# Patient Record
Sex: FEMALE | Race: WHITE | NOT HISPANIC OR LATINO | Employment: OTHER | URBAN - METROPOLITAN AREA
[De-identification: names, ages, dates, MRNs, and addresses within clinical notes are randomized per-mention and may not be internally consistent; named-entity substitution may affect disease eponyms.]

---

## 2017-01-27 ENCOUNTER — TELEPHONE (OUTPATIENT)
Dept: MEDICAL GROUP | Facility: CLINIC | Age: 72
End: 2017-01-27

## 2017-01-27 ENCOUNTER — HOSPITAL ENCOUNTER (OUTPATIENT)
Dept: LAB | Facility: MEDICAL CENTER | Age: 72
End: 2017-01-27
Attending: FAMILY MEDICINE
Payer: COMMERCIAL

## 2017-01-27 DIAGNOSIS — E78.5 DYSLIPIDEMIA, GOAL LDL BELOW 130: ICD-10-CM

## 2017-01-27 DIAGNOSIS — K21.9 GASTROESOPHAGEAL REFLUX DISEASE WITHOUT ESOPHAGITIS: ICD-10-CM

## 2017-01-27 DIAGNOSIS — Z85.51 HISTORY OF BLADDER CANCER: ICD-10-CM

## 2017-01-27 DIAGNOSIS — I10 ESSENTIAL HYPERTENSION: ICD-10-CM

## 2017-01-27 LAB
ALBUMIN SERPL BCP-MCNC: 4.8 G/DL (ref 3.2–4.9)
ALBUMIN/GLOB SERPL: 1.7 G/DL
ALP SERPL-CCNC: 115 U/L (ref 30–99)
ALT SERPL-CCNC: 17 U/L (ref 2–50)
ANION GAP SERPL CALC-SCNC: 10 MMOL/L (ref 0–11.9)
AST SERPL-CCNC: 18 U/L (ref 12–45)
BASOPHILS # BLD AUTO: 0.03 K/UL (ref 0–0.12)
BASOPHILS NFR BLD AUTO: 0.5 % (ref 0–1.8)
BILIRUB SERPL-MCNC: 0.5 MG/DL (ref 0.1–1.5)
BUN SERPL-MCNC: 21 MG/DL (ref 8–22)
CALCIUM SERPL-MCNC: 10.7 MG/DL (ref 8.5–10.5)
CHLORIDE SERPL-SCNC: 105 MMOL/L (ref 96–112)
CHOLEST SERPL-MCNC: 227 MG/DL (ref 100–199)
CO2 SERPL-SCNC: 27 MMOL/L (ref 20–33)
CREAT SERPL-MCNC: 0.9 MG/DL (ref 0.5–1.4)
EOSINOPHIL # BLD: 0.08 K/UL (ref 0–0.51)
EOSINOPHIL NFR BLD AUTO: 1.3 % (ref 0–6.9)
ERYTHROCYTE [DISTWIDTH] IN BLOOD BY AUTOMATED COUNT: 46.3 FL (ref 35.9–50)
GLOBULIN SER CALC-MCNC: 2.8 G/DL (ref 1.9–3.5)
GLUCOSE SERPL-MCNC: 87 MG/DL (ref 65–99)
HCT VFR BLD AUTO: 46.2 % (ref 37–47)
HDLC SERPL-MCNC: 90 MG/DL
HGB BLD-MCNC: 14.5 G/DL (ref 12–16)
IMM GRANULOCYTES # BLD AUTO: 0 K/UL (ref 0–0.11)
IMM GRANULOCYTES NFR BLD AUTO: 0 % (ref 0–0.9)
LDLC SERPL CALC-MCNC: 115 MG/DL
LYMPHOCYTES # BLD: 1.72 K/UL (ref 1–4.8)
LYMPHOCYTES NFR BLD AUTO: 28.5 % (ref 22–41)
MCH RBC QN AUTO: 29.5 PG (ref 27–33)
MCHC RBC AUTO-ENTMCNC: 31.4 G/DL (ref 33.6–35)
MCV RBC AUTO: 94.1 FL (ref 81.4–97.8)
MONOCYTES # BLD: 0.51 K/UL (ref 0–0.85)
MONOCYTES NFR BLD AUTO: 8.5 % (ref 0–13.4)
NEUTROPHILS # BLD: 3.69 K/UL (ref 2–7.15)
NEUTROPHILS NFR BLD AUTO: 61.2 % (ref 44–72)
NRBC # BLD AUTO: 0 K/UL
NRBC BLD-RTO: 0 /100 WBC
PLATELET # BLD AUTO: 248 K/UL (ref 164–446)
PMV BLD AUTO: 9.4 FL (ref 9–12.9)
POTASSIUM SERPL-SCNC: 4 MMOL/L (ref 3.6–5.5)
PROT SERPL-MCNC: 7.6 G/DL (ref 6–8.2)
RBC # BLD AUTO: 4.91 M/UL (ref 4.2–5.4)
SODIUM SERPL-SCNC: 142 MMOL/L (ref 135–145)
TRIGL SERPL-MCNC: 108 MG/DL (ref 0–149)
WBC # BLD AUTO: 6 K/UL (ref 4.8–10.8)

## 2017-01-27 PROCEDURE — 85025 COMPLETE CBC W/AUTO DIFF WBC: CPT

## 2017-01-27 PROCEDURE — 80061 LIPID PANEL: CPT

## 2017-01-27 PROCEDURE — 80053 COMPREHEN METABOLIC PANEL: CPT

## 2017-01-27 PROCEDURE — 36415 COLL VENOUS BLD VENIPUNCTURE: CPT

## 2017-01-27 NOTE — TELEPHONE ENCOUNTER
1. Caller Name: autumn                      Call Back Number: [phone].     2. Message: current labs in the system have been ordered over a year ago. Pt is requesting new updated labs.     3. Patient approves office to leave a detailed voicemail/MyChart message: yes

## 2017-02-24 ENCOUNTER — OFFICE VISIT (OUTPATIENT)
Dept: MEDICAL GROUP | Facility: CLINIC | Age: 72
End: 2017-02-24
Payer: MEDICARE

## 2017-02-24 VITALS
OXYGEN SATURATION: 97 % | DIASTOLIC BLOOD PRESSURE: 80 MMHG | WEIGHT: 221 LBS | HEART RATE: 60 BPM | TEMPERATURE: 97.5 F | SYSTOLIC BLOOD PRESSURE: 130 MMHG | RESPIRATION RATE: 16 BRPM | HEIGHT: 67 IN | BODY MASS INDEX: 34.69 KG/M2

## 2017-02-24 DIAGNOSIS — Z12.31 ENCOUNTER FOR SCREENING MAMMOGRAM FOR BREAST CANCER: ICD-10-CM

## 2017-02-24 DIAGNOSIS — E66.9 OBESITY (BMI 30.0-34.9): ICD-10-CM

## 2017-02-24 DIAGNOSIS — Z00.00 ROUTINE GENERAL MEDICAL EXAMINATION AT A HEALTH CARE FACILITY: ICD-10-CM

## 2017-02-24 DIAGNOSIS — Z00.00 LABORATORY EXAMINATION ORDERED AS PART OF A ROUTINE GENERAL MEDICAL EXAMINATION: ICD-10-CM

## 2017-02-24 PROBLEM — E66.811 OBESITY (BMI 30.0-34.9): Status: ACTIVE | Noted: 2017-02-24

## 2017-02-24 PROCEDURE — 99397 PER PM REEVAL EST PAT 65+ YR: CPT | Performed by: FAMILY MEDICINE

## 2017-02-24 PROCEDURE — G8419 CALC BMI OUT NRM PARAM NOF/U: HCPCS | Performed by: FAMILY MEDICINE

## 2017-02-24 ASSESSMENT — LIFESTYLE VARIABLES: SUBSTANCE_ABUSE: 0

## 2017-02-24 ASSESSMENT — ENCOUNTER SYMPTOMS
SPEECH CHANGE: 0
ORTHOPNEA: 0
FEVER: 0
BLURRED VISION: 0
MEMORY LOSS: 0
TREMORS: 0
LOSS OF CONSCIOUSNESS: 0
BLOOD IN STOOL: 0
DIZZINESS: 0
DIARRHEA: 0
SPUTUM PRODUCTION: 0
DOUBLE VISION: 0
SHORTNESS OF BREATH: 0
SENSORY CHANGE: 0
CONSTIPATION: 0
CHILLS: 0
MYALGIAS: 0
COUGH: 0
DEPRESSION: 0
SEIZURES: 0
INSOMNIA: 0
NAUSEA: 0
ABDOMINAL PAIN: 0
WEIGHT LOSS: 0
VOMITING: 0
FOCAL WEAKNESS: 0
NERVOUS/ANXIOUS: 0
HEADACHES: 0
TINGLING: 0
WHEEZING: 0
PALPITATIONS: 0
SORE THROAT: 0
BRUISES/BLEEDS EASILY: 0
HEARTBURN: 0
HALLUCINATIONS: 0

## 2017-02-24 ASSESSMENT — PATIENT HEALTH QUESTIONNAIRE - PHQ9: CLINICAL INTERPRETATION OF PHQ2 SCORE: 0

## 2017-02-24 NOTE — MR AVS SNAPSHOT
"        Franchesca Paige   2017 9:20 AM   Office Visit   MRN: [id number]    Department:  [facility]   Dept Phone:  [phone]    Description:  Female : 1945   Provider:  Lou De La Rosa M.D.           Reason for Visit     Annual Exam           Allergies as of 2017     Allergen Noted Reactions    Sulfa Drugs 2008   Vomiting      You were diagnosed with     Routine general medical examination at a health care facility   [V70.0.ICD-9-CM]       Laboratory examination ordered as part of a routine general medical examination   [V72.62.ICD-9-CM]       Encounter for screening mammogram for breast cancer   [[id number]]       Obesity (BMI 30.0-34.9)   [498889]         Vital Signs     Blood Pressure Pulse Temperature Respirations Height Weight    130/80 mmHg 60 36.4 °C (97.5 °F) 16 1.702 m (5' 7.01\") 100.245 kg (221 lb)    Body Mass Index Oxygen Saturation Smoking Status             34.61 kg/m2 97% Former Smoker         Basic Information     Date Of Birth Sex Race Ethnicity Preferred Language    1945 Female White Non- English      Problem List              ICD-10-CM Priority Class Noted - Resolved    Primary osteoarthritis involving multiple joints M15.0   Unknown - Present    Dyslipidemia, goal LDL below 130 E78.5   Unknown - Present    Essential hypertension I10   2015 - Present    Status post total left knee replacement Z96.652   2015 - Present    Gastroesophageal reflux disease without esophagitis K21.9   2015 - Present    Allergic rhinitis due to pollen J30.1   2015 - Present    History of bladder cancer Z85.51   2015 - Present    Need for subacute bacterial endocarditis prophylaxis Z41.8   2016 - Present    Obesity (BMI 30.0-34.9) E66.9   2017 - Present      Health Maintenance        Date Due Completion Dates    MAMMOGRAM 2016, 2013, 2012 (Prv Comp), 2010, 2010, 2009, 2009, 2008, 2008, " 1/10/2007, 1/10/2007, 11/3/2005, 11/2/2004, 10/20/2004    Override on 6/8/2012: Previously completed    BONE DENSITY 9/24/2017 9/24/2012 (Prv Comp)    Override on 9/24/2012: Previously completed    IMM DTaP/Tdap/Td Vaccine (2 - Td) 5/18/2019 5/18/2009    COLONOSCOPY 8/12/2020 8/12/2010 (Prv Comp)    Override on 8/12/2010: Previously completed (diverticulosis)            Current Immunizations     13-VALENT PCV PREVNAR 9/26/2016  2:11 PM    Influenza TIV (IM) 10/5/2014, 12/3/2012    Influenza Vaccine Adult HD 9/26/2016  2:08 PM, 9/26/2015    Pneumococcal Vaccine (UF)Historical Data 12/3/2012    Pneumococcal polysaccharide vaccine (PPSV-23) 12/3/2012    SHINGLES VACCINE 11/5/2012    Tdap Vaccine 5/18/2009    Tetanus Vaccine 12/3/2012      Below and/or attached are the medications your provider expects you to take. Review all of your home medications and newly ordered medications with your provider and/or pharmacist. Follow medication instructions as directed by your provider and/or pharmacist. Please keep your medication list with you and share with your provider. Update the information when medications are discontinued, doses are changed, or new medications (including over-the-counter products) are added; and carry medication information at all times in the event of emergency situations     Allergies:  SULFA DRUGS - Vomiting               Medications  Valid as of: February 24, 2017 -  9:58 AM    Generic Name Brand Name Tablet Size Instructions for use    Acyclovir (Ointment) ZOVIRAX 5 % Applied to affected area q.i.d.        Famotidine (Tab) PEPCID 20 MG Take 1 Tab by mouth 2 times a day.        Fluticasone Propionate (Suspension) FLONASE 50 MCG/ACT Spray 2 Sprays in nose every day.        Fosinopril Sodium (Tab) MONOPRIL 10 MG Take 1 Tab by mouth every day.        S-Adenosylmethionine (Tab) NYLA-e 200 MG Take 1 Tab by mouth every day. OTC        TraMADol HCl (Tab) ULTRAM 50 MG Take 1 Tab by mouth every 6 hours as  needed (arthritis pain).        .                 Medicines prescribed today were sent to:     [id number] - [place], [place] - [address] [address]    [address] [address] [place] [address]    Phone: [phone] Fax: [phone]    Open 24 Hours?: No      Medication refill instructions:       If your prescription bottle indicates you have medication refills left, it is not necessary to call your provider’s office. Please contact your pharmacy and they will refill your medication.    If your prescription bottle indicates you do not have any refills left, you may request refills at any time through one of the following ways: The online [facility] system (except Urgent Care), by calling your provider’s office, or by asking your pharmacy to contact your provider’s office with a refill request. Medication refills are processed only during regular business hours and may not be available until the next business day. Your provider may request additional information or to have a follow-up visit with you prior to refilling your medication.   *Please Note: Medication refills are assigned a new Rx number when refilled electronically. Your pharmacy may indicate that no refills were authorized even though a new prescription for the same medication is available at the pharmacy. Please request the medicine by name with the pharmacy before contacting your provider for a refill.        Your To Do List     Future Labs/Procedures Complete By Expires    CBC WITHOUT DIFFERENTIAL  As directed 8/27/2017    COMP METABOLIC PANEL  As directed 2/25/2018    LIPID PROFILE  As directed 2/25/2018    MA-SCREEN MAMMO W/CAD-BILAT  As directed 2/24/2018         [facility] Status: Patient Declined

## 2017-02-24 NOTE — PROGRESS NOTES
Subjective:      Franchesca Paige is a 71 y.o. female who presents with Annual Exam        She is here for her general medical examination.  She is generally well.    Annual Exam  Pertinent negatives include no abdominal pain, chest pain, chills, congestion, coughing, fever, headaches, myalgias, nausea, rash, sore throat or vomiting.    has a past medical history of Bladder cancer (CMS-HCC); Hypertension (1992); Arthritis; Osteopenia; Dyslipidemia, goal LDL below 130; Total knee replacement status; and Ocular migraine. She also has no past medical history of Clotting disorder (CMS-HCC) or Breast cancer (CMS-HCC).   has past surgical history that includes hysterectomy, total abdominal (1980s); colonoscopy (8/3/2010); and knee replacement, total (12/4/12).  family history includes Cancer in her paternal aunt and sister; Diabetes in her other; Hypertension in her father and mother; Psychiatry in her mother and sister.   reports that she quit smoking about 16 years ago. She has never used smokeless tobacco. She reports that she does not drink alcohol or use illicit drugs.      Current outpatient prescriptions:   •  famotidine (PEPCID) 20 MG Tab, Take 1 Tab by mouth 2 times a day., Disp: 60 Tab, Rfl: 11  •  tramadol (ULTRAM) 50 MG Tab, Take 1 Tab by mouth every 6 hours as needed (arthritis pain)., Disp: 50 Tab, Rfl: 2  •  fosinopril (MONOPRIL) 10 MG Tab, Take 1 Tab by mouth every day., Disp: 30 Tab, Rfl: 11  •  fluticasone (FLONASE) 50 MCG/ACT nasal spray, Spray 2 Sprays in nose every day., Disp: 1 Bottle, Rfl: 11  •  acyclovir (ZOVIRAX) 5 % OINT, Applied to affected area q.i.d., Disp: 1 Tube, Rfl: 3  •  S-Adenosylmethionine (NYLA-E) 200 MG TABS, Take 1 Tab by mouth every day. OTC, Disp: 30 Tab, Rfl: 0  is allergic to sulfa drugs.      Review of Systems   Constitutional: Negative for fever, chills, weight loss and malaise/fatigue.   HENT: Negative for congestion, hearing loss, sore throat and tinnitus.    Eyes: Negative for  "blurred vision and double vision.   Respiratory: Negative for cough, sputum production, shortness of breath and wheezing.    Cardiovascular: Negative for chest pain, palpitations, orthopnea and leg swelling.   Gastrointestinal: Negative for heartburn, nausea, vomiting, abdominal pain, diarrhea, constipation and blood in stool.        Stool is loose all the time, as always.  Has tried many things.   Genitourinary: Negative for dysuria, urgency and frequency.   Musculoskeletal: Negative for myalgias and joint pain.   Skin: Negative for rash.   Neurological: Negative for dizziness, tingling, tremors, sensory change, speech change, focal weakness, seizures, loss of consciousness and headaches.   Endo/Heme/Allergies: Positive for environmental allergies. Does not bruise/bleed easily.   Psychiatric/Behavioral: Negative for depression, suicidal ideas, hallucinations, memory loss and substance abuse. The patient is not nervous/anxious and does not have insomnia.           Objective:     /80 mmHg  Pulse 60  Temp(Src) 36.4 °C (97.5 °F)  Resp 16  Ht 1.702 m (5' 7.01\")  Wt 100.245 kg (221 lb)  BMI 34.61 kg/m2  SpO2 97%     Physical Exam   Constitutional: She is oriented to person, place, and time. She appears well-developed and well-nourished.   HENT:   Head: Normocephalic and atraumatic.   Mouth/Throat: Oropharynx is clear and moist.   Eyes: EOM are normal. Pupils are equal, round, and reactive to light. Left eye exhibits no discharge.   Neck: Normal range of motion. Neck supple. No JVD present. No thyromegaly present.   Cardiovascular: Normal rate, regular rhythm and normal heart sounds.    No murmur heard.  Pulmonary/Chest: Effort normal and breath sounds normal. No respiratory distress. She has no wheezes. She has no rales.   Abdominal: Soft. Bowel sounds are normal. She exhibits no distension and no mass. There is no tenderness.   Musculoskeletal: Normal range of motion. She exhibits no edema. "   Lymphadenopathy:     She has no cervical adenopathy.   Neurological: She is alert and oriented to person, place, and time. Coordination normal.   Skin: Skin is warm and dry. No rash noted. No erythema.   Psychiatric: She has a normal mood and affect. Her behavior is normal.   Vitals reviewed.        1/27/17 lab tests are reviewed.  The cholesterol is superb overall.  Blood sugar and kidney function are good.  May be low in vitamin D.       Assessment/Plan:     1. Routine general medical examination at a health care facility  She is generally very well.    2. Laboratory examination ordered as part of a routine general medical examination  Her January lab results are reviewed, they are excellent overall.    3. Encounter for screening mammogram for breast cancer  Order discussed and placed  - MA-SCREEN MAMMO W/CAD-BILAT; Future    4. Obesity (BMI 30.0-34.9)  She has had voluntary weight loss.  She is still doing Pilates, though she  her ribs.  - Patient identified as having weight management issue.  Appropriate orders and counseling given.

## 2017-02-28 DIAGNOSIS — I10 ESSENTIAL HYPERTENSION: ICD-10-CM

## 2017-02-28 RX ORDER — FOSINOPRIL SODIUM 10 MG/1
10 TABLET ORAL DAILY
Qty: 30 TAB | Refills: 11 | Status: SHIPPED | OUTPATIENT
Start: 2017-02-28 | End: 2017-03-13 | Stop reason: SDUPTHER

## 2017-03-13 DIAGNOSIS — I10 ESSENTIAL HYPERTENSION: ICD-10-CM

## 2017-03-13 DIAGNOSIS — J30.1 ALLERGIC RHINITIS DUE TO POLLEN, UNSPECIFIED RHINITIS SEASONALITY: ICD-10-CM

## 2017-03-13 DIAGNOSIS — M15.9 PRIMARY OSTEOARTHRITIS INVOLVING MULTIPLE JOINTS: ICD-10-CM

## 2017-03-13 DIAGNOSIS — B00.1 RECURRENT COLD SORES: ICD-10-CM

## 2017-03-13 RX ORDER — FOSINOPRIL SODIUM 10 MG/1
10 TABLET ORAL DAILY
Qty: 30 TAB | Refills: 11 | Status: SHIPPED | OUTPATIENT
Start: 2017-03-13 | End: 2018-02-08 | Stop reason: SDUPTHER

## 2017-03-13 RX ORDER — ACYCLOVIR 50 MG/G
OINTMENT TOPICAL
Qty: 1 TUBE | Refills: 3 | Status: SHIPPED | OUTPATIENT
Start: 2017-03-13 | End: 2018-02-21

## 2017-03-13 RX ORDER — FLUTICASONE PROPIONATE 50 MCG
2 SPRAY, SUSPENSION (ML) NASAL DAILY
Qty: 1 BOTTLE | Refills: 11 | Status: SHIPPED | OUTPATIENT
Start: 2017-03-13 | End: 2018-02-21

## 2017-03-13 RX ORDER — TRAMADOL HYDROCHLORIDE 50 MG/1
50 TABLET ORAL EVERY 6 HOURS PRN
Qty: 50 TAB | Refills: 2 | Status: SHIPPED
Start: 2017-03-13 | End: 2018-02-08 | Stop reason: SDUPTHER

## 2017-03-17 ENCOUNTER — TELEPHONE (OUTPATIENT)
Dept: MEDICAL GROUP | Facility: CLINIC | Age: 72
End: 2017-03-17

## 2017-03-17 DIAGNOSIS — Z85.51 HISTORY OF BLADDER CANCER: ICD-10-CM

## 2017-03-17 DIAGNOSIS — Z09 FOLLOW-UP EXAMINATION: ICD-10-CM

## 2017-03-17 NOTE — TELEPHONE ENCOUNTER
1. Caller Name: Franchesca Paige                                         Call Back Number: [phone] (home)       Patient approves a detailed voicemail message: yes    2. SPECIFIC Action To Be Taken: Referral pending, please sign.    3. Diagnosis/Clinical Reason for Request: bladder cancer follow up     4. Specialty & Provider Name/Lab/Imaging Location: Dr. Mccall    5. Is appointment scheduled for requested order/referral: yes 3/21/17    Patient informed they will receive a return phone call from the office ONLY if there are any questions before processing their request. Advised to call back if they haven't received a call from the referral department in 5 days.

## 2017-03-29 DIAGNOSIS — K21.9 GASTROESOPHAGEAL REFLUX DISEASE WITHOUT ESOPHAGITIS: ICD-10-CM

## 2017-03-30 RX ORDER — FAMOTIDINE 20 MG/1
20 TABLET, FILM COATED ORAL 2 TIMES DAILY
Qty: 180 TAB | Refills: 3 | Status: SHIPPED | OUTPATIENT
Start: 2017-03-30 | End: 2018-02-21

## 2017-05-22 DIAGNOSIS — Z29.89 NEED FOR SUBACUTE BACTERIAL ENDOCARDITIS PROPHYLAXIS: ICD-10-CM

## 2017-05-22 RX ORDER — AMOXICILLIN 500 MG/1
2000 CAPSULE ORAL ONCE
Qty: 4 CAP | Refills: 0 | Status: SHIPPED | OUTPATIENT
Start: 2017-05-22 | End: 2017-11-14 | Stop reason: SDUPTHER

## 2017-06-23 ENCOUNTER — HOSPITAL ENCOUNTER (OUTPATIENT)
Dept: RADIOLOGY | Facility: MEDICAL CENTER | Age: 72
End: 2017-06-23
Attending: FAMILY MEDICINE
Payer: MEDICARE

## 2017-06-23 DIAGNOSIS — Z12.31 ENCOUNTER FOR SCREENING MAMMOGRAM FOR BREAST CANCER: ICD-10-CM

## 2017-06-23 PROCEDURE — 77063 BREAST TOMOSYNTHESIS BI: CPT

## 2017-11-14 DIAGNOSIS — Z29.89 NEED FOR SUBACUTE BACTERIAL ENDOCARDITIS PROPHYLAXIS: ICD-10-CM

## 2017-11-15 RX ORDER — AMOXICILLIN 500 MG/1
CAPSULE ORAL
Qty: 4 CAP | Refills: 2 | Status: SHIPPED | OUTPATIENT
Start: 2017-11-15 | End: 2018-02-21

## 2018-01-17 ENCOUNTER — OFFICE VISIT (OUTPATIENT)
Dept: MEDICAL GROUP | Facility: CLINIC | Age: 73
End: 2018-01-17
Payer: MEDICARE

## 2018-01-17 VITALS
DIASTOLIC BLOOD PRESSURE: 80 MMHG | OXYGEN SATURATION: 97 % | TEMPERATURE: 98.8 F | WEIGHT: 275.8 LBS | BODY MASS INDEX: 41.8 KG/M2 | HEART RATE: 72 BPM | SYSTOLIC BLOOD PRESSURE: 148 MMHG | HEIGHT: 68 IN | RESPIRATION RATE: 12 BRPM

## 2018-01-17 DIAGNOSIS — H92.01 OTALGIA OF RIGHT EAR: ICD-10-CM

## 2018-01-17 PROCEDURE — 99213 OFFICE O/P EST LOW 20 MIN: CPT | Performed by: FAMILY MEDICINE

## 2018-01-17 NOTE — PROGRESS NOTES
"CC: Sharp pains right ear    HPI:   Franchesca is a 72-year-old white female presents today with the following.    Woke up with intermittent sharp pains in her right ear. She is worried about wax buildup which led to a severe ear infection about 12 years ago. Denies left ear pain. Denies fever or chills. Denies head congestion. Otherwise she feels well.      Patient Active Problem List    Diagnosis Date Noted   • Obesity (BMI 30.0-34.9) 02/24/2017   • Need for subacute bacterial endocarditis prophylaxis 01/21/2016   • Essential hypertension 06/11/2015   • Status post total left knee replacement 06/11/2015   • Gastroesophageal reflux disease without esophagitis 06/11/2015   • Allergic rhinitis due to pollen 06/11/2015   • History of bladder cancer 06/11/2015   • Dyslipidemia, goal LDL below 130    • Primary osteoarthritis involving multiple joints        Current Outpatient Prescriptions   Medication Sig Dispense Refill   • fosinopril (MONOPRIL) 10 MG Tab Take 1 Tab by mouth every day. 30 Tab 11   • S-Adenosylmethionine (NYLA-E) 200 MG TABS Take 1 Tab by mouth every day. OTC 30 Tab 0   • amoxicillin (AMOXIL) 500 MG Cap TAKE 4 CAPSULES BY MOUTH AS 1 DOSE 4 Cap 2   • famotidine (PEPCID) 20 MG Tab Take 1 Tab by mouth 2 times a day. 180 Tab 3   • fluticasone (FLONASE) 50 MCG/ACT nasal spray Spray 2 Sprays in nose every day. 1 Bottle 11   • acyclovir (ZOVIRAX) 5 % Ointment Applied to affected area q.i.d. 1 Tube 3   • tramadol (ULTRAM) 50 MG Tab Take 1 Tab by mouth every 6 hours as needed (arthritis pain). 50 Tab 2     No current facility-administered medications for this visit.          Allergies as of 01/17/2018 - Reviewed 01/17/2018   Allergen Reaction Noted   • Sulfa drugs Vomiting 01/04/2008        ROS: As per HPI.    /80   Pulse 72   Temp 37.1 °C (98.8 °F)   Resp 12   Ht 1.727 m (5' 8\")   Wt (!) 125.1 kg (275 lb 12.8 oz)   SpO2 97%   Breastfeeding? No   BMI 41.94 kg/m²     Physical Exam:  Gen:         Alert " and oriented, No apparent distress.  HEENT:    PERRLA, EOMI, oropharynx clear without exudates, TMs clear bilaterally. Ear canals clear bilaterally. Left ear canal has a scant amount of cerumen in right ear has none  Neck:        No Lymphadenopathy  Lungs:     Clear to auscultation bilaterally  CV:          Regular rate and rhythm. No murmurs, rubs or gallops.                 Ext:          No clubbing, cyanosis, edema.      Assessment and Plan.   72 y.o. female with the following issues.    1. Otalgia of right ear  -She is given reassurance that there is no sign of wax or infection of her right ear. I suggested the option of using Debrox ear drops to prevent wax buildup.    Follow-up when necessary.

## 2018-02-08 ENCOUNTER — OFFICE VISIT (OUTPATIENT)
Dept: MEDICAL GROUP | Facility: CLINIC | Age: 73
End: 2018-02-08
Payer: MEDICARE

## 2018-02-08 VITALS
BODY MASS INDEX: 32.13 KG/M2 | OXYGEN SATURATION: 98 % | TEMPERATURE: 97.7 F | SYSTOLIC BLOOD PRESSURE: 128 MMHG | RESPIRATION RATE: 12 BRPM | DIASTOLIC BLOOD PRESSURE: 62 MMHG | HEART RATE: 56 BPM | WEIGHT: 212 LBS | HEIGHT: 68 IN

## 2018-02-08 DIAGNOSIS — E66.9 OBESITY (BMI 30.0-34.9): ICD-10-CM

## 2018-02-08 DIAGNOSIS — M25.571 CHRONIC PAIN OF RIGHT ANKLE: ICD-10-CM

## 2018-02-08 DIAGNOSIS — M15.9 PRIMARY OSTEOARTHRITIS INVOLVING MULTIPLE JOINTS: ICD-10-CM

## 2018-02-08 DIAGNOSIS — M85.89 OSTEOPENIA OF MULTIPLE SITES: ICD-10-CM

## 2018-02-08 DIAGNOSIS — I10 ESSENTIAL HYPERTENSION: ICD-10-CM

## 2018-02-08 DIAGNOSIS — G89.29 CHRONIC PAIN OF RIGHT ANKLE: ICD-10-CM

## 2018-02-08 DIAGNOSIS — E78.5 DYSLIPIDEMIA, GOAL LDL BELOW 130: ICD-10-CM

## 2018-02-08 DIAGNOSIS — K21.9 GASTROESOPHAGEAL REFLUX DISEASE WITHOUT ESOPHAGITIS: ICD-10-CM

## 2018-02-08 PROCEDURE — 99214 OFFICE O/P EST MOD 30 MIN: CPT | Performed by: FAMILY MEDICINE

## 2018-02-08 RX ORDER — FOSINOPRIL SODIUM 10 MG/1
10 TABLET ORAL DAILY
Qty: 30 TAB | Refills: 11 | Status: SHIPPED | OUTPATIENT
Start: 2018-02-08 | End: 2019-02-02 | Stop reason: SDUPTHER

## 2018-02-08 RX ORDER — TRAMADOL HYDROCHLORIDE 50 MG/1
50 TABLET ORAL EVERY 6 HOURS PRN
Qty: 50 TAB | Refills: 3 | Status: SHIPPED | OUTPATIENT
Start: 2018-02-08 | End: 2019-02-26 | Stop reason: SDUPTHER

## 2018-02-08 ASSESSMENT — PAIN SCALES - GENERAL: PAINLEVEL: 6=MODERATE PAIN

## 2018-02-08 ASSESSMENT — PATIENT HEALTH QUESTIONNAIRE - PHQ9
5. POOR APPETITE OR OVEREATING: 1 - SEVERAL DAYS
CLINICAL INTERPRETATION OF PHQ2 SCORE: 1
SUM OF ALL RESPONSES TO PHQ QUESTIONS 1-9: 5

## 2018-02-11 NOTE — PROGRESS NOTES
Chief Complaint   Patient presents with   • Ankle Pain     years   • Nasal Congestion   • Arthritis   • Hyperlipidemia   • Hypertension       Subjective:     HPI:   Franchesca Paige presents today with the followin. Osteopenia of multiple sites  She is due for repeat bone density test. She has no history of pathologic fracture. Discussed vitamin D and calcium supplementation. DEXA scan discussed and order placed.    2. Dyslipidemia, goal LDL below 130  Patient denies chest pain, chest pressure, palpitations or exertional shortness of breath. Patient is not on lipid-lowering medication. Her previous lipid testing had shown mild lipid elevation with an excellent risk ratio. Patient is a former smoker. Patient takes no aspirin daily due to bruising and GI upset. Patient has no history of myocardial infarction, stroke or PVD.  The problem is clinically stable.  Lab orders are discussed and placed.    3. Essential hypertension  HTN - Chronic condition stable. Currently taking all meds as directed.   She is not taking baby aspirin daily.   She is not monitoring BP at home.   Denies symptoms low BP: light-headed, tunnel-vision, unusual fatigue.   Denies symptoms high BP:pounding headache, visual changes, palpitations, flushed face.   Denies medicine side effects: unusual fatigue, slow heartbeat, foot/leg swelling, cough.  -related lab orders are discussed and placed.    4. Gastroesophageal reflux disease without esophagitis  She feels the current medication regimen of famotidine is controlling the gastroesophageal reflux symptoms well. Denies dysphagia, reflux symptoms, acidity, abdominal pain or visible blood or mucus in the stool. Denies vomiting or hematemesis. Denies burping or abdominal bloating. Patient avoids nonsteroidal anti-inflammatory drugs. Avoids heavy meals or eating within 2 hours of bedtime.  Orders discussed and placed.    5. Primary osteoarthritis involving multiple joints  Patient has ongoing  history of osteoarthritis of multiple joints.  She had advanced left knee arthritis clinically and by MRI and underwent a total knee replacement. This is been successful but the right knee is now giving her increased pain and debility. Discussed that at some point she may need a right total knee replacement. Have advised her not to delay this beyond age 77. The patient uses tramadol to treat this pain.  She is unfortunately unable to use anti-inflammatories due to her chronic gastrointestinal issues. There mild morning stiffness.  Use of anti-inflammatories including risk of kidney failure and GI problems including ulceration are discussed.    6. Chronic pain of right ankle  Per patient, this has been present over 2 years. There is medial pain and sometimes the tissue swells. This has been felt to be a combination of arthritis and possibly tendon damage from old injury.  She continues to use compression in the area. That is modestly helpful. States anti-inflammatories that were tried in the past were not particularly helpful. States that the tramadol brings the pain from an 8 or worse down to around 6 and sometimes better which allows her to complete her ADLs. Denies somnolence or confusion from the tramadol regimen. Review of [facility] Board of pharmacy interface shows no inconsistencies. Morphine milligram equivalent as well within CVC guidelines. Discussed specific ways to massage the ankle and keep the ankle elevated. She will try this for a few weeks and will let me know if there is any improvement. She may need further imaging and orthopedic evaluation.    7. Obesity (BMI 30.0-34.9)  Patient continues to slowly lose weight. She has lost 9 pounds over the last year. She is congratulated. She is following a new dietary regimen which is primarily a lifestyle change. She is satisfied that she is continuing to lose weight.        Patient Active Problem List    Diagnosis Date Noted   • Chronic pain of right ankle  "02/08/2018   • Obesity (BMI 30.0-34.9) 02/24/2017   • Need for subacute bacterial endocarditis prophylaxis 01/21/2016   • Essential hypertension 06/11/2015   • Status post total left knee replacement 06/11/2015   • Gastroesophageal reflux disease without esophagitis 06/11/2015   • Allergic rhinitis due to pollen 06/11/2015   • History of bladder cancer 06/11/2015   • Dyslipidemia, goal LDL below 130    • Primary osteoarthritis involving multiple joints        Current medicines (including changes today)  Current Outpatient Prescriptions   Medication Sig Dispense Refill   • tramadol (ULTRAM) 50 MG Tab Take 1 Tab by mouth every 6 hours as needed (arthritis pain) for up to 180 days. 50 Tab 3   • fosinopril (MONOPRIL) 10 MG Tab Take 1 Tab by mouth every day. 30 Tab 11   • famotidine (PEPCID) 20 MG Tab Take 1 Tab by mouth 2 times a day. 180 Tab 3   • fluticasone (FLONASE) 50 MCG/ACT nasal spray Spray 2 Sprays in nose every day. 1 Bottle 11   • acyclovir (ZOVIRAX) 5 % Ointment Applied to affected area q.i.d. 1 Tube 3   • S-Adenosylmethionine (NYLA-E) 200 MG TABS Take 1 Tab by mouth every day. OTC 30 Tab 0   • amoxicillin (AMOXIL) 500 MG Cap TAKE 4 CAPSULES BY MOUTH AS 1 DOSE 4 Cap 2     No current facility-administered medications for this visit.        Allergies   Allergen Reactions   • Sulfa Drugs Vomiting       ROS: As per HPI       Objective:     Blood pressure 128/62, pulse (!) 56, temperature 36.5 °C (97.7 °F), resp. rate 12, height 1.727 m (5' 8\"), weight 96.2 kg (212 lb), SpO2 98 %, not currently breastfeeding. Body mass index is 32.23 kg/m².    Physical Exam:  Constitutional: Well-developed and well-nourished. Not diaphoretic. No distress. Lucid and fluent.  Skin: Skin is warm and dry. No rash noted.  Head: Atraumatic without lesions.  Eyes: Conjunctivae and extraocular motions are normal. Pupils are equal, round, and reactive to light. No scleral icterus.   Ears:  External ears unremarkable. Tympanic membranes " clear and intact.  Nose: Nares patent. Mucosa without edema or erythema. No discharge. No facial tenderness.  Mouth/Throat: Tongue normal. Oropharynx is clear and moist. Posterior pharynx without erythema or exudates.  Neck: Supple, trachea midline. No thyromegaly present. No cervical or supraclavicular lymphadenopathy. No JVD or carotid bruits appreciated  Cardiovascular: Regular rate and rhythm.  Normal S1, S2 without murmur appreciated.  Chest: Effort normal. Clear to auscultation throughout. No adventitious sounds.   Abdomen: Soft, non tender, and without distention. Active bowel sounds in all four quadrants. No rebound, guarding, masses or hepatosplenomegaly.  Extremities: No cyanosis, clubbing, erythema, nor edema.  Right ankle medial swollen tissue, quite painful. No heat or erythema.  Neurological: Alert and oriented x 3.   Psychiatric:  Behavior, mood, and affect are appropriate.       Assessment and Plan:     72 y.o. female with the following issues:    1. Osteopenia of multiple sites  VITAMIN D,25 HYDROXY    DS-BONE DENSITY STUDY (DEXA)   2. Dyslipidemia, goal LDL below 130  COMP METABOLIC PANEL    LIPID PROFILE   3. Essential hypertension  COMP METABOLIC PANEL    fosinopril (MONOPRIL) 10 MG Tab   4. Gastroesophageal reflux disease without esophagitis  COMP METABOLIC PANEL    CBC WITHOUT DIFFERENTIAL   5. Primary osteoarthritis involving multiple joints  tramadol (ULTRAM) 50 MG Tab    CONSENT FOR OPIATE PRESCRIPTION   6. Chronic pain of right ankle  tramadol (ULTRAM) 50 MG Tab    CONSENT FOR OPIATE PRESCRIPTION   7. Obesity (BMI 30.0-34.9)  Patient identified as having weight management issue.  Appropriate orders and counseling given.         Followup: Return in about 5 weeks (around 3/16/2018), or if symptoms worsen or fail to improve.

## 2018-02-16 ENCOUNTER — HOSPITAL ENCOUNTER (OUTPATIENT)
Dept: LAB | Facility: MEDICAL CENTER | Age: 73
End: 2018-02-16
Attending: FAMILY MEDICINE
Payer: COMMERCIAL

## 2018-02-16 DIAGNOSIS — E78.5 DYSLIPIDEMIA, GOAL LDL BELOW 130: ICD-10-CM

## 2018-02-16 DIAGNOSIS — K21.9 GASTROESOPHAGEAL REFLUX DISEASE WITHOUT ESOPHAGITIS: ICD-10-CM

## 2018-02-16 DIAGNOSIS — I10 ESSENTIAL HYPERTENSION: ICD-10-CM

## 2018-02-16 DIAGNOSIS — M85.89 OSTEOPENIA OF MULTIPLE SITES: ICD-10-CM

## 2018-02-16 LAB
25(OH)D3 SERPL-MCNC: 22 NG/ML (ref 30–100)
ALBUMIN SERPL BCP-MCNC: 4.3 G/DL (ref 3.2–4.9)
ALBUMIN/GLOB SERPL: 1.4 G/DL
ALP SERPL-CCNC: 89 U/L (ref 30–99)
ALT SERPL-CCNC: 14 U/L (ref 2–50)
ANION GAP SERPL CALC-SCNC: 4 MMOL/L (ref 0–11.9)
AST SERPL-CCNC: 15 U/L (ref 12–45)
BILIRUB SERPL-MCNC: 0.5 MG/DL (ref 0.1–1.5)
BUN SERPL-MCNC: 17 MG/DL (ref 8–22)
CALCIUM SERPL-MCNC: 10.5 MG/DL (ref 8.5–10.5)
CHLORIDE SERPL-SCNC: 104 MMOL/L (ref 96–112)
CHOLEST SERPL-MCNC: 215 MG/DL (ref 100–199)
CO2 SERPL-SCNC: 27 MMOL/L (ref 20–33)
CREAT SERPL-MCNC: 0.71 MG/DL (ref 0.5–1.4)
ERYTHROCYTE [DISTWIDTH] IN BLOOD BY AUTOMATED COUNT: 44 FL (ref 35.9–50)
GLOBULIN SER CALC-MCNC: 3.1 G/DL (ref 1.9–3.5)
GLUCOSE SERPL-MCNC: 87 MG/DL (ref 65–99)
HCT VFR BLD AUTO: 45.3 % (ref 37–47)
HDLC SERPL-MCNC: 86 MG/DL
HGB BLD-MCNC: 14.8 G/DL (ref 12–16)
LDLC SERPL CALC-MCNC: 108 MG/DL
MCH RBC QN AUTO: 30.3 PG (ref 27–33)
MCHC RBC AUTO-ENTMCNC: 32.7 G/DL (ref 33.6–35)
MCV RBC AUTO: 92.6 FL (ref 81.4–97.8)
PLATELET # BLD AUTO: 256 K/UL (ref 164–446)
PMV BLD AUTO: 10.3 FL (ref 9–12.9)
POTASSIUM SERPL-SCNC: 3.9 MMOL/L (ref 3.6–5.5)
PROT SERPL-MCNC: 7.4 G/DL (ref 6–8.2)
RBC # BLD AUTO: 4.89 M/UL (ref 4.2–5.4)
SODIUM SERPL-SCNC: 135 MMOL/L (ref 135–145)
TRIGL SERPL-MCNC: 107 MG/DL (ref 0–149)
WBC # BLD AUTO: 6 K/UL (ref 4.8–10.8)

## 2018-02-16 PROCEDURE — 36415 COLL VENOUS BLD VENIPUNCTURE: CPT

## 2018-02-16 PROCEDURE — 80053 COMPREHEN METABOLIC PANEL: CPT

## 2018-02-16 PROCEDURE — 82306 VITAMIN D 25 HYDROXY: CPT

## 2018-02-16 PROCEDURE — 85027 COMPLETE CBC AUTOMATED: CPT

## 2018-02-16 PROCEDURE — 80061 LIPID PANEL: CPT

## 2018-02-21 ENCOUNTER — HOSPITAL ENCOUNTER (OUTPATIENT)
Facility: MEDICAL CENTER | Age: 73
End: 2018-02-22
Attending: EMERGENCY MEDICINE | Admitting: HOSPITALIST
Payer: COMMERCIAL

## 2018-02-21 ENCOUNTER — APPOINTMENT (OUTPATIENT)
Dept: RADIOLOGY | Facility: MEDICAL CENTER | Age: 73
End: 2018-02-21
Attending: EMERGENCY MEDICINE
Payer: COMMERCIAL

## 2018-02-21 ENCOUNTER — HOSPITAL ENCOUNTER (OUTPATIENT)
Dept: RADIOLOGY | Facility: MEDICAL CENTER | Age: 73
End: 2018-02-21
Attending: FAMILY MEDICINE
Payer: MEDICARE

## 2018-02-21 ENCOUNTER — RESOLUTE PROFESSIONAL BILLING HOSPITAL PROF FEE (OUTPATIENT)
Dept: HOSPITALIST | Facility: MEDICAL CENTER | Age: 73
End: 2018-02-21
Payer: MEDICARE

## 2018-02-21 DIAGNOSIS — G45.9 TRANSIENT CEREBRAL ISCHEMIA, UNSPECIFIED TYPE: ICD-10-CM

## 2018-02-21 PROBLEM — H70.11 CHRONIC RIGHT MASTOIDITIS: Status: ACTIVE | Noted: 2018-02-21

## 2018-02-21 LAB
ABO GROUP BLD: NORMAL
ABO GROUP BLD: NORMAL
ALBUMIN SERPL BCP-MCNC: 4.6 G/DL (ref 3.2–4.9)
ALBUMIN/GLOB SERPL: 1.4 G/DL
ALP SERPL-CCNC: 101 U/L (ref 30–99)
ALT SERPL-CCNC: 18 U/L (ref 2–50)
ANION GAP SERPL CALC-SCNC: 7 MMOL/L (ref 0–11.9)
APPEARANCE UR: CLEAR
APTT PPP: 29.1 SEC (ref 24.7–36)
AST SERPL-CCNC: 21 U/L (ref 12–45)
BASOPHILS # BLD AUTO: 0.3 % (ref 0–1.8)
BASOPHILS # BLD: 0.02 K/UL (ref 0–0.12)
BILIRUB SERPL-MCNC: 0.6 MG/DL (ref 0.1–1.5)
BILIRUB UR QL STRIP.AUTO: NEGATIVE
BLD GP AB SCN SERPL QL: NORMAL
BUN SERPL-MCNC: 16 MG/DL (ref 8–22)
CALCIUM SERPL-MCNC: 10.6 MG/DL (ref 8.4–10.2)
CHLORIDE SERPL-SCNC: 104 MMOL/L (ref 96–112)
CO2 SERPL-SCNC: 24 MMOL/L (ref 20–33)
COLOR UR: YELLOW
CREAT SERPL-MCNC: 0.87 MG/DL (ref 0.5–1.4)
CULTURE IF INDICATED INDCX: NO UA CULTURE
EKG IMPRESSION: NORMAL
EOSINOPHIL # BLD AUTO: 0.03 K/UL (ref 0–0.51)
EOSINOPHIL NFR BLD: 0.4 % (ref 0–6.9)
ERYTHROCYTE [DISTWIDTH] IN BLOOD BY AUTOMATED COUNT: 39.9 FL (ref 35.9–50)
EST. AVERAGE GLUCOSE BLD GHB EST-MCNC: 114 MG/DL
GLOBULIN SER CALC-MCNC: 3.4 G/DL (ref 1.9–3.5)
GLUCOSE SERPL-MCNC: 105 MG/DL (ref 65–99)
GLUCOSE UR STRIP.AUTO-MCNC: NEGATIVE MG/DL
HBA1C MFR BLD: 5.6 % (ref 0–5.6)
HCT VFR BLD AUTO: 43.3 % (ref 37–47)
HGB BLD-MCNC: 14.7 G/DL (ref 12–16)
IMM GRANULOCYTES # BLD AUTO: 0.02 K/UL (ref 0–0.11)
IMM GRANULOCYTES NFR BLD AUTO: 0.3 % (ref 0–0.9)
INR PPP: 0.89 (ref 0.87–1.13)
KETONES UR STRIP.AUTO-MCNC: NEGATIVE MG/DL
LEUKOCYTE ESTERASE UR QL STRIP.AUTO: NEGATIVE
LYMPHOCYTES # BLD AUTO: 1.73 K/UL (ref 1–4.8)
LYMPHOCYTES NFR BLD: 22.6 % (ref 22–41)
MCH RBC QN AUTO: 29.8 PG (ref 27–33)
MCHC RBC AUTO-ENTMCNC: 33.9 G/DL (ref 33.6–35)
MCV RBC AUTO: 87.7 FL (ref 81.4–97.8)
MICRO URNS: NORMAL
MONOCYTES # BLD AUTO: 0.48 K/UL (ref 0–0.85)
MONOCYTES NFR BLD AUTO: 6.3 % (ref 0–13.4)
NEUTROPHILS # BLD AUTO: 5.37 K/UL (ref 2–7.15)
NEUTROPHILS NFR BLD: 70.1 % (ref 44–72)
NITRITE UR QL STRIP.AUTO: NEGATIVE
NRBC # BLD AUTO: 0 K/UL
NRBC BLD-RTO: 0 /100 WBC
PH UR STRIP.AUTO: 7 [PH]
PLATELET # BLD AUTO: 239 K/UL (ref 164–446)
PMV BLD AUTO: 9.4 FL (ref 9–12.9)
POTASSIUM SERPL-SCNC: 3.6 MMOL/L (ref 3.6–5.5)
PROT SERPL-MCNC: 8 G/DL (ref 6–8.2)
PROT UR QL STRIP: NEGATIVE MG/DL
PROTHROMBIN TIME: 11.7 SEC (ref 12–14.6)
RBC # BLD AUTO: 4.94 M/UL (ref 4.2–5.4)
RBC UR QL AUTO: NEGATIVE
RH BLD: NORMAL
RH BLD: NORMAL
SODIUM SERPL-SCNC: 135 MMOL/L (ref 135–145)
SP GR UR STRIP.AUTO: <=1.005
TROPONIN I SERPL-MCNC: <0.02 NG/ML (ref 0–0.04)
WBC # BLD AUTO: 7.7 K/UL (ref 4.8–10.8)

## 2018-02-21 PROCEDURE — 71045 X-RAY EXAM CHEST 1 VIEW: CPT

## 2018-02-21 PROCEDURE — 85610 PROTHROMBIN TIME: CPT

## 2018-02-21 PROCEDURE — G0378 HOSPITAL OBSERVATION PER HR: HCPCS

## 2018-02-21 PROCEDURE — A9270 NON-COVERED ITEM OR SERVICE: HCPCS | Performed by: HOSPITALIST

## 2018-02-21 PROCEDURE — 93880 EXTRACRANIAL BILAT STUDY: CPT

## 2018-02-21 PROCEDURE — 86900 BLOOD TYPING SEROLOGIC ABO: CPT

## 2018-02-21 PROCEDURE — 84484 ASSAY OF TROPONIN QUANT: CPT

## 2018-02-21 PROCEDURE — 700102 HCHG RX REV CODE 250 W/ 637 OVERRIDE(OP): Performed by: HOSPITALIST

## 2018-02-21 PROCEDURE — 85025 COMPLETE CBC W/AUTO DIFF WBC: CPT

## 2018-02-21 PROCEDURE — 70450 CT HEAD/BRAIN W/O DYE: CPT

## 2018-02-21 PROCEDURE — 94760 N-INVAS EAR/PLS OXIMETRY 1: CPT

## 2018-02-21 PROCEDURE — 700111 HCHG RX REV CODE 636 W/ 250 OVERRIDE (IP): Performed by: HOSPITALIST

## 2018-02-21 PROCEDURE — 80053 COMPREHEN METABOLIC PANEL: CPT

## 2018-02-21 PROCEDURE — 83036 HEMOGLOBIN GLYCOSYLATED A1C: CPT

## 2018-02-21 PROCEDURE — 99285 EMERGENCY DEPT VISIT HI MDM: CPT

## 2018-02-21 PROCEDURE — 81003 URINALYSIS AUTO W/O SCOPE: CPT

## 2018-02-21 PROCEDURE — 85730 THROMBOPLASTIN TIME PARTIAL: CPT

## 2018-02-21 PROCEDURE — 99220 PR INITIAL OBSERVATION CARE,LEVL III: CPT | Performed by: HOSPITALIST

## 2018-02-21 PROCEDURE — 86901 BLOOD TYPING SEROLOGIC RH(D): CPT

## 2018-02-21 PROCEDURE — 36415 COLL VENOUS BLD VENIPUNCTURE: CPT

## 2018-02-21 PROCEDURE — 93005 ELECTROCARDIOGRAM TRACING: CPT

## 2018-02-21 PROCEDURE — 86850 RBC ANTIBODY SCREEN: CPT

## 2018-02-21 PROCEDURE — 93005 ELECTROCARDIOGRAM TRACING: CPT | Performed by: EMERGENCY MEDICINE

## 2018-02-21 RX ORDER — ONDANSETRON 4 MG/1
4 TABLET, ORALLY DISINTEGRATING ORAL EVERY 4 HOURS PRN
Status: DISCONTINUED | OUTPATIENT
Start: 2018-02-21 | End: 2018-02-22 | Stop reason: HOSPADM

## 2018-02-21 RX ORDER — ATORVASTATIN CALCIUM 40 MG/1
80 TABLET, FILM COATED ORAL EVERY EVENING
Status: DISCONTINUED | OUTPATIENT
Start: 2018-02-21 | End: 2018-02-22

## 2018-02-21 RX ORDER — ONDANSETRON 2 MG/ML
4 INJECTION INTRAMUSCULAR; INTRAVENOUS EVERY 4 HOURS PRN
Status: DISCONTINUED | OUTPATIENT
Start: 2018-02-21 | End: 2018-02-22 | Stop reason: HOSPADM

## 2018-02-21 RX ORDER — AMOXICILLIN 250 MG
2 CAPSULE ORAL 2 TIMES DAILY
Status: DISCONTINUED | OUTPATIENT
Start: 2018-02-21 | End: 2018-02-22 | Stop reason: HOSPADM

## 2018-02-21 RX ORDER — BISACODYL 10 MG
10 SUPPOSITORY, RECTAL RECTAL
Status: DISCONTINUED | OUTPATIENT
Start: 2018-02-21 | End: 2018-02-22 | Stop reason: HOSPADM

## 2018-02-21 RX ORDER — ASPIRIN 81 MG/1
324 TABLET, CHEWABLE ORAL DAILY
Status: DISCONTINUED | OUTPATIENT
Start: 2018-02-21 | End: 2018-02-22 | Stop reason: HOSPADM

## 2018-02-21 RX ORDER — TRAMADOL HYDROCHLORIDE 50 MG/1
50 TABLET ORAL EVERY 6 HOURS PRN
Status: DISCONTINUED | OUTPATIENT
Start: 2018-02-21 | End: 2018-02-22 | Stop reason: HOSPADM

## 2018-02-21 RX ORDER — ASPIRIN 325 MG
325 TABLET ORAL DAILY
Status: DISCONTINUED | OUTPATIENT
Start: 2018-02-21 | End: 2018-02-22 | Stop reason: HOSPADM

## 2018-02-21 RX ORDER — ASPIRIN 600 MG/1
300 SUPPOSITORY RECTAL DAILY
Status: DISCONTINUED | OUTPATIENT
Start: 2018-02-21 | End: 2018-02-22 | Stop reason: HOSPADM

## 2018-02-21 RX ORDER — HEPARIN SODIUM 5000 [USP'U]/ML
5000 INJECTION, SOLUTION INTRAVENOUS; SUBCUTANEOUS EVERY 8 HOURS
Status: DISCONTINUED | OUTPATIENT
Start: 2018-02-21 | End: 2018-02-22 | Stop reason: HOSPADM

## 2018-02-21 RX ORDER — POLYETHYLENE GLYCOL 3350 17 G/17G
1 POWDER, FOR SOLUTION ORAL
Status: DISCONTINUED | OUTPATIENT
Start: 2018-02-21 | End: 2018-02-22 | Stop reason: HOSPADM

## 2018-02-21 RX ADMIN — ASPIRIN 325 MG ORAL TABLET 325 MG: 325 PILL ORAL at 16:54

## 2018-02-21 RX ADMIN — ATORVASTATIN CALCIUM 80 MG: 40 TABLET, FILM COATED ORAL at 21:03

## 2018-02-21 RX ADMIN — HEPARIN SODIUM 5000 UNITS: 5000 INJECTION, SOLUTION INTRAVENOUS; SUBCUTANEOUS at 21:04

## 2018-02-21 RX ADMIN — HEPARIN SODIUM 5000 UNITS: 5000 INJECTION, SOLUTION INTRAVENOUS; SUBCUTANEOUS at 16:54

## 2018-02-21 ASSESSMENT — ENCOUNTER SYMPTOMS
HEADACHES: 0
VOMITING: 0
SHORTNESS OF BREATH: 0
DIZZINESS: 1
DIARRHEA: 0
WHEEZING: 0
CONSTIPATION: 0
COUGH: 0
FEVER: 0
NAUSEA: 0
CHILLS: 0

## 2018-02-21 ASSESSMENT — LIFESTYLE VARIABLES
EVER_SMOKED: YES
ALCOHOL_USE: NO

## 2018-02-21 ASSESSMENT — PAIN SCALES - GENERAL
PAINLEVEL_OUTOF10: 0

## 2018-02-21 ASSESSMENT — PATIENT HEALTH QUESTIONNAIRE - PHQ9
1. LITTLE INTEREST OR PLEASURE IN DOING THINGS: NOT AT ALL
SUM OF ALL RESPONSES TO PHQ9 QUESTIONS 1 AND 2: 0
2. FEELING DOWN, DEPRESSED, IRRITABLE, OR HOPELESS: NOT AT ALL
SUM OF ALL RESPONSES TO PHQ QUESTIONS 1-9: 0

## 2018-02-21 NOTE — ED NOTES
1330 All results back, chart up for MD for re evaluation. poc update given to pt. No further questions at this time. Further orders and dispo pending  1350 pt to be admitted to hospital, poc explained to pt and family

## 2018-02-21 NOTE — PROGRESS NOTES
Received report from ED, patient oriented to unit. Patient to the service of the hospitalists.  Telemetry monitor in place. Admission assessment complete.

## 2018-02-21 NOTE — ED PROVIDER NOTES
"ED Provider Note    CHIEF COMPLAINT  Chief Complaint   Patient presents with   • Memory Loss     \"I'm sophie blank\" started about 45 minutes ago after Pilates class   • Dizziness        HPI  Franchesca Paige is a 72 y.o. female who presents to the ED with complaints of acute onset of confusion/memory loss. Patient's otherwise healthy 72-year-old female who is doing Pilates today. Afterwards, she had acute onset of confusion. She described her murmur what she needed to do. She contacted a family member and she was brought to the emergency for evaluation. According to the family member. Patient is very confused, thought it was Monday to remember other facts of things that happened within the last day. Now she is about 98%, improved per family member. The patient just states she still feels \"off.\" Patient denies any headaches, visual acuity changes, cousin, nausea, vomiting, fevers, chills. She did have an abscess over she gets flashes of light in her left eye, but denies any other symptoms currently.    REVIEW OF SYSTEMS  See HPI for further details. All other systems are negative.     PAST MEDICAL HISTORY  Past Medical History:   Diagnosis Date   • Arthritis    • Bladder cancer (CMS-HCC)     bladder cancer 2007 , march   • Dyslipidemia, goal LDL below 130    • Hypertension 1992   • Ocular migraine    • Osteopenia     DEXA 9/24/12   • Total knee replacement status        FAMILY HISTORY  Family History   Problem Relation Age of Onset   • Hypertension Mother    • Psychiatry Mother      paranoid schizophrenic   • Hypertension Father      heavy smoker   • Diabetes Other      niece   • Cancer Sister      skin   • Cancer Paternal Aunt      breast   • Psychiatry Sister      paranoid schizophrenic     Patient's family history has been discussed and is been found to be noncontributory to his present illness  SOCIAL HISTORY  Social History     Social History   • Marital status:      Spouse name: N/A   • Number of children: " "N/A   • Years of education: N/A     Social History Main Topics   • Smoking status: Former Smoker     Quit date: 5/1/2000   • Smokeless tobacco: Never Used   • Alcohol use No   • Drug use: No   • Sexual activity: Yes     Other Topics Concern   • Not on file     Social History Narrative   • No narrative on file      Tate De La Rosa M.D.    SURGICAL HISTORY  Past Surgical History:   Procedure Laterality Date   • KNEE REPLACEMENT, TOTAL  12/4/12    Dr. Harry, left knee   • COLONOSCOPY  8/3/2010    diverticulosis only, due in 2020   • HYSTERECTOMY, TOTAL ABDOMINAL  1980s    has one half of an ovary and a fallopian tube       CURRENT MEDICATIONS  Home Medications     Reviewed by Sophie Pride (Pharmacy Tech) on 02/21/18 at 1204  Med List Status: Complete   Medication Last Dose Status   Cholecalciferol (VITAMIN D3) 5000 units Cap 2/20/2018 Active   fosinopril (MONOPRIL) 10 MG Tab 2/21/2018 Active   S-Adenosylmethionine (NYLA-E) 200 MG TABS 2/21/2018 Active   tramadol (ULTRAM) 50 MG Tab 2/21/2018 Active              No current facility-administered medications on file prior to encounter.      Current Outpatient Prescriptions on File Prior to Encounter   Medication Sig Dispense Refill   • tramadol (ULTRAM) 50 MG Tab Take 1 Tab by mouth every 6 hours as needed (arthritis pain) for up to 180 days. 50 Tab 3   • fosinopril (MONOPRIL) 10 MG Tab Take 1 Tab by mouth every day. 30 Tab 11   • S-Adenosylmethionine (NYLA-E) 200 MG TABS Take 1 Tab by mouth every day. OTC 30 Tab 0         ALLERGIES  Allergies   Allergen Reactions   • Sulfa Drugs Vomiting       PHYSICAL EXAM  VITAL SIGNS: BP (!) 166/75   Pulse (!) 58   Temp 36.5 °C (97.7 °F)   Resp 16   Ht 1.727 m (5' 8\")   Wt 96.2 kg (212 lb 1.3 oz)   SpO2 96%   BMI 32.25 kg/m²    Pulse Oximetry was obtained. It showed a reading of Pulse Oximetry: 96 %.  I interpreted this as not hypoxic.     Constitutional: Well developed, Well nourished, No acute distress, Non-toxic " appearance.   HENT: Normocephalic, Atraumatic, Bilateral external ears normal, bilateral tympanic membranes normal, Oropharynx moist, No oral exudates, Nose normal.   Eyes: Pupils are equal round and react to light, extraocular motions are intact, conjunctiva is normal, there are no signs of exudate.   Neck: Supple, no cervical lymphadenopathy, no meningeal signs..   Lymphatic: No lymphadenopathy noted.   Cardiovascular: Regular rate and rhythm without murmurs gallops or rubs.   Thorax & Lungs: Lungs are clear to auscultation bilaterally, there are no wheezes no rales. Chest wall is nontender.  Abdomen: Soft, nontender nondistended. Bowel sounds are present.   Skin: Warm, Dry, No erythema,   Back: No tenderness, No CVA tenderness.   Musculoskeletal: Good range of motion in all major joints. No tenderness to palpation or major deformities noted. Intact distal pulses, no clubbing, no cyanosis, no edema,   Neurologic: Alert & oriented x 3, Normal motor function, Normal sensory function, No focal deficits noted. , Creatinine is 2-12 grossly intact. DTRs are 2+ and equal all distributions. The patient has 5 over 5 strength in all distributions is a grossly normal. Station and gait. Has a normal Mini-Mental status exam at this time.  Psychiatric: Affect normal, Judgment normal, Mood normal.     EKG  Interpreted to below by myself    RADIOLOGY/PROCEDURES  CT-HEAD W/O   Final Result      1.  No evidence of acute intracranial process.      2.  Right mastoid air cell disease.      DX-CHEST-PORTABLE (1 VIEW)    (Results Pending)       Results for orders placed or performed during the hospital encounter of 02/21/18   CBC WITH DIFFERENTIAL   Result Value Ref Range    WBC 7.7 4.8 - 10.8 K/uL    RBC 4.94 4.20 - 5.40 M/uL    Hemoglobin 14.7 12.0 - 16.0 g/dL    Hematocrit 43.3 37.0 - 47.0 %    MCV 87.7 81.4 - 97.8 fL    MCH 29.8 27.0 - 33.0 pg    MCHC 33.9 33.6 - 35.0 g/dL    RDW 39.9 35.9 - 50.0 fL    Platelet Count 239 164 - 446  K/uL    MPV 9.4 9.0 - 12.9 fL    Neutrophils-Polys 70.10 44.00 - 72.00 %    Lymphocytes 22.60 22.00 - 41.00 %    Monocytes 6.30 0.00 - 13.40 %    Eosinophils 0.40 0.00 - 6.90 %    Basophils 0.30 0.00 - 1.80 %    Immature Granulocytes 0.30 0.00 - 0.90 %    Nucleated RBC 0.00 /100 WBC    Neutrophils (Absolute) 5.37 2.00 - 7.15 K/uL    Lymphs (Absolute) 1.73 1.00 - 4.80 K/uL    Monos (Absolute) 0.48 0.00 - 0.85 K/uL    Eos (Absolute) 0.03 0.00 - 0.51 K/uL    Baso (Absolute) 0.02 0.00 - 0.12 K/uL    Immature Granulocytes (abs) 0.02 0.00 - 0.11 K/uL    NRBC (Absolute) 0.00 K/uL   COMP METABOLIC PANEL   Result Value Ref Range    Sodium 135 135 - 145 mmol/L    Potassium 3.6 3.6 - 5.5 mmol/L    Chloride 104 96 - 112 mmol/L    Co2 24 20 - 33 mmol/L    Anion Gap 7.0 0.0 - 11.9    Glucose 105 (H) 65 - 99 mg/dL    Bun 16 8 - 22 mg/dL    Creatinine 0.87 0.50 - 1.40 mg/dL    Calcium 10.6 (H) 8.4 - 10.2 mg/dL    AST(SGOT) 21 12 - 45 U/L    ALT(SGPT) 18 2 - 50 U/L    Alkaline Phosphatase 101 (H) 30 - 99 U/L    Total Bilirubin 0.6 0.1 - 1.5 mg/dL    Albumin 4.6 3.2 - 4.9 g/dL    Total Protein 8.0 6.0 - 8.2 g/dL    Globulin 3.4 1.9 - 3.5 g/dL    A-G Ratio 1.4 g/dL   PROTHROMBIN TIME   Result Value Ref Range    PT 11.7 (L) 12.0 - 14.6 sec    INR 0.89 0.87 - 1.13   APTT   Result Value Ref Range    APTT 29.1 24.7 - 36.0 sec   COD (ADULT)   Result Value Ref Range    ABO Grouping Only O     Rh Grouping Only POS     Antibody Screen-Cod NEG    TROPONIN   Result Value Ref Range    Troponin I <0.02 0.00 - 0.04 ng/mL   URINALYSIS CULTURE, IF INDICATED   Result Value Ref Range    Color Yellow     Character Clear     Specific Gravity <=1.005 <1.035    Ph 7.0 5.0 - 8.0    Glucose Negative Negative mg/dL    Ketones Negative Negative mg/dL    Protein Negative Negative mg/dL    Bilirubin Negative Negative    Nitrite Negative Negative    Leukocyte Esterase Negative Negative    Occult Blood Negative Negative    Micro Urine Req see below     Culture  Indicated No UA Culture   ESTIMATED GFR   Result Value Ref Range    GFR If African American >60 >60 mL/min/1.73 m 2    GFR If Non African American >60 >60 mL/min/1.73 m 2   EKG (NOW)   Result Value Ref Range    Report       [facility] Emergency Dept.    Test Date:  2018  Pt Name:    ELIANE VIERA                 Department: EDS  MRN:        [id number]                      Room:  Gender:     Female                       Technician: HRS  :        [id number]                   Requested By:ER TRIAGE PROTOCOL  Order #:    [id number]                    Reading MD:    Measurements  Intervals                                Axis  Rate:       49                           P:          31  UT:         168                          QRS:        -18  QRSD:       113                          T:          62  QT:         424  QTc:        383    Interpretive Statements  Sinus bradycardia  Borderline intraventricular conduction delay  Baseline wander in lead(s) V3  Compared to ECG 01/15/2008 14:26:10  Sinus rhythm no longer present           COURSE & MEDICAL DECISION MAKING  Pertinent Labs & Imaging studies reviewed. (See chart for details)  Patient presents today for evaluation. Upon initial evaluation. Neurologic is grossly intact, but does feel slightly out of it. Family member at bedside states that she is 98% better. The above workup was done. It was noted that her some fluid on the right mastoid air cells. I did recheck the right TM. It appears normal. This is most likely chronic congestion. She is stated that she has had some ear problems for the past couple of months. At this point, we will admit the patient for TIA workup. I spoke with the hospitalist for this admission.    FINAL IMPRESSION  1. Transient cerebral ischemia, unspecified type    2. Chronic ear congestion, right          Electronically signed by: John Major, 2018 12:03 PM

## 2018-02-21 NOTE — ED NOTES
"Chief Complaint   Patient presents with   • Memory Loss     \"I'm sophie blank\" started about 45 minutes ago after Pilates class   • Dizziness     BP (!) 166/75   Pulse (!) 58   Temp 36.5 °C (97.7 °F)   Resp 16   Ht 1.727 m (5' 8\")   Wt 96.2 kg (212 lb 1.3 oz)   SpO2 96%   BMI 32.25 kg/m²     MOLLY /97    Pt states feels memory is improving.  A&O x 4,  equal and strong.  "

## 2018-02-22 ENCOUNTER — PATIENT OUTREACH (OUTPATIENT)
Dept: HEALTH INFORMATION MANAGEMENT | Facility: OTHER | Age: 73
End: 2018-02-22

## 2018-02-22 ENCOUNTER — APPOINTMENT (OUTPATIENT)
Dept: RADIOLOGY | Facility: MEDICAL CENTER | Age: 73
End: 2018-02-22
Attending: HOSPITALIST
Payer: COMMERCIAL

## 2018-02-22 VITALS
WEIGHT: 212.08 LBS | OXYGEN SATURATION: 97 % | RESPIRATION RATE: 18 BRPM | TEMPERATURE: 98.2 F | SYSTOLIC BLOOD PRESSURE: 148 MMHG | HEART RATE: 60 BPM | BODY MASS INDEX: 32.14 KG/M2 | HEIGHT: 68 IN | DIASTOLIC BLOOD PRESSURE: 89 MMHG

## 2018-02-22 PROBLEM — R42 DIZZINESS: Status: ACTIVE | Noted: 2018-02-22

## 2018-02-22 LAB
ALBUMIN SERPL BCP-MCNC: 3.7 G/DL (ref 3.2–4.9)
BASOPHILS # BLD AUTO: 0.4 % (ref 0–1.8)
BASOPHILS # BLD: 0.02 K/UL (ref 0–0.12)
BUN SERPL-MCNC: 15 MG/DL (ref 8–22)
CALCIUM SERPL-MCNC: 10.7 MG/DL (ref 8.4–10.2)
CHLORIDE SERPL-SCNC: 107 MMOL/L (ref 96–112)
CHOLEST SERPL-MCNC: 209 MG/DL (ref 100–199)
CO2 SERPL-SCNC: 27 MMOL/L (ref 20–33)
CREAT SERPL-MCNC: 0.79 MG/DL (ref 0.5–1.4)
EOSINOPHIL # BLD AUTO: 0.09 K/UL (ref 0–0.51)
EOSINOPHIL NFR BLD: 1.6 % (ref 0–6.9)
ERYTHROCYTE [DISTWIDTH] IN BLOOD BY AUTOMATED COUNT: 41.4 FL (ref 35.9–50)
GLUCOSE SERPL-MCNC: 96 MG/DL (ref 65–99)
HCT VFR BLD AUTO: 41 % (ref 37–47)
HDLC SERPL-MCNC: 80 MG/DL
HGB BLD-MCNC: 13.9 G/DL (ref 12–16)
IMM GRANULOCYTES # BLD AUTO: 0.02 K/UL (ref 0–0.11)
IMM GRANULOCYTES NFR BLD AUTO: 0.4 % (ref 0–0.9)
LDLC SERPL CALC-MCNC: 105 MG/DL
LV EJECT FRACT  99904: 60
LV EJECT FRACT MOD 2C 99903: 61.57
LV EJECT FRACT MOD 4C 99902: 57.82
LV EJECT FRACT MOD BP 99901: 59.2
LYMPHOCYTES # BLD AUTO: 2.03 K/UL (ref 1–4.8)
LYMPHOCYTES NFR BLD: 36.8 % (ref 22–41)
MCH RBC QN AUTO: 30.2 PG (ref 27–33)
MCHC RBC AUTO-ENTMCNC: 33.9 G/DL (ref 33.6–35)
MCV RBC AUTO: 89.1 FL (ref 81.4–97.8)
MONOCYTES # BLD AUTO: 0.46 K/UL (ref 0–0.85)
MONOCYTES NFR BLD AUTO: 8.3 % (ref 0–13.4)
NEUTROPHILS # BLD AUTO: 2.9 K/UL (ref 2–7.15)
NEUTROPHILS NFR BLD: 52.5 % (ref 44–72)
NRBC # BLD AUTO: 0 K/UL
NRBC BLD-RTO: 0 /100 WBC
PHOSPHATE SERPL-MCNC: 3 MG/DL (ref 2.5–4.5)
PLATELET # BLD AUTO: 241 K/UL (ref 164–446)
PMV BLD AUTO: 9.6 FL (ref 9–12.9)
POTASSIUM SERPL-SCNC: 4 MMOL/L (ref 3.6–5.5)
RBC # BLD AUTO: 4.6 M/UL (ref 4.2–5.4)
SODIUM SERPL-SCNC: 139 MMOL/L (ref 135–145)
TRIGL SERPL-MCNC: 118 MG/DL (ref 0–149)
TSH SERPL DL<=0.005 MIU/L-ACNC: 3.76 UIU/ML (ref 0.38–5.33)
VIT B12 SERPL-MCNC: 383 PG/ML (ref 211–911)
WBC # BLD AUTO: 5.5 K/UL (ref 4.8–10.8)

## 2018-02-22 PROCEDURE — 700102 HCHG RX REV CODE 250 W/ 637 OVERRIDE(OP): Performed by: HOSPITALIST

## 2018-02-22 PROCEDURE — 70551 MRI BRAIN STEM W/O DYE: CPT

## 2018-02-22 PROCEDURE — 93306 TTE W/DOPPLER COMPLETE: CPT

## 2018-02-22 PROCEDURE — 93306 TTE W/DOPPLER COMPLETE: CPT | Mod: 26 | Performed by: INTERNAL MEDICINE

## 2018-02-22 PROCEDURE — 80061 LIPID PANEL: CPT

## 2018-02-22 PROCEDURE — 82607 VITAMIN B-12: CPT

## 2018-02-22 PROCEDURE — G8988 SELF CARE GOAL STATUS: HCPCS | Mod: CI

## 2018-02-22 PROCEDURE — G8980 MOBILITY D/C STATUS: HCPCS | Mod: CI

## 2018-02-22 PROCEDURE — 84443 ASSAY THYROID STIM HORMONE: CPT

## 2018-02-22 PROCEDURE — 85025 COMPLETE CBC W/AUTO DIFF WBC: CPT

## 2018-02-22 PROCEDURE — G8987 SELF CARE CURRENT STATUS: HCPCS | Mod: CI

## 2018-02-22 PROCEDURE — 80069 RENAL FUNCTION PANEL: CPT

## 2018-02-22 PROCEDURE — G8979 MOBILITY GOAL STATUS: HCPCS | Mod: CI

## 2018-02-22 PROCEDURE — G0378 HOSPITAL OBSERVATION PER HR: HCPCS

## 2018-02-22 PROCEDURE — 700111 HCHG RX REV CODE 636 W/ 250 OVERRIDE (IP): Performed by: HOSPITALIST

## 2018-02-22 PROCEDURE — A9270 NON-COVERED ITEM OR SERVICE: HCPCS | Performed by: HOSPITALIST

## 2018-02-22 PROCEDURE — G8978 MOBILITY CURRENT STATUS: HCPCS | Mod: CI

## 2018-02-22 PROCEDURE — 97161 PT EVAL LOW COMPLEX 20 MIN: CPT

## 2018-02-22 PROCEDURE — 97165 OT EVAL LOW COMPLEX 30 MIN: CPT

## 2018-02-22 PROCEDURE — 99217 PR OBSERVATION CARE DISCHARGE: CPT | Performed by: INTERNAL MEDICINE

## 2018-02-22 PROCEDURE — G8989 SELF CARE D/C STATUS: HCPCS | Mod: CI

## 2018-02-22 RX ORDER — ATORVASTATIN CALCIUM 40 MG/1
40 TABLET, FILM COATED ORAL EVERY EVENING
Status: DISCONTINUED | OUTPATIENT
Start: 2018-02-22 | End: 2018-02-22 | Stop reason: HOSPADM

## 2018-02-22 RX ADMIN — HEPARIN SODIUM 5000 UNITS: 5000 INJECTION, SOLUTION INTRAVENOUS; SUBCUTANEOUS at 05:39

## 2018-02-22 RX ADMIN — ASPIRIN 325 MG ORAL TABLET 325 MG: 325 PILL ORAL at 08:06

## 2018-02-22 RX ADMIN — DOCUSATE SODIUM AND SENNOSIDES 2 TABLET: 8.6; 5 TABLET, FILM COATED ORAL at 08:06

## 2018-02-22 ASSESSMENT — LIFESTYLE VARIABLES: EVER_SMOKED: YES

## 2018-02-22 ASSESSMENT — ACTIVITIES OF DAILY LIVING (ADL): TOILETING: INDEPENDENT

## 2018-02-22 ASSESSMENT — PAIN SCALES - GENERAL
PAINLEVEL_OUTOF10: 0
PAINLEVEL_OUTOF10: 0

## 2018-02-22 ASSESSMENT — GAIT ASSESSMENTS
GAIT LEVEL OF ASSIST: SUPERVISED
DISTANCE (FEET): 300
DEVIATION: INCREASED BASE OF SUPPORT

## 2018-02-22 NOTE — PROGRESS NOTES
Tele Summary:    Rhythm: SB/SR  Rate: 40s-90s  RI: 0.18  QRS: 0.10  QT: 0.42    Ectopy: rare PVCs    Tele strip placed in pt chart.

## 2018-02-22 NOTE — CARE PLAN
Problem: Safety  Goal: Will remain free from injury  Outcome: PROGRESSING AS EXPECTED    Intervention: Provide assistance with mobility  Encourage to call for any assistance needed, call light within reach.      Problem: Knowledge Deficit  Goal: Knowledge of disease process/condition, treatment plan, diagnostic tests, and medications will improve  Outcome: PROGRESSING AS EXPECTED    Intervention: Explain information regarding disease process/condition, treatment plan, diagnostic tests, and medications and document in education  Updated with plan of care, neuro check q 4 hours, for MRI as ordered.

## 2018-02-22 NOTE — ASSESSMENT & PLAN NOTE
TIA / Stroke workup  Pt will be admitted for TIA workup. The pt had a negative brain CT. The pt will be monitored on telemetry with neuro checks.   A MRI, carotid US and echocardiogram are PENDING. The pt will be seen by PT/OT.   Pt will be placed on aspirin and statin. A vitamin B12, TSH lipid panel and Hba1c will also be checked.

## 2018-02-22 NOTE — DISCHARGE INSTRUCTIONS
Discharge Instructions    Discharged to home by car with relative. Discharged via walking, hospital escort: Refused.  Special equipment needed: Not Applicable    Be sure to schedule a follow-up appointment with your primary care doctor or any specialists as instructed.     Discharge Plan:   Influenza Vaccine Indication: Not indicated: Previously immunized this influenza season and > 8 years of age    I understand that a diet low in cholesterol, fat, and sodium is recommended for good health. Unless I have been given specific instructions below for another diet, I accept this instruction as my diet prescription.   Other diet: regular    Special Instructions: Stroke Prevention  Some medical conditions and behaviors are associated with an increased chance of having a stroke. You may prevent a stroke by making healthy choices and managing medical conditions.  HOW CAN I REDUCE MY RISK OF HAVING A STROKE?   · Stay physically active. Get at least 30 minutes of activity on most or all days.  · Do not smoke. It may also be helpful to avoid exposure to secondhand smoke.  · Limit alcohol use. Moderate alcohol use is considered to be:  ¨ No more than 2 drinks per day for men.  ¨ No more than 1 drink per day for nonpregnant women.  · Eat healthy foods. This involves:  ¨ Eating 5 or more servings of fruits and vegetables a day.  ¨ Making dietary changes that address high blood pressure (hypertension), high cholesterol, diabetes, or obesity.  · Manage your cholesterol levels.  ¨ Making food choices that are high in fiber and low in saturated fat, trans fat, and cholesterol may control cholesterol levels.  ¨ Take any prescribed medicines to control cholesterol as directed by your health care provider.  · Manage your diabetes.  ¨ Controlling your carbohydrate and sugar intake is recommended to manage diabetes.  ¨ Take any prescribed medicines to control diabetes as directed by your health care provider.  · Control your  hypertension.  ¨ Making food choices that are low in salt (sodium), saturated fat, trans fat, and cholesterol is recommended to manage hypertension.  ¨ Ask your health care provider if you need treatment to lower your blood pressure. Take any prescribed medicines to control hypertension as directed by your health care provider.  ¨ If you are 18-39 years of age, have your blood pressure checked every 3-5 years. If you are 40 years of age or older, have your blood pressure checked every year.  · Maintain a healthy weight.  ¨ Reducing calorie intake and making food choices that are low in sodium, saturated fat, trans fat, and cholesterol are recommended to manage weight.  · Stop drug abuse.  · Avoid taking birth control pills.  ¨ Talk to your health care provider about the risks of taking birth control pills if you are over 35 years old, smoke, get migraines, or have ever had a blood clot.  · Get evaluated for sleep disorders (sleep apnea).  ¨ Talk to your health care provider about getting a sleep evaluation if you snore a lot or have excessive sleepiness.  · Take medicines only as directed by your health care provider.  ¨ For some people, aspirin or blood thinners (anticoagulants) are helpful in reducing the risk of forming abnormal blood clots that can lead to stroke. If you have the irregular heart rhythm of atrial fibrillation, you should be on a blood thinner unless there is a good reason you cannot take them.  ¨ Understand all your medicine instructions.  · Make sure that other conditions (such as anemia or atherosclerosis) are addressed.  SEEK IMMEDIATE MEDICAL CARE IF:   · You have sudden weakness or numbness of the face, arm, or leg, especially on one side of the body.  · Your face or eyelid droops to one side.  · You have sudden confusion.  · You have trouble speaking (aphasia) or understanding.  · You have sudden trouble seeing in one or both eyes.  · You have sudden trouble walking.  · You have  dizziness.  · You have a loss of balance or coordination.  · You have a sudden, severe headache with no known cause.  · You have new chest pain or an irregular heartbeat.  Any of these symptoms may represent a serious problem that is an emergency. Do not wait to see if the symptoms will go away. Get medical help at once. Call your local emergency services (911 in [place] Do not drive yourself to the hospital.     This information is not intended to replace advice given to you by your health care provider. Make sure you discuss any questions you have with your health care provider.     Document Released: 01/25/2006 Document Revised: 01/08/2016 Document Reviewed: 06/20/2014  [employer] Interactive Patient Education ©2016 [employer]      · Is patient discharged on Warfarin / Coumadin?   No     Depression / Suicide Risk    As you are discharged from this [facility] the [facility] facility, it is important to learn how to keep safe from harming yourself.    Recognize the warning signs:  · Abrupt changes in personality, positive or negative- including increase in energy   · Giving away possessions  · Change in eating patterns- significant weight changes-  positive or negative  · Change in sleeping patterns- unable to sleep or sleeping all the time   · Unwillingness or inability to communicate  · Depression  · Unusual sadness, discouragement and loneliness  · Talk of wanting to die  · Neglect of personal appearance   · Rebelliousness- reckless behavior  · Withdrawal from people/activities they love  · Confusion- inability to concentrate     If you or a loved one observes any of these behaviors or has concerns about self-harm, here's what you can do:  · Talk about it- your feelings and reasons for harming yourself  · Remove any means that you might use to hurt yourself (examples: pills, rope, extension cords, firearm)  · Get professional help from the community (Mental Health, Substance Abuse, psychological counseling)  · Do not be  alone:Call your Safe Contact- someone whom you trust who will be there for you.  · Call your local CRISIS HOTLINE 151-1691 or 705-724-2213  · Call your local Children's Mobile Crisis Response Team [place] (404) 826-2540 or www.Dotspin  · Call the toll free National Suicide Prevention Hotlines   · National Suicide Prevention Lifeline 464-884-PJDX (5324)  · National [employer] Line Network 800-SUICIDE (089-8625)

## 2018-02-22 NOTE — ASSESSMENT & PLAN NOTE
- Patient has been battling this for weeks which is intermittently symptomatic but currently asymptomatic  - She used to see [facility]  - Mastoid air disease seen on CT  - Follow up with ENT outpatient

## 2018-02-22 NOTE — THERAPY
"Physical Therapy Evaluation completed.   Bed Mobility:  Supine to Sit: Modified Independent  Transfers: Sit to Stand: Independent  Gait: Level Of Assist: Supervised with No Equipment Needed       Plan of Care: Patient with no further skilled PT needs in the acute care setting at this time  Discharge Recommendations: Equipment: No Equipment Needed. Post-acute therapy Currently anticipate no further skilled therapy needs once patient is discharged from the inpatient setting.    See \"Rehab Therapy-Acute\" Patient Summary Report for complete documentation.   Pt is a 72 y.o.f. referred to PT secondary to confusion with possible TIA.  Pt states she was leaving her Pilates class and noted confusion.  she had no c/o weakness or mobility concerns.  Pt lives with her .  she is active and has been indep with all self cares and mobility w/o AD.  Pt demonstrated safe gait w/o AD.  Pt had no LOB with forward gait, backward gait or change in directions.  The pt completed sits<->stand w/o UE assist and w/o LOB.  The patient is safe up and down stairs.  No further skilled PT needs in acture care setting.  "

## 2018-02-22 NOTE — H&P
" Hospital Medicine History and Physical      Date of Service  2/21/2018    Chief Complaint  Chief Complaint   Patient presents with   • Memory Loss     \"I'm sophie blank\" started about 45 minutes ago after Pilates class   • Dizziness       History of Presenting Illness  Royal is a 72 y.o. female w/h/o hypertension, bladder cancer, dyslipidemia, osteopenia who presents with confusion. Patient was a Pilates today and completed her course without issue. At 10:30 AM, patient finished her Pilates course and they went over to her car. However, during her walk to her car, patient became very confused. She could not think at all. She did not have any weakness or word finding difficulties. She also did not have any numbness. She called her  who felt that she did not have any word finding difficulties. However they were worried so they came over to the hospital. Her symptoms have mostly now resolved. However, she still has a slight cloudiness in her thinking she says.   Primary Care Physician  Tate De La Rosa M.D.    Code Status  Full code per patient with  present    Review of Systems  Review of Systems   Constitutional: Negative for chills and fever.   Respiratory: Negative for cough, shortness of breath and wheezing.    Cardiovascular: Negative for chest pain.   Gastrointestinal: Negative for constipation, diarrhea, nausea and vomiting.   Genitourinary: Negative for dysuria.   Neurological: Positive for dizziness. Negative for headaches.     Please see HPI, all other systems were reviewed and are negative (AMA/CMS criteria)   Past Medical History  Past Medical History:   Diagnosis Date   • Hypertension 1992   • Arthritis    • Bladder cancer (CMS-HCC)     bladder cancer 2007 , march   • Dyslipidemia, goal LDL below 130    • Ocular migraine    • Osteopenia     DEXA 9/24/12   • Total knee replacement status        Surgical History  Past Surgical History:   Procedure Laterality Date   • KNEE REPLACEMENT, TOTAL  " 12    Dr. Harry, left knee   • COLONOSCOPY  8/3/2010    diverticulosis only, due in 2020   • HYSTERECTOMY, TOTAL ABDOMINAL  1980s    has one half of an ovary and a fallopian tube       Medications  No current facility-administered medications on file prior to encounter.      Current Outpatient Prescriptions on File Prior to Encounter   Medication Sig Dispense Refill   • tramadol (ULTRAM) 50 MG Tab Take 1 Tab by mouth every 6 hours as needed (arthritis pain) for up to 180 days. 50 Tab 3   • fosinopril (MONOPRIL) 10 MG Tab Take 1 Tab by mouth every day. 30 Tab 11   • S-Adenosylmethionine (NYLA-E) 200 MG TABS Take 1 Tab by mouth every day. OTC 30 Tab 0     Family History  Family History   Problem Relation Age of Onset   • Hypertension Mother    • Psychiatry Mother      paranoid schizophrenic   • Hypertension Father      heavy smoker   • Diabetes Other      niece   • Cancer Sister      skin   • Cancer Paternal Aunt      breast   • Psychiatry Sister      paranoid schizophrenic     Social History  Social History   Substance Use Topics   • Smoking status: Former Smoker     Quit date: 2000   • Smokeless tobacco: Never Used   • Alcohol use No       Allergies  Allergies   Allergen Reactions   • Sulfa Drugs Vomiting        Physical Exam  Laboratory   Hemodynamics  Temp (24hrs), Av.8 °C (98.2 °F), Min:36.5 °C (97.7 °F), Max:37.1 °C (98.7 °F)   Temperature: 37.1 °C (98.7 °F)  Pulse  Av.3  Min: 58  Max: 62    Blood Pressure : (!) 184/83, NIBP: (!) 173/85      Respiratory      Respiration: 20, Pulse Oximetry: 99 %             Physical Exam   Constitutional: She is oriented to person, place, and time. She appears well-developed and well-nourished.   HENT:   Head: Normocephalic.   Right Ear: External ear normal.   Left Ear: External ear normal.   Nose: Nose normal.   Eyes: Conjunctivae and EOM are normal. Pupils are equal, round, and reactive to light. Right eye exhibits no discharge. Left eye exhibits no  "discharge. No scleral icterus.   Neck: Neck supple. No tracheal deviation present.   Cardiovascular: Normal rate.  Exam reveals no gallop and no friction rub.    Pulmonary/Chest: No respiratory distress. She has no wheezes. She has no rales.   Abdominal: Soft. She exhibits no distension. There is no tenderness. There is no rebound and no guarding.   Musculoskeletal: She exhibits no edema.   Neurological: She is alert and oriented to person, place, and time.   5 out of 5 strength upper and lower extremities bilaterally. Sensation intact to light touch distally all 4 extremities and face. Able to spell the word \"world\" forwards and backwards. CN II through XII grossly normal     Skin: Skin is warm and dry. No rash noted. No erythema.   Psychiatric: She has a normal mood and affect.       Recent Labs      02/21/18   1220   WBC  7.7   RBC  4.94   HEMOGLOBIN  14.7   HEMATOCRIT  43.3   MCV  87.7   MCH  29.8   MCHC  33.9   RDW  39.9   PLATELETCT  239   MPV  9.4     Recent Labs      02/21/18   1220   SODIUM  135   POTASSIUM  3.6   CHLORIDE  104   CO2  24   GLUCOSE  105*   BUN  16   CREATININE  0.87   CALCIUM  10.6*     Recent Labs      02/21/18   1220   ALTSGPT  18   ASTSGOT  21   ALKPHOSPHAT  101*   TBILIRUBIN  0.6   GLUCOSE  105*     Recent Labs      02/21/18   1220   APTT  29.1   INR  0.89             Lab Results   Component Value Date    TROPONINI <0.02 02/21/2018       Imaging  Carotid Duplex ([facility] Only) - Do not order if CTA - Neck done in ER   Final Result      DX-CHEST-PORTABLE (1 VIEW)   Final Result      1.  Mild cardiomegaly.      2.  Linear atelectasis versus scarring within the lungs.      CT-HEAD W/O   Final Result      1.  No evidence of acute intracranial process.      2.  Right mastoid air cell disease.      MR-BRAIN-W/O    (Results Pending)   Echocardiogram Comp W/O cont    (Results Pending)      Assessment/Plan     I anticipate this patient is appropriate for observation status at " this time.    Chronic right mastoiditis- (present on admission)   Assessment & Plan    - Patient has been battling this for weeks which is intermittently symptomatic but currently asymptomatic  - She used to see ENT [facility]  - Mastoid air disease seen on CT  - Follow up with ENT outpatient        TIA (transient ischemic attack)- (present on admission)   Assessment & Plan    TIA / Stroke workup  Pt will be admitted for TIA workup. The pt had a negative brain CT. The pt will be monitored on telemetry with neuro checks.   A MRI, carotid US and echocardiogram are PENDING. The pt will be seen by PT/OT.   Pt will be placed on aspirin and statin. A vitamin B12, TSH lipid panel and Hba1c will also be checked.               Prophylaxis:  sc heparin

## 2018-02-22 NOTE — THERAPY
"Occupational Therapy Evaluation completed.   Functional Status:  A&Ox4. BUE strength, F/GMC, AROM is WFL. Vision- weares glasses; pt states no changes. Pt demo's Indep with feeding, toileting, grooming, LB dressing and tolerates ~100' walk, Sup, no AD. Good safety awareness.    Plan of Care: Patient with no further skilled OT needs in the acute care setting at this time  Discharge Recommendations:   Currently anticipate no further skilled therapy needs once patient is discharged from the inpatient setting.   Pt is very active, attends pilates 2x/week.  Lives with spouse.   See \"Rehab Therapy-Acute\" Patient Summary Report for complete documentation.    "

## 2018-02-22 NOTE — DIETARY
"Nutrition services:   72 year old female admitted on 2/21/18  Nutrition Admit Trigger for weight loss prior to admit.     Assessment:  BMI: 32.25  Weight:  212 lbs  Ht:  5'8\"  IBW:  140 lbs  %IBW:  151%  Diet Rx:  Regular    Evaluation:   Labs and medications reviewed  Skin:  No skin breakdown noted  PO intake:  Good  Overweight:  BMI 32.35 and 151% IBW    Patient was down having an MRI at time of visit.  Unable to ascertain weight loss.  However, patient with good PO intake.  Per Document Flowsheets, she took in % of breakfast.       Recommendations/Plan:  1. Monitor PO intake   2. Nutrition Services to work with patient for food preferences    "

## 2018-02-22 NOTE — PROGRESS NOTES
Patient alert and oriented x 4. Answers questions appropriately. Denied pain or discomfort. Bed in lowest position and call light within reach.

## 2018-02-22 NOTE — CARE PLAN
Problem: Safety  Goal: Will remain free from falls    Intervention: Assess risk factors for falls  Encouraged patient to call for assistance when needed.      Problem: Knowledge Deficit  Goal: Knowledge of the prescribed therapeutic regimen will improve    Intervention: Discuss information regarding therpeutic regimen and document in education  Discussed plan of care.

## 2018-02-22 NOTE — PROGRESS NOTES
Discharge to home, discharge instructions and prescription given, explained and understood, only aspirin added to medication, accomapnied with .

## 2018-02-27 ENCOUNTER — OFFICE VISIT (OUTPATIENT)
Dept: MEDICAL GROUP | Facility: CLINIC | Age: 73
End: 2018-02-27
Payer: MEDICARE

## 2018-02-27 VITALS
HEART RATE: 62 BPM | BODY MASS INDEX: 33.12 KG/M2 | TEMPERATURE: 97.4 F | SYSTOLIC BLOOD PRESSURE: 140 MMHG | RESPIRATION RATE: 16 BRPM | DIASTOLIC BLOOD PRESSURE: 68 MMHG | OXYGEN SATURATION: 98 % | HEIGHT: 67 IN | WEIGHT: 211 LBS

## 2018-02-27 DIAGNOSIS — Z09 HOSPITAL DISCHARGE FOLLOW-UP: ICD-10-CM

## 2018-02-27 DIAGNOSIS — H74.91 DISORDER OF RIGHT MASTOID: ICD-10-CM

## 2018-02-27 DIAGNOSIS — R42 DIZZINESS: ICD-10-CM

## 2018-02-27 PROCEDURE — 99213 OFFICE O/P EST LOW 20 MIN: CPT | Performed by: NURSE PRACTITIONER

## 2018-02-27 ASSESSMENT — ENCOUNTER SYMPTOMS
SPUTUM PRODUCTION: 0
NERVOUS/ANXIOUS: 0
NAUSEA: 0
DEPRESSION: 0
SHORTNESS OF BREATH: 0
WHEEZING: 0
ABDOMINAL PAIN: 0
FEVER: 0
COUGH: 0
PALPITATIONS: 0
HEADACHES: 0
CHILLS: 0
MYALGIAS: 0
HEARTBURN: 0
FOCAL WEAKNESS: 0
FALLS: 0
DIZZINESS: 1
WEAKNESS: 0
VOMITING: 0

## 2018-02-27 NOTE — PATIENT INSTRUCTIONS
If you need further assistance, or have any questions; concerns or lingering symptoms before seeing your Primary Care Provider or specialist.     Do not hesitate to contact us.     Please contact us at the Post-Hospital Follow Up Program at [phone].   Our offices hours are Monday-Friday 8 am-5 pm.      Fat and Cholesterol Restricted Diet  High levels of fat and cholesterol in your blood may lead to various health problems, such as diseases of the heart, blood vessels, gallbladder, liver, and pancreas. Fats are concentrated sources of energy that come in various forms. Certain types of fat, including saturated fat, may be harmful in excess. Cholesterol is a substance needed by your body in small amounts. Your body makes all the cholesterol it needs. Excess cholesterol comes from the food you eat.  When you have high levels of cholesterol and saturated fat in your blood, health problems can develop because the excess fat and cholesterol will gather along the walls of your blood vessels, causing them to narrow. Choosing the right foods will help you control your intake of fat and cholesterol. This will help keep the levels of these substances in your blood within normal limits and reduce your risk of disease.  WHAT IS MY PLAN?  Your health care provider recommends that you:  · Get no more than __________ % of the total calories in your daily diet from fat.  · Limit your intake of saturated fat to less than ______% of your total calories each day.  · Limit the amount of cholesterol in your diet to less than _________mg per day.  WHAT TYPES OF FAT SHOULD I CHOOSE?  · Choose healthy fats more often. Choose monounsaturated and polyunsaturated fats, such as olive and canola oil, flaxseeds, walnuts, almonds, and seeds.  · Eat more omega-3 fats. Good choices include salmon, mackerel, sardines, tuna, flaxseed oil, and ground flaxseeds. Aim to eat fish at least two times a week.  · Limit saturated fats. Saturated fats  "are primarily found in animal products, such as meats, butter, and cream. Plant sources of saturated fats include palm oil, palm kernel oil, and coconut oil.  · Avoid foods with partially hydrogenated oils in them. These contain trans fats. Examples of foods that contain trans fats are stick margarine, some tub margarines, cookies, crackers, and other baked goods.  WHAT GENERAL GUIDELINES DO I NEED TO FOLLOW?  These guidelines for healthy eating will help you control your intake of fat and cholesterol:  · Check food labels carefully to identify foods with trans fats or high amounts of saturated fat.  · Fill one half of your plate with vegetables and green salads.  · Fill one fourth of your plate with whole grains. Look for the word \"whole\" as the first word in the ingredient list.  · Fill one fourth of your plate with lean protein foods.  · Limit fruit to two servings a day. Choose fruit instead of juice.  · Eat more foods that contain soluble fiber. Examples of foods that contain this type of fiber are apples, broccoli, carrots, beans, peas, and barley. Aim to get 20-30 g of fiber per day.  · Eat more home-cooked food and less restaurant, buffet, and fast food.  · Limit or avoid alcohol.  · Limit foods high in starch and sugar.  · Limit fried foods.  · Cook foods using methods other than frying. Baking, boiling, grilling, and broiling are all great options.  · Lose weight if you are overweight. Losing just 5-10% of your initial body weight can help your overall health and prevent diseases such as diabetes and heart disease.  WHAT FOODS CAN I EAT?  Grains  Whole grains, such as whole wheat or whole grain breads, crackers, cereals, and pasta. Unsweetened oatmeal, bulgur, barley, quinoa, or brown rice. Corn or whole wheat flour tortillas.  Vegetables  Fresh or frozen vegetables (raw, steamed, roasted, or grilled). Green salads.  Fruits  All fresh, canned (in natural juice), or frozen fruits.  Meat and Other Protein " Products  Ground beef (85% or leaner), grass-fed beef, or beef trimmed of fat. Skinless chicken or turkey. Ground chicken or turkey. Pork trimmed of fat. All fish and seafood. Eggs. Dried beans, peas, or lentils. Unsalted nuts or seeds. Unsalted canned or dry beans.  Dairy  Low-fat dairy products, such as skim or 1% milk, 2% or reduced-fat cheeses, low-fat ricotta or cottage cheese, or plain low-fat yogurt.  Fats and Oils  Tub margarines without trans fats. Light or reduced-fat mayonnaise and salad dressings. Avocado. Olive, canola, sesame, or safflower oils. Natural peanut or almond butter (choose ones without added sugar and oil).  The items listed above may not be a complete list of recommended foods or beverages. Contact your dietitian for more options.  WHAT FOODS ARE NOT RECOMMENDED?  Grains  White bread. White pasta. White rice. Cornbread. Bagels, pastries, and croissants. Crackers that contain trans fat.  Vegetables  White potatoes. Corn. Creamed or fried vegetables. Vegetables in a cheese sauce.  Fruits  Dried fruits. Canned fruit in light or heavy syrup. Fruit juice.  Meat and Other Protein Products  Fatty cuts of meat. Ribs, chicken wings, tenorio, sausage, bologna, salami, chitterlings, fatback, hot dogs, bratwurst, and packaged luncheon meats. Liver and organ meats.  Dairy  Whole or 2% milk, cream, half-and-half, and cream cheese. Whole milk cheeses. Whole-fat or sweetened yogurt. Full-fat cheeses. Nondairy creamers and whipped toppings. Processed cheese, cheese spreads, or cheese curds.  Sweets and Desserts  Corn syrup, sugars, honey, and molasses. Candy. Jam and jelly. Syrup. Sweetened cereals. Cookies, pies, cakes, donuts, muffins, and ice cream.  Fats and Oils  Butter, stick margarine, lard, shortening, ghee, or tenorio fat. Coconut, palm kernel, or palm oils.  Beverages  Alcohol. Sweetened drinks (such as sodas, lemonade, and fruit drinks or punches).  The items listed above may not be a complete list  of foods and beverages to avoid. Contact your dietitian for more information.     This information is not intended to replace advice given to you by your health care provider. Make sure you discuss any questions you have with your health care provider.     Document Released: 12/18/2006 Document Revised: 01/08/2016 Document Reviewed: 03/18/2015  [employer] Interactive Patient Education ©2016 [employer]

## 2018-02-27 NOTE — DISCHARGE SUMMARY
"CHIEF COMPLAINT ON ADMISSION  Chief Complaint   Patient presents with   • Memory Loss     \"I'm sophie blank\" started about 45 minutes ago after Pilates class   • Dizziness       CODE STATUS  Full    HPI & HOSPITAL COURSE  This is a 72 y.o. female with history of hypertension, bladder cancer, hyperlipidemia, osteopenia here with near syncope. She just finished working out and became dizzy while walking to her car. She felt that she had some difficulty finding words and felt slightly disoriented. She came to the hospital for evaluation where CT of her head was negative. Her blood pressure was on the high end of normal in the 150s. Her blood pressure subsequently normalized. She had borderline low heart rate at 50.    She was admitted overnight to telemetry where she had heart rates of the low end of normal however remained asymptomatic. She had noticed telemetry events. Her hemoglobin A1c was in the normal range and her cholesterol panel also was normal. Her heart enzyme, troponin remains negative. She did undergo an echocardiogram which was negative for any significant abnormality.  MRI of the brain was performed and was negative for any acute abnormality.    Her symptoms did not return throughout her hospital stay. She was given IV fluids and she felt significantly improved. She was evaluated for infection however showed no evidence of this.    She was told to maintain adequate hydration while working out, presuming volume depletion may have contributed to her symptoms. She was also noted to have some mild fluid in the right mastoid space which appeared to be chronic. This also may have contributed to her slight feeling of dizziness. She is told to take over-the-counter de-congestion medications if needed She is also told to buy a blood pressure monitor and to follow her blood pressure readings at home to ensure they are not running too high or too low. She had complete resolution of her symptoms and was discharged " in stable condition. There was no confirmed TIA during this hospital stay.    The patient recovered much more quickly than anticipated on admission.    Therefore, she is discharged in fair and stable condition with close outpatient follow-up.    SPECIFIC OUTPATIENT FOLLOW-UP  F/u with PCP within one week    DISCHARGE PROBLEM LIST  Active Problems:    Chronic right mastoiditis POA: Yes  Resolved Problems:    * No resolved hospital problems. *      FOLLOW UP  Future Appointments  Date Time Provider Department Center   3/8/2018 10:15 AM SHARON DE LEON [facility] [address] [facility]   3/16/2018 9:20 AM Tate De La Rosa M.D. [facility], Emergency Dept  [address]  [place] [address]  [phone]    As needed, If symptoms worsen    Tate De La Rosa M.D.  [address]  [address]  [place] [address]  [phone]            MEDICATIONS ON DISCHARGE   Franchesca Paige   Home Medication Instructions TOMER:[id number]    Printed on:02/27/18 [id number]   Medication Information                      aspirin EC (ECOTRIN) 81 MG Tablet Delayed Response  Take 1 Tab by mouth every day.             Cholecalciferol (VITAMIN D3) 5000 units Cap  Take 1 Cap by mouth every evening.             fosinopril (MONOPRIL) 10 MG Tab  Take 1 Tab by mouth every day.             S-Adenosylmethionine (NYLA-E) 200 MG TABS  Take 1 Tab by mouth every day. OTC             tramadol (ULTRAM) 50 MG Tab  Take 1 Tab by mouth every 6 hours as needed (arthritis pain) for up to 180 days.                 DIET  No orders of the defined types were placed in this encounter.      ACTIVITY  As tolerated.  Weight bearing as tolerated      CONSULTATIONS  None    PROCEDURES  None    LABORATORY  Lab Results   Component Value Date/Time    SODIUM 139 02/22/2018 04:31 AM    POTASSIUM 4.0 02/22/2018 04:31 AM    CHLORIDE 107 02/22/2018 04:31 AM    CO2 27 02/22/2018 04:31 AM    GLUCOSE 96 02/22/2018 04:31 AM    BUN 15 02/22/2018 04:31 AM    CREATININE  0.79 02/22/2018 04:31 AM    CREATININE 0.74 10/02/2012 08:32 AM    GLOMRATE >59 09/15/2009 08:49 AM        Lab Results   Component Value Date/Time    WBC 5.5 02/22/2018 04:31 AM    WBC 5.3 09/15/2009 08:49 AM    HEMOGLOBIN 13.9 02/22/2018 04:31 AM    HEMATOCRIT 41.0 02/22/2018 04:31 AM    PLATELETCT 241 02/22/2018 04:31 AM        Total time of the discharge process exceeds 40 minutes

## 2018-03-08 ENCOUNTER — HOSPITAL ENCOUNTER (OUTPATIENT)
Dept: RADIOLOGY | Facility: MEDICAL CENTER | Age: 73
End: 2018-03-08
Attending: FAMILY MEDICINE
Payer: COMMERCIAL

## 2018-03-08 ENCOUNTER — HOSPITAL ENCOUNTER (OUTPATIENT)
Dept: RADIOLOGY | Facility: MEDICAL CENTER | Age: 73
End: 2018-03-08
Attending: CHIROPRACTOR
Payer: COMMERCIAL

## 2018-03-08 DIAGNOSIS — M85.9 DISORDER OF BONE DENSITY AND STRUCTURE, UNSPECIFIED: ICD-10-CM

## 2018-03-08 DIAGNOSIS — M85.89 OSTEOPENIA OF MULTIPLE SITES: ICD-10-CM

## 2018-03-08 DIAGNOSIS — R26.89 UNABLE TO BEAR WEIGHT: ICD-10-CM

## 2018-03-08 PROCEDURE — 77080 DXA BONE DENSITY AXIAL: CPT

## 2018-03-08 PROCEDURE — 73700 CT LOWER EXTREMITY W/O DYE: CPT | Mod: RT

## 2018-04-16 ENCOUNTER — OFFICE VISIT (OUTPATIENT)
Dept: MEDICAL GROUP | Facility: CLINIC | Age: 73
End: 2018-04-16
Payer: MEDICARE

## 2018-04-16 VITALS
HEART RATE: 52 BPM | RESPIRATION RATE: 16 BRPM | TEMPERATURE: 96.1 F | WEIGHT: 206 LBS | HEIGHT: 67 IN | BODY MASS INDEX: 32.33 KG/M2 | SYSTOLIC BLOOD PRESSURE: 138 MMHG | DIASTOLIC BLOOD PRESSURE: 76 MMHG | OXYGEN SATURATION: 98 %

## 2018-04-16 DIAGNOSIS — M15.9 PRIMARY OSTEOARTHRITIS INVOLVING MULTIPLE JOINTS: ICD-10-CM

## 2018-04-16 DIAGNOSIS — H70.11 CHRONIC RIGHT MASTOIDITIS: ICD-10-CM

## 2018-04-16 DIAGNOSIS — E66.9 OBESITY (BMI 30.0-34.9): ICD-10-CM

## 2018-04-16 DIAGNOSIS — E78.5 DYSLIPIDEMIA, GOAL LDL BELOW 130: ICD-10-CM

## 2018-04-16 DIAGNOSIS — M85.89 OSTEOPENIA OF MULTIPLE SITES: ICD-10-CM

## 2018-04-16 DIAGNOSIS — G43.109 OCULAR MIGRAINE: ICD-10-CM

## 2018-04-16 DIAGNOSIS — Z96.652 STATUS POST TOTAL LEFT KNEE REPLACEMENT: ICD-10-CM

## 2018-04-16 DIAGNOSIS — G89.29 CHRONIC PAIN OF RIGHT ANKLE: ICD-10-CM

## 2018-04-16 DIAGNOSIS — Z85.51 HISTORY OF BLADDER CANCER: ICD-10-CM

## 2018-04-16 DIAGNOSIS — Z00.00 MEDICARE ANNUAL WELLNESS VISIT, SUBSEQUENT: ICD-10-CM

## 2018-04-16 DIAGNOSIS — J30.1 CHRONIC SEASONAL ALLERGIC RHINITIS DUE TO POLLEN: ICD-10-CM

## 2018-04-16 DIAGNOSIS — K21.9 GASTROESOPHAGEAL REFLUX DISEASE WITHOUT ESOPHAGITIS: ICD-10-CM

## 2018-04-16 DIAGNOSIS — F43.21 SITUATIONAL DEPRESSION: ICD-10-CM

## 2018-04-16 DIAGNOSIS — R41.3 TRANSIENT AMNESIA: ICD-10-CM

## 2018-04-16 DIAGNOSIS — I10 ESSENTIAL HYPERTENSION: ICD-10-CM

## 2018-04-16 DIAGNOSIS — M25.571 CHRONIC PAIN OF RIGHT ANKLE: ICD-10-CM

## 2018-04-16 PROCEDURE — G0439 PPPS, SUBSEQ VISIT: HCPCS | Performed by: FAMILY MEDICINE

## 2018-04-16 ASSESSMENT — PATIENT HEALTH QUESTIONNAIRE - PHQ9
SUM OF ALL RESPONSES TO PHQ QUESTIONS 1-9: 8
5. POOR APPETITE OR OVEREATING: 0 - NOT AT ALL
CLINICAL INTERPRETATION OF PHQ2 SCORE: 4

## 2018-04-16 ASSESSMENT — PAIN SCALES - GENERAL: PAINLEVEL: NO PAIN

## 2018-04-16 ASSESSMENT — ACTIVITIES OF DAILY LIVING (ADL): BATHING_REQUIRES_ASSISTANCE: 0

## 2018-04-16 NOTE — PROGRESS NOTES
Chief Complaint   Patient presents with   • Annual Exam         HPI:  Franchesca Paige is a 72 y.o. here for Medicare Annual Wellness Visit     Patient Active Problem List    Diagnosis Date Noted   • Ocular migraine 04/16/2018   • Osteopenia of multiple sites    • Dizziness 02/22/2018   • 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/21/2018   • Chronic pain of right ankle 02/08/2018   • Obesity (BMI 30.0-34.9) 02/24/2017   • Need for subacute bacterial endocarditis prophylaxis 01/21/2016   • Essential hypertension 06/11/2015   • Status post total left knee replacement 06/11/2015   • Gastroesophageal reflux disease without esophagitis 06/11/2015   • Allergic rhinitis due to pollen 06/11/2015   • History of bladder cancer 06/11/2015   • Dyslipidemia, goal LDL below 130    • Primary osteoarthritis involving multiple joints        Current Outpatient Prescriptions   Medication Sig Dispense Refill   • aspirin EC (ECOTRIN) 81 MG Tablet Delayed Response Take 1 Tab by mouth every day. 30 Tab 2   • Cholecalciferol (VITAMIN D3) 5000 units Cap Take 1 Cap by mouth every evening.     • tramadol (ULTRAM) 50 MG Tab Take 1 Tab by mouth every 6 hours as needed (arthritis pain) for up to 180 days. 50 Tab 3   • fosinopril (MONOPRIL) 10 MG Tab Take 1 Tab by mouth every day. 30 Tab 11   • S-Adenosylmethionine (NYLA-E) 200 MG TABS Take 1 Tab by mouth every day. OTC 30 Tab 0     No current facility-administered medications for this visit.             Current supplements as per medication list.       Allergies: Sulfa drugs    Current social contact/activities:  is doing better.  They are changing diet and exercise.  She is trying to exercise but her ankle limits her.   Patient's perception of their health: fair    She  reports that she quit smoking about 17 years ago. She has never used smokeless tobacco. She reports that she does not drink alcohol or use drugs.  Counseling given: Yes      DPA/Advanced Directive:  Patient has Advanced Directive, Living  Will and Durable Power of , but it is not on file. Instructed to bring in a copy to scan into their chart.    ROS:    Gait: Uses no assistive device  Ostomy: No  Other tubes: No  Amputations: No  Chronic oxygen use: No  Last eye exam: last May, going to be seen soon, early cataract  Wears hearing aids: No   : Denies any urinary leakage during the last 6 months   Denies chest pain, pressure, palpitations or exertional SOB    Screening:  Complete   Depression Screening    Little interest or pleasure in doing things?  2 - more than half the days  Feeling down, depressed , or hopeless? 2 - more than half the days  Trouble falling or staying asleep, or sleeping too much?  0 - not at all  Feeling tired or having little energy?  2 - more than half the days  Poor appetite or overeating?  0 - not at all  Feeling bad about yourself - or that you are a failure or have let yourself or your family down? 0 - not at all  Trouble concentrating on things, such as reading the newspaper or watching television? 2 - more than half the days  Moving or speaking so slowly that other people could have noticed.  Or the opposite - being so fidgety or restless that you have been moving around a lot more than usual?  0 - not at all  Thoughts that you would be better off dead, or of hurting yourself?  0 - not at all  Patient Health Questionnaire Score: 8    If depressive symptoms identified deferred to follow up visit unless specifically addressed in assessment and plan.    Interpretation of PHQ-9 Total Score   Score Severity   1-4 No Depression   5-9 Mild Depression   10-14 Moderate Depression   15-19 Moderately Severe Depression   20-27 Severe Depression      Screening for Cognitive Impairment    Three Minute Recall (apple, watch, maria a)  3/3    Draw clock face with all 12 numbers set to the hand to show 10 minutes past 11 o'clock  0    Cognitive concerns identified deferred for follow up unless specifically addressed in assessment  and plan.    Fall Risk Assessment    Has the patient had two or more falls in the last year or any fall with injury in the last year?  No    Safety Assessment    Throw rugs on floor.  No  Handrails on all stairs.  No  Good lighting in all hallways.  Yes  Difficulty hearing.  No  Patient counseled about all safety risks that were identified.    Functional Assessment ADLs    Are there any barriers preventing you from cooking for yourself or meeting nutritional needs?  No.    Are there any barriers preventing you from driving safely or obtaining transportation?  No.    Are there any barriers preventing you from using a telephone or calling for help?  No.    Are there any barriers preventing you from shopping?  No.    Are there any barriers preventing you from taking care of your own finances?  No.    Are there any barriers preventing you from managing your medications?  No.    Are there any barriers preventing you from showering, bathing or dressing yourself? No.    Are currently engaging any exercise or physical activity?  Yes.       Health Maintenance Summary                Annual Wellness Visit Overdue 6/11/2016      Done 6/11/2015 Visit Dx: Medicare annual wellness visit, initial    MAMMOGRAM Next Due 6/23/2018      Done 6/23/2017 MA-MAMMO SCREENING BILAT W/TOMOSYNTHESIS W/CAD     Patient has more history with this topic...    IMM DTaP/Tdap/Td Vaccine Next Due 5/18/2019      Done 5/18/2009 Imm Admin: Tdap Vaccine    BONE DENSITY Next Due 3/8/2020      Done 3/8/2018 DS-BONE DENSITY STUDY (DEXA)     Patient has more history with this topic...    COLONOSCOPY Next Due 8/12/2020      Previously completed 8/12/2010 diverticulosis          Patient Care Team:  Tate De La Rosa M.D. as PCP - General (Family Medicine)  Guy Mccall M.D. as Consulting Physician (Urology)  Kiley Chris M.D. as Consulting Physician (Gastroenterology)  Lucas Lucas O.D. as Consulting Physician (Optometry)  So Cannon M.D. as  "Consulting Physician (Dermatology)  NICHOLAS Noel D.P.M. as Consulting Physician (Podiatry)  Wesley Decker M.D. as Consulting Physician (OB/Gyn)      Social History   Substance Use Topics   • Smoking status: Former Smoker     Quit date: 5/1/2000   • Smokeless tobacco: Never Used   • Alcohol use No     Family History   Problem Relation Age of Onset   • Hypertension Mother    • Psychiatry Mother      paranoid schizophrenic   • Hypertension Father      heavy smoker   • Diabetes Other      niece   • Cancer Sister      skin   • Cancer Paternal Aunt      breast   • Psychiatry Sister      paranoid schizophrenic     She  has a past medical history of Arthritis; Bladder cancer ([facility]); Dyslipidemia, goal LDL below 130; Hypertension (1992); Ocular migraine; Osteopenia; Osteopenia of multiple sites; and Total knee replacement status.   Past Surgical History:   Procedure Laterality Date   • KNEE REPLACEMENT, TOTAL  12/4/12    Dr. Harry, left knee   • COLONOSCOPY  8/3/2010    diverticulosis only, due in 2020   • HYSTERECTOMY, TOTAL ABDOMINAL  1980s    has one half of an ovary and a fallopian tube       Exam:   Blood pressure 138/76, pulse (!) 52, temperature (!) 35.6 °C (96.1 °F), resp. rate 16, height 1.702 m (5' 7\"), weight 93.4 kg (206 lb), SpO2 98 %, not currently breastfeeding. Body mass index is 32.26 kg/m².    Hearing good.    Dentition good  Alert, oriented in no acute distress.  Eye contact is good, speech goal directed, affect calm  HEENT:   EOMI, PERRLA.   Oropharynx is well hydrated with normal soft palate motion. No exudate or ulcerations noted.   Neck:       Full range of motion, mild posterior cervical spasm. No JVD or carotid bruits appreciated. No cervical adenopathy appreciated. No thyromegaly or neck masses appreciated.  No retractions appreciated.  Lungs:     Clear to auscultation A&P with good air movement.   Breasts:  No masses appreciated.  No distortion of the breast or skin texture changes.  "   Heart:      Regular rate and rhythm normal S1 and S2 without murmur appreciated.  Strong and symmetric radial and DP pulses.  Abd:        Soft, bowel sounds positive, nontender. No bloating noted. No hepatosplenomegaly or mass appreciated. No pulsatile mass appreciated.  Ext:         Extremities show symmetric and full range of motion with normal strength. No cyanosis or clubbing appreciated. No peripheral edema appreciated.  Neuro:    Gait is normal. Patient is lucid, fluent and appropriate. No significant tremor appreciated.  Skin:       Exhibit no rashes, pigmented lesions or ulcerations.    Depression Screen (PHQ-2/PHQ-9) 2/8/2018 2/21/2018 4/16/2018   PHQ-2 Total Score - 0 -   PHQ-2 Total Score - - -   PHQ-2 Total Score 1 - 4   PHQ-9 Total Score - 0 -   PHQ-9 Total Score 5 - 8       Interpretation of PHQ-9 Total Score   Score Severity   1-4 No Depression   5-9 Mild Depression   10-14 Moderate Depression   15-19 Moderately Severe Depression   20-27 Severe Depression    Assessment and Plan. The following treatment and monitoring plan is recommended:    1. Medicare annual wellness visit, subsequent  Her medical problems are generally stable.   2. Obesity (BMI 30.0-34.9)  OBESITY COUNSELING (No Charge): Patient identified as having weight management issue.  Appropriate orders and counseling given.  She has made continued strides in her weight loss.  Eating a plant based diet.  Stable.   3. Chronic right mastoiditis  She continues with her ENT, Dr. Prado.  This problem is currently in good control.  Dizziness is much better.  Stable.    4. Dyslipidemia, goal LDL below 130  Patient denies chest pain, chest pressure, palpitations or exertional shortness of breath. Patient is on no lipid lowering medication.  Her lipids have been generally very favorable. Patient is a former smoker. Patient takes no aspirin daily due to bruising. Patient has no history of myocardial infarction, stroke or PVD.  The problem is  clinically stable.   5. Essential hypertension  HTN - Chronic condition stable. Currently taking all meds as directed.   She is not taking baby aspirin daily.   She is monitoring BP at home. This is around twice a month.  110-125/70s at home consistently  Denies symptoms low BP: light-headed, tunnel-vision, unusual fatigue.   Denies symptoms high BP:pounding headache, visual changes, palpitations, flushed face.   Denies medicine side effects: unusual fatigue, slow heartbeat, foot/leg swelling, cough.   6. Osteopenia of multiple sites  She does have documented vitamin D deficiency and osteopenia. Recent vitamin D test in February is mildly low. She continues 5000 units daily. She had to try a few different ones before finding a brand she can tolerate. She is pushing herself to walk on a daily basis but is having difficulty due to her chronic ankle problem. There is no history of pathologic fracture. Stable problem.    7. Status post total left knee replacement  The total knee replacement was successful. She has been able to walk more and is now being limited primarily by the ankle. Stable    8. Primary osteoarthritis involving multiple joints  She has osteoarthritis in multiple joints including knees, ankles and lumbar spine. She had a successful left total knee. This is generally under good control using stretching, heat. She uses tramadol for rescue as needed. She avoids anti-inflammatories due to her gastrointestinal reflux disease and hypertension. Stable problem.    9. History of bladder cancer  She is now over 10 years from her bladder cancer. She no longer sees urology. We will continue yearly urine tests. Recent urinalysis in February was negative for blood. Denies any visible blood or difficulty with urination. Stable problem.    10. Chronic pain of right ankle  Recent CT shows mild to moderate arthritis throughout the foot and ankle. There is also ligamentous edema medially. This is felt to represent a  chronic tendinitis. She continues to work with her chiropractor and is doing exercises to gently stretch and stabilize the area. She feels she is gradually improving. Stable overall.    11. Chronic seasonal allergic rhinitis due to pollen  Patient continues to have episodic allergic symptoms. These are controlled well with over-the-counter fexofenadine as needed.  Stable problem.    12. Gastroesophageal reflux disease without esophagitis  She feels the current dietary regimen is controlling the gastroesophageal reflux symptoms well. Does use pepcid on occasion with relief. Denies dysphagia, reflux symptoms, acidity, abdominal pain or visible blood or mucus in the stool. Denies vomiting or hematemesis. Denies burping or abdominal bloating. Patient avoids nonsteroidal anti-inflammatory drugs. Avoids heavy meals or eating within 2 hours of bedtime.  Stable problem.   13. Transient amnesia  She had an episode in February of disorientation with some recent memory confusion. MRI was done but no definite stroke or TIA was identified. It took 2-3 weeks for the symptoms to resolve. At the time her  had just come out of the hospital with cardiac treatment. Her father-in-law had passed. Her mother-in-law was distraught and all in all things were very difficult. She had some symptoms at the time which were suggestive of ocular migraine as well. No further recurrence. Currently stable problem. Have asked the patient to attempt again to take low-dose aspirin at least once or twice a week.    14. Ocular migraine  Patient does have symptoms of ocular migraine including peripheral scotomata and a little narrowing down the division and some fatigue. Generally her symptoms are mild and are resolved within an hour or 2. Stable.    15. Situational depression  The is lightening up now that her husbands health is doing better.  Also, her father-in-law finally passed from long illness which has been a sad thing but has been a  relief. Her  is finally doing much better. They are following a strict diet but she feels that may be helping her depression as well. She feels she is gradually improving. She says in February her score would probably been much higher. Patient denies any need for medication, feels most of this is situational. Stable problem.          Services suggested: No services needed at this time  Health Care Screening: Age-appropriate preventive services recommended by USPTF and ACIP covered by Medicare were discussed today. Services ordered if indicated and agreed upon by the patient.  Referrals offered: Community-based lifestyle interventions to reduce health risks and promote self-management and wellness, fall prevention, nutrition, physical activity, tobacco-use cessation, weight loss, and mental health services as per orders if indicated.    Discussion today about general wellness and lifestyle habits:  discussed  · Prevent falls and reduce trip hazards; Cautioned about securing or removing rugs.  · Have a working fire alarm and carbon monoxide detector; has  · Engage in regular physical activity and social activities     Follow-up: Return in about 6 months (around 10/16/2018), or if symptoms worsen or fail to improve.

## 2018-06-29 ENCOUNTER — HOSPITAL ENCOUNTER (OUTPATIENT)
Dept: RADIOLOGY | Facility: MEDICAL CENTER | Age: 73
End: 2018-06-29
Attending: FAMILY MEDICINE
Payer: COMMERCIAL

## 2018-06-29 DIAGNOSIS — Z12.31 BREAST CANCER SCREENING BY MAMMOGRAM: ICD-10-CM

## 2018-06-29 PROCEDURE — 77067 SCR MAMMO BI INCL CAD: CPT

## 2018-11-01 ENCOUNTER — OFFICE VISIT (OUTPATIENT)
Dept: MEDICAL GROUP | Facility: MEDICAL CENTER | Age: 73
End: 2018-11-01
Payer: COMMERCIAL

## 2018-11-01 VITALS
SYSTOLIC BLOOD PRESSURE: 110 MMHG | BODY MASS INDEX: 31.52 KG/M2 | RESPIRATION RATE: 12 BRPM | DIASTOLIC BLOOD PRESSURE: 72 MMHG | HEIGHT: 68 IN | HEART RATE: 50 BPM | WEIGHT: 208 LBS | TEMPERATURE: 98 F | OXYGEN SATURATION: 98 %

## 2018-11-01 DIAGNOSIS — I10 ESSENTIAL HYPERTENSION: ICD-10-CM

## 2018-11-01 DIAGNOSIS — R00.1 BRADYCARDIA: ICD-10-CM

## 2018-11-01 DIAGNOSIS — E66.9 OBESITY (BMI 30.0-34.9): ICD-10-CM

## 2018-11-01 PROCEDURE — 99213 OFFICE O/P EST LOW 20 MIN: CPT | Performed by: FAMILY MEDICINE

## 2018-11-01 NOTE — PROGRESS NOTES
Chief Complaint   Patient presents with   • Hypertension   • Bradycardia       Subjective:     HPI:   Franchesca Paige presents today with the followin. Essential hypertension  HTN - Chronic condition stable. Currently taking all meds as directed.   She is not taking baby aspirin daily.   She is monitoring BP at home. However, the readings vary a lot.  Will check occasionally at the grocery store.  Denies symptoms low BP: light-headed, tunnel-vision, unusual fatigue.   Denies symptoms high BP:pounding headache, visual changes, palpitations, flushed face.   Denies medicine side effects: unusual fatigue, slow heartbeat, foot/leg swelling, cough.    2. Bradycardia  This has been present over 10 years.      3. Obesity (BMI 30.0-34.9)  She has gained 2 pounds.  She is working on improving diet.      Still takes the tramadol as needed only.  States she has over 50 left, does not need a renewal.      Patient Active Problem List    Diagnosis Date Noted   • Bradycardia 2018   • Ocular migraine 2018   • Osteopenia of multiple sites    • Dizziness 2018   • Chronic right mastoiditis 2018   • Chronic pain of right ankle 2018   • Obesity (BMI 30.0-34.9) 2017   • Need for subacute bacterial endocarditis prophylaxis 2016   • Essential hypertension 2015   • Status post total left knee replacement 2015   • Gastroesophageal reflux disease without esophagitis 2015   • Allergic rhinitis due to pollen 2015   • History of bladder cancer 2015   • Dyslipidemia, goal LDL below 130    • Primary osteoarthritis involving multiple joints        Current medicines (including changes today)  Current Outpatient Prescriptions   Medication Sig Dispense Refill   • Cholecalciferol (VITAMIN D3) 5000 units Cap Take 1 Cap by mouth every evening.     • fosinopril (MONOPRIL) 10 MG Tab Take 1 Tab by mouth every day. 30 Tab 11   • S-Adenosylmethionine (NYLA-E) 200 MG TABS Take 1 Tab by  "mouth every day. OTC 30 Tab 0     No current facility-administered medications for this visit.        Allergies   Allergen Reactions   • Sulfa Drugs Vomiting       ROS: As per HPI       Objective:     Blood pressure 110/72, pulse (!) 50, temperature 36.7 °C (98 °F), resp. rate 12, height 1.727 m (5' 8\"), weight 94.3 kg (208 lb), SpO2 98 %, not currently breastfeeding. Body mass index is 31.63 kg/m².    Physical Exam:  Constitutional: Well-developed and well-nourished. Not diaphoretic. No distress. Lucid and fluent. Speech is very good.  Skin: Skin is warm and dry. No rash noted.  Head: Atraumatic without lesions.  Eyes: Conjunctivae and extraocular motions are normal. Pupils are equal, round, and reactive to light. No scleral icterus.   Ears:  External ears unremarkable. Tympanic membranes clear and intact.  No erythema.  Nose: Nares patent. Mucosa without edema or erythema. No discharge. No facial tenderness.  Mouth/Throat: Tongue normal. Oropharynx is clear and moist. Posterior pharynx without erythema or exudates.  Neck: Supple, trachea midline. No thyromegaly present. No cervical or supraclavicular lymphadenopathy. No JVD or carotid bruits appreciated  Cardiovascular: Regular rate and rhythm.  Normal S1, S2 without murmur appreciated.  Chest: Effort normal. Clear to auscultation throughout. No adventitious sounds.   Abdomen: Soft, non tender, and without distention. Active bowel sounds in all four quadrants. No rebound, guarding, masses or hepatosplenomegaly.  Extremities: No cyanosis, clubbing, erythema, nor edema.   Neurological: Alert and oriented x 3. No tremor noted.    Psychiatric:  Behavior, mood, and affect are appropriate.       Assessment and Plan:     72 y.o. female with the following issues:    1. Essential hypertension     2. Bradycardia     3. Obesity (BMI 30.0-34.9)           Followup: Return in about 6 months (around 5/1/2019), or if symptoms worsen or fail to improve.  "

## 2019-02-02 DIAGNOSIS — I10 ESSENTIAL HYPERTENSION: ICD-10-CM

## 2019-02-02 RX ORDER — FOSINOPRIL SODIUM 10 MG/1
TABLET ORAL
Qty: 30 TAB | Refills: 11 | Status: SHIPPED | OUTPATIENT
Start: 2019-02-02 | End: 2019-02-26 | Stop reason: SDUPTHER

## 2019-02-05 DIAGNOSIS — Z29.89 NEED FOR SUBACUTE BACTERIAL ENDOCARDITIS PROPHYLAXIS: ICD-10-CM

## 2019-02-05 RX ORDER — AMOXICILLIN 500 MG/1
CAPSULE ORAL
Qty: 4 CAP | Refills: 2 | Status: SHIPPED | OUTPATIENT
Start: 2019-02-05 | End: 2019-02-10

## 2019-02-10 ENCOUNTER — APPOINTMENT (OUTPATIENT)
Dept: RADIOLOGY | Facility: MEDICAL CENTER | Age: 74
DRG: 690 | End: 2019-02-10
Attending: EMERGENCY MEDICINE
Payer: COMMERCIAL

## 2019-02-10 ENCOUNTER — HOSPITAL ENCOUNTER (INPATIENT)
Facility: MEDICAL CENTER | Age: 74
LOS: 2 days | DRG: 690 | End: 2019-02-12
Attending: EMERGENCY MEDICINE | Admitting: INTERNAL MEDICINE
Payer: COMMERCIAL

## 2019-02-10 DIAGNOSIS — N12 PYELONEPHRITIS: ICD-10-CM

## 2019-02-10 PROBLEM — E87.6 HYPOKALEMIA: Status: ACTIVE | Noted: 2019-02-10

## 2019-02-10 LAB
ALBUMIN SERPL BCP-MCNC: 3.8 G/DL (ref 3.2–4.9)
ALBUMIN/GLOB SERPL: 1.2 G/DL
ALP SERPL-CCNC: 100 U/L (ref 30–99)
ALT SERPL-CCNC: 16 U/L (ref 2–50)
ANION GAP SERPL CALC-SCNC: 10 MMOL/L (ref 0–11.9)
APPEARANCE UR: CLEAR
AST SERPL-CCNC: 22 U/L (ref 12–45)
BACTERIA #/AREA URNS HPF: ABNORMAL /HPF
BASOPHILS # BLD AUTO: 0.1 % (ref 0–1.8)
BASOPHILS # BLD: 0.02 K/UL (ref 0–0.12)
BILIRUB SERPL-MCNC: 1.1 MG/DL (ref 0.1–1.5)
BILIRUB UR QL STRIP.AUTO: NEGATIVE
BUN SERPL-MCNC: 16 MG/DL (ref 8–22)
CALCIUM SERPL-MCNC: 10.2 MG/DL (ref 8.4–10.2)
CHLORIDE SERPL-SCNC: 104 MMOL/L (ref 96–112)
CO2 SERPL-SCNC: 22 MMOL/L (ref 20–33)
COLOR UR: YELLOW
CREAT SERPL-MCNC: 0.74 MG/DL (ref 0.5–1.4)
EOSINOPHIL # BLD AUTO: 0 K/UL (ref 0–0.51)
EOSINOPHIL NFR BLD: 0 % (ref 0–6.9)
EPI CELLS #/AREA URNS HPF: ABNORMAL /HPF
ERYTHROCYTE [DISTWIDTH] IN BLOOD BY AUTOMATED COUNT: 41.4 FL (ref 35.9–50)
GLOBULIN SER CALC-MCNC: 3.1 G/DL (ref 1.9–3.5)
GLUCOSE SERPL-MCNC: 115 MG/DL (ref 65–99)
GLUCOSE UR STRIP.AUTO-MCNC: NEGATIVE MG/DL
HCT VFR BLD AUTO: 42.7 % (ref 37–47)
HGB BLD-MCNC: 14.1 G/DL (ref 12–16)
IMM GRANULOCYTES # BLD AUTO: 0.15 K/UL (ref 0–0.11)
IMM GRANULOCYTES NFR BLD AUTO: 1 % (ref 0–0.9)
KETONES UR STRIP.AUTO-MCNC: 40 MG/DL
LEUKOCYTE ESTERASE UR QL STRIP.AUTO: ABNORMAL
LIPASE SERPL-CCNC: 23 U/L (ref 7–58)
LYMPHOCYTES # BLD AUTO: 0.23 K/UL (ref 1–4.8)
LYMPHOCYTES NFR BLD: 1.5 % (ref 22–41)
MCH RBC QN AUTO: 29.6 PG (ref 27–33)
MCHC RBC AUTO-ENTMCNC: 33 G/DL (ref 33.6–35)
MCV RBC AUTO: 89.7 FL (ref 81.4–97.8)
MICRO URNS: ABNORMAL
MONOCYTES # BLD AUTO: 0.16 K/UL (ref 0–0.85)
MONOCYTES NFR BLD AUTO: 1.1 % (ref 0–13.4)
MUCOUS THREADS #/AREA URNS HPF: ABNORMAL /HPF
NEUTROPHILS # BLD AUTO: 14.4 K/UL (ref 2–7.15)
NEUTROPHILS NFR BLD: 96.3 % (ref 44–72)
NITRITE UR QL STRIP.AUTO: POSITIVE
NRBC # BLD AUTO: 0 K/UL
NRBC BLD-RTO: 0 /100 WBC
PH UR STRIP.AUTO: 6 [PH]
PLATELET # BLD AUTO: 224 K/UL (ref 164–446)
PMV BLD AUTO: 9.3 FL (ref 9–12.9)
POTASSIUM SERPL-SCNC: 3.5 MMOL/L (ref 3.6–5.5)
PROT SERPL-MCNC: 6.9 G/DL (ref 6–8.2)
PROT UR QL STRIP: 100 MG/DL
RBC # BLD AUTO: 4.76 M/UL (ref 4.2–5.4)
RBC # URNS HPF: ABNORMAL /HPF
RBC UR QL AUTO: ABNORMAL
SODIUM SERPL-SCNC: 136 MMOL/L (ref 135–145)
SP GR UR REFRACTOMETRY: 1.02
SP GR UR STRIP.AUTO: >=1.03
TROPONIN I SERPL-MCNC: 0.02 NG/ML (ref 0–0.04)
TROPONIN I SERPL-MCNC: 0.03 NG/ML (ref 0–0.04)
UNIDENT CRYS URNS QL MICRO: ABNORMAL /HPF
WBC # BLD AUTO: 15 K/UL (ref 4.8–10.8)
WBC #/AREA URNS HPF: ABNORMAL /HPF

## 2019-02-10 PROCEDURE — 96375 TX/PRO/DX INJ NEW DRUG ADDON: CPT

## 2019-02-10 PROCEDURE — 36415 COLL VENOUS BLD VENIPUNCTURE: CPT

## 2019-02-10 PROCEDURE — 700102 HCHG RX REV CODE 250 W/ 637 OVERRIDE(OP): Performed by: INTERNAL MEDICINE

## 2019-02-10 PROCEDURE — 74176 CT ABD & PELVIS W/O CONTRAST: CPT

## 2019-02-10 PROCEDURE — 770006 HCHG ROOM/CARE - MED/SURG/GYN SEMI*

## 2019-02-10 PROCEDURE — 99285 EMERGENCY DEPT VISIT HI MDM: CPT

## 2019-02-10 PROCEDURE — 80053 COMPREHEN METABOLIC PANEL: CPT

## 2019-02-10 PROCEDURE — 700111 HCHG RX REV CODE 636 W/ 250 OVERRIDE (IP): Performed by: INTERNAL MEDICINE

## 2019-02-10 PROCEDURE — 83690 ASSAY OF LIPASE: CPT

## 2019-02-10 PROCEDURE — A9270 NON-COVERED ITEM OR SERVICE: HCPCS | Performed by: INTERNAL MEDICINE

## 2019-02-10 PROCEDURE — 99222 1ST HOSP IP/OBS MODERATE 55: CPT | Performed by: INTERNAL MEDICINE

## 2019-02-10 PROCEDURE — 85025 COMPLETE CBC W/AUTO DIFF WBC: CPT

## 2019-02-10 PROCEDURE — 84484 ASSAY OF TROPONIN QUANT: CPT | Mod: 91

## 2019-02-10 PROCEDURE — 94760 N-INVAS EAR/PLS OXIMETRY 1: CPT

## 2019-02-10 PROCEDURE — 700111 HCHG RX REV CODE 636 W/ 250 OVERRIDE (IP): Performed by: EMERGENCY MEDICINE

## 2019-02-10 PROCEDURE — 96365 THER/PROPH/DIAG IV INF INIT: CPT

## 2019-02-10 PROCEDURE — 81001 URINALYSIS AUTO W/SCOPE: CPT

## 2019-02-10 PROCEDURE — 700105 HCHG RX REV CODE 258: Performed by: EMERGENCY MEDICINE

## 2019-02-10 PROCEDURE — 87086 URINE CULTURE/COLONY COUNT: CPT

## 2019-02-10 PROCEDURE — 700105 HCHG RX REV CODE 258: Performed by: INTERNAL MEDICINE

## 2019-02-10 RX ORDER — ONDANSETRON 2 MG/ML
4 INJECTION INTRAMUSCULAR; INTRAVENOUS ONCE
Status: COMPLETED | OUTPATIENT
Start: 2019-02-10 | End: 2019-02-10

## 2019-02-10 RX ORDER — MORPHINE SULFATE 10 MG/ML
5 INJECTION, SOLUTION INTRAMUSCULAR; INTRAVENOUS ONCE
Status: COMPLETED | OUTPATIENT
Start: 2019-02-10 | End: 2019-02-10

## 2019-02-10 RX ORDER — POLYETHYLENE GLYCOL 3350 17 G/17G
1 POWDER, FOR SOLUTION ORAL
Status: DISCONTINUED | OUTPATIENT
Start: 2019-02-10 | End: 2019-02-12 | Stop reason: HOSPADM

## 2019-02-10 RX ORDER — HYDRALAZINE HYDROCHLORIDE 20 MG/ML
10 INJECTION INTRAMUSCULAR; INTRAVENOUS EVERY 4 HOURS PRN
Status: DISCONTINUED | OUTPATIENT
Start: 2019-02-10 | End: 2019-02-12 | Stop reason: HOSPADM

## 2019-02-10 RX ORDER — ACETAMINOPHEN 325 MG/1
650 TABLET ORAL EVERY 6 HOURS PRN
Status: DISCONTINUED | OUTPATIENT
Start: 2019-02-10 | End: 2019-02-12 | Stop reason: HOSPADM

## 2019-02-10 RX ORDER — OXYCODONE HYDROCHLORIDE 5 MG/1
10 TABLET ORAL
Status: DISCONTINUED | OUTPATIENT
Start: 2019-02-10 | End: 2019-02-12 | Stop reason: HOSPADM

## 2019-02-10 RX ORDER — BISACODYL 10 MG
10 SUPPOSITORY, RECTAL RECTAL
Status: DISCONTINUED | OUTPATIENT
Start: 2019-02-10 | End: 2019-02-12 | Stop reason: HOSPADM

## 2019-02-10 RX ORDER — TRAMADOL HYDROCHLORIDE 50 MG/1
50 TABLET ORAL EVERY 4 HOURS PRN
COMMUNITY
End: 2019-02-26

## 2019-02-10 RX ORDER — ONDANSETRON 2 MG/ML
4 INJECTION INTRAMUSCULAR; INTRAVENOUS EVERY 4 HOURS PRN
Status: DISCONTINUED | OUTPATIENT
Start: 2019-02-10 | End: 2019-02-12 | Stop reason: HOSPADM

## 2019-02-10 RX ORDER — AMOXICILLIN 250 MG
2 CAPSULE ORAL 2 TIMES DAILY
Status: DISCONTINUED | OUTPATIENT
Start: 2019-02-10 | End: 2019-02-12 | Stop reason: HOSPADM

## 2019-02-10 RX ORDER — MORPHINE SULFATE 4 MG/ML
4 INJECTION, SOLUTION INTRAMUSCULAR; INTRAVENOUS
Status: DISCONTINUED | OUTPATIENT
Start: 2019-02-10 | End: 2019-02-12 | Stop reason: HOSPADM

## 2019-02-10 RX ORDER — OXYCODONE HYDROCHLORIDE 5 MG/1
5 TABLET ORAL
Status: DISCONTINUED | OUTPATIENT
Start: 2019-02-10 | End: 2019-02-12 | Stop reason: HOSPADM

## 2019-02-10 RX ORDER — FOSINOPRIL SODIUM 20 MG/1
10 TABLET ORAL DAILY
Status: DISCONTINUED | OUTPATIENT
Start: 2019-02-11 | End: 2019-02-12 | Stop reason: HOSPADM

## 2019-02-10 RX ORDER — ONDANSETRON 4 MG/1
4 TABLET, ORALLY DISINTEGRATING ORAL EVERY 4 HOURS PRN
Status: DISCONTINUED | OUTPATIENT
Start: 2019-02-10 | End: 2019-02-12 | Stop reason: HOSPADM

## 2019-02-10 RX ORDER — SODIUM CHLORIDE 9 MG/ML
INJECTION, SOLUTION INTRAVENOUS CONTINUOUS
Status: DISCONTINUED | OUTPATIENT
Start: 2019-02-10 | End: 2019-02-12 | Stop reason: HOSPADM

## 2019-02-10 RX ADMIN — SODIUM CHLORIDE: 9 INJECTION, SOLUTION INTRAVENOUS at 16:58

## 2019-02-10 RX ADMIN — OXYCODONE HYDROCHLORIDE 5 MG: 5 TABLET ORAL at 17:45

## 2019-02-10 RX ADMIN — MORPHINE SULFATE 5 MG: 10 INJECTION INTRAVENOUS at 12:31

## 2019-02-10 RX ADMIN — ONDANSETRON 4 MG: 2 INJECTION INTRAMUSCULAR; INTRAVENOUS at 12:32

## 2019-02-10 RX ADMIN — ACETAMINOPHEN 650 MG: 325 TABLET, FILM COATED ORAL at 16:58

## 2019-02-10 RX ADMIN — CEFTRIAXONE SODIUM 2 G: 2 INJECTION, POWDER, FOR SOLUTION INTRAMUSCULAR; INTRAVENOUS at 13:33

## 2019-02-10 RX ADMIN — ONDANSETRON 4 MG: 2 INJECTION INTRAMUSCULAR; INTRAVENOUS at 21:08

## 2019-02-10 ASSESSMENT — ENCOUNTER SYMPTOMS
COUGH: 0
CONSTIPATION: 0
VOMITING: 0
MYALGIAS: 0
CHILLS: 1
WEAKNESS: 0
SHORTNESS OF BREATH: 0
ABDOMINAL PAIN: 0
FEVER: 0
PALPITATIONS: 0
STRIDOR: 0
DIZZINESS: 0
DIARRHEA: 0
TINGLING: 0
HEADACHES: 0
NAUSEA: 0
SPUTUM PRODUCTION: 0
LOSS OF CONSCIOUSNESS: 0
DEPRESSION: 0
FLANK PAIN: 1
FALLS: 0

## 2019-02-10 ASSESSMENT — PAIN DESCRIPTION - DESCRIPTORS: DESCRIPTORS: ACHING

## 2019-02-10 NOTE — ED NOTES
Med rec updated and complete  Allergies reviewed  Interviewed pt with  at bedside with permission from pt.  Pt reports that she took her FOSINOPRIL 10MG today (2/10/2019) @ 0800, but got sick and thinks she threw it up.  Pt reports no antibiotics in the last 30 days.

## 2019-02-10 NOTE — ED PROVIDER NOTES
ED Provider Note    CHIEF COMPLAINT  Chief Complaint   Patient presents with   • Painful Urination   • Abdominal Pain   • N/V       HPI  Franchesca Paige is a 73 y.o. female here for evaluation of abdominal pain.  The patient states that her abdominal pain is left lower side, with radiation around.  She states that this is been ongoing for the last 2 days, and was worse earlier this morning.  She had 3 bouts of nausea and vomiting, each time making the pain feel better.  Again, the patient states that vomiting alleviated her pain, while walking around exacerbated this.  She describes as an aching pain to the left lower side and again with radiation to the left back.  She has no upper abdominal pain, no chest pain, and no shortness of breath.  She has not taken anything for the pain, but did take some Azo for her urinary issues a few days ago.    PAST MEDICAL HISTORY   has a past medical history of Arthritis; Bladder cancer (HCC); Dyslipidemia, goal LDL below 130; Hypertension (1992); Ocular migraine; Osteopenia; Osteopenia of multiple sites; and Total knee replacement status.    SOCIAL HISTORY  Social History     Social History Main Topics   • Smoking status: Former Smoker     Quit date: 5/1/2000   • Smokeless tobacco: Never Used   • Alcohol use No   • Drug use: No   • Sexual activity: Yes       Family History  No bleeding disorders    SURGICAL HISTORY   has a past surgical history that includes hysterectomy, total abdominal (1980s); colonoscopy (8/3/2010); knee replacement, total (12/4/12); and other (Right, 10/07/2018).    CURRENT MEDICATIONS  Home Medications    **Home medications have not yet been reviewed for this encounter**         ALLERGIES  Allergies   Allergen Reactions   • Sulfa Drugs Vomiting       REVIEW OF SYSTEMS  See HPI for further details. Review of systems as above, otherwise all other systems are negative.     PHYSICAL EXAM  Constitutional: Well developed, well nourished. No acute distress.  HEENT:  Normocephalic, atraumatic. Posterior pharynx clear and moist.  Eyes:  EOMI. Normal sclera.  Neck: Supple, Full range of motion, nontender.  Chest/Pulmonary: clear to ausculation. Symmetrical expansion.   Cardio: Regular rate and rhythm with no murmur.   Abdomen: Soft, left lower abdominal tenderness.  No peritoneal signs. No guarding. No palpable masses.  Back: Left CVAT noted, nontender right.  Musculoskeletal: No deformity, no edema, neurovascular intact.   Neuro: Clear speech, appropriate, cooperative, cranial nerves II-XII grossly intact.  Psych: Normal mood and affect    Results for orders placed or performed during the hospital encounter of 02/10/19   CBC WITH DIFFERENTIAL   Result Value Ref Range    WBC 15.0 (H) 4.8 - 10.8 K/uL    RBC 4.76 4.20 - 5.40 M/uL    Hemoglobin 14.1 12.0 - 16.0 g/dL    Hematocrit 42.7 37.0 - 47.0 %    MCV 89.7 81.4 - 97.8 fL    MCH 29.6 27.0 - 33.0 pg    MCHC 33.0 (L) 33.6 - 35.0 g/dL    RDW 41.4 35.9 - 50.0 fL    Platelet Count 224 164 - 446 K/uL    MPV 9.3 9.0 - 12.9 fL    Neutrophils-Polys 96.30 (H) 44.00 - 72.00 %    Lymphocytes 1.50 (L) 22.00 - 41.00 %    Monocytes 1.10 0.00 - 13.40 %    Eosinophils 0.00 0.00 - 6.90 %    Basophils 0.10 0.00 - 1.80 %    Immature Granulocytes 1.00 (H) 0.00 - 0.90 %    Nucleated RBC 0.00 /100 WBC    Neutrophils (Absolute) 14.40 (H) 2.00 - 7.15 K/uL    Lymphs (Absolute) 0.23 (L) 1.00 - 4.80 K/uL    Monos (Absolute) 0.16 0.00 - 0.85 K/uL    Eos (Absolute) 0.00 0.00 - 0.51 K/uL    Baso (Absolute) 0.02 0.00 - 0.12 K/uL    Immature Granulocytes (abs) 0.15 (H) 0.00 - 0.11 K/uL    NRBC (Absolute) 0.00 K/uL   COMP METABOLIC PANEL   Result Value Ref Range    Sodium 136 135 - 145 mmol/L    Potassium 3.5 (L) 3.6 - 5.5 mmol/L    Chloride 104 96 - 112 mmol/L    Co2 22 20 - 33 mmol/L    Anion Gap 10.0 0.0 - 11.9    Glucose 115 (H) 65 - 99 mg/dL    Bun 16 8 - 22 mg/dL    Creatinine 0.74 0.50 - 1.40 mg/dL    Calcium 10.2 8.4 - 10.2 mg/dL    AST(SGOT) 22 12 - 45  U/L    ALT(SGPT) 16 2 - 50 U/L    Alkaline Phosphatase 100 (H) 30 - 99 U/L    Total Bilirubin 1.1 0.1 - 1.5 mg/dL    Albumin 3.8 3.2 - 4.9 g/dL    Total Protein 6.9 6.0 - 8.2 g/dL    Globulin 3.1 1.9 - 3.5 g/dL    A-G Ratio 1.2 g/dL   LIPASE   Result Value Ref Range    Lipase 23 7 - 58 U/L   ESTIMATED GFR   Result Value Ref Range    GFR If African American >60 >60 mL/min/1.73 m 2    GFR If Non African American >60 >60 mL/min/1.73 m 2     CT-RENAL COLIC EVALUATION(A/P W/O)   Final Result      1.  Mild/moderate left hydronephrosis. There is left perinephric fat stranding. No ureteral stones seen. This may represent presence of pyelonephritis versus recent passage of a ureteral stone.      2.  No evidence of bowel obstruction or focal inflammatory change.      3.  Sigmoid diverticulosis.            PROCEDURES     MEDICAL RECORD  I have reviewed patient's medical record and pertinent results are listed above.    COURSE & MEDICAL DECISION MAKING  I have reviewed any medical record information, laboratory studies and radiographic results as noted above.    The pt is nontoxic appearing, and will be admitted to Dr. King for further evaluation and treatment.       FINAL IMPRESSION  Pyelonephritis    Electronically signed by: Bennie Monique, 2/10/2019 12:43 PM

## 2019-02-10 NOTE — ED NOTES
"Pt presents complaining of acute onset of diffuse abdominal pain with recurring episodes of N/V since yesterday. She states a recent hx of dysuria without any infectious findings; however, she has identified malodorous urine \"for the past few days.\"    Chief Complaint   Patient presents with   • Painful Urination   • Abdominal Pain   • N/V     BP (!) 168/91   Pulse 62   Temp 36.9 °C (98.4 °F) (Temporal)   Resp 18   Ht 1.727 m (5' 8\")   SpO2 99%   BMI 31.63 kg/m²     "

## 2019-02-10 NOTE — ED NOTES
Attempt for blood specimens via blood draw unsuccessful x2 RN, x2 lab techs, x1 edt. Dr Monique aware. Ok to start IV antibiotic per Dr Monique. Will cont to attempt to obtain blood spec from blood draw

## 2019-02-11 ENCOUNTER — APPOINTMENT (OUTPATIENT)
Dept: RADIOLOGY | Facility: MEDICAL CENTER | Age: 74
DRG: 690 | End: 2019-02-11
Attending: HOSPITALIST
Payer: COMMERCIAL

## 2019-02-11 PROBLEM — N13.30 HYDRONEPHROSIS: Status: ACTIVE | Noted: 2019-02-11

## 2019-02-11 PROBLEM — N39.0 COMPLICATED UTI (URINARY TRACT INFECTION): Status: ACTIVE | Noted: 2019-02-10

## 2019-02-11 LAB
ANION GAP SERPL CALC-SCNC: 6 MMOL/L (ref 0–11.9)
BUN SERPL-MCNC: 17 MG/DL (ref 8–22)
CALCIUM SERPL-MCNC: 9.9 MG/DL (ref 8.4–10.2)
CHLORIDE SERPL-SCNC: 105 MMOL/L (ref 96–112)
CO2 SERPL-SCNC: 25 MMOL/L (ref 20–33)
CREAT SERPL-MCNC: 0.76 MG/DL (ref 0.5–1.4)
ERYTHROCYTE [DISTWIDTH] IN BLOOD BY AUTOMATED COUNT: 44.8 FL (ref 35.9–50)
GLUCOSE SERPL-MCNC: 100 MG/DL (ref 65–99)
HCT VFR BLD AUTO: 37.6 % (ref 37–47)
HGB BLD-MCNC: 12.2 G/DL (ref 12–16)
MCH RBC QN AUTO: 29.9 PG (ref 27–33)
MCHC RBC AUTO-ENTMCNC: 32.4 G/DL (ref 33.6–35)
MCV RBC AUTO: 92.2 FL (ref 81.4–97.8)
PLATELET # BLD AUTO: 189 K/UL (ref 164–446)
PMV BLD AUTO: 9.8 FL (ref 9–12.9)
POTASSIUM SERPL-SCNC: 3.9 MMOL/L (ref 3.6–5.5)
RBC # BLD AUTO: 4.08 M/UL (ref 4.2–5.4)
SODIUM SERPL-SCNC: 136 MMOL/L (ref 135–145)
WBC # BLD AUTO: 10.1 K/UL (ref 4.8–10.8)

## 2019-02-11 PROCEDURE — 76775 US EXAM ABDO BACK WALL LIM: CPT

## 2019-02-11 PROCEDURE — 85027 COMPLETE CBC AUTOMATED: CPT

## 2019-02-11 PROCEDURE — 99232 SBSQ HOSP IP/OBS MODERATE 35: CPT | Performed by: HOSPITALIST

## 2019-02-11 PROCEDURE — 700111 HCHG RX REV CODE 636 W/ 250 OVERRIDE (IP): Performed by: INTERNAL MEDICINE

## 2019-02-11 PROCEDURE — 700105 HCHG RX REV CODE 258: Performed by: INTERNAL MEDICINE

## 2019-02-11 PROCEDURE — 36415 COLL VENOUS BLD VENIPUNCTURE: CPT

## 2019-02-11 PROCEDURE — 770006 HCHG ROOM/CARE - MED/SURG/GYN SEMI*

## 2019-02-11 PROCEDURE — 80048 BASIC METABOLIC PNL TOTAL CA: CPT

## 2019-02-11 PROCEDURE — A9270 NON-COVERED ITEM OR SERVICE: HCPCS | Performed by: INTERNAL MEDICINE

## 2019-02-11 PROCEDURE — 700102 HCHG RX REV CODE 250 W/ 637 OVERRIDE(OP): Performed by: INTERNAL MEDICINE

## 2019-02-11 RX ORDER — KETOROLAC TROMETHAMINE 30 MG/ML
15 INJECTION, SOLUTION INTRAMUSCULAR; INTRAVENOUS EVERY 6 HOURS PRN
Status: DISCONTINUED | OUTPATIENT
Start: 2019-02-11 | End: 2019-02-12 | Stop reason: HOSPADM

## 2019-02-11 RX ADMIN — CEFTRIAXONE SODIUM 2 G: 2 INJECTION, POWDER, FOR SOLUTION INTRAMUSCULAR; INTRAVENOUS at 05:38

## 2019-02-11 RX ADMIN — FOSINOPRIL 10 MG: 20 TABLET ORAL at 05:37

## 2019-02-11 RX ADMIN — ONDANSETRON 4 MG: 4 TABLET, ORALLY DISINTEGRATING ORAL at 15:49

## 2019-02-11 RX ADMIN — ACETAMINOPHEN 650 MG: 325 TABLET, FILM COATED ORAL at 15:49

## 2019-02-11 ASSESSMENT — COGNITIVE AND FUNCTIONAL STATUS - GENERAL
MOBILITY SCORE: 24
DAILY ACTIVITIY SCORE: 24
SUGGESTED CMS G CODE MODIFIER DAILY ACTIVITY: CH
SUGGESTED CMS G CODE MODIFIER MOBILITY: CH

## 2019-02-11 ASSESSMENT — PATIENT HEALTH QUESTIONNAIRE - PHQ9
SUM OF ALL RESPONSES TO PHQ9 QUESTIONS 1 AND 2: 0
1. LITTLE INTEREST OR PLEASURE IN DOING THINGS: NOT AT ALL
2. FEELING DOWN, DEPRESSED, IRRITABLE, OR HOPELESS: NOT AT ALL

## 2019-02-11 NOTE — ASSESSMENT & PLAN NOTE
-Causing significant left flank and abdominal pain  Also with left hydronephrosis and a ct consistent with a possible passed stone.   -conitnue IV Rocephin  -Await culture results  -Start symptomatic care with narcotics and I added toradol today.   Recheck ultrasound to recheck hydronephrosis. Consider urology consult if not improved.

## 2019-02-11 NOTE — CARE PLAN
Problem: Venous Thromboembolism (VTW)/Deep Vein Thrombosis (DVT) Prevention:  Goal: Patient will participate in Venous Thrombosis (VTE)/Deep Vein Thrombosis (DVT)Prevention Measures   02/10/19 2100   Mechanical/VTE Prophylaxis   Mechanical Prophylaxis  SCDs, Sequential Compression Device   SCDs, Sequential Compression Device Refused   OTHER   Pharmacologic Prophylaxis Used LMWH: Enoxaparin(Lovenox)

## 2019-02-11 NOTE — PROGRESS NOTES
[facility] Medicine Daily Progress Note    Date of Service  2/11/2019    Chief Complaint  73 y.o. female admitted 2/10/2019 with Flank pain.     Hospital Course    She was admitted with evidence for a complicated UTI      Interval Problem Update  She is weak today and has a litle nausea with chills.     I reviewed her CT today and ordered a follow up ultrasound.   1.  Mild/moderate left hydronephrosis. There is left perinephric fat stranding. No ureteral stones seen. This may represent presence of pyelonephritis versus recent passage of a ureteral stone.    2.  No evidence of bowel obstruction or focal inflammatory change.    3.  Sigmoid diverticulosis.        Consultants/Specialty  none    Code Status  full    Disposition  Home once improved.     Review of Systems  ROS     Physical Exam  Temp:  [36.9 °C (98.4 °F)-37.9 °C (100.2 °F)] 36.9 °C (98.5 °F)  Pulse:  [57-75] 64  Resp:  [14-18] 18  BP: (106-168)/(47-91) 125/47  SpO2:  [90 %-99 %] 99 %    Physical Exam    Fluids    Intake/Output Summary (Last 24 hours) at 02/11/19 1017  Last data filed at 02/11/19 0600   Gross per 24 hour   Intake                0 ml   Output              200 ml   Net             -200 ml       Laboratory  Recent Labs      02/10/19   1427  02/11/19   0507   WBC  15.0*  10.1   RBC  4.76  4.08*   HEMOGLOBIN  14.1  12.2   HEMATOCRIT  42.7  37.6   MCV  89.7  92.2   MCH  29.6  29.9   MCHC  33.0*  32.4*   RDW  41.4  44.8   PLATELETCT  224  189   MPV  9.3  9.8     Recent Labs      02/10/19   1427  02/11/19   0507   SODIUM  136  136   POTASSIUM  3.5*  3.9   CHLORIDE  104  105   CO2  22  25   GLUCOSE  115*  100*   BUN  16  17   CREATININE  0.74  0.76   CALCIUM  10.2  9.9                   Imaging  CT-RENAL COLIC EVALUATION(A/P W/O)   Final Result      1.  Mild/moderate left hydronephrosis. There is left perinephric fat stranding. No ureteral stones seen. This may represent presence of pyelonephritis versus recent passage of a ureteral stone.      2.  No  evidence of bowel obstruction or focal inflammatory change.      3.  Sigmoid diverticulosis.           Assessment/Plan  Complicated UTI (urinary tract infection)- (present on admission)   Assessment & Plan    -Causing significant left flank and abdominal pain  Also with left hydronephrosis and a ct consistent with a possible passed stone.   -conitnue IV Rocephin  -Await culture results  -Start symptomatic care with narcotics and I added toradol today.   Recheck ultrasound to recheck hydronephrosis. Consider urology consult if not improved.      Hydronephrosis   Assessment & Plan    As per UTI. Recheck ultrasound.  I have not had the chance to discuss this finding with her yet.      Hypokalemia- (present on admission)   Assessment & Plan    -Place IV potassium and with IV fluids  -Repeat BMP in the morning     Essential hypertension- (present on admission)   Assessment & Plan    -Continue fosinopril  -Place PRN hydralazine  -Adjust as needed          VTE prophylaxis: lovenox.

## 2019-02-11 NOTE — H&P
Hospital Medicine History & Physical Note    Date of Service  2/10/2019    Primary Care Physician  Tate De La Rosa M.D.    Consultants  None    Code Status  Full    Chief Complaint  Flank pain    History of Presenting Illness  73 y.o. female who presented 2/10/2019 with flank pain.  Patient states 10 days ago she began having urinary pressure as well as dysuria, possible blood clot.  She states she just started drinking more water, the pain then became intermittent.  She began taking cranberry pills and then Azo.  She states the pain actually improved until last night when she had severe pain, located in the front and back on the left, associated with nausea and vomiting.  She described it as sharp, 10/10 at its worst.  It has improved upon arrival here with narcotics.  She does complain of fatigue and chills during this time as well.    Review of Systems  Review of Systems   Constitutional: Positive for chills and malaise/fatigue. Negative for fever.   HENT: Negative for congestion.    Respiratory: Negative for cough, sputum production, shortness of breath and stridor.    Cardiovascular: Negative for chest pain, palpitations and leg swelling.   Gastrointestinal: Negative for abdominal pain, constipation, diarrhea, nausea and vomiting.   Genitourinary: Positive for dysuria, flank pain, frequency and urgency.   Musculoskeletal: Negative for falls and myalgias.   Neurological: Negative for dizziness, tingling, loss of consciousness, weakness and headaches.   Psychiatric/Behavioral: Negative for depression and suicidal ideas.   All other systems reviewed and are negative.      Past Medical History   has a past medical history of Arthritis; Bladder cancer (HCC); Dyslipidemia, goal LDL below 130; Hypertension (1992); Ocular migraine; Osteopenia; Osteopenia of multiple sites; and Total knee replacement status.    Surgical History   has a past surgical history that includes hysterectomy, total abdominal (1980s);  colonoscopy (8/3/2010); knee replacement, total (12/4/12); and other (Right, 10/07/2018).     Family History  family history includes Cancer in her paternal aunt and sister; Diabetes in her other; Hypertension in her father and mother; Psychiatry in her mother and sister.     Social History   reports that she quit smoking about 18 years ago. She has never used smokeless tobacco. She reports that she does not drink alcohol or use drugs.    Allergies  Allergies   Allergen Reactions   • Sulfa Drugs Vomiting       Medications  Prior to Admission Medications   Prescriptions Last Dose Informant Patient Reported? Taking?   Cholecalciferol (VITAMIN D3) 5000 units Cap 2/9/2019 at 1200 Patient Yes No   Sig: Take 1 Cap by mouth every day.   S-Adenosylmethionine (NYLA-E) 200 MG TABS 2/9/2019 at 0800 Patient No No   Sig: Take 1 Tab by mouth every day. OTC   aspirin EC (ECOTRIN) 81 MG Tablet Delayed Response > 3 days at Unknown Patient Yes Yes   Sig: Take 162 mg by mouth as needed.   fosinopril (MONOPRIL) 10 MG Tab 2/10/2019 at 0800 Patient No No   Sig: TAKE ONE TABLET BY MOUTH EVERY DAY   tramadol (ULTRAM) 50 MG Tab 2/9/2019 at 1400 Patient Yes Yes   Sig: Take 50 mg by mouth every four hours as needed for Moderate Pain.      Facility-Administered Medications: None       Physical Exam  Temp:  [36.9 °C (98.4 °F)] 36.9 °C (98.4 °F)  Pulse:  [62-75] 75  Resp:  [18] 18  BP: (168)/(91) 168/91  SpO2:  [95 %-99 %] 96 %    Physical Exam   Constitutional: She is oriented to person, place, and time. She appears well-developed. She is cooperative. No distress.   HENT:   Head: Normocephalic and atraumatic. Not macrocephalic and not microcephalic. Head is without raccoon's eyes and without Diego's sign.   Right Ear: External ear normal.   Left Ear: External ear normal.   Mouth/Throat: Mucous membranes are dry. No oropharyngeal exudate.   Eyes: Conjunctivae are normal. Right eye exhibits no discharge. Left eye exhibits no discharge. No scleral  icterus.   Neck: Neck supple. No tracheal deviation present.   Cardiovascular: Normal rate, regular rhythm and intact distal pulses.  Exam reveals no gallop, no distant heart sounds and no friction rub.    No murmur heard.  Pulmonary/Chest: Effort normal. No accessory muscle usage or stridor. No tachypnea and no bradypnea. No respiratory distress. She has no decreased breath sounds. She has no wheezes. She has no rhonchi. She has no rales. She exhibits no tenderness.   Abdominal: Soft. Bowel sounds are normal. She exhibits no distension. There is no hepatosplenomegaly, splenomegaly or hepatomegaly. There is tenderness in the left upper quadrant. There is CVA tenderness. There is no rebound and no guarding.   Musculoskeletal: Normal range of motion. She exhibits no edema or tenderness.   Lymphadenopathy:     She has no cervical adenopathy.   Neurological: She is alert and oriented to person, place, and time. No cranial nerve deficit. She displays no seizure activity.   Skin: Skin is warm, dry and intact. No rash noted. She is not diaphoretic. No erythema. No pallor.   Psychiatric: She has a normal mood and affect. Her speech is normal and behavior is normal. Judgment and thought content normal. Cognition and memory are normal.   Nursing note and vitals reviewed.      Laboratory:  Recent Labs      02/10/19   1427   WBC  15.0*   RBC  4.76   HEMOGLOBIN  14.1   HEMATOCRIT  42.7   MCV  89.7   MCH  29.6   MCHC  33.0*   RDW  41.4   PLATELETCT  224   MPV  9.3     Recent Labs      02/10/19   1427   SODIUM  136   POTASSIUM  3.5*   CHLORIDE  104   CO2  22   GLUCOSE  115*   BUN  16   CREATININE  0.74   CALCIUM  10.2     Recent Labs      02/10/19   1427   ALTSGPT  16   ASTSGOT  22   ALKPHOSPHAT  100*   TBILIRUBIN  1.1   LIPASE  23   GLUCOSE  115*                 Recent Labs      02/10/19   1427   TROPONINI  0.03       Urinalysis:    No results found     Imaging:  CT-RENAL COLIC EVALUATION(A/P W/O)   Final Result      1.   Mild/moderate left hydronephrosis. There is left perinephric fat stranding. No ureteral stones seen. This may represent presence of pyelonephritis versus recent passage of a ureteral stone.      2.  No evidence of bowel obstruction or focal inflammatory change.      3.  Sigmoid diverticulosis.            Assessment/Plan:  I anticipate this patient will require at least two midnights for appropriate medical management, necessitating inpatient admission.    Pyelonephritis   Assessment & Plan    -Causing significant left flank and abdominal pain  -Start IV Rocephin  -Await culture results  -Start symptomatic care with narcotics  -She is not septic, does have a significant leukocytosis, her heart rate has been normal as has a respiratory rate and temperature     Hypokalemia   Assessment & Plan    -Place IV potassium and with IV fluids  -Repeat BMP in the morning     Essential hypertension- (present on admission)   Assessment & Plan    -Continue fosinopril  -Place PRN hydralazine  -Adjust as needed         VTE prophylaxis: Lovenox

## 2019-02-11 NOTE — PROGRESS NOTES
Pt arrived on [facility], no signs of distress . Pt complains of pain with urination. denies nausea at this time. POC discussed. Safety measures in place.

## 2019-02-12 VITALS
OXYGEN SATURATION: 94 % | HEIGHT: 68 IN | HEART RATE: 57 BPM | RESPIRATION RATE: 18 BRPM | BODY MASS INDEX: 32.36 KG/M2 | TEMPERATURE: 98.8 F | WEIGHT: 213.52 LBS | DIASTOLIC BLOOD PRESSURE: 54 MMHG | SYSTOLIC BLOOD PRESSURE: 124 MMHG

## 2019-02-12 LAB
ALBUMIN SERPL BCP-MCNC: 3.4 G/DL (ref 3.2–4.9)
ALBUMIN/GLOB SERPL: 1.1 G/DL
ALP SERPL-CCNC: 86 U/L (ref 30–99)
ALT SERPL-CCNC: 21 U/L (ref 2–50)
ANION GAP SERPL CALC-SCNC: 4 MMOL/L (ref 0–11.9)
AST SERPL-CCNC: 25 U/L (ref 12–45)
BACTERIA UR CULT: NORMAL
BASOPHILS # BLD AUTO: 0.2 % (ref 0–1.8)
BASOPHILS # BLD: 0.01 K/UL (ref 0–0.12)
BILIRUB SERPL-MCNC: 0.5 MG/DL (ref 0.1–1.5)
BUN SERPL-MCNC: 12 MG/DL (ref 8–22)
CALCIUM SERPL-MCNC: 9.6 MG/DL (ref 8.4–10.2)
CHLORIDE SERPL-SCNC: 103 MMOL/L (ref 96–112)
CO2 SERPL-SCNC: 25 MMOL/L (ref 20–33)
CREAT SERPL-MCNC: 0.67 MG/DL (ref 0.5–1.4)
EOSINOPHIL # BLD AUTO: 0.02 K/UL (ref 0–0.51)
EOSINOPHIL NFR BLD: 0.4 % (ref 0–6.9)
ERYTHROCYTE [DISTWIDTH] IN BLOOD BY AUTOMATED COUNT: 42.8 FL (ref 35.9–50)
GLOBULIN SER CALC-MCNC: 3.1 G/DL (ref 1.9–3.5)
GLUCOSE SERPL-MCNC: 100 MG/DL (ref 65–99)
HCT VFR BLD AUTO: 36.4 % (ref 37–47)
HGB BLD-MCNC: 12.1 G/DL (ref 12–16)
IMM GRANULOCYTES # BLD AUTO: 0.02 K/UL (ref 0–0.11)
IMM GRANULOCYTES NFR BLD AUTO: 0.4 % (ref 0–0.9)
LYMPHOCYTES # BLD AUTO: 0.86 K/UL (ref 1–4.8)
LYMPHOCYTES NFR BLD: 15.5 % (ref 22–41)
MCH RBC QN AUTO: 30 PG (ref 27–33)
MCHC RBC AUTO-ENTMCNC: 33.2 G/DL (ref 33.6–35)
MCV RBC AUTO: 90.3 FL (ref 81.4–97.8)
MONOCYTES # BLD AUTO: 0.54 K/UL (ref 0–0.85)
MONOCYTES NFR BLD AUTO: 9.7 % (ref 0–13.4)
NEUTROPHILS # BLD AUTO: 4.09 K/UL (ref 2–7.15)
NEUTROPHILS NFR BLD: 73.8 % (ref 44–72)
NRBC # BLD AUTO: 0 K/UL
NRBC BLD-RTO: 0 /100 WBC
PLATELET # BLD AUTO: 163 K/UL (ref 164–446)
PMV BLD AUTO: 9.4 FL (ref 9–12.9)
POTASSIUM SERPL-SCNC: 3.8 MMOL/L (ref 3.6–5.5)
PROT SERPL-MCNC: 6.5 G/DL (ref 6–8.2)
RBC # BLD AUTO: 4.03 M/UL (ref 4.2–5.4)
SIGNIFICANT IND 70042: NORMAL
SITE SITE: NORMAL
SODIUM SERPL-SCNC: 132 MMOL/L (ref 135–145)
SOURCE SOURCE: NORMAL
WBC # BLD AUTO: 5.5 K/UL (ref 4.8–10.8)

## 2019-02-12 PROCEDURE — 99239 HOSP IP/OBS DSCHRG MGMT >30: CPT | Performed by: INTERNAL MEDICINE

## 2019-02-12 PROCEDURE — 700102 HCHG RX REV CODE 250 W/ 637 OVERRIDE(OP): Performed by: INTERNAL MEDICINE

## 2019-02-12 PROCEDURE — 85025 COMPLETE CBC W/AUTO DIFF WBC: CPT

## 2019-02-12 PROCEDURE — 36415 COLL VENOUS BLD VENIPUNCTURE: CPT

## 2019-02-12 PROCEDURE — 80053 COMPREHEN METABOLIC PANEL: CPT

## 2019-02-12 PROCEDURE — 700105 HCHG RX REV CODE 258: Performed by: INTERNAL MEDICINE

## 2019-02-12 PROCEDURE — A9270 NON-COVERED ITEM OR SERVICE: HCPCS | Performed by: INTERNAL MEDICINE

## 2019-02-12 PROCEDURE — 700111 HCHG RX REV CODE 636 W/ 250 OVERRIDE (IP): Performed by: INTERNAL MEDICINE

## 2019-02-12 RX ORDER — ONDANSETRON 4 MG/1
4 TABLET, ORALLY DISINTEGRATING ORAL EVERY 4 HOURS PRN
Qty: 10 TAB | Refills: 2 | Status: SHIPPED | OUTPATIENT
Start: 2019-02-12 | End: 2019-02-26

## 2019-02-12 RX ORDER — CEFDINIR 300 MG/1
300 CAPSULE ORAL 2 TIMES DAILY
Qty: 4 CAP | Refills: 0 | Status: SHIPPED | OUTPATIENT
Start: 2019-02-12 | End: 2019-02-26

## 2019-02-12 RX ADMIN — CEFTRIAXONE SODIUM 2 G: 2 INJECTION, POWDER, FOR SOLUTION INTRAMUSCULAR; INTRAVENOUS at 06:29

## 2019-02-12 RX ADMIN — SODIUM CHLORIDE: 9 INJECTION, SOLUTION INTRAVENOUS at 11:22

## 2019-02-12 RX ADMIN — ONDANSETRON 4 MG: 4 TABLET, ORALLY DISINTEGRATING ORAL at 11:22

## 2019-02-12 RX ADMIN — FOSINOPRIL 10 MG: 20 TABLET ORAL at 06:29

## 2019-02-12 RX ADMIN — SODIUM CHLORIDE: 9 INJECTION, SOLUTION INTRAVENOUS at 00:42

## 2019-02-12 NOTE — PROGRESS NOTES
1900 Received report from day RN. Pt is Aox4. Vss, denies any pain/discomfort.  She does need an iv access. She has been voiding better, denies any dysuria/hematuria at this time.    Pt made aware of a renal ultrasound.    2105 New IV in place. Fluids restarted.    2140 renal ultrasound performed.    Bed is locked and low, controls on call light button and personal belongings within reach. Will continue to [facility]

## 2019-02-12 NOTE — DISCHARGE INSTRUCTIONS
Discharge Instructions    Discharged to home by car with friend. Discharged via wheelchair, hospital escort: Yes.  Special equipment needed: Not Applicable    Be sure to schedule a follow-up appointment with your primary care doctor or any specialists as instructed.     Discharge Plan:   Diet Plan: Discussed  Activity Level: Discussed  Confirmed Follow up Appointment: Patient to Call and Schedule Appointment  Confirmed Symptoms Management: Discussed  Medication Reconciliation Updated: Yes  Pneumococcal Vaccine Administered/Refused: Not given - Patient refused pneumococcal vaccine  Influenza Vaccine Indication: Not indicated: Previously immunized this influenza season and > 8 years of age    I understand that a diet low in cholesterol, fat, and sodium is recommended for good health. Unless I have been given specific instructions below for another diet, I accept this instruction as my diet prescription.   Other diet: Regular    Special Instructions: None    · Is patient discharged on Warfarin / Coumadin?   No     Depression / Suicide Risk    As you are discharged from this [facility] facility, it is important to learn how to keep safe from harming yourself.    Recognize the warning signs:  · Abrupt changes in personality, positive or negative- including increase in energy   · Giving away possessions  · Change in eating patterns- significant weight changes-  positive or negative  · Change in sleeping patterns- unable to sleep or sleeping all the time   · Unwillingness or inability to communicate  · Depression  · Unusual sadness, discouragement and loneliness  · Talk of wanting to die  · Neglect of personal appearance   · Rebelliousness- reckless behavior  · Withdrawal from people/activities they love  · Confusion- inability to concentrate     If you or a loved one observes any of these behaviors or has concerns about self-harm, here's what you can do:  · Talk about it- your feelings and reasons for harming  Pt stated that she was encouraged by you during her last appointment to call if she developed any cold symptoms. She is calling today to let you know that she has been coughing for 6 days, and now she is congested and fatigued.    yourself  · Remove any means that you might use to hurt yourself (examples: pills, rope, extension cords, firearm)  · Get professional help from the community (Mental Health, Substance Abuse, psychological counseling)  · Do not be alone:Call your Safe Contact- someone whom you trust who will be there for you.  · Call your local CRISIS HOTLINE 333-8497 or 163-452-9569  · Call your local Children's Mobile Crisis Response Team [place] (713) 593-5417 or www.[employer]  · Call the toll free National Suicide Prevention Hotlines   · National Suicide Prevention Lifeline 246-344-KCJP (4925)  · National Hope Line Network 800-SUICIDE (067-7331)

## 2019-02-12 NOTE — CARE PLAN
Problem: Infection  Goal: Will remain free from infection  Outcome: PROGRESSING AS EXPECTED  Tolerating iv abx    Problem: Urinary Elimination:  Goal: Ability to reestablish a normal urinary elimination pattern will improve  Outcome: PROGRESSING AS EXPECTED  Pt now reporting that there is no more burning sensation when she voids.   Will continue to hydrate w/ ivfs.

## 2019-02-13 NOTE — DISCHARGE SUMMARY
Discharge Summary    CHIEF COMPLAINT ON ADMISSION  Chief Complaint   Patient presents with   • Painful Urination   • Abdominal Pain   • N/V       Reason for Admission  Painful Urination, Abdominal Pain,*     Admission Date  2/10/2019    CODE STATUS  Prior    HPI & HOSPITAL COURSE  This is a 73 y.o. female here with Flank pain and concren for pyelonephritis, she had perinephric stranding noted on initial imaging possibly consistent with Shaheen versus recently passed a stone, she had hydronephrosis but there was no obstruction noted.  She had elevated WBCs and was empirically started on Rocephin however subsequent cultures were negative.  Over the next 48 hours she improved significantly and felt ready to go home.  She denied any dysuria or hematuria.  She was tolerating her diet.  She denied any dizziness and her physical exam was unremarkable.  I reviewed with her that her culture was negative but given her leukocytosis I think is probably prudent to continue antibiotics for another couple of days.  She is to keep adequate fluid intake.       Therefore, she is discharged in good and stable condition to home with close outpatient follow-up.    The patient met 2-midnight criteria for an inpatient stay at the time of discharge.    Discharge Date  2/12/2019    FOLLOW UP ITEMS POST DISCHARGE  PCP    DISCHARGE DIAGNOSES  Active Problems:    Complicated UTI (urinary tract infection) POA: Yes    Essential hypertension POA: Yes    Hypokalemia POA: Yes    Hydronephrosis POA: Unknown  Resolved Problems:    * No resolved hospital problems. *      FOLLOW UP  Future Appointments  Date Time Provider Department Center   2/26/2019 9:40 AM Tate De La Rosa M.D. [address]   5/2/2019 8:20 AM Tate De La Rosa M.D. [address] [facility] De La Rosa M.D.  [address]  [facility] [address]  [place] [address]  [phone]    In 1 week      Tate De La Rosa M.D.  [address]  [facility] [address]  [place]  [address]  [phone]            MEDICATIONS ON DISCHARGE     Medication List      START taking these medications      Instructions   cefdinir 300 MG Caps  Commonly known as:  OMNICEF   Take 1 Cap by mouth 2 times a day.  Dose:  300 mg     ondansetron 4 MG Tbdp  Commonly known as:  ZOFRAN ODT   Take 1 Tab by mouth every four hours as needed (give PO if IV route is unavailable. May give per feeding tube.).  Dose:  4 mg        CONTINUE taking these medications      Instructions   aspirin EC 81 MG Tbec  Commonly known as:  ECOTRIN   Take 162 mg by mouth as needed.  Dose:  162 mg     fosinopril 10 MG Tabs  Commonly known as:  MONOPRIL   TAKE ONE TABLET BY MOUTH EVERY DAY     NYLA-e 200 MG Tabs   Take 1 Tab by mouth every day. OTC  Dose:  1 Tab     tramadol 50 MG Tabs  Commonly known as:  ULTRAM   Take 50 mg by mouth every four hours as needed for Moderate Pain.  Dose:  50 mg     vitamin D3 5000 units Caps   Take 1 Cap by mouth every day.  Dose:  1 Cap            Allergies  Allergies   Allergen Reactions   • Sulfa Drugs Vomiting       DIET   regular    ACTIVITY  AS tolertaed    CONSULTATIONS  none    PROCEDURES  none    LABORATORY  Lab Results   Component Value Date    SODIUM 132 (L) 02/12/2019    POTASSIUM 3.8 02/12/2019    CHLORIDE 103 02/12/2019    CO2 25 02/12/2019    GLUCOSE 100 (H) 02/12/2019    BUN 12 02/12/2019    CREATININE 0.67 02/12/2019    CREATININE 0.74 10/02/2012    GLOMRATE >59 09/15/2009        Lab Results   Component Value Date    WBC 5.5 02/12/2019    WBC 5.3 09/15/2009    HEMOGLOBIN 12.1 02/12/2019    HEMATOCRIT 36.4 (L) 02/12/2019    PLATELETCT 163 (L) 02/12/2019        Total time of the discharge process exceeds 31 minutes.

## 2019-02-26 ENCOUNTER — OFFICE VISIT (OUTPATIENT)
Dept: MEDICAL GROUP | Facility: MEDICAL CENTER | Age: 74
End: 2019-02-26
Payer: COMMERCIAL

## 2019-02-26 ENCOUNTER — TELEPHONE (OUTPATIENT)
Dept: MEDICAL GROUP | Facility: MEDICAL CENTER | Age: 74
End: 2019-02-26

## 2019-02-26 VITALS
SYSTOLIC BLOOD PRESSURE: 112 MMHG | HEIGHT: 68 IN | TEMPERATURE: 98 F | OXYGEN SATURATION: 98 % | WEIGHT: 210 LBS | RESPIRATION RATE: 12 BRPM | BODY MASS INDEX: 31.83 KG/M2 | HEART RATE: 55 BPM | DIASTOLIC BLOOD PRESSURE: 72 MMHG

## 2019-02-26 DIAGNOSIS — I10 ESSENTIAL HYPERTENSION: ICD-10-CM

## 2019-02-26 DIAGNOSIS — E78.5 DYSLIPIDEMIA, GOAL LDL BELOW 130: ICD-10-CM

## 2019-02-26 DIAGNOSIS — N20.0 NEPHROLITHIASIS: ICD-10-CM

## 2019-02-26 DIAGNOSIS — M15.9 PRIMARY OSTEOARTHRITIS INVOLVING MULTIPLE JOINTS: ICD-10-CM

## 2019-02-26 DIAGNOSIS — M25.571 CHRONIC PAIN OF RIGHT ANKLE: ICD-10-CM

## 2019-02-26 DIAGNOSIS — G89.29 CHRONIC PAIN OF RIGHT ANKLE: ICD-10-CM

## 2019-02-26 PROBLEM — N39.0 COMPLICATED UTI (URINARY TRACT INFECTION): Status: RESOLVED | Noted: 2019-02-10 | Resolved: 2019-02-26

## 2019-02-26 PROCEDURE — 99214 OFFICE O/P EST MOD 30 MIN: CPT | Performed by: FAMILY MEDICINE

## 2019-02-26 RX ORDER — TRAMADOL HYDROCHLORIDE 50 MG/1
50 TABLET ORAL EVERY 6 HOURS PRN
Qty: 50 TAB | Refills: 2 | Status: SHIPPED | OUTPATIENT
Start: 2019-02-26 | End: 2019-06-03 | Stop reason: SDUPTHER

## 2019-02-26 RX ORDER — FOSINOPRIL SODIUM 10 MG/1
10 TABLET ORAL
Qty: 90 TAB | Refills: 3 | Status: SHIPPED | OUTPATIENT
Start: 2019-02-26 | End: 2020-01-06 | Stop reason: SDUPTHER

## 2019-02-26 NOTE — PROGRESS NOTES
Chief Complaint   Patient presents with   • Hypertension   • Hyperlipidemia   • Arthritis       Subjective:     HPI:   Franchesca Paige presents today with the followin. Essential hypertension  HTN - Chronic condition stable. Currently taking all meds as directed.   She is taking baby aspirin daily.   She is monitoring BP at home.  She is not monitoring is often now as it has been consistently in the 110s-120s over 70s.  Denies symptoms low BP: light-headed, tunnel-vision, unusual fatigue.   Denies symptoms high BP:pounding headache, visual changes, palpitations, flushed face.   Denies medicine side effects: unusual fatigue, slow heartbeat, foot/leg swelling, cough.    2. Dyslipidemia, goal LDL below 130  Patient denies chest pain, chest pressure, palpitations or exertional shortness of breath. Patient is not on lipid-lowering medication.  Lipid results in the past were excellent with a very good risk ratio.  Patient is a former smoker. Patient takes 81 mg aspirin daily. Patient has no history of myocardial infarction, stroke or PVD.  Lab orders discussed and placed.    3. Primary osteoarthritis involving multiple joints/Chronic pain of right ankle  Patient does have osteoarthritis of multiple joints.  This is particularly bothersome in the right ankle.  She is having some finger arthritis changes and stiffness as well.  She states the tramadol is extremely helpful.  This is renewed.  She uses this as needed only [employer] of pharmacy interface shows no inconsistencies.  - tramadol (ULTRAM) 50 MG Tab; Take 1 Tab by mouth every 6 hours as needed for Moderate Pain (arthritis pain) for up to 120 days.  Dispense: 50 Tab; Refill: 2  - Consent for Opiate Prescription    4. Nephrolithiasis  She has had follow up with urology.  Cystoscopy was normal.  Occasional stones.  She has increased her fluid intake.          Patient Active Problem List    Diagnosis Date Noted   • Hydronephrosis 2019   • Hypokalemia  "02/10/2019   • Bradycardia 11/01/2018   • Ocular migraine 04/16/2018   • Osteopenia of multiple sites    • Dizziness 02/22/2018   • 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/21/2018   • Chronic pain of right ankle 02/08/2018   • Obesity (BMI 30.0-34.9) 02/24/2017   • Need for subacute bacterial endocarditis prophylaxis 01/21/2016   • Essential hypertension 06/11/2015   • Status post total left knee replacement 06/11/2015   • Gastroesophageal reflux disease without esophagitis 06/11/2015   • Allergic rhinitis due to pollen 06/11/2015   • History of bladder cancer 06/11/2015   • Dyslipidemia, goal LDL below 130    • Primary osteoarthritis involving multiple joints        Current medicines (including changes today)  Current Outpatient Prescriptions   Medication Sig Dispense Refill   • tramadol (ULTRAM) 50 MG Tab Take 1 Tab by mouth every 6 hours as needed for Moderate Pain (arthritis pain) for up to 120 days. 50 Tab 2   • fosinopril (MONOPRIL) 10 MG Tab Take 1 Tab by mouth every day. 90 Tab 3   • aspirin EC (ECOTRIN) 81 MG Tablet Delayed Response Take 162 mg by mouth as needed.     • Cholecalciferol (VITAMIN D3) 5000 units Cap Take 1 Cap by mouth every day.     • S-Adenosylmethionine (NYLA-E) 200 MG TABS Take 1 Tab by mouth every day. OTC 30 Tab 0     No current facility-administered medications for this visit.        Allergies   Allergen Reactions   • Sulfa Drugs Vomiting       ROS: As per HPI       Objective:     Blood pressure 112/72, pulse (!) 55, temperature 36.7 °C (98 °F), resp. rate 12, height 1.727 m (5' 8\"), weight 95.3 kg (210 lb), SpO2 98 %, not currently breastfeeding. Body mass index is 31.93 kg/m².    Physical Exam:  Constitutional: Well-developed and well-nourished. Not diaphoretic. No distress. Lucid and fluent.  Skin: Skin is warm and dry. No rash noted.  Head: Atraumatic without lesions.  Eyes: Conjunctivae and extraocular motions are normal. Pupils are equal, round, and reactive to light. No scleral icterus. "   Mouth/Throat: Tongue normal. Oropharynx is clear and moist. Posterior pharynx without erythema or exudates.  Neck: Supple, mild kyphosis, good range of motion.  No thyromegaly present. No cervical or supraclavicular lymphadenopathy. No JVD or carotid bruits appreciated  Cardiovascular: Regular rate and rhythm.  Normal S1, S2 without murmur appreciated.  Chest: Effort normal. Clear to auscultation throughout. No adventitious sounds.   Abdomen: Soft, non tender, and without distention. Active bowel sounds in all four quadrants. No rebound, guarding, masses or hepatosplenomegaly.  Extremities: No cyanosis, clubbing, erythema, nor edema.   Neurological: Alert and oriented x 3.  Gait is slightly antalgic but overall strong and symmetric.  Psychiatric:  Behavior, mood, and affect are appropriate.       Hospital notes from earlier this month reviewed and discussed at length.  Scan reviewed and interpretation reviewed as well.      Assessment and Plan:     73 y.o. female with the following issues:    1. Essential hypertension  fosinopril (MONOPRIL) 10 MG Tab    Comp Metabolic Panel   2. Dyslipidemia, goal LDL below 130  Comp Metabolic Panel    Lipid Profile   3. Primary osteoarthritis involving multiple joints  tramadol (ULTRAM) 50 MG Tab   4. Chronic pain of right ankle  tramadol (ULTRAM) 50 MG Tab    Consent for Opiate Prescription   5. Nephrolithiasis           Followup: Return in about 6 months (around 8/26/2019), or if symptoms worsen or fail to improve.

## 2019-02-27 ENCOUNTER — DOCUMENTATION (OUTPATIENT)
Dept: MEDICAL GROUP | Facility: MEDICAL CENTER | Age: 74
End: 2019-02-27

## 2019-05-03 ENCOUNTER — OFFICE VISIT (OUTPATIENT)
Dept: MEDICAL GROUP | Facility: MEDICAL CENTER | Age: 74
End: 2019-05-03
Payer: COMMERCIAL

## 2019-05-03 VITALS
HEIGHT: 68 IN | HEART RATE: 51 BPM | SYSTOLIC BLOOD PRESSURE: 122 MMHG | OXYGEN SATURATION: 97 % | TEMPERATURE: 97.4 F | BODY MASS INDEX: 32.58 KG/M2 | RESPIRATION RATE: 12 BRPM | DIASTOLIC BLOOD PRESSURE: 78 MMHG | WEIGHT: 215 LBS

## 2019-05-03 DIAGNOSIS — D69.6 THROMBOCYTOPENIA (HCC): ICD-10-CM

## 2019-05-03 DIAGNOSIS — E66.9 OBESITY (BMI 30.0-34.9): ICD-10-CM

## 2019-05-03 DIAGNOSIS — D64.9 ANEMIA, UNSPECIFIED TYPE: ICD-10-CM

## 2019-05-03 DIAGNOSIS — R00.1 BRADYCARDIA: ICD-10-CM

## 2019-05-03 DIAGNOSIS — Z87.448 HISTORY OF ACUTE PYELONEPHRITIS: ICD-10-CM

## 2019-05-03 DIAGNOSIS — K21.9 GASTROESOPHAGEAL REFLUX DISEASE WITHOUT ESOPHAGITIS: ICD-10-CM

## 2019-05-03 DIAGNOSIS — R53.82 CHRONIC FATIGUE: ICD-10-CM

## 2019-05-03 DIAGNOSIS — I10 ESSENTIAL HYPERTENSION: ICD-10-CM

## 2019-05-03 DIAGNOSIS — E78.5 DYSLIPIDEMIA, GOAL LDL BELOW 130: ICD-10-CM

## 2019-05-03 DIAGNOSIS — R42 DIZZINESS: ICD-10-CM

## 2019-05-03 DIAGNOSIS — R82.90 FOUL SMELLING URINE: ICD-10-CM

## 2019-05-03 PROBLEM — E87.6 HYPOKALEMIA: Status: RESOLVED | Noted: 2019-02-10 | Resolved: 2019-05-03

## 2019-05-03 PROCEDURE — 99214 OFFICE O/P EST MOD 30 MIN: CPT | Performed by: FAMILY MEDICINE

## 2019-05-03 PROCEDURE — 93000 ELECTROCARDIOGRAM COMPLETE: CPT | Performed by: FAMILY MEDICINE

## 2019-05-03 NOTE — PROGRESS NOTES
Chief Complaint   Patient presents with   • Hypertension   • Hyperlipidemia   • Arthritis       Subjective:     HPI:   Franchesca Paige presents today with the followin. Essential hypertension  HTN - Chronic condition stable. Currently taking all meds as directed.   She is taking baby aspirin daily.   She is monitoring BP at home.  Her  checks, usually at least weekly.  Her blood pressure has been running a little higher than here.  Typically her systolics are 120s to 130s over 80s.  She is getting some symptoms low BP: light-headed, tunnel-vision, unusual fatigue.  Sometimes it is difficult for her to exert and she has a little chest heaviness which disappears quite quickly per patient.  Denies symptoms high BP:pounding headache, visual changes, palpitations, flushed face.   Denies medicine side effects: unusual fatigue, slow heartbeat, foot/leg swelling, cough.  Follow-up lab orders discussed and placed.    2. Dyslipidemia, goal LDL below 130  Patient denies chest pain, chest pressure, palpitations or exertional shortness of breath. Patient is not on lipid-lowering medication.  Her lipid results are usually very favorable, generally low with an excellent risk ratio. Patient is a former smoker. Patient takes 81 mg aspirin daily. Patient has no history of myocardial infarction, stroke or PVD.  Lab orders are discussed and placed.    3. Bradycardia  Patient pulse remains low.  Her  has noted that when he checks her pressure as well.  She is beginning to get some symptoms that may be related to this.  Repeat EKG is done today which continues to show sinus bradycardia.  There is an intraventricular conduction delay but it is unchanged compared to the EKG from February last year.  Otherwise the EKG is normal.  Have advised the patient that if her symptoms escalate she needs to let me know right away and we will get her in to cardiology for further evaluation and treatment.  Thyroid lab orders discussed  and placed.    4. Gastroesophageal reflux disease without esophagitis  She feels the current improved dietary regimen is controlling the gastroesophageal reflux symptoms well. Denies dysphagia, reflux symptoms, acidity, abdominal pain or visible blood or mucus in the stool. Denies vomiting or hematemesis. Denies burping or abdominal bloating. Patient avoids nonsteroidal anti-inflammatory drugs. Avoids heavy meals or eating within 2 hours of bedtime.  Follow-up lab orders discussed and placed.    5. Obesity (BMI 30.0-34.9)  Patient has not done as well with her weight lately.  She and her  are trying to increase their physical activity.    6. Thrombocytopenia (HCC)  Patient had pyelonephritis in February and CBC showed thrombocytopenia.  Patient denies unusual bruising.  Follow-up lab order discussed and placed    7. Anemia, unspecified type  There was mild anemia on the CBC done in February.  Follow-up order discussed and placed.  Patient denies any bleeding.  Denies specifically noting any blood in the stool or urine.  Denies hematemesis or hemoptysis.    8. Dizziness  Patient does note that she has been more dizzy and also much more fatigued.  This may be due to the bradycardia.  Labs to rule out worsening anemia or thyroid issues.  Lab orders discussed and placed.    9. Chronic fatigue  Lab orders discussed and placed including thyroid testing.    10. History of acute pyelonephritis/Foul smelling urine  Urinalysis with culture if indicated order is placed and discussed.        Patient Active Problem List    Diagnosis Date Noted   • Anemia 05/03/2019   • Hydronephrosis 02/11/2019   • Bradycardia 11/01/2018   • Ocular migraine 04/16/2018   • Osteopenia of multiple sites    • Dizziness 02/22/2018   • 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/21/2018   • Chronic pain of right ankle 02/08/2018   • Obesity (BMI 30.0-34.9) 02/24/2017   • Need for subacute bacterial endocarditis prophylaxis 01/21/2016   • Essential  "hypertension 06/11/2015   • Status post total left knee replacement 06/11/2015   • Gastroesophageal reflux disease without esophagitis 06/11/2015   • Allergic rhinitis due to pollen 06/11/2015   • History of bladder cancer 06/11/2015   • Dyslipidemia, goal LDL below 130    • Primary osteoarthritis involving multiple joints        Current medicines (including changes today)  Current Outpatient Prescriptions   Medication Sig Dispense Refill   • tramadol (ULTRAM) 50 MG Tab Take 1 Tab by mouth every 6 hours as needed for Moderate Pain (arthritis pain) for up to 120 days. 50 Tab 2   • fosinopril (MONOPRIL) 10 MG Tab Take 1 Tab by mouth every day. 90 Tab 3   • aspirin EC (ECOTRIN) 81 MG Tablet Delayed Response Take 162 mg by mouth as needed.     • Cholecalciferol (VITAMIN D3) 5000 units Cap Take 1 Cap by mouth every day.     • S-Adenosylmethionine (NYLA-E) 200 MG TABS Take 1 Tab by mouth every day. OTC 30 Tab 0     No current facility-administered medications for this visit.        Allergies   Allergen Reactions   • Sulfa Drugs Vomiting       ROS: As per HPI.       Objective:     /78 (BP Location: Right arm, Patient Position: Sitting)   Pulse (!) 51   Temp 36.3 °C (97.4 °F)   Resp 12   Ht 1.727 m (5' 8\")   Wt 97.5 kg (215 lb)   SpO2 97%  Body mass index is 32.69 kg/m².    Physical Exam:  Constitutional: Well-developed and well-nourished. Not diaphoretic. No distress. Lucid and fluent.  Skin: Skin is warm and dry. No rash noted.  Head: Atraumatic without lesions.  Eyes: Conjunctivae and extraocular motions are normal. Pupils are equal, round, and reactive to light. No scleral icterus.   Mouth/Throat: Tongue normal. Oropharynx is clear and moist. Posterior pharynx without erythema or exudates.  Neck: Supple, trachea midline. No thyromegaly present. No cervical or supraclavicular lymphadenopathy. No JVD or carotid bruits appreciated  Cardiovascular: Regular rate and rhythm.  Normal S1, S2 without murmur " appreciated.  Chest: Effort normal. Clear to auscultation throughout. No adventitious sounds.   Abdomen: Soft, non tender, and without distention. Active bowel sounds in all four quadrants. No rebound, guarding, masses or hepatosplenomegaly.  Extremities: No cyanosis, clubbing, erythema, nor edema.   Neurological: Alert and oriented x 3.  Balance appears to be normal.  Gait is normal overall.  Psychiatric:  Behavior, mood, and affect are appropriate.    EKG Interpretation-HR is 49, bradycardia.  unchanged from previous tracings, sinus bradycardia, non-specific conduction delay.  Not prolonged       Assessment and Plan:     73 y.o. female with the following issues:    1. Essential hypertension     2. Dyslipidemia, goal LDL below 130     3. Bradycardia  EKG - Clinic Performed    TSH WITH REFLEX TO FT4   4. Gastroesophageal reflux disease without esophagitis     5. Obesity (BMI 30.0-34.9)     6. Thrombocytopenia (HCC)  CBC WITH DIFFERENTIAL   7. Anemia, unspecified type  CBC WITH DIFFERENTIAL   8. Dizziness  TSH WITH REFLEX TO FT4   9. Chronic fatigue  TSH WITH REFLEX TO FT4   10. History of acute pyelonephritis  URINALYSIS,CULTURE IF INDICATED   11. Foul smelling urine  URINALYSIS,CULTURE IF INDICATED         Followup: Return in about 4 months (around 9/3/2019), or if symptoms worsen or fail to improve.

## 2019-05-24 ENCOUNTER — HOSPITAL ENCOUNTER (OUTPATIENT)
Dept: LAB | Facility: MEDICAL CENTER | Age: 74
End: 2019-05-24
Attending: FAMILY MEDICINE
Payer: COMMERCIAL

## 2019-05-24 DIAGNOSIS — R82.90 FOUL SMELLING URINE: ICD-10-CM

## 2019-05-24 DIAGNOSIS — R53.82 CHRONIC FATIGUE: ICD-10-CM

## 2019-05-24 DIAGNOSIS — D64.9 ANEMIA, UNSPECIFIED TYPE: ICD-10-CM

## 2019-05-24 DIAGNOSIS — D69.6 THROMBOCYTOPENIA (HCC): ICD-10-CM

## 2019-05-24 DIAGNOSIS — I10 ESSENTIAL HYPERTENSION: ICD-10-CM

## 2019-05-24 DIAGNOSIS — R42 DIZZINESS: ICD-10-CM

## 2019-05-24 DIAGNOSIS — Z87.448 HISTORY OF ACUTE PYELONEPHRITIS: ICD-10-CM

## 2019-05-24 DIAGNOSIS — R00.1 BRADYCARDIA: ICD-10-CM

## 2019-05-24 DIAGNOSIS — E78.5 DYSLIPIDEMIA, GOAL LDL BELOW 130: ICD-10-CM

## 2019-05-24 LAB
ALBUMIN SERPL BCP-MCNC: 4.4 G/DL (ref 3.2–4.9)
ALBUMIN/GLOB SERPL: 1.6 G/DL
ALP SERPL-CCNC: 112 U/L (ref 30–99)
ALT SERPL-CCNC: 12 U/L (ref 2–50)
ANION GAP SERPL CALC-SCNC: 6 MMOL/L (ref 0–11.9)
APPEARANCE UR: CLEAR
AST SERPL-CCNC: 16 U/L (ref 12–45)
BASOPHILS # BLD AUTO: 0.5 % (ref 0–1.8)
BASOPHILS # BLD: 0.03 K/UL (ref 0–0.12)
BILIRUB SERPL-MCNC: 0.5 MG/DL (ref 0.1–1.5)
BILIRUB UR QL STRIP.AUTO: NEGATIVE
BUN SERPL-MCNC: 16 MG/DL (ref 8–22)
CALCIUM SERPL-MCNC: 10.7 MG/DL (ref 8.5–10.5)
CHLORIDE SERPL-SCNC: 103 MMOL/L (ref 96–112)
CHOLEST SERPL-MCNC: 211 MG/DL (ref 100–199)
CO2 SERPL-SCNC: 28 MMOL/L (ref 20–33)
COLOR UR: YELLOW
CREAT SERPL-MCNC: 0.76 MG/DL (ref 0.5–1.4)
EOSINOPHIL # BLD AUTO: 0.08 K/UL (ref 0–0.51)
EOSINOPHIL NFR BLD: 1.2 % (ref 0–6.9)
ERYTHROCYTE [DISTWIDTH] IN BLOOD BY AUTOMATED COUNT: 43.8 FL (ref 35.9–50)
FASTING STATUS PATIENT QL REPORTED: NORMAL
GLOBULIN SER CALC-MCNC: 2.8 G/DL (ref 1.9–3.5)
GLUCOSE SERPL-MCNC: 91 MG/DL (ref 65–99)
GLUCOSE UR STRIP.AUTO-MCNC: NEGATIVE MG/DL
HCT VFR BLD AUTO: 42.4 % (ref 37–47)
HDLC SERPL-MCNC: 75 MG/DL
HGB BLD-MCNC: 13.6 G/DL (ref 12–16)
IMM GRANULOCYTES # BLD AUTO: 0.01 K/UL (ref 0–0.11)
IMM GRANULOCYTES NFR BLD AUTO: 0.2 % (ref 0–0.9)
KETONES UR STRIP.AUTO-MCNC: NEGATIVE MG/DL
LDLC SERPL CALC-MCNC: 120 MG/DL
LEUKOCYTE ESTERASE UR QL STRIP.AUTO: NEGATIVE
LYMPHOCYTES # BLD AUTO: 1.92 K/UL (ref 1–4.8)
LYMPHOCYTES NFR BLD: 29.3 % (ref 22–41)
MCH RBC QN AUTO: 29.4 PG (ref 27–33)
MCHC RBC AUTO-ENTMCNC: 32.1 G/DL (ref 33.6–35)
MCV RBC AUTO: 91.8 FL (ref 81.4–97.8)
MICRO URNS: NORMAL
MONOCYTES # BLD AUTO: 0.51 K/UL (ref 0–0.85)
MONOCYTES NFR BLD AUTO: 7.8 % (ref 0–13.4)
NEUTROPHILS # BLD AUTO: 4.01 K/UL (ref 2–7.15)
NEUTROPHILS NFR BLD: 61 % (ref 44–72)
NITRITE UR QL STRIP.AUTO: NEGATIVE
NRBC # BLD AUTO: 0 K/UL
NRBC BLD-RTO: 0 /100 WBC
PH UR STRIP.AUTO: 7.5 [PH]
PLATELET # BLD AUTO: 258 K/UL (ref 164–446)
PMV BLD AUTO: 9.6 FL (ref 9–12.9)
POTASSIUM SERPL-SCNC: 4.1 MMOL/L (ref 3.6–5.5)
PROT SERPL-MCNC: 7.2 G/DL (ref 6–8.2)
PROT UR QL STRIP: NEGATIVE MG/DL
RBC # BLD AUTO: 4.62 M/UL (ref 4.2–5.4)
RBC UR QL AUTO: NEGATIVE
SODIUM SERPL-SCNC: 137 MMOL/L (ref 135–145)
SP GR UR STRIP.AUTO: 1.01
TRIGL SERPL-MCNC: 78 MG/DL (ref 0–149)
TSH SERPL DL<=0.005 MIU/L-ACNC: 2.67 UIU/ML (ref 0.38–5.33)
UROBILINOGEN UR STRIP.AUTO-MCNC: 0.2 MG/DL
WBC # BLD AUTO: 6.6 K/UL (ref 4.8–10.8)

## 2019-05-24 PROCEDURE — 81003 URINALYSIS AUTO W/O SCOPE: CPT

## 2019-05-24 PROCEDURE — 80053 COMPREHEN METABOLIC PANEL: CPT

## 2019-05-24 PROCEDURE — 80061 LIPID PANEL: CPT

## 2019-05-24 PROCEDURE — 36415 COLL VENOUS BLD VENIPUNCTURE: CPT

## 2019-05-24 PROCEDURE — 85025 COMPLETE CBC W/AUTO DIFF WBC: CPT

## 2019-05-24 PROCEDURE — 84443 ASSAY THYROID STIM HORMONE: CPT

## 2019-06-03 ENCOUNTER — OFFICE VISIT (OUTPATIENT)
Dept: URGENT CARE | Facility: CLINIC | Age: 74
End: 2019-06-03
Payer: COMMERCIAL

## 2019-06-03 VITALS
OXYGEN SATURATION: 92 % | WEIGHT: 215 LBS | TEMPERATURE: 99.5 F | HEART RATE: 73 BPM | SYSTOLIC BLOOD PRESSURE: 150 MMHG | BODY MASS INDEX: 32.58 KG/M2 | DIASTOLIC BLOOD PRESSURE: 88 MMHG | RESPIRATION RATE: 12 BRPM | HEIGHT: 68 IN

## 2019-06-03 DIAGNOSIS — M15.9 PRIMARY OSTEOARTHRITIS INVOLVING MULTIPLE JOINTS: ICD-10-CM

## 2019-06-03 DIAGNOSIS — G89.29 CHRONIC PAIN OF RIGHT ANKLE: ICD-10-CM

## 2019-06-03 DIAGNOSIS — R06.2 WHEEZING: ICD-10-CM

## 2019-06-03 DIAGNOSIS — J22 LRTI (LOWER RESPIRATORY TRACT INFECTION): ICD-10-CM

## 2019-06-03 DIAGNOSIS — R05.9 COUGH: ICD-10-CM

## 2019-06-03 DIAGNOSIS — M25.571 CHRONIC PAIN OF RIGHT ANKLE: ICD-10-CM

## 2019-06-03 PROCEDURE — 99214 OFFICE O/P EST MOD 30 MIN: CPT | Mod: 25 | Performed by: NURSE PRACTITIONER

## 2019-06-03 PROCEDURE — 94640 AIRWAY INHALATION TREATMENT: CPT | Performed by: NURSE PRACTITIONER

## 2019-06-03 PROCEDURE — 94760 N-INVAS EAR/PLS OXIMETRY 1: CPT | Performed by: NURSE PRACTITIONER

## 2019-06-03 RX ORDER — ALBUTEROL SULFATE 2.5 MG/3ML
2.5 SOLUTION RESPIRATORY (INHALATION) ONCE
Status: COMPLETED | OUTPATIENT
Start: 2019-06-03 | End: 2019-06-03

## 2019-06-03 RX ORDER — BENZONATATE 100 MG/1
100 CAPSULE ORAL 3 TIMES DAILY PRN
Qty: 60 CAP | Refills: 0 | Status: SHIPPED | OUTPATIENT
Start: 2019-06-03 | End: 2020-06-15

## 2019-06-03 RX ORDER — ALBUTEROL SULFATE 90 UG/1
1-2 AEROSOL, METERED RESPIRATORY (INHALATION) EVERY 6 HOURS PRN
Qty: 8.5 G | Refills: 0 | Status: SHIPPED | OUTPATIENT
Start: 2019-06-03 | End: 2020-06-15

## 2019-06-03 RX ORDER — METHYLPREDNISOLONE 4 MG/1
TABLET ORAL
Qty: 21 TAB | Refills: 0 | Status: SHIPPED | OUTPATIENT
Start: 2019-06-03 | End: 2020-06-15

## 2019-06-03 RX ORDER — TRAMADOL HYDROCHLORIDE 50 MG/1
50 TABLET ORAL EVERY 6 HOURS PRN
Qty: 50 TAB | Refills: 2 | Status: SHIPPED
Start: 2019-06-03 | End: 2020-06-15 | Stop reason: SDUPTHER

## 2019-06-03 RX ORDER — DOXYCYCLINE HYCLATE 100 MG/1
100 CAPSULE ORAL 2 TIMES DAILY
Qty: 20 CAP | Refills: 0 | Status: SHIPPED | OUTPATIENT
Start: 2019-06-03 | End: 2019-06-13

## 2019-06-03 RX ADMIN — ALBUTEROL SULFATE 2.5 MG: 2.5 SOLUTION RESPIRATORY (INHALATION) at 14:27

## 2019-06-03 ASSESSMENT — ENCOUNTER SYMPTOMS
DIZZINESS: 0
MYALGIAS: 0
WHEEZING: 0
BACK PAIN: 0
FOCAL WEAKNESS: 0
EYE REDNESS: 0
ABDOMINAL PAIN: 0
SPUTUM PRODUCTION: 1
COUGH: 1
EYE PAIN: 0
HEMOPTYSIS: 0
ORTHOPNEA: 0
DIARRHEA: 0
TINGLING: 0
SORE THROAT: 1
SENSORY CHANGE: 0
VOMITING: 0
BLURRED VISION: 0
SHORTNESS OF BREATH: 0
HEADACHES: 0
NECK PAIN: 0
BRUISES/BLEEDS EASILY: 0
PALPITATIONS: 0
NAUSEA: 0
FEVER: 1
EYE DISCHARGE: 0
CHILLS: 1
CONSTIPATION: 0

## 2019-06-03 ASSESSMENT — LIFESTYLE VARIABLES: SUBSTANCE_ABUSE: 0

## 2019-06-03 NOTE — PROGRESS NOTES
"Subjective:      Franchesca Paige is a 73 y.o. female who presents with Cough (X 1 month, cough, phlem, fevers, earache on right side, sore throat on right side )    Reviewed past medical, surgical and family history with patient. Reviewed prescription and OTC medications with patient in electronic health record today.     Allergies   Allergen Reactions   • Sulfa Drugs Vomiting         Brought in with her spouse.     Cough   This is a new problem. Episode onset:  1 month \" on and off\"  The problem has been waxing and waning. The problem occurs constantly. Associated symptoms include chills, a fever and a sore throat. Pertinent negatives include no chest pain, ear pain, eye redness, headaches, hemoptysis, myalgias, rash, shortness of breath or wheezing. Associated symptoms comments: Fever Tmax 100.5*F ( today) , productive cough  . She has tried rest and OTC cough suppressant (flonase, vicks Nyquil, dayquil) for the symptoms. The treatment provided mild relief. There is no history of environmental allergies.        Review of Systems   Constitutional: Positive for chills, fever and malaise/fatigue.   HENT: Positive for congestion and sore throat. Negative for ear pain.    Eyes: Negative for blurred vision, pain, discharge and redness.   Respiratory: Positive for cough and sputum production. Negative for hemoptysis, shortness of breath and wheezing.    Cardiovascular: Negative for chest pain, palpitations and orthopnea.   Gastrointestinal: Negative for abdominal pain, constipation, diarrhea, nausea and vomiting.   Genitourinary: Negative for dysuria.   Musculoskeletal: Negative for back pain, myalgias and neck pain.   Skin: Negative for rash.   Neurological: Negative for dizziness, tingling, sensory change, focal weakness and headaches.   Endo/Heme/Allergies: Negative for environmental allergies. Does not bruise/bleed easily.   Psychiatric/Behavioral: Negative for substance abuse.          Objective:     /88 (BP " "Location: Left arm, Patient Position: Sitting, BP Cuff Size: Adult)   Pulse 73   Temp 37.5 °C (99.5 °F) (Temporal)   Resp 12   Ht 1.727 m (5' 8\")   Wt 97.5 kg (215 lb)   SpO2 92%   BMI 32.69 kg/m²      Physical Exam   Constitutional: She is oriented to person, place, and time. Vital signs are normal. She appears well-developed and well-nourished.  Non-toxic appearance. She does not have a sickly appearance. She does not appear ill. No distress.   HENT:   Head: Normocephalic.   Right Ear: Hearing, external ear and ear canal normal. Tympanic membrane is injected and bulging. Tympanic membrane is not erythematous. No middle ear effusion.   Left Ear: Hearing, external ear and ear canal normal. Tympanic membrane is injected and bulging. Tympanic membrane is not erythematous.  No middle ear effusion.   Nose: Rhinorrhea (clear) present. No mucosal edema. Right sinus exhibits no maxillary sinus tenderness and no frontal sinus tenderness. Left sinus exhibits no maxillary sinus tenderness and no frontal sinus tenderness.   Mouth/Throat: Uvula is midline and mucous membranes are normal. Posterior oropharyngeal erythema (mild) present. No oropharyngeal exudate, posterior oropharyngeal edema or tonsillar abscesses.   Eyes: Pupils are equal, round, and reactive to light.   Neck: Trachea normal, normal range of motion and full passive range of motion without pain. Neck supple.   Cardiovascular: Normal rate and regular rhythm.  PMI is not displaced.    Pulmonary/Chest: Effort normal. No accessory muscle usage. No respiratory distress. She has decreased breath sounds. She has wheezes. She has no rhonchi. She has no rales.   Abdominal: Soft. Normal appearance. There is no tenderness.   Musculoskeletal: Normal range of motion.   Lymphadenopathy:     She has no cervical adenopathy.        Right: No supraclavicular adenopathy present.        Left: No supraclavicular adenopathy present.   Neurological: She is alert and oriented to " person, place, and time. She has normal strength. Gait normal.   Skin: Skin is warm and dry. Capillary refill takes less than 2 seconds.   Psychiatric: She has a normal mood and affect. Her speech is normal and behavior is normal. Thought content normal.   Nursing note and vitals reviewed.    ALBUTEROL. Demonstration &/or evaluation of pt utilization of a nebulizer and evaluation of results. Pt tolerated well. No adverse events. SpO2 post treatment 98%. Improved Breath Sounds t/o. Good Vt. Course breath sounds Left upper lobe does not clear with coughing.  Pt reports improved WOB.              Assessment/Plan:     1. LRTI (lower respiratory tract infection)  doxycycline (VIBRAMYCIN) 100 MG Cap    MethylPREDNISolone (MEDROL DOSEPAK) 4 MG Tablet Therapy Pack   2. Wheezing  albuterol (PROVENTIL) 2.5mg/3ml nebulizer solution 2.5 mg    MethylPREDNISolone (MEDROL DOSEPAK) 4 MG Tablet Therapy Pack    albuterol 108 (90 Base) MCG/ACT Aero Soln inhalation aerosol   3. Cough  benzonatate (TESSALON) 100 MG Cap       Educated in proper administration of medication(s) ordered today including safety, possible SE, risks, benefits, rationale and alternatives to therapy.     Return to clinic or PCP  5-7 days if current symptoms are not resolving in a satisfactory manner or sooner if new or worsening symptoms occur.   Patient was advised of signs and symptoms which would warrant further evaluation and /or emergent evaluation in ER.  Verbalized agreement with this treatment plan and seemed to understand without barriers. Questions were encouraged and answered to patients satisfaction.     Aftercare instructions were given to pt    OTC antihistamine of choice. Follow manufactures dosing and safety guidelines.       Keep well hydrated    OTC Antipyretic of choice (Acetaminophen, Ibuprofen) for fevers greater than or equal to 101.5 degrees.

## 2019-06-11 DIAGNOSIS — B00.1 RECURRENT COLD SORES: ICD-10-CM

## 2019-06-11 RX ORDER — ACYCLOVIR 50 MG/G
OINTMENT TOPICAL
Qty: 1 TUBE | Refills: 3 | Status: SHIPPED | OUTPATIENT
Start: 2019-06-11 | End: 2022-11-30

## 2019-08-12 ENCOUNTER — HOSPITAL ENCOUNTER (OUTPATIENT)
Dept: LAB | Facility: MEDICAL CENTER | Age: 74
End: 2019-08-12
Attending: INTERNAL MEDICINE
Payer: COMMERCIAL

## 2019-08-12 PROCEDURE — 36415 COLL VENOUS BLD VENIPUNCTURE: CPT

## 2019-08-12 PROCEDURE — 86003 ALLG SPEC IGE CRUDE XTRC EA: CPT | Mod: 91

## 2019-08-12 PROCEDURE — 82785 ASSAY OF IGE: CPT

## 2019-08-13 ENCOUNTER — APPOINTMENT (RX ONLY)
Dept: URBAN - METROPOLITAN AREA CLINIC 35 | Facility: CLINIC | Age: 74
Setting detail: DERMATOLOGY
End: 2019-08-13

## 2019-08-13 DIAGNOSIS — L91.8 OTHER HYPERTROPHIC DISORDERS OF THE SKIN: ICD-10-CM

## 2019-08-13 DIAGNOSIS — L81.4 OTHER MELANIN HYPERPIGMENTATION: ICD-10-CM

## 2019-08-13 DIAGNOSIS — D485 NEOPLASM OF UNCERTAIN BEHAVIOR OF SKIN: ICD-10-CM

## 2019-08-13 DIAGNOSIS — L82.1 OTHER SEBORRHEIC KERATOSIS: ICD-10-CM

## 2019-08-13 DIAGNOSIS — D22 MELANOCYTIC NEVI: ICD-10-CM

## 2019-08-13 DIAGNOSIS — L70.2 ACNE VARIOLIFORMIS: ICD-10-CM

## 2019-08-13 DIAGNOSIS — L57.0 ACTINIC KERATOSIS: ICD-10-CM

## 2019-08-13 PROBLEM — D48.5 NEOPLASM OF UNCERTAIN BEHAVIOR OF SKIN: Status: ACTIVE | Noted: 2019-08-13

## 2019-08-13 PROBLEM — D22.5 MELANOCYTIC NEVI OF TRUNK: Status: ACTIVE | Noted: 2019-08-13

## 2019-08-13 PROBLEM — D22.62 MELANOCYTIC NEVI OF LEFT UPPER LIMB, INCLUDING SHOULDER: Status: ACTIVE | Noted: 2019-08-13

## 2019-08-13 PROCEDURE — ? MEDICATION COUNSELING

## 2019-08-13 PROCEDURE — 17000 DESTRUCT PREMALG LESION: CPT

## 2019-08-13 PROCEDURE — ? BENIGN DESTRUCTION COSMETIC

## 2019-08-13 PROCEDURE — ? COUNSELING

## 2019-08-13 PROCEDURE — ? OBSERVATION AND MEASURE

## 2019-08-13 PROCEDURE — ? PRESCRIPTION

## 2019-08-13 PROCEDURE — 99214 OFFICE O/P EST MOD 30 MIN: CPT | Mod: 25

## 2019-08-13 PROCEDURE — ? LIQUID NITROGEN

## 2019-08-13 PROCEDURE — ? DEFER

## 2019-08-13 RX ORDER — CLOBETASOL PROPIONATE 0.5 MG/ML
1 SOLUTION TOPICAL TWICE A DAY
Qty: 1 | Refills: 1 | Status: ERX | COMMUNITY
Start: 2019-08-13

## 2019-08-13 RX ADMIN — CLOBETASOL PROPIONATE 1: 0.5 SOLUTION TOPICAL at 00:00

## 2019-08-13 ASSESSMENT — LOCATION SIMPLE DESCRIPTION DERM
LOCATION SIMPLE: LEFT SHOULDER
LOCATION SIMPLE: RIGHT ZYGOMA
LOCATION SIMPLE: RIGHT POSTERIOR UPPER ARM
LOCATION SIMPLE: RIGHT BUTTOCK
LOCATION SIMPLE: POSTERIOR SCALP
LOCATION SIMPLE: LEFT UPPER ARM
LOCATION SIMPLE: ABDOMEN
LOCATION SIMPLE: CHEST
LOCATION SIMPLE: LEFT FOREARM

## 2019-08-13 ASSESSMENT — LOCATION ZONE DERM
LOCATION ZONE: FACE
LOCATION ZONE: TRUNK
LOCATION ZONE: ARM
LOCATION ZONE: SCALP

## 2019-08-13 ASSESSMENT — LOCATION DETAILED DESCRIPTION DERM
LOCATION DETAILED: RIGHT MEDIAL SUPERIOR CHEST
LOCATION DETAILED: RIGHT CENTRAL ZYGOMA
LOCATION DETAILED: LEFT OCCIPITAL SCALP
LOCATION DETAILED: LEFT DISTAL DORSAL FOREARM
LOCATION DETAILED: RIGHT BUTTOCK
LOCATION DETAILED: LEFT RIB CAGE
LOCATION DETAILED: RIGHT LATERAL SUPERIOR CHEST
LOCATION DETAILED: RIGHT PROXIMAL POSTERIOR UPPER ARM
LOCATION DETAILED: LEFT LATERAL PROXIMAL UPPER ARM
LOCATION DETAILED: LEFT LATERAL SHOULDER

## 2019-08-13 NOTE — PROCEDURE: MEDICATION COUNSELING
Isotretinoin Pregnancy And Lactation Text: This medication is Pregnancy Category X and is considered extremely dangerous during pregnancy. It is unknown if it is excreted in breast milk.
Detail Level: Zone
Cimetidine Pregnancy And Lactation Text: This medication is Pregnancy Category B and is considered safe during pregnancy. It is also excreted in breast milk and breast feeding isn't recommended.
SSKI Counseling:  I discussed with the patient the risks of SSKI including but not limited to thyroid abnormalities, metallic taste, GI upset, fever, headache, acne, arthralgias, paraesthesias, lymphadenopathy, easy bleeding, arrhythmias, and allergic reaction.
Cellcept Counseling:  I discussed with the patient the risks of mycophenolate mofetil including but not limited to infection/immunosuppression, GI upset, hypokalemia, hypercholesterolemia, bone marrow suppression, lymphoproliferative disorders, malignancy, GI ulceration/bleed/perforation, colitis, interstitial lung disease, kidney failure, progressive multifocal leukoencephalopathy, and birth defects.  The patient understands that monitoring is required including a baseline creatinine and regular CBC testing. In addition, patient must alert us immediately if symptoms of infection or other concerning signs are noted.
Cimzia Counseling:  I discussed with the patient the risks of Cimzia including but not limited to immunosuppression, allergic reactions and infections.  The patient understands that monitoring is required including a PPD at baseline and must alert us or the primary physician if symptoms of infection or other concerning signs are noted.
Rifampin Counseling: I discussed with the patient the risks of rifampin including but not limited to liver damage, kidney damage, red-orange body fluids, nausea/vomiting and severe allergy.
Bactrim Counseling:  I discussed with the patient the risks of sulfa antibiotics including but not limited to GI upset, allergic reaction, drug rash, diarrhea, dizziness, photosensitivity, and yeast infections.  Rarely, more serious reactions can occur including but not limited to aplastic anemia, agranulocytosis, methemoglobinemia, blood dyscrasias, liver or kidney failure, lung infiltrates or desquamative/blistering drug rashes.
Glycopyrrolate Pregnancy And Lactation Text: This medication is Pregnancy Category B and is considered safe during pregnancy. It is unknown if it is excreted breast milk.
Spironolactone Pregnancy And Lactation Text: This medication can cause feminization of the male fetus and should be avoided during pregnancy. The active metabolite is also found in breast milk.
Siliq Pregnancy And Lactation Text: The risk during pregnancy and breastfeeding is uncertain with this medication.
Hydroquinone Counseling:  Patient advised that medication may result in skin irritation, lightening (hypopigmentation), dryness, and burning.  In the event of skin irritation, the patient was advised to reduce the amount of the drug applied or use it less frequently.  Rarely, spots that are treated with hydroquinone can become darker (pseudoochronosis).  Should this occur, patient instructed to stop medication and call the office. The patient verbalized understanding of the proper use and possible adverse effects of hydroquinone.  All of the patient's questions and concerns were addressed.
Topical Retinoid counseling:  Patient advised to apply a pea-sized amount only at bedtime and wait 30 minutes after washing their face before applying.  If too drying, patient may add a non-comedogenic moisturizer. The patient verbalized understanding of the proper use and possible adverse effects of retinoids.  All of the patient's questions and concerns were addressed.
High Dose Vitamin A Counseling: Side effects reviewed, pt to contact office should one occur.
Cimzia Pregnancy And Lactation Text: This medication crosses the placenta but can be considered safe in certain situations. Cimzia may be excreted in breast milk.
Azithromycin Pregnancy And Lactation Text: This medication is considered safe during pregnancy and is also secreted in breast milk.
Cimetidine Counseling:  I discussed with the patient the risks of Cimetidine including but not limited to gynecomastia, headache, diarrhea, nausea, drowsiness, arrhythmias, pancreatitis, skin rashes, psychosis, bone marrow suppression and kidney toxicity.
Arava Counseling:  Patient counseled regarding adverse effects of Arava including but not limited to nausea, vomiting, abnormalities in liver function tests. Patients may develop mouth sores, rash, diarrhea, and abnormalities in blood counts. The patient understands that monitoring is required including LFTs and blood counts.  There is a rare possibility of scarring of the liver and lung problems that can occur when taking methotrexate. Persistent nausea, loss of appetite, pale stools, dark urine, cough, and shortness of breath should be reported immediately. Patient advised to discontinue Arava treatment and consult with a physician prior to attempting conception. The patient will have to undergo a treatment to eliminate Arava from the body prior to conception.
Cellcept Pregnancy And Lactation Text: This medication is Pregnancy Category D and isn't considered safe during pregnancy. It is unknown if this medication is excreted in breast milk.
Hydroquinone Pregnancy And Lactation Text: This medication has not been assigned a Pregnancy Risk Category but animal studies failed to show danger with the topical medication. It is unknown if the medication is excreted in breast milk.
Quinolones Pregnancy And Lactation Text: This medication is Pregnancy Category C and it isn't know if it is safe during pregnancy. It is also excreted in breast milk.
Hydroxychloroquine Counseling:  I discussed with the patient that a baseline ophthalmologic exam is needed at the start of therapy and every year thereafter while on therapy. A CBC may also be warranted for monitoring.  The side effects of this medication were discussed with the patient, including but not limited to agranulocytosis, aplastic anemia, seizures, rashes, retinopathy, and liver toxicity. Patient instructed to call the office should any adverse effect occur.  The patient verbalized understanding of the proper use and possible adverse effects of Plaquenil.  All the patient's questions and concerns were addressed.
High Dose Vitamin A Pregnancy And Lactation Text: High dose vitamin A therapy is contraindicated during pregnancy and breast feeding.
Spironolactone Counseling: Patient advised regarding risks of diarrhea, abdominal pain, hyperkalemia, birth defects (for female patients), liver toxicity and renal toxicity. The patient may need blood work to monitor liver and kidney function and potassium levels while on therapy. The patient verbalized understanding of the proper use and possible adverse effects of spironolactone.  All of the patient's questions and concerns were addressed.
Cosentyx Counseling:  I discussed with the patient the risks of Cosentyx including but not limited to worsening of Crohn's disease, immunosuppression, allergic reactions and infections.  The patient understands that monitoring is required including a PPD at baseline and must alert us or the primary physician if symptoms of infection or other concerning signs are noted.
Simponi Counseling:  I discussed with the patient the risks of golimumab including but not limited to myelosuppression, immunosuppression, autoimmune hepatitis, demyelinating diseases, lymphoma, and serious infections.  The patient understands that monitoring is required including a PPD at baseline and must alert us or the primary physician if symptoms of infection or other concerning signs are noted.
Topical Retinoid Pregnancy And Lactation Text: This medication is Pregnancy Category C. It is unknown if this medication is excreted in breast milk.
Arava Pregnancy And Lactation Text: This medication is Pregnancy Category X and is absolutely contraindicated during pregnancy. It is unknown if it is excreted in breast milk.
Cyclophosphamide Counseling:  I discussed with the patient the risks of cyclophosphamide including but not limited to hair loss, hormonal abnormalities, decreased fertility, abdominal pain, diarrhea, nausea and vomiting, bone marrow suppression and infection. The patient understands that monitoring is required while taking this medication.
Azithromycin Counseling:  I discussed with the patient the risks of azithromycin including but not limited to GI upset, allergic reaction, drug rash, diarrhea, and yeast infections.
Quinolones Counseling:  I discussed with the patient the risks of fluoroquinolones including but not limited to GI upset, allergic reaction, drug rash, diarrhea, dizziness, photosensitivity, yeast infections, liver function test abnormalities, tendonitis/tendon rupture.
Tazorac Counseling:  Patient advised that medication is irritating and drying.  Patient may need to apply sparingly and wash off after an hour before eventually leaving it on overnight.  The patient verbalized understanding of the proper use and possible adverse effects of tazorac.  All of the patient's questions and concerns were addressed.
Imiquimod Counseling:  I discussed with the patient the risks of imiquimod including but not limited to erythema, scaling, itching, weeping, crusting, and pain.  Patient understands that the inflammatory response to imiquimod is variable from person to person and was educated regarded proper titration schedule.  If flu-like symptoms develop, patient knows to discontinue the medication and contact us.
Hydroxychloroquine Pregnancy And Lactation Text: This medication has been shown to cause fetal harm but it isn't assigned a Pregnancy Risk Category. There are small amounts excreted in breast milk.
Birth Control Pills Pregnancy And Lactation Text: This medication should be avoided if pregnant and for the first 30 days post-partum.
Cyclophosphamide Pregnancy And Lactation Text: This medication is Pregnancy Category D and it isn't considered safe during pregnancy. This medication is excreted in breast milk.
Cosentyx Pregnancy And Lactation Text: This medication is Pregnancy Category B and is considered safe during pregnancy. It is unknown if this medication is excreted in breast milk.
Clofazimine Counseling:  I discussed with the patient the risks of clofazimine including but not limited to skin and eye pigmentation, liver damage, nausea/vomiting, gastrointestinal bleeding and allergy.
Stelara Counseling:  I discussed with the patient the risks of ustekinumab including but not limited to immunosuppression, malignancy, posterior leukoencephalopathy syndrome, and serious infections.  The patient understands that monitoring is required including a PPD at baseline and must alert us or the primary physician if symptoms of infection or other concerning signs are noted.
Minocycline Pregnancy And Lactation Text: This medication is Pregnancy Category D and not consider safe during pregnancy. It is also excreted in breast milk.
Niacinamide Counseling: I recommended taking niacin or niacinamide, also know as vitamin B3, twice daily. Recent evidence suggests that taking vitamin B3 (500 mg twice daily) can reduce the risk of actinic keratoses and non-melanoma skin cancers. Side effects of vitamin B3 include flushing and headache.
Tazorac Pregnancy And Lactation Text: This medication is not safe during pregnancy. It is unknown if this medication is excreted in breast milk.
Birth Control Pills Counseling: Birth Control Pill Counseling: I discussed with the patient the potential side effects of OCPs including but not limited to increased risk of stroke, heart attack, thrombophlebitis, deep venous thrombosis, hepatic adenomas, breast changes, GI upset, headaches, and depression.  The patient verbalized understanding of the proper use and possible adverse effects of OCPs. All of the patient's questions and concerns were addressed.
Dupixent Counseling: I discussed with the patient the risks of dupilumab including but not limited to eye infection and irritation, cold sores, injection site reactions, worsening of asthma, allergic reactions and increased risk of parasitic infection.  Live vaccines should be avoided while taking dupilumab. Dupilumab will also interact with certain medications such as warfarin and cyclosporine. The patient understands that monitoring is required and they must alert us or the primary physician if symptoms of infection or other concerning signs are noted.
Terbinafine Counseling: Patient counseling regarding adverse effects of terbinafine including but not limited to headache, diarrhea, rash, upset stomach, liver function test abnormalities, itching, taste/smell disturbance, nausea, abdominal pain, and flatulence.  There is a rare possibility of liver failure that can occur when taking terbinafine.  The patient understands that a baseline LFT and kidney function test may be required. The patient verbalized understanding of the proper use and possible adverse effects of terbinafine.  All of the patient's questions and concerns were addressed.
Cyclosporine Counseling:  I discussed with the patient the risks of cyclosporine including but not limited to hypertension, gingival hyperplasia,myelosuppression, immunosuppression, liver damage, kidney damage, neurotoxicity, lymphoma, and serious infections. The patient understands that monitoring is required including baseline blood pressure, CBC, CMP, lipid panel and uric acid, and then 1-2 times monthly CMP and blood pressure.
Minoxidil Counseling: Minoxidil is a topical medication which can increase blood flow where it is applied. It is uncertain how this medication increases hair growth. Side effects are uncommon and include stinging and allergic reactions.
Minocycline Counseling: Patient advised regarding possible photosensitivity and discoloration of the teeth, skin, lips, tongue and gums.  Patient instructed to avoid sunlight, if possible.  When exposed to sunlight, patients should wear protective clothing, sunglasses, and sunscreen.  The patient was instructed to call the office immediately if the following severe adverse effects occur:  hearing changes, easy bruising/bleeding, severe headache, or vision changes.  The patient verbalized understanding of the proper use and possible adverse effects of minocycline.  All of the patient's questions and concerns were addressed.
Clofazimine Pregnancy And Lactation Text: This medication is Pregnancy Category C and isn't considered safe during pregnancy. It is excreted in breast milk.
Benzoyl Peroxide Counseling: Patient counseled that medicine may cause skin irritation and bleach clothing.  In the event of skin irritation, the patient was advised to reduce the amount of the drug applied or use it less frequently.   The patient verbalized understanding of the proper use and possible adverse effects of benzoyl peroxide.  All of the patient's questions and concerns were addressed.
Propranolol Pregnancy And Lactation Text: This medication is Pregnancy Category C and it isn't known if it is safe during pregnancy. It is excreted in breast milk.
Topical Clindamycin Counseling: Patient counseled that this medication may cause skin irritation or allergic reactions.  In the event of skin irritation, the patient was advised to reduce the amount of the drug applied or use it less frequently.   The patient verbalized understanding of the proper use and possible adverse effects of clindamycin.  All of the patient's questions and concerns were addressed.
Ketoconazole Pregnancy And Lactation Text: This medication is Pregnancy Category C and it isn't know if it is safe during pregnancy. It is also excreted in breast milk and breast feeding isn't recommended.
Dupixent Pregnancy And Lactation Text: This medication likely crosses the placenta but the risk for the fetus is uncertain. This medication is excreted in breast milk.
Cyclosporine Pregnancy And Lactation Text: This medication is Pregnancy Category C and it isn't know if it is safe during pregnancy. This medication is excreted in breast milk.
Metronidazole Pregnancy And Lactation Text: This medication is Pregnancy Category B and considered safe during pregnancy.  It is also excreted in breast milk.
Taltz Counseling: I discussed with the patient the risks of ixekizumab including but not limited to immunosuppression, serious infections, worsening of inflammatory bowel disease and drug reactions.  The patient understands that monitoring is required including a PPD at baseline and must alert us or the primary physician if symptoms of infection or other concerning signs are noted.
Colchicine Counseling:  Patient counseled regarding adverse effects including but not limited to stomach upset (nausea, vomiting, stomach pain, or diarrhea).  Patient instructed to limit alcohol consumption while taking this medication.  Colchicine may reduce blood counts especially with prolonged use.  The patient understands that monitoring of kidney function and blood counts may be required, especially at baseline. The patient verbalized understanding of the proper use and possible adverse effects of colchicine.  All of the patient's questions and concerns were addressed.
Benzoyl Peroxide Pregnancy And Lactation Text: This medication is Pregnancy Category C. It is unknown if benzoyl peroxide is excreted in breast milk.
Ketoconazole Counseling:   Patient counseled regarding improving absorption with orange juice.  Adverse effects include but are not limited to breast enlargement, headache, diarrhea, nausea, upset stomach, liver function test abnormalities, taste disturbance, and stomach pain.  There is a rare possibility of liver failure that can occur when taking ketoconazole. The patient understands that monitoring of LFTs may be required, especially at baseline. The patient verbalized understanding of the proper use and possible adverse effects of ketoconazole.  All of the patient's questions and concerns were addressed.
Topical Clindamycin Pregnancy And Lactation Text: This medication is Pregnancy Category B and is considered safe during pregnancy. It is unknown if it is excreted in breast milk.
Enbrel Counseling:  I discussed with the patient the risks of etanercept including but not limited to myelosuppression, immunosuppression, autoimmune hepatitis, demyelinating diseases, lymphoma, and infections.  The patient understands that monitoring is required including a PPD at baseline and must alert us or the primary physician if symptoms of infection or other concerning signs are noted.
Propranolol Counseling:  I discussed with the patient the risks of propranolol including but not limited to low heart rate, low blood pressure, low blood sugar, restlessness and increased cold sensitivity. They should call the office if they experience any of these side effects.
Methotrexate Counseling:  Patient counseled regarding adverse effects of methotrexate including but not limited to nausea, vomiting, abnormalities in liver function tests. Patients may develop mouth sores, rash, diarrhea, and abnormalities in blood counts. The patient understands that monitoring is required including LFT's and blood counts.  There is a rare possibility of scarring of the liver and lung problems that can occur when taking methotrexate. Persistent nausea, loss of appetite, pale stools, dark urine, cough, and shortness of breath should be reported immediately. Patient advised to discontinue methotrexate treatment at least three months before attempting to become pregnant.  I discussed the need for folate supplements while taking methotrexate.  These supplements can decrease side effects during methotrexate treatment. The patient verbalized understanding of the proper use and possible adverse effects of methotrexate.  All of the patient's questions and concerns were addressed.
Mirvaso Counseling: Mirvaso is a topical medication which can decrease superficial blood flow where applied. Side effects are uncommon and include stinging, redness and allergic reactions.
Metronidazole Counseling:  I discussed with the patient the risks of metronidazole including but not limited to seizures, nausea/vomiting, a metallic taste in the mouth, nausea/vomiting and severe allergy.
Hydroxyzine Counseling: Patient advised that the medication is sedating and not to drive a car after taking this medication.  Patient informed of potential adverse effects including but not limited to dry mouth, urinary retention, and blurry vision.  The patient verbalized understanding of the proper use and possible adverse effects of hydroxyzine.  All of the patient's questions and concerns were addressed.
Carac Counseling:  I discussed with the patient the risks of Carac including but not limited to erythema, scaling, itching, weeping, crusting, and pain.
Topical Sulfur Applications Counseling: Topical Sulfur Counseling: Patient counseled that this medication may cause skin irritation or allergic reactions.  In the event of skin irritation, the patient was advised to reduce the amount of the drug applied or use it less frequently.   The patient verbalized understanding of the proper use and possible adverse effects of topical sulfur application.  All of the patient's questions and concerns were addressed.
Methotrexate Pregnancy And Lactation Text: This medication is Pregnancy Category X and is known to cause fetal harm. This medication is excreted in breast milk.
Erythromycin Pregnancy And Lactation Text: This medication is Pregnancy Category B and is considered safe during pregnancy. It is also excreted in breast milk.
Dapsone Counseling: I discussed with the patient the risks of dapsone including but not limited to hemolytic anemia, agranulocytosis, rashes, methemoglobinemia, kidney failure, peripheral neuropathy, headaches, GI upset, and liver toxicity.  Patients who start dapsone require monitoring including baseline LFTs and weekly CBCs for the first month, then every month thereafter.  The patient verbalized understanding of the proper use and possible adverse effects of dapsone.  All of the patient's questions and concerns were addressed.
Carac Pregnancy And Lactation Text: This medication is Pregnancy Category X and contraindicated in pregnancy and in women who may become pregnant. It is unknown if this medication is excreted in breast milk.
Hydroxyzine Pregnancy And Lactation Text: This medication is not safe during pregnancy and should not be taken. It is also excreted in breast milk and breast feeding isn't recommended.
Mirvaso Pregnancy And Lactation Text: This medication has not been assigned a Pregnancy Risk Category. It is unknown if the medication is excreted in breast milk.
Oxybutynin Counseling:  I discussed with the patient the risks of oxybutynin including but not limited to skin rash, drowsiness, dry mouth, difficulty urinating, and blurred vision.
Tremfya Counseling: I discussed with the patient the risks of guselkumab including but not limited to immunosuppression, serious infections, worsening of inflammatory bowel disease and drug reactions.  The patient understands that monitoring is required including a PPD at baseline and must alert us or the primary physician if symptoms of infection or other concerning signs are noted.
Itraconazole Counseling:  I discussed with the patient the risks of itraconazole including but not limited to liver damage, nausea/vomiting, neuropathy, and severe allergy.  The patient understands that this medication is best absorbed when taken with acidic beverages such as non-diet cola or ginger ale.  The patient understands that monitoring is required including baseline LFTs and repeat LFTs at intervals.  The patient understands that they are to contact us or the primary physician if concerning signs are noted.
Humira Counseling:  I discussed with the patient the risks of adalimumab including but not limited to myelosuppression, immunosuppression, autoimmune hepatitis, demyelinating diseases, lymphoma, and serious infections.  The patient understands that monitoring is required including a PPD at baseline and must alert us or the primary physician if symptoms of infection or other concerning signs are noted.
Topical Sulfur Applications Pregnancy And Lactation Text: This medication is Pregnancy Category C and has an unknown safety profile during pregnancy. It is unknown if this topical medication is excreted in breast milk.
Prednisone Counseling:  I discussed with the patient the risks of prolonged use of prednisone including but not limited to weight gain, insomnia, osteoporosis, mood changes, diabetes, susceptibility to infection, glaucoma and high blood pressure.  In cases where prednisone use is prolonged, patients should be monitored with blood pressure checks, serum glucose levels and an eye exam.  Additionally, the patient may need to be placed on GI prophylaxis, PCP prophylaxis, and calcium and vitamin D supplementation and/or a bisphosphonate.  The patient verbalized understanding of the proper use and the possible adverse effects of prednisone.  All of the patient's questions and concerns were addressed.
Picato Counseling:  I discussed with the patient the risks of Picato including but not limited to erythema, scaling, itching, weeping, crusting, and pain.
Dapsone Pregnancy And Lactation Text: This medication is Pregnancy Category C and is not considered safe during pregnancy or breast feeding.
Erythromycin Counseling:  I discussed with the patient the risks of erythromycin including but not limited to GI upset, allergic reaction, drug rash, diarrhea, increase in liver enzymes, and yeast infections.
5-Fu Counseling: 5-Fluorouracil Counseling:  I discussed with the patient the risks of 5-fluorouracil including but not limited to erythema, scaling, itching, weeping, crusting, and pain.
Griseofulvin Pregnancy And Lactation Text: This medication is Pregnancy Category X and is known to cause serious birth defects. It is unknown if this medication is excreted in breast milk but breast feeding should be avoided.
Otezla Pregnancy And Lactation Text: This medication is Pregnancy Category C and it isn't known if it is safe during pregnancy. It is unknown if it is excreted in breast milk.
Wartpeel Counseling:  I discussed with the patient the risks of Wartpeel including but not limited to erythema, scaling, itching, weeping, crusting, and pain.
Doxycycline Pregnancy And Lactation Text: This medication is Pregnancy Category D and not consider safe during pregnancy. It is also excreted in breast milk but is considered safe for shorter treatment courses.
Albendazole Counseling:  I discussed with the patient the risks of albendazole including but not limited to cytopenia, kidney damage, nausea/vomiting and severe allergy.  The patient understands that this medication is being used in an off-label manner.
Xeljanz Counseling: I discussed with the patient the risks of Xeljanz therapy including increased risk of infection, liver issues, headache, diarrhea, or cold symptoms. Live vaccines should be avoided. They were instructed to call if they have any problems.
Erivedge Counseling- I discussed with the patient the risks of Erivedge including but not limited to nausea, vomiting, diarrhea, constipation, weight loss, changes in the sense of taste, decreased appetite, muscle spasms, and hair loss.  The patient verbalized understanding of the proper use and possible adverse effects of Erivedge.  All of the patient's questions and concerns were addressed.
Griseofulvin Counseling:  I discussed with the patient the risks of griseofulvin including but not limited to photosensitivity, cytopenia, liver damage, nausea/vomiting and severe allergy.  The patient understands that this medication is best absorbed when taken with a fatty meal (e.g., ice cream or french fries).
Ilumya Counseling: I discussed with the patient the risks of tildrakizumab including but not limited to immunosuppression, malignancy, posterior leukoencephalopathy syndrome, and serious infections.  The patient understands that monitoring is required including a PPD at baseline and must alert us or the primary physician if symptoms of infection or other concerning signs are noted.
Doxepin Pregnancy And Lactation Text: This medication is Pregnancy Category C and it isn't known if it is safe during pregnancy. It is also excreted in breast milk and breast feeding isn't recommended.
Doxycycline Counseling:  Patient counseled regarding possible photosensitivity and increased risk for sunburn.  Patient instructed to avoid sunlight, if possible.  When exposed to sunlight, patients should wear protective clothing, sunglasses, and sunscreen.  The patient was instructed to call the office immediately if the following severe adverse effects occur:  hearing changes, easy bruising/bleeding, severe headache, or vision changes.  The patient verbalized understanding of the proper use and possible adverse effects of doxycycline.  All of the patient's questions and concerns were addressed.
Use Enhanced Medication Counseling?: No
Otezla Counseling: The side effects of Otezla were discussed with the patient, including but not limited to worsening or new depression, weight loss, diarrhea, nausea, upper respiratory tract infection, and headache. Patient instructed to call the office should any adverse effect occur.  The patient verbalized understanding of the proper use and possible adverse effects of Otezla.  All the patient's questions and concerns were addressed.
Drysol Counseling:  I discussed with the patient the risks of drysol/aluminum chloride including but not limited to skin rash, itching, irritation, burning.
Albendazole Pregnancy And Lactation Text: This medication is Pregnancy Category C and it isn't known if it is safe during pregnancy. It is also excreted in breast milk.
Xelamiz Pregnancy And Lactation Text: This medication is Pregnancy Category D and is not considered safe during pregnancy.  The risk during breast feeding is also uncertain.
Protopic Counseling: Patient may experience a mild burning sensation during topical application. Protopic is not approved in children less than 2 years of age. There have been case reports of hematologic and skin malignancies in patients using topical calcineurin inhibitors although causality is questionable.
Valtrex Pregnancy And Lactation Text: this medication is Pregnancy Category B and is considered safe during pregnancy. This medication is not directly found in breast milk but it's metabolite acyclovir is present.
Acitretin Counseling:  I discussed with the patient the risks of acitretin including but not limited to hair loss, dry lips/skin/eyes, liver damage, hyperlipidemia, depression/suicidal ideation, photosensitivity.  Serious rare side effects can include but are not limited to pancreatitis, pseudotumor cerebri, bony changes, clot formation/stroke/heart attack.  Patient understands that alcohol is contraindicated since it can result in liver toxicity and significantly prolong the elimination of the drug by many years.
Protopic Pregnancy And Lactation Text: This medication is Pregnancy Category C. It is unknown if this medication is excreted in breast milk when applied topically.
Zyclara Counseling:  I discussed with the patient the risks of imiquimod including but not limited to erythema, scaling, itching, weeping, crusting, and pain.  Patient understands that the inflammatory response to imiquimod is variable from person to person and was educated regarded proper titration schedule.  If flu-like symptoms develop, patient knows to discontinue the medication and contact us.
Clindamycin Pregnancy And Lactation Text: This medication can be used in pregnancy if certain situations. Clindamycin is also present in breast milk.
Finasteride Male Counseling: Finasteride Counseling:  I discussed with the patient the risks of use of finasteride including but not limited to decreased libido, decreased ejaculate volume, gynecomastia, and depression. Women should not handle medication.  All of the patient's questions and concerns were addressed.
Ivermectin Counseling:  Patient instructed to take medication on an empty stomach with a full glass of water.  Patient informed of potential adverse effects including but not limited to nausea, diarrhea, dizziness, itching, and swelling of the extremities or lymph nodes.  The patient verbalized understanding of the proper use and possible adverse effects of ivermectin.  All of the patient's questions and concerns were addressed.
Infliximab Counseling:  I discussed with the patient the risks of infliximab including but not limited to myelosuppression, immunosuppression, autoimmune hepatitis, demyelinating diseases, lymphoma, and serious infections.  The patient understands that monitoring is required including a PPD at baseline and must alert us or the primary physician if symptoms of infection or other concerning signs are noted.
Xolair Counseling:  Patient informed of potential adverse effects including but not limited to fever, muscle aches, rash and allergic reactions.  The patient verbalized understanding of the proper use and possible adverse effects of Xolair.  All of the patient's questions and concerns were addressed.
Drysol Pregnancy And Lactation Text: This medication is considered safe during pregnancy and breast feeding.
Fluconazole Counseling:  Patient counseled regarding adverse effects of fluconazole including but not limited to headache, diarrhea, nausea, upset stomach, liver function test abnormalities, taste disturbance, and stomach pain.  There is a rare possibility of liver failure that can occur when taking fluconazole.  The patient understands that monitoring of LFTs and kidney function test may be required, especially at baseline. The patient verbalized understanding of the proper use and possible adverse effects of fluconazole.  All of the patient's questions and concerns were addressed.
Valtrex Counseling: I discussed with the patient the risks of valacyclovir including but not limited to kidney damage, nausea, vomiting and severe allergy.  The patient understands that if the infection seems to be worsening or is not improving, they are to call.
Odomzo Counseling- I discussed with the patient the risks of Odomzo including but not limited to nausea, vomiting, diarrhea, constipation, weight loss, changes in the sense of taste, decreased appetite, muscle spasms, and hair loss.  The patient verbalized understanding of the proper use and possible adverse effects of Odomzo.  All of the patient's questions and concerns were addressed.
Acitretin Pregnancy And Lactation Text: This medication is Pregnancy Category X and should not be given to women who are pregnant or may become pregnant in the future. This medication is excreted in breast milk.
Clindamycin Counseling: I counseled the patient regarding use of clindamycin as an antibiotic for prophylactic and/or therapeutic purposes. Clindamycin is active against numerous classes of bacteria, including skin bacteria. Side effects may include nausea, diarrhea, gastrointestinal upset, rash, hives, yeast infections, and in rare cases, colitis.
Rhofade Counseling: Rhofade is a topical medication which can decrease superficial blood flow where applied. Side effects are uncommon and include stinging, redness and allergic reactions.
Xolair Pregnancy And Lactation Text: This medication is Pregnancy Category B and is considered safe during pregnancy. This medication is excreted in breast milk.
Elidel Counseling: Patient may experience a mild burning sensation during topical application. Elidel is not approved in children less than 2 years of age. There have been case reports of hematologic and skin malignancies in patients using topical calcineurin inhibitors although causality is questionable.
Finasteride Pregnancy And Lactation Text: This medication is absolutely contraindicated during pregnancy. It is unknown if it is excreted in breast milk.
Bexarotene Counseling:  I discussed with the patient the risks of bexarotene including but not limited to hair loss, dry lips/skin/eyes, liver abnormalities, hyperlipidemia, pancreatitis, depression/suicidal ideation, photosensitivity, drug rash/allergic reactions, hypothyroidism, anemia, leukopenia, infection, cataracts, and teratogenicity.  Patient understands that they will need regular blood tests to check lipid profile, liver function tests, white blood cell count, thyroid function tests and pregnancy test if applicable.
Nsaids Pregnancy And Lactation Text: These medications are considered safe up to 30 weeks gestation. It is excreted in breast milk.
Cephalexin Pregnancy And Lactation Text: This medication is Pregnancy Category B and considered safe during pregnancy.  It is also excreted in breast milk but can be used safely for shorter doses.
Gabapentin Counseling: I discussed with the patient the risks of gabapentin including but not limited to dizziness, somnolence, fatigue and ataxia.
Nsaids Counseling: NSAID Counseling: I discussed with the patient that NSAIDs should be taken with food. Prolonged use of NSAIDs can result in the development of stomach ulcers.  Patient advised to stop taking NSAIDs if abdominal pain occurs.  The patient verbalized understanding of the proper use and possible adverse effects of NSAIDs.  All of the patient's questions and concerns were addressed.
Thalidomide Counseling: I discussed with the patient the risks of thalidomide including but not limited to birth defects, anxiety, weakness, chest pain, dizziness, cough and severe allergy.
Rituxan Counseling:  I discussed with the patient the risks of Rituxan infusions. Side effects can include infusion reactions, severe drug rashes including mucocutaneous reactions, reactivation of latent hepatitis and other infections and rarely progressive multifocal leukoencephalopathy.  All of the patient's questions and concerns were addressed.
Bexarotene Pregnancy And Lactation Text: This medication is Pregnancy Category X and should not be given to women who are pregnant or may become pregnant. This medication should not be used if you are breast feeding.
Azathioprine Counseling:  I discussed with the patient the risks of azathioprine including but not limited to myelosuppression, immunosuppression, hepatotoxicity, lymphoma, and infections.  The patient understands that monitoring is required including baseline LFTs, Creatinine, possible TPMP genotyping and weekly CBCs for the first month and then every 2 weeks thereafter.  The patient verbalized understanding of the proper use and possible adverse effects of azathioprine.  All of the patient's questions and concerns were addressed.
Opioid Counseling: I discussed with the patient the potential side effects of opioids including but not limited to addiction, altered mental status, and depression. I stressed avoiding alcohol, benzodiazepines, muscle relaxants and sleep aids unless specifically okayed by a physician. The patient verbalized understanding of the proper use and possible adverse effects of opioids. All of the patient's questions and concerns were addressed. They were instructed to flush the remaining pills down the toilet if they did not need them for pain.
Cephalexin Counseling: I counseled the patient regarding use of cephalexin as an antibiotic for prophylactic and/or therapeutic purposes. Cephalexin (commonly prescribed under brand name Keflex) is a cephalosporin antibiotic which is active against numerous classes of bacteria, including most skin bacteria. Side effects may include nausea, diarrhea, gastrointestinal upset, rash, hives, yeast infections, and in rare cases, hepatitis, kidney disease, seizures, fever, confusion, neurologic symptoms, and others. Patients with severe allergies to penicillin medications are cautioned that there is about a 10% incidence of cross-reactivity with cephalosporins. When possible, patients with penicillin allergies should use alternatives to cephalosporins for antibiotic therapy.
Rituxan Pregnancy And Lactation Text: This medication is Pregnancy Category C and it isn't know if it is safe during pregnancy. It is unknown if this medication is excreted in breast milk but similar antibodies are known to be excreted.
Eucrisa Counseling: Patient may experience a mild burning sensation during topical application. Eucrisa is not approved in children less than 2 years of age.
Tetracycline Counseling: Patient counseled regarding possible photosensitivity and increased risk for sunburn.  Patient instructed to avoid sunlight, if possible.  When exposed to sunlight, patients should wear protective clothing, sunglasses, and sunscreen.  The patient was instructed to call the office immediately if the following severe adverse effects occur:  hearing changes, easy bruising/bleeding, severe headache, or vision changes.  The patient verbalized understanding of the proper use and possible adverse effects of tetracycline.  All of the patient's questions and concerns were addressed. Patient understands to avoid pregnancy while on therapy due to potential birth defects.
Solaraze Counseling:  I discussed with the patient the risks of Solaraze including but not limited to erythema, scaling, itching, weeping, crusting, and pain.
Isotretinoin Counseling: Patient should get monthly blood tests, not donate blood, not drive at night if vision affected, not share medication, and not undergo elective surgery for 6 months after tx completed. Side effects reviewed, pt to contact office should one occur.
Sski Pregnancy And Lactation Text: This medication is Pregnancy Category D and isn't considered safe during pregnancy. It is excreted in breast milk.
Niacinamide Pregnancy And Lactation Text: These medications are considered safe during pregnancy.
Doxepin Counseling:  Patient advised that the medication is sedating and not to drive a car after taking this medication. Patient informed of potential adverse effects including but not limited to dry mouth, urinary retention, and blurry vision.  The patient verbalized understanding of the proper use and possible adverse effects of doxepin.  All of the patient's questions and concerns were addressed.
Bactrim Pregnancy And Lactation Text: This medication is Pregnancy Category D and is known to cause fetal risk.  It is also excreted in breast milk.
Opioid Pregnancy And Lactation Text: These medications can lead to premature delivery and should be avoided during pregnancy. These medications are also present in breast milk in small amounts.
Rifampin Pregnancy And Lactation Text: This medication is Pregnancy Category C and it isn't know if it is safe during pregnancy. It is also excreted in breast milk and should not be used if you are breast feeding.
Solaraze Pregnancy And Lactation Text: This medication is Pregnancy Category B and is considered safe. There is some data to suggest avoiding during the third trimester. It is unknown if this medication is excreted in breast milk.
Glycopyrrolate Counseling:  I discussed with the patient the risks of glycopyrrolate including but not limited to skin rash, drowsiness, dry mouth, difficulty urinating, and blurred vision.
Siliq Counseling:  I discussed with the patient the risks of Siliq including but not limited to new or worsening depression, suicidal thoughts and behavior, immunosuppression, malignancy, posterior leukoencephalopathy syndrome, and serious infections.  The patient understands that monitoring is required including a PPD at baseline and must alert us or the primary physician if symptoms of infection or other concerning signs are noted. There is also a special program designed to monitor depression which is required with Siliq.

## 2019-08-13 NOTE — HPI: FULL BODY SKIN EXAMINATION
How Severe Are Your Spot(S)?: mild
What Type Of Note Output Would You Prefer (Optional)?: Standard Output
What Is The Reason For Today's Visit?: Full Body Skin Examination with No Concerns
What Is The Reason For Today's Visit? (Being Monitored For X): concerning skin lesions on an annual basis
Additional History: Pt also has itchy spots to scalp.

## 2019-08-13 NOTE — PROCEDURE: DEFER
Introduction Text (Please End With A Colon): patient to watch return in 3 weeks for: possible biopsy
Detail Level: Detailed

## 2019-08-13 NOTE — PROCEDURE: BENIGN DESTRUCTION COSMETIC
Price (Use Numbers Only, No Special Characters Or $): test spot
Detail Level: Detailed
Anesthesia Volume In Cc: 0.5
Consent: The patient's consent was obtained including but not limited to risks of crusting, scabbing, blistering, scarring, darker or lighter pigmentary change, recurrence, incomplete removal and infection.
Post-Care Instructions: I reviewed with the patient in detail post-care instructions. Patient is to wear sunprotection, and avoid picking at any of the treated lesions. Pt may apply Vaseline to crusted or scabbing areas.

## 2019-08-13 NOTE — PROCEDURE: OBSERVATION
Morphology Per Location (Optional): pink linear telangiectactic macule
Detail Level: Detailed
Size Of Lesion: 1 cm
Morphology Per Location (Optional): tan macule
Size Of Lesion: 5 mm

## 2019-08-14 LAB
A ALTERNATA IGE QN: <0.1 KU/L
A FUMIGATUS IGE QN: <0.1 KU/L
BERMUDA GRASS IGE QN: <0.1 KU/L
BOXELDER IGE QN: <0.1 KU/L
C SPHAEROSPERMUM IGE QN: <0.1 KU/L
CAT DANDER IGE QN: <0.1 KU/L
CMN PIGWEED IGE QN: 1.04 KU/L
COMMON RAGWEED IGE QN: <0.1 KU/L
COTTONWOOD IGE QN: 0.1 KU/L
COW MILK IGE QN: 2.78 KU/L
D FARINAE IGE QN: <0.1 KU/L
D PTERONYSS IGE QN: <0.1 KU/L
DEPRECATED MISC ALLERGEN IGE RAST QL: ABNORMAL
DOG DANDER IGE QN: <0.1 KU/L
IGE SERPL-ACNC: 169 KU/L
M RACEMOSUS IGE QN: <0.1 KU/L
MOUSE EPITH IGE QN: <0.1 KU/L
MT JUNIPER IGE QN: <0.1 KU/L
MUGWORT IGE QN: 0.16 KU/L
OLIVE POLN IGE QN: <0.1 KU/L
P NOTATUM IGE QN: <0.1 KU/L
PEANUT IGE QN: <0.1 KU/L
ROACH IGE QN: <0.1 KU/L
SALTWORT IGE QN: 2.36 KU/L
TIMOTHY IGE QN: <0.1 KU/L
WHITE ELM IGE QN: 0.27 KU/L
WHITE MULBERRY IGE QN: <0.1 KU/L
WHITE OAK IGE QN: <0.1 KU/L

## 2019-08-23 ENCOUNTER — TELEPHONE (OUTPATIENT)
Dept: MEDICAL GROUP | Facility: MEDICAL CENTER | Age: 74
End: 2019-08-23

## 2019-08-23 NOTE — TELEPHONE ENCOUNTER
Colorectal Care Gap Outreach    1. Confirmed patient is between the ages of 50-75: YES     2. Confirmed that patient IS overdue or due soon for colorectal cancer screening: YES     3. Were orders placed within the last 12 months to complete screening: NO     Phone Number Called: [phone] (home)     Call outcome: left message for patient to call back regarding message below notes to have Colonoscopy in 2010, but no record  Requested from Dr Chris, who was on file    _____________________________________________________________________    Colon Cancer Screening Guidelines:     Important: If patient has any history of colon polyps or family history of colorectal cancer, FIT and Cologuard are NOT appropriate options. A colonoscopy is the recommended test for this set of patients.    • Colonoscopy  o Always recommend colonoscopy first.   o A colonoscopy is recommended over the other tests because it provides direct visualization of the colon and if there are small polyps these can also be removed with one procedure.  o If negative and no family history, could be cleared for 10 years.     • Cologuard/FIT  o Cologuard is completed once every 3 years.  o FIT is completed annually.  o If positive, Cologuard and FIT will require a diagnostic colonoscopy. Screening colonoscopies are classically covered by insurances, however, diagnostic colonoscopies may result in a bill.

## 2019-08-30 ENCOUNTER — DOCUMENTATION (OUTPATIENT)
Dept: MEDICAL GROUP | Facility: MEDICAL CENTER | Age: 74
End: 2019-08-30

## 2019-08-30 ENCOUNTER — TELEPHONE (OUTPATIENT)
Dept: MEDICAL GROUP | Facility: MEDICAL CENTER | Age: 74
End: 2019-08-30

## 2019-08-30 NOTE — TELEPHONE ENCOUNTER
I understand her thinking but that does not check for breast cancer at all.  Of course this is her choice.  Please request the results from [facility].

## 2019-09-05 ENCOUNTER — APPOINTMENT (RX ONLY)
Dept: URBAN - METROPOLITAN AREA CLINIC 35 | Facility: CLINIC | Age: 74
Setting detail: DERMATOLOGY
End: 2019-09-05

## 2019-09-05 DIAGNOSIS — I78.8 OTHER DISEASES OF CAPILLARIES: ICD-10-CM

## 2019-09-05 PROCEDURE — ? ADDITIONAL NOTES

## 2019-09-05 NOTE — PROCEDURE: ADDITIONAL NOTES
Additional Notes: Unable to treat patient due to machine not functioning. Patient will not be charged.
Detail Level: Detailed

## 2019-09-05 NOTE — HPI: COSMETIC (LASER RED SPOTS)
Have You Had Red Spots Treated With Laser Before?: has had previous treatments
Number Of Treatments: 6
When Was Your Last Laser Treatment?: 09/20/2018

## 2020-01-06 DIAGNOSIS — I10 ESSENTIAL HYPERTENSION: ICD-10-CM

## 2020-01-06 RX ORDER — FOSINOPRIL SODIUM 10 MG/1
10 TABLET ORAL
Qty: 90 TAB | Refills: 3 | Status: SHIPPED | OUTPATIENT
Start: 2020-01-06 | End: 2020-10-29

## 2020-01-14 ENCOUNTER — APPOINTMENT (RX ONLY)
Dept: URBAN - METROPOLITAN AREA CLINIC 35 | Facility: CLINIC | Age: 75
Setting detail: DERMATOLOGY
End: 2020-01-14

## 2020-01-14 PROBLEM — D37.01 NEOPLASM OF UNCERTAIN BEHAVIOR OF LIP: Status: ACTIVE | Noted: 2020-01-14

## 2020-01-14 PROCEDURE — 40490 BIOPSY OF LIP: CPT

## 2020-01-14 PROCEDURE — ? BIOPSY BY SHAVE METHOD

## 2020-01-14 NOTE — PROCEDURE: BIOPSY BY SHAVE METHOD
Hide Additional Anticipated Plan?: No
Size Of Lesion In Cm: 0
Lab Facility: 
Lab: [id number]
Hemostasis: Aluminum Chloride
Post-Care Instructions: I reviewed with the patient in detail post-care instructions. Patient is to keep the biopsy site dry overnight, and then apply white petrolatum twice daily until healed. Patient may apply hydrogen peroxide soaks to remove any crusting.
Biopsy Type: H and E
Type Of Destruction Used: Electrodesiccation and Curettage
Billing Type: Third-Party Bill
Dressing: no dressing applied
Depth Of Biopsy: dermis
Consent: Written consent was obtained and risks were reviewed including but not limited to scarring, infection, bleeding, scabbing, incomplete removal, nerve damage and allergy to anesthesia.
Was A Bandage Applied: Yes
Detail Level: Detailed
Electrodesiccation And Curettage Text: The wound bed was treated with electrodesiccation and curettage after the biopsy was
Anesthesia Type: 0.5% lidocaine with 1:200,000 epinephrine and a 1:10 solution of 8.4% sodium bicarbonate
Notification Instructions: Patient will be notified of biopsy results. However, patient instructed to call the office if not contacted within 2 weeks.
Anesthesia Volume In Cc: 0.6
Wound Care: Petrolatum
Biopsy Method: Dermablade
Curettage Text: The wound bed was treated with curettage after the biopsy was performed.

## 2020-01-21 ENCOUNTER — PATIENT OUTREACH (OUTPATIENT)
Dept: HEALTH INFORMATION MANAGEMENT | Facility: OTHER | Age: 75
End: 2020-01-21

## 2020-01-21 NOTE — PROGRESS NOTES
Outcome: Left Message pa introduction call    Please transfer to Patient Outreach Team at [phone] when patient returns call.    [facility] Verified: yes    Attempt # 1

## 2020-02-12 ENCOUNTER — APPOINTMENT (RX ONLY)
Dept: URBAN - METROPOLITAN AREA CLINIC 35 | Facility: CLINIC | Age: 75
Setting detail: DERMATOLOGY
End: 2020-02-12

## 2020-02-12 DIAGNOSIS — D49.2 NEOPLASM OF UNSPECIFIED BEHAVIOR OF BONE, SOFT TISSUE, AND SKIN: ICD-10-CM

## 2020-02-12 DIAGNOSIS — L57.0 ACTINIC KERATOSIS: ICD-10-CM

## 2020-02-12 DIAGNOSIS — L82.1 OTHER SEBORRHEIC KERATOSIS: ICD-10-CM

## 2020-02-12 PROCEDURE — ? OBSERVATION AND MEASURE

## 2020-02-12 PROCEDURE — ? DIAGNOSIS COMMENT

## 2020-02-12 PROCEDURE — ? COUNSELING

## 2020-02-12 PROCEDURE — 99213 OFFICE O/P EST LOW 20 MIN: CPT

## 2020-02-12 ASSESSMENT — LOCATION ZONE DERM
LOCATION ZONE: TRUNK
LOCATION ZONE: LIP

## 2020-02-12 ASSESSMENT — LOCATION SIMPLE DESCRIPTION DERM
LOCATION SIMPLE: LEFT LOWER BACK
LOCATION SIMPLE: LEFT UPPER BACK
LOCATION SIMPLE: LEFT BREAST
LOCATION SIMPLE: LEFT LIP

## 2020-02-12 ASSESSMENT — LOCATION DETAILED DESCRIPTION DERM
LOCATION DETAILED: LEFT INFERIOR MEDIAL UPPER BACK
LOCATION DETAILED: LEFT SUPERIOR MEDIAL MIDBACK
LOCATION DETAILED: LEFT INFERIOR VERMILION LIP
LOCATION DETAILED: LEFT LATERAL BREAST 5-6:00 REGION

## 2020-02-12 NOTE — PROCEDURE: MIPS QUALITY
Quality 226: Preventive Care And Screening: Tobacco Use: Screening And Cessation Intervention: Patient screened for tobacco use and is an ex/non-smoker
Quality 265: Biopsy Follow-Up: Biopsy results reviewed, communicated, tracked, and documented
Quality 131: Pain Assessment And Follow-Up: No documentation of pain assessment, reason not given
Detail Level: Detailed

## 2020-02-12 NOTE — PROCEDURE: COUNSELING
Patient Specific Counseling (Will Not Stick From Patient To Patient): Advised applying Vaseline
Detail Level: Detailed

## 2020-02-19 NOTE — PROGRESS NOTES
Called patient pharmacy to let them know to dispense 28 tabs of Tramadol for the patient's 7 days supply.   No

## 2020-06-15 ENCOUNTER — OFFICE VISIT (OUTPATIENT)
Dept: MEDICAL GROUP | Facility: MEDICAL CENTER | Age: 75
End: 2020-06-15
Payer: COMMERCIAL

## 2020-06-15 VITALS
WEIGHT: 215 LBS | SYSTOLIC BLOOD PRESSURE: 130 MMHG | HEART RATE: 50 BPM | RESPIRATION RATE: 14 BRPM | BODY MASS INDEX: 32.58 KG/M2 | OXYGEN SATURATION: 98 % | DIASTOLIC BLOOD PRESSURE: 72 MMHG | TEMPERATURE: 97.8 F | HEIGHT: 68 IN

## 2020-06-15 DIAGNOSIS — M85.89 OSTEOPENIA OF MULTIPLE SITES: ICD-10-CM

## 2020-06-15 DIAGNOSIS — E66.9 OBESITY (BMI 30.0-34.9): ICD-10-CM

## 2020-06-15 DIAGNOSIS — M15.9 PRIMARY OSTEOARTHRITIS INVOLVING MULTIPLE JOINTS: ICD-10-CM

## 2020-06-15 DIAGNOSIS — Z85.51 HISTORY OF BLADDER CANCER: ICD-10-CM

## 2020-06-15 DIAGNOSIS — J30.1 SEASONAL ALLERGIC RHINITIS DUE TO POLLEN: ICD-10-CM

## 2020-06-15 DIAGNOSIS — H70.11 CHRONIC RIGHT MASTOIDITIS: ICD-10-CM

## 2020-06-15 DIAGNOSIS — R00.1 BRADYCARDIA: ICD-10-CM

## 2020-06-15 DIAGNOSIS — Z00.00 MEDICARE ANNUAL WELLNESS VISIT, SUBSEQUENT: ICD-10-CM

## 2020-06-15 DIAGNOSIS — Z23 NEED FOR DIPHTHERIA-TETANUS-PERTUSSIS (TDAP) VACCINE: ICD-10-CM

## 2020-06-15 DIAGNOSIS — Z12.11 COLON CANCER SCREENING: ICD-10-CM

## 2020-06-15 DIAGNOSIS — N13.39 OTHER HYDRONEPHROSIS: ICD-10-CM

## 2020-06-15 DIAGNOSIS — G89.29 CHRONIC PAIN OF RIGHT ANKLE: ICD-10-CM

## 2020-06-15 DIAGNOSIS — Z12.31 ENCOUNTER FOR SCREENING MAMMOGRAM FOR BREAST CANCER: ICD-10-CM

## 2020-06-15 DIAGNOSIS — I10 ESSENTIAL HYPERTENSION: ICD-10-CM

## 2020-06-15 DIAGNOSIS — E78.5 DYSLIPIDEMIA, GOAL LDL BELOW 130: ICD-10-CM

## 2020-06-15 DIAGNOSIS — Z23 NEED FOR PNEUMOCOCCAL VACCINATION: ICD-10-CM

## 2020-06-15 DIAGNOSIS — Z29.89 NEED FOR SUBACUTE BACTERIAL ENDOCARDITIS PROPHYLAXIS: ICD-10-CM

## 2020-06-15 DIAGNOSIS — Z78.0 POSTMENOPAUSAL STATUS: ICD-10-CM

## 2020-06-15 DIAGNOSIS — H25.093 OTHER AGE-RELATED INCIPIENT CATARACT OF BOTH EYES: ICD-10-CM

## 2020-06-15 DIAGNOSIS — R53.82 CHRONIC FATIGUE: ICD-10-CM

## 2020-06-15 DIAGNOSIS — R42 DIZZINESS: ICD-10-CM

## 2020-06-15 DIAGNOSIS — K21.9 GASTROESOPHAGEAL REFLUX DISEASE WITHOUT ESOPHAGITIS: ICD-10-CM

## 2020-06-15 DIAGNOSIS — M25.571 CHRONIC PAIN OF RIGHT ANKLE: ICD-10-CM

## 2020-06-15 DIAGNOSIS — Z96.652 STATUS POST TOTAL LEFT KNEE REPLACEMENT: ICD-10-CM

## 2020-06-15 PROBLEM — D64.9 ANEMIA: Status: RESOLVED | Noted: 2019-05-03 | Resolved: 2020-06-15

## 2020-06-15 PROCEDURE — G0439 PPPS, SUBSEQ VISIT: HCPCS | Performed by: FAMILY MEDICINE

## 2020-06-15 PROCEDURE — 90732 PPSV23 VACC 2 YRS+ SUBQ/IM: CPT | Performed by: FAMILY MEDICINE

## 2020-06-15 PROCEDURE — G0009 ADMIN PNEUMOCOCCAL VACCINE: HCPCS | Performed by: FAMILY MEDICINE

## 2020-06-15 RX ORDER — TRAMADOL HYDROCHLORIDE 50 MG/1
50 TABLET ORAL EVERY 4 HOURS PRN
Qty: 50 TAB | Refills: 2 | Status: SHIPPED | OUTPATIENT
Start: 2020-06-15 | End: 2020-10-29 | Stop reason: SDUPTHER

## 2020-06-15 ASSESSMENT — PATIENT HEALTH QUESTIONNAIRE - PHQ9: CLINICAL INTERPRETATION OF PHQ2 SCORE: 0

## 2020-06-15 ASSESSMENT — ACTIVITIES OF DAILY LIVING (ADL): BATHING_REQUIRES_ASSISTANCE: 0

## 2020-06-15 ASSESSMENT — ENCOUNTER SYMPTOMS: GENERAL WELL-BEING: EXCELLENT

## 2020-06-15 ASSESSMENT — FIBROSIS 4 INDEX: FIB4 SCORE: 1.29

## 2020-06-15 NOTE — PROGRESS NOTES
Chief Complaint   Patient presents with   • Annual Exam         HPI:  Franchesca Paige is a 74 y.o. here for Medicare Annual Wellness Visit     Patient Active Problem List    Diagnosis Date Noted   • Hydronephrosis 02/11/2019   • Bradycardia 11/01/2018   • Ocular migraine 04/16/2018   • Osteopenia of multiple sites    • Dizziness 02/22/2018   • 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/21/2018   • Chronic pain of right ankle 02/08/2018   • Obesity (BMI 30.0-34.9) 02/24/2017   • Need for subacute bacterial endocarditis prophylaxis 01/21/2016   • Essential hypertension 06/11/2015   • Status post total left knee replacement 06/11/2015   • Gastroesophageal reflux disease without esophagitis 06/11/2015   • Allergic rhinitis due to pollen 06/11/2015   • History of bladder cancer 06/11/2015   • Dyslipidemia, goal LDL below 130    • Primary osteoarthritis involving multiple joints        Current Outpatient Medications   Medication Sig Dispense Refill   • tramadol (ULTRAM) 50 MG Tab Take 1 Tab by mouth every four hours as needed for Moderate Pain (arthritis pain) for up to 90 days. 50 Tab 2   • fosinopril (MONOPRIL) 10 MG Tab Take 1 Tab by mouth every day. 90 Tab 3   • acyclovir (ZOVIRAX) 5 % Ointment Applied to affected area q.i.d. 1 Tube 3   • aspirin EC (ECOTRIN) 81 MG Tablet Delayed Response Take 162 mg by mouth as needed.     • Cholecalciferol (VITAMIN D3) 5000 units Cap Take 1 Cap by mouth every day.     • S-Adenosylmethionine (NYLA-E) 200 MG TABS Take 1 Tab by mouth every day. OTC 30 Tab 0     No current facility-administered medications for this visit.             Current supplements as per medication list.       Allergies: Sulfa drugs    Current social contact/activities: Is active with her .  They have a course been socially distancing during this pandemic and she has not been able to see much of her family.  They are planning to get together with family in a few weeks as long as numbers continue to improve.  She wears a mask  and has reduced her shopping from every few days to about every 10 days.    She  reports that she quit smoking about 20 years ago. She has never used smokeless tobacco. She reports that she does not drink alcohol or use drugs.  Counseling given: Not Answered      DPA/Advanced Directive:  Patient does not have an Advanced Directive.  A packet and workshop information was given on Advanced Directives.    Annual Health Assessment Questions:    1.  Are you currently engaging in any exercise or physical activity? Yes    2.  How would you describe your mood or emotional well-being today? good    3.  Have you had any falls in the last year? No, balance getting worse.  Her knees continue to be quite unstable.    4.  Have you noticed any problems with your balance or had difficulty walking? Yes    5.  In the last six months have you experienced any leakage of urine? Yes    6. DPA/Advanced Directive: Patient does not have an Advanced Directive.  A packet and workshop information was given on Advanced Directives.    ROS:    Gait: Uses no assistive device  Discussed that she should  Ostomy: No  Other tubes: No  Amputations: No  Chronic oxygen use: No  Last eye exam: 5/2020, astigmatism worse, no glaucoma, early cataract bilaterally  Wears hearing aids: No   : Reports urinary leakage during the last 6 months that has somewhat interfered with their daily activities or sleep.    Screening:  Discussed, needs mammogram and DEXA, immunization given today.    Depression Screening    Little interest or pleasure in doing things?  0 - not at all  Feeling down, depressed , or hopeless? 0 - not at all  Patient Health Questionnaire Score: 0     If depressive symptoms identified deferred to follow up visit unless specifically addressed in assessment and plan.    Interpretation of PHQ-9 Total Score   Score Severity   1-4 No Depression   5-9 Mild Depression   10-14 Moderate Depression   15-19 Moderately Severe Depression   20-27 Severe  Depression    Screening for Cognitive Impairment    Three Minute Recall (village, kitchen, baby) 3/3    Ismael clock face with all 12 numbers and set the hands to show 10 past 10.   yes, 5/5    Cognitive concerns identified deferred for follow up unless specifically addressed in assessment and plan.    Fall Risk Assessment    Has the patient had two or more falls in the last year or any fall with injury in the last year?  No    Safety Assessment    Throw rugs on floor.  Yes  Handrails on all stairs.  Yes  Good lighting in all hallways.  Yes  Difficulty hearing.  No  Patient counseled about all safety risks that were identified.    Functional Assessment ADLs    Are there any barriers preventing you from cooking for yourself or meeting nutritional needs?  No.    Are there any barriers preventing you from driving safely or obtaining transportation?  No.    Are there any barriers preventing you from using a telephone or calling for help?  No.    Are there any barriers preventing you from shopping?  No.    Are there any barriers preventing you from taking care of your own finances?  No.    Are there any barriers preventing you from managing your medications?  No.    Are there any barriers preventing you from showering, bathing or dressing yourself?  No.    Are you currently engaging in any exercise or physical activity?  Yes.     What is your perception of your health?  Excellent.      Health Maintenance Summary                IMM DTaP/Tdap/Td Vaccine Overdue 5/18/2019      Done 5/18/2009 Imm Admin: Tdap Vaccine    MAMMOGRAM Overdue 6/29/2019      Done 6/29/2018 MA-MAMMO SCREENING BILAT W/TOMOSYNTHESIS W/CAD     Patient has more history with this topic...    BONE DENSITY Overdue 3/8/2020      Done 3/8/2018 DS-BONE DENSITY STUDY (DEXA)     Patient has more history with this topic...    IMM ZOSTER VACCINES Postponed 6/1/2021 Originally 12/31/2012. Provider advises postponing for medical reasons     Done 11/5/2012 Imm Admin:  Zoster Vaccine Live (ZVL) (Zostavax)    COLONOSCOPY Next Due 2020      Previously completed 2010 diverticulosis          Patient Care Team:  Tate De La Rosa M.D. as PCP - General (Family Medicine)  Guy Mccall M.D. as Consulting Physician (Urology)  Lucas Lucas O.D. as Consulting Physician (Optometry)  So Cannon M.D. as Consulting Physician (Dermatology)  NICHOLAS Noel D.P.M. as Consulting Physician (Podiatry)  Wesley Decker M.D. as Consulting Physician (OB/Gyn)  Jose Prado M.D. as Consulting Physician (Otolaryngology)  Kavon Azevedo M.D. as Community Care Manager (Surgery)  Dena Thomson [facility] as    [facility] as Consulting Physician        Social History     Tobacco Use   • Smoking status: Former Smoker     Last attempt to quit: 2000     Years since quittin.1   • Smokeless tobacco: Never Used   Substance Use Topics   • Alcohol use: No     Alcohol/week: 0.0 oz   • Drug use: No     Family History   Problem Relation Age of Onset   • Hypertension Mother    • Psychiatric Illness Mother         paranoid schizophrenic   • Hypertension Father         heavy smoker   • Diabetes Other         niece   • Cancer Sister         skin   • Cancer Paternal Aunt         breast   • Psychiatric Illness Sister         paranoid schizophrenic     She  has a past medical history of Arthritis, Bladder cancer (), Dyslipidemia, goal LDL below 130, Hypertension (), Ocular migraine, Osteopenia, Osteopenia of multiple sites, and Total knee replacement status.   Past Surgical History:   Procedure Laterality Date   • OTHER Right 10/07/2018    vein ablation right thigh, Dr. Azevedo   • KNEE REPLACEMENT, TOTAL  12    Dr. Harry, left knee   • COLONOSCOPY  8/3/2010    diverticulosis only, due in    • HYSTERECTOMY, TOTAL ABDOMINAL  1980s    has one half of an ovary and a fallopian tube       Exam:   /72   " Pulse (!) 50   Temp 36.6 °C (97.8 °F)   Resp 14   Ht 1.727 m (5' 8\")   Wt 97.5 kg (215 lb)   SpO2 98%  Body mass index is 32.69 kg/m².    Hearing good.    Dentition good  Alert, oriented in no acute distress.  Patient, physician and staff all wearing masks.  Eye contact is good, speech goal directed, affect calm  HEENT:   EOMI, PERRLA.     Neck:       Full range of motion, mild kyphosis, mild posterior cervical spasm. No JVD or carotid bruits appreciated. No cervical adenopathy appreciated. No thyromegaly or neck masses appreciated.  No retractions appreciated.  Lungs:     Clear to auscultation A&P with good air movement.   Heart:      Regular rate and rhythm normal S1 and S2 without murmur appreciated.  Strong and symmetric radial and DP pulses.  Abd:        Soft, bowel sounds positive, nontender. No bloating noted. No hepatosplenomegaly or mass appreciated. No pulsatile mass appreciated.  Ext:         Extremities show symmetric and full range of motion with normal strength. No cyanosis or clubbing appreciated. No peripheral edema appreciated.   Shifting and crepitus both knees, even the left which has a knee replacement.  Neuro:    Gait is normal. Patient is lucid, fluent and appropriate. No significant tremor appreciated.  Skin:       Exhibit no rashes, pigmented lesions or ulcerations.        Assessment and Plan. The following treatment and monitoring plan is recommended:      1. Medicare annual wellness visit, subsequent  Her medical problems are generally stable    2. Need for pneumococcal vaccination  23 Valent pneumococcal vaccine administered today.  - Pneumococal Polysaccharide Vaccine 23-Valent =>3YO SQ/IM    3. Need for diphtheria-tetanus-pertussis (Tdap) vaccine  Unfortunately we have run out of vaccine and are awaiting order.  Her name will be added to her list of administer as soon as available.    4. Encounter for screening mammogram for breast cancer/Postmenopausal status  Patient is due for " her mammogram and her bone density screening.  Orders are discussed and placed.  - DS-BONE DENSITY STUDY (DEXA); Future    5. Essential hypertension  HTN - Chronic condition stable. Currently taking all meds as directed.   She is taking baby aspirin daily.   She is monitoring BP at home.  She monitors intermittently, systolic blood pressure has been well controlled in the 130 range.  Denies symptoms low BP: light-headed, tunnel-vision, unusual fatigue.   Denies symptoms high BP:pounding headache, visual changes, palpitations, flushed face.   Denies medicine side effects: unusual fatigue, slow heartbeat, foot/leg swelling, cough.  Follow-up lab orders discussed and placed.  - Comp Metabolic Panel; Future  - Lipid Profile; Future  - TSH; Future    6. Dyslipidemia, goal LDL below 130  Patient denies chest pain, chest pressure, palpitations or exertional shortness of breath. Patient is not on lipid-lowering medication.  Lipid elevation has been mild with an excellent risk ratio. Patient is a former smoker.  Has not been quit 20 years.  Patient takes 81 mg aspirin daily. Patient has no history of myocardial infarction, stroke or PVD.  The problem is clinically stable.  - Comp Metabolic Panel; Future  - Lipid Profile; Future  - TSH; Future    7. Bradycardia  Patient has chronic stable bradycardia.  No heart block has been identified.  Medication related.  Stable.    8. Chronic fatigue  Patient has intermittent but persistent fatigue.  This has been worked up with unclear etiology.  No sleep apnea symptoms.  No cardiac symptoms.  Stable problem.  Follow-up lab orders discussed and placed.  - TSH; Future    9. Osteopenia of multiple sites  Patient does have history of osteopenia and is due for follow-up vitamin D levels.  Order discussed and placed.  No history of pathologic fracture.  DEXA bone density order has been placed.  Stable problem.  - VITAMIN D,25 HYDROXY; Future    10. Obesity (BMI 30.0-34.9)  Patient is aware  that this is a factor in her knee pain and instability.  She has looked after adopting a Mediterranean diet but finds she cannot stick to it for long.  Discussed increasing vegetables servings and fruit.  Discussed general activity.  Patient is encouraged to use a cane.  Overall stable problem.  - Patient identified as having weight management issue.  Appropriate orders and counseling given.    11. Seasonal allergic rhinitis due to pollen/Chronic right mastoiditis/Dizziness  Patient has problems with seasonal allergies.  She is using over-the-counter Zyrtec with some help.  She also has problems with chronic mastoiditis and dizziness which may be related to this.  Follow-up lab orders are discussed and placed.  If these are not needed she may need to see ear nose and throat.  Clinically stable overall.  - TSH; Future  - CBC WITHOUT DIFFERENTIAL; Future    12. Other age-related incipient cataract of both eyes  On recent eye exam she told her eyes were healthy but she does have a early cataract in both eyes.  They are not yet mature.  Patient denies any change in vision.  She will continue to follow-up with ophthalmology on a yearly basis.  Stable problem.    13. Colon cancer screening  Patient is due for colon screening, referral discussed and placed.  Past colonoscopy in 2010 was normal.  - REFERRAL TO GASTROENTEROLOGY    14. Gastroesophageal reflux disease without esophagitis  The patient feels the current medication regimen of smaller meals is controlling the gastroesophageal reflux symptoms well. Denies dysphagia, reflux symptoms, acidity, abdominal pain or visible blood or mucus in the stool. Denies vomiting or hematemesis. Denies burping or abdominal bloating. Patient avoids nonsteroidal anti-inflammatory drugs. Avoids heavy meals or eating within 2 hours of bedtime.  Clinically stable problem.    15. History of bladder cancer  Patient has been stable, denies any visible blood.  Denies any dysuria or change in  smell or color of the urine.  Clinically stable problem.    16. Other hydronephrosis  Patient has mild residual hydronephrosis.  Nephrolithiasis in the past.  Denies any pain or change in urine pattern.  She is due to see urology again in follow-up.  She will call them and see when she needs to be seen.  Stable problem.    17. Primary osteoarthritis involving multiple joints/Status post total left knee replacement  Patient has continued problems with arthritis of multiple joints.  She is status post left knee replacement but the knee is unstable.  Her right knee is unstable as well.  She does have occasionally significant pain related to this.  Discussed that walking, strengthening of muscles and weight loss would help.  She voices understanding.  She notices lately that she has to use the pain medication on a regular basis.  Denies somnolence or confusion from the regimen.   review is without inconsistencies.  Unfortunately stable chronic problem.  - tramadol (ULTRAM) 50 MG Tab; Take 1 Tab by mouth every four hours as needed for Moderate Pain (arthritis pain) for up to 90 days.  Dispense: 50 Tab; Refill: 2  - Consent for Opiate Prescription    18. Need for subacute bacterial endocarditis prophylaxis  Patient is post and does need prophylaxis to prevent knee joint infection and has a history of being told she needs subacute bacterial endocarditis prophylaxis.  She takes antibiotics prior to dental procedures.  Stable.    19. Chronic pain of right ankle  Patient states the ankle pain now comes and goes.  It.  Currently not too bad though she does occasionally have to take the tramadol for it.  Has been told to avoid nonsteroidal anti-inflammatory drugs due to her gastroesophageal reflux and chronic gastritis.  - tramadol (ULTRAM) 50 MG Tab; Take 1 Tab by mouth every four hours as needed for Moderate Pain (arthritis pain) for up to 90 days.  Dispense: 50 Tab; Refill: 2  - Consent for Opiate  Prescription      Services suggested: No services needed at this time  Health Care Screening: Age-appropriate preventive services recommended by USPTF and ACIP covered by Medicare were discussed today. Services ordered if indicated and agreed upon by the patient.  Referrals offered: Community-based lifestyle interventions to reduce health risks and promote self-management and wellness, fall prevention, nutrition, physical activity, tobacco-use cessation, weight loss, and mental health services as per orders if indicated.    Discussion today about general wellness and lifestyle habits:  discussed  · Prevent falls and reduce trip hazards; Cautioned about securing or removing rugs.  · Have a working fire alarm and carbon monoxide detector;  has  · Engage in regular physical activity and social activities     Follow-up: Return in about 1 year (around 6/15/2021), or if symptoms worsen or fail to improve.

## 2020-08-18 ENCOUNTER — APPOINTMENT (RX ONLY)
Dept: URBAN - METROPOLITAN AREA CLINIC 35 | Facility: CLINIC | Age: 75
Setting detail: DERMATOLOGY
End: 2020-08-18

## 2020-08-18 DIAGNOSIS — L82.1 OTHER SEBORRHEIC KERATOSIS: ICD-10-CM

## 2020-08-18 DIAGNOSIS — D22 MELANOCYTIC NEVI: ICD-10-CM

## 2020-08-18 DIAGNOSIS — L81.4 OTHER MELANIN HYPERPIGMENTATION: ICD-10-CM

## 2020-08-18 DIAGNOSIS — Z71.89 OTHER SPECIFIED COUNSELING: ICD-10-CM

## 2020-08-18 DIAGNOSIS — L57.0 ACTINIC KERATOSIS: ICD-10-CM

## 2020-08-18 PROBLEM — D22.5 MELANOCYTIC NEVI OF TRUNK: Status: ACTIVE | Noted: 2020-08-18

## 2020-08-18 PROBLEM — D22.62 MELANOCYTIC NEVI OF LEFT UPPER LIMB, INCLUDING SHOULDER: Status: ACTIVE | Noted: 2020-08-18

## 2020-08-18 PROCEDURE — 99214 OFFICE O/P EST MOD 30 MIN: CPT | Mod: 25

## 2020-08-18 PROCEDURE — 17000 DESTRUCT PREMALG LESION: CPT

## 2020-08-18 PROCEDURE — ? COUNSELING

## 2020-08-18 PROCEDURE — ? LIQUID NITROGEN

## 2020-08-18 PROCEDURE — ? OBSERVATION AND MEASURE

## 2020-08-18 PROCEDURE — 17003 DESTRUCT PREMALG LES 2-14: CPT

## 2020-08-18 ASSESSMENT — LOCATION ZONE DERM
LOCATION ZONE: ARM
LOCATION ZONE: TRUNK
LOCATION ZONE: HAND
LOCATION ZONE: FACE

## 2020-08-18 ASSESSMENT — LOCATION DETAILED DESCRIPTION DERM
LOCATION DETAILED: LEFT LATERAL FOREHEAD
LOCATION DETAILED: RIGHT LATERAL EYEBROW
LOCATION DETAILED: RIGHT LATERAL SUPERIOR CHEST
LOCATION DETAILED: RIGHT BUTTOCK
LOCATION DETAILED: LEFT LATERAL SHOULDER
LOCATION DETAILED: RIGHT PROXIMAL POSTERIOR UPPER ARM
LOCATION DETAILED: LEFT INFERIOR MEDIAL UPPER BACK
LOCATION DETAILED: LEFT RADIAL DORSAL HAND
LOCATION DETAILED: LEFT DISTAL DORSAL FOREARM
LOCATION DETAILED: LEFT MEDIAL UPPER BACK

## 2020-08-18 ASSESSMENT — LOCATION SIMPLE DESCRIPTION DERM
LOCATION SIMPLE: LEFT UPPER BACK
LOCATION SIMPLE: RIGHT EYEBROW
LOCATION SIMPLE: RIGHT POSTERIOR UPPER ARM
LOCATION SIMPLE: LEFT FOREARM
LOCATION SIMPLE: RIGHT BUTTOCK
LOCATION SIMPLE: CHEST
LOCATION SIMPLE: LEFT HAND
LOCATION SIMPLE: LEFT SHOULDER
LOCATION SIMPLE: LEFT FOREHEAD

## 2020-08-18 NOTE — PROCEDURE: LIQUID NITROGEN
Consent: The patient's consent was obtained including but not limited to risks of crusting, scabbing, blistering, scarring, darker or lighter pigmentary change, recurrence, incomplete removal and infection.
Post-Care Instructions: I reviewed with the patient in detail post-care instructions. Patient is to wear sunprotection, and avoid picking at any of the treated lesions. Pt may apply Vaseline to crusted or scabbing areas.
Render Note In Bullet Format When Appropriate: No
Number Of Freeze-Thaw Cycles: 2 freeze-thaw cycles
Detail Level: Detailed
Duration Of Freeze Thaw-Cycle (Seconds): 2

## 2020-10-29 ENCOUNTER — TELEPHONE (OUTPATIENT)
Dept: MEDICAL GROUP | Facility: MEDICAL CENTER | Age: 75
End: 2020-10-29

## 2020-10-29 DIAGNOSIS — M15.9 PRIMARY OSTEOARTHRITIS INVOLVING MULTIPLE JOINTS: ICD-10-CM

## 2020-10-29 DIAGNOSIS — I10 ESSENTIAL HYPERTENSION: ICD-10-CM

## 2020-10-29 DIAGNOSIS — M25.571 CHRONIC PAIN OF RIGHT ANKLE: ICD-10-CM

## 2020-10-29 DIAGNOSIS — G89.29 CHRONIC PAIN OF RIGHT ANKLE: ICD-10-CM

## 2020-10-29 RX ORDER — TRAMADOL HYDROCHLORIDE 50 MG/1
50 TABLET ORAL EVERY 4 HOURS PRN
Qty: 50 TAB | Refills: 2 | Status: SHIPPED | OUTPATIENT
Start: 2020-10-29 | End: 2022-02-11 | Stop reason: SDUPTHER

## 2020-10-29 RX ORDER — FOSINOPRIL SODIUM 10 MG/1
TABLET ORAL
Qty: 100 TAB | Refills: 3 | Status: SHIPPED | OUTPATIENT
Start: 2020-10-29 | End: 2021-11-09

## 2021-01-15 DIAGNOSIS — Z23 NEED FOR VACCINATION: ICD-10-CM

## 2021-02-12 ENCOUNTER — OFFICE VISIT (OUTPATIENT)
Dept: MEDICAL GROUP | Facility: MEDICAL CENTER | Age: 76
End: 2021-02-12
Payer: MEDICARE

## 2021-02-12 ENCOUNTER — NURSE TRIAGE (OUTPATIENT)
Dept: HEALTH INFORMATION MANAGEMENT | Facility: OTHER | Age: 76
End: 2021-02-12

## 2021-02-12 VITALS
HEIGHT: 68 IN | HEART RATE: 58 BPM | DIASTOLIC BLOOD PRESSURE: 72 MMHG | TEMPERATURE: 98.7 F | RESPIRATION RATE: 16 BRPM | BODY MASS INDEX: 32.58 KG/M2 | OXYGEN SATURATION: 97 % | WEIGHT: 215 LBS | SYSTOLIC BLOOD PRESSURE: 124 MMHG

## 2021-02-12 DIAGNOSIS — K21.9 GASTROESOPHAGEAL REFLUX DISEASE WITHOUT ESOPHAGITIS: ICD-10-CM

## 2021-02-12 DIAGNOSIS — R00.0 RAPID HEARTBEAT: ICD-10-CM

## 2021-02-12 DIAGNOSIS — R73.09 ELEVATED GLYCOHEMOGLOBIN: ICD-10-CM

## 2021-02-12 DIAGNOSIS — E55.9 VITAMIN D DEFICIENCY: ICD-10-CM

## 2021-02-12 DIAGNOSIS — R00.2 PALPITATIONS: ICD-10-CM

## 2021-02-12 DIAGNOSIS — E83.52 HYPERCALCEMIA: ICD-10-CM

## 2021-02-12 DIAGNOSIS — I10 ESSENTIAL HYPERTENSION: ICD-10-CM

## 2021-02-12 DIAGNOSIS — R73.01 ELEVATED FASTING GLUCOSE: ICD-10-CM

## 2021-02-12 DIAGNOSIS — R00.1 BRADYCARDIA: ICD-10-CM

## 2021-02-12 DIAGNOSIS — Z12.11 COLON CANCER SCREENING: ICD-10-CM

## 2021-02-12 PROCEDURE — 99214 OFFICE O/P EST MOD 30 MIN: CPT | Performed by: FAMILY MEDICINE

## 2021-02-12 PROCEDURE — 93000 ELECTROCARDIOGRAM COMPLETE: CPT | Performed by: FAMILY MEDICINE

## 2021-02-12 ASSESSMENT — PATIENT HEALTH QUESTIONNAIRE - PHQ9
5. POOR APPETITE OR OVEREATING: 0 - NOT AT ALL
SUM OF ALL RESPONSES TO PHQ QUESTIONS 1-9: 5
CLINICAL INTERPRETATION OF PHQ2 SCORE: 4

## 2021-02-12 ASSESSMENT — FIBROSIS 4 INDEX: FIB4 SCORE: 1.31

## 2021-02-12 NOTE — TELEPHONE ENCOUNTER
"SOB, past couple of years heavier, knees shot, SOB a couple of years, fluttering around sternum in past couple of months, not enough oxygen to brain affecting hr balance.  Last summer, dehydrated, had sugar, elevated heart rate, 94 to 194 pulse rate, went on for 30 minutes, oxygen level okay.  Fluttering is a daily thing.      BP for past 6 months has been going up.  Last week 128/84.      No travel.  No known covid exposure.  No covid symptoms.      SOB started way before covid, a couple of years ago.  This is not symptom.      Reason for Disposition  • History of heart disease (i.e., heart attack, bypass surgery, angina, angioplasty)    Additional Information  • Negative: Passed out (i.e., fainted, collapsed and was not responding)  • Negative: Shock suspected (e.g., cold/pale/clammy skin, too weak to stand, low BP, rapid pulse)  • Negative: Difficult to awaken or acting confused (e.g., disoriented, slurred speech)  • Negative: Visible sweat on face or sweat dripping down face  • Negative: Unable to walk, or can only walk with assistance (e.g., requires support)  • Negative: Received SHOCK from implantable cardiac defibrillator and has persisting symptoms (i.e., palpitations, lightheadedness)  • Negative: Sounds like a life-threatening emergency to the triager  • Negative: Chest pain  • Negative: Difficulty breathing  • Negative: Dizziness, lightheadedness, or weakness  • Negative: Heart beating very rapidly (e.g., > 140 / minute) and present now (EXCEPTION: during exercise)  • Negative: Heart beating very slowly (e.g., < 50 / minute) (EXCEPTION: athlete)    Answer Assessment - Initial Assessment Questions  1. DESCRIPTION: \"Please describe your heart rate or heart beat that you are having\" (e.g., fast/slow, regular/irregular, skipped or extra beats, \"palpitations\")      Fluttering in the area of the sternum, fast heart rate  2. ONSET: \"When did it start?\" (Minutes, hours or days)       After the summer incident, " "noticeable in last few months, increase w/exertion  3. DURATION: \"How long does it last\" (e.g., seconds, minutes, hours)      Minutes, goes away when mobile  4. PATTERN \"Does it come and go, or has it been constant since it started?\"  \"Does it get worse with exertion?\"   \"Are you feeling it now?\"      Comes & goes, worse w/exertion   5. TAP: \"Using your hand, can you tap out what you are feeling on a chair or table in front of you, so that I can hear?\" (Note: not all patients can do this)        Rapid beat, occasional skipped beats  6. HEART RATE: \"Can you tell me your heart rate?\" \"How many beats in 15 seconds?\"  (Note: not all patients can do this)        When got up to get pulse ox machine heart fluttered & felt heavy, 94% o2 & 52 heart rate  7. RECURRENT SYMPTOM: \"Have you ever had this before?\" If so, ask: \"When was the last time?\" and \"What happened that time?\"       no  8. CAUSE: \"What do you think is causing the palpitations?\"      Murcia Parkinson White Syndrome maybe  9. CARDIAC HISTORY: \"Do you have any history of heart disease?\" (e.g., heart attack, angina, bypass surgery, angioplasty, arrhythmia)       no  10. OTHER SYMPTOMS: \"Do you have any other symptoms?\" (e.g., dizziness, chest pain, sweating, difficulty breathing)        no  11. PREGNANCY: \"Is there any chance you are pregnant?\" \"When was your last menstrual period?\"        NA    Protocols used: HEART RATE AND HEARTBEAT [id number]      "

## 2021-02-12 NOTE — TELEPHONE ENCOUNTER
Regarding: COVID SCREENING  ----- Message from Jese Alfred sent at 2/12/2021  9:51 AM PST -----  TRANSFERRED PATIENT TO THE NURSE TEAM DUE TO THESE SYMPTOMS: SHORTNESS OF BREATH

## 2021-02-13 NOTE — PROGRESS NOTES
Chief Complaint   Patient presents with   • Palpitations   • Rapid Heart Beat   • Bradycardia   • Hyperglycemia   • Hypertension   • Gastrophageal Reflux       Subjective:     HPI:   Franchesca Paige presents today with the followin. Palpitations/ Rapid heartbeat  Back in the summer she had an episode associated with dehydration of very severe rapid heartbeat with a little bit of shortness of breath and dizziness.  This lasted for several hours and finally cleared.  She has not had any further severe episodes however, she notes that most nights she experiences heart fluttering when she gets up in the middle of the night..  She often has to stop and wait for this to clear.    2. Bradycardia  Patient does have significant bradycardia, not greatly changed from the past.  Intraventricular conduction delay appears to be stable.  No significant PVCs and certainly no bigeminy or trigeminy appreciated today.  No atrial fibrillation appreciated today.  However, I feel that in light of the above symptoms she definitely needs further monitoring probably for several days.  She is referred to cardiology for further evaluation and treatment.    3. Elevated glycohemoglobin  Thyroid labs from  showed mildly elevated glycohemoglobin of 5.7%.  Fasting glucose was normal.  Thyroid testing was also normal at the time as was CBC and kidney function..  She has been pushing herself to be more active.  She does not typically notice palpitations with exertion but does occasionally note a pounding heartbeat.  Follow-up lab orders discussed and placed.    4. Hypercalcemia  Patient was found to be mildly hypercalcemic.  Review of her past labs here show that she has been mildly hypercalcemic in the past.  I do not think this is clinically very significant at this time but follow-up lab order is also discussed and placed.    5. Elevated fasting glucose  On review of her past labs here she has had mildly elevated fasting  glucose in the past.  Orders discussed and placed.    7. Vitamin D deficiency  Patient does have history of vitamin D deficiency.  This was increased to 5000 units/day this last fall when she was found to have low bone density.  Follow-up order discussed and placed.    8. Essential hypertension  HTN - Chronic condition stable. Currently taking all meds as directed.   She is taking baby aspirin daily.   She is monitoring BP at home. Has been usually 120s at home, sometimes higher.  Denies symptoms low BP: light-headed, tunnel-vision, unusual fatigue.   Denies symptoms high BP:pounding headache, visual changes, palpitations, flushed face.   Denies medicine side effects: unusual fatigue, slow heartbeat, foot/leg swelling, cough.    9. Gastroesophageal reflux disease without esophagitis  The patient feels the current dietary regimen is controlling the gastroesophageal reflux symptoms well. Denies dysphagia, reflux symptoms, acidity, abdominal pain or visible blood or mucus in the stool. Denies vomiting or hematemesis. Denies burping or abdominal bloating. Patient avoids nonsteroidal anti-inflammatory drugs. Avoids heavy meals or eating within 2 hours of bedtime.  She finds being more active is helpful.        Patient Active Problem List    Diagnosis Date Noted   • Hydronephrosis 02/11/2019   • Bradycardia 11/01/2018   • Ocular migraine 04/16/2018   • Osteopenia of multiple sites    • Dizziness 02/22/2018   • 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/21/2018   • Chronic pain of right ankle 02/08/2018   • Obesity (BMI 30.0-34.9) 02/24/2017   • Need for subacute bacterial endocarditis prophylaxis 01/21/2016   • Essential hypertension 06/11/2015   • Status post total left knee replacement 06/11/2015   • Gastroesophageal reflux disease without esophagitis 06/11/2015   • Allergic rhinitis due to pollen 06/11/2015   • History of bladder cancer 06/11/2015   • Dyslipidemia, goal LDL below 130    • Primary osteoarthritis involving multiple  "joints        Current medicines (including changes today)  Current Outpatient Medications   Medication Sig Dispense Refill   • fosinopril (MONOPRIL) 10 MG Tab TAKE ONE TABLET BY MOUTH EVERY  Tab 3   • acyclovir (ZOVIRAX) 5 % Ointment Applied to affected area q.i.d. 1 Tube 3   • aspirin EC (ECOTRIN) 81 MG Tablet Delayed Response Take 162 mg by mouth as needed.     • Cholecalciferol (VITAMIN D3) 5000 units Cap Take 1 Cap by mouth every day.     • S-Adenosylmethionine (NYLA-E) 200 MG TABS Take 1 Tab by mouth every day. OTC 30 Tab 0     No current facility-administered medications for this visit.       Allergies   Allergen Reactions   • Sulfa Drugs Vomiting       ROS: As per HPI       Objective:     /72   Pulse (!) 58   Temp 37.1 °C (98.7 °F)   Resp 16   Ht 1.727 m (5' 8\")   Wt 97.5 kg (215 lb)   SpO2 97%  Body mass index is 32.69 kg/m².    Physical Exam:  Constitutional: Well-developed and well-nourished. Not diaphoretic. No distress. Lucid and fluent.  Patient, spouse, physician and staff all wearing masks.  Skin: Skin is warm and dry. No rash noted.  Head: Atraumatic without lesions.  Eyes: Conjunctivae and extraocular motions are normal. Pupils are equal, round, and reactive to light. No scleral icterus.   Ears:  External ears unremarkable.   Neck: Supple, trachea midline. No thyromegaly present. No cervical or supraclavicular lymphadenopathy. No JVD or carotid bruits appreciated  Cardiovascular: Regular rate and rhythm.  Normal S1, S2 without murmur appreciated.  Chest: Effort normal. Clear to auscultation throughout. No adventitious sounds.   Abdomen: Soft, non tender, and without distention. Active bowel sounds in all four quadrants. No rebound, guarding, masses or hepatosplenomegaly.  Extremities: No cyanosis, clubbing, erythema, nor edema.   Neurological: Alert and oriented x 3. No tremor appreciated.  Psychiatric:  Behavior, mood, and affect are appropriate.    EKG Interpretation-HR is 51 " intraventricular conduction delay.  Essentially unchanged           Assessment and Plan:     75 y.o. female with the following issues:    1. Palpitations  EKG - Clinic Performed    REFERRAL TO CARDIOLOGY    TSH   2. Rapid heartbeat  EKG - Clinic Performed    REFERRAL TO CARDIOLOGY    TSH   3. Bradycardia  REFERRAL TO CARDIOLOGY    TSH   4. Elevated glycohemoglobin  HEMOGLOBIN A1C   5. Hypercalcemia  Comp Metabolic Panel   6. Elevated fasting glucose  Comp Metabolic Panel   7. Vitamin D deficiency  VITAMIN D,25 HYDROXY   8. Essential hypertension  Comp Metabolic Panel   9. Gastroesophageal reflux disease without esophagitis  CBC WITHOUT DIFFERENTIAL   10. Colon cancer screening  REFERRAL TO GASTROENTEROLOGY         Followup: Return in about 6 months (around 8/12/2021), or if symptoms worsen or fail to improve.

## 2021-02-25 ENCOUNTER — OFFICE VISIT (OUTPATIENT)
Dept: CARDIOLOGY | Facility: MEDICAL CENTER | Age: 76
End: 2021-02-25
Payer: MEDICARE

## 2021-02-25 VITALS
HEART RATE: 48 BPM | RESPIRATION RATE: 14 BRPM | SYSTOLIC BLOOD PRESSURE: 150 MMHG | BODY MASS INDEX: 32.43 KG/M2 | DIASTOLIC BLOOD PRESSURE: 68 MMHG | HEIGHT: 68 IN | OXYGEN SATURATION: 99 % | WEIGHT: 214 LBS

## 2021-02-25 DIAGNOSIS — R07.9 CHEST PAIN, UNSPECIFIED TYPE: ICD-10-CM

## 2021-02-25 DIAGNOSIS — R06.02 SHORTNESS OF BREATH: ICD-10-CM

## 2021-02-25 DIAGNOSIS — R01.1 SYSTOLIC MURMUR: ICD-10-CM

## 2021-02-25 DIAGNOSIS — R00.2 PALPITATIONS: ICD-10-CM

## 2021-02-25 LAB — EKG IMPRESSION: NORMAL

## 2021-02-25 PROCEDURE — 93000 ELECTROCARDIOGRAM COMPLETE: CPT | Performed by: INTERNAL MEDICINE

## 2021-02-25 PROCEDURE — 99204 OFFICE O/P NEW MOD 45 MIN: CPT | Mod: 25 | Performed by: INTERNAL MEDICINE

## 2021-02-25 ASSESSMENT — ENCOUNTER SYMPTOMS
ORTHOPNEA: 0
PND: 0
LOSS OF CONSCIOUSNESS: 0
FALLS: 0
DEPRESSION: 0
SHORTNESS OF BREATH: 0
DIZZINESS: 0
ABDOMINAL PAIN: 0
PALPITATIONS: 1

## 2021-02-25 ASSESSMENT — FIBROSIS 4 INDEX: FIB4 SCORE: 1.31

## 2021-02-25 NOTE — PROGRESS NOTES
Chief Complaint   Patient presents with   • Palpitations     NP       Subjective:   Franchesca Paige is a 75 y.o. female who presents today as a referral for palpitations.    Patient was in her usual state of health until last summer when she had an episode of severe palpitations with heart rate ranging in the 60s to 190s beats per minute associated with dizziness.  Her symptoms resolved after she rested and drank a lot of fluids.    In the past few months, she has noted palpitations which she reports is fluttering in the center of her chest associated with this dyspnea that occurs without any clear triggers and usually resolves within a minute.  Her symptoms have been occurring multiple times a week.  She feels that some of her symptoms may be stress related.  She reports chest heaviness that occurs after the palpitations start but also resolves within a minute.  She has been exercising regularly without any anginal symptoms.    She drinks 2 cups of coffee and at best about 36 to 40 ounces of water a day.  She likes eating dark chocolate.    Referring physician: Tate De La Rosa M.D.      Past Medical History:   Diagnosis Date   • Arthritis    • Bladder cancer (HCC)     bladder cancer 2007 , march   • Dyslipidemia, goal LDL below 130    • Hypertension 1992   • Ocular migraine    • Osteopenia     DEXA 9/24/12   • Osteopenia of multiple sites    • Total knee replacement status      Past Surgical History:   Procedure Laterality Date   • OTHER Right 10/07/2018    vein ablation right thigh, Dr. Azevedo   • KNEE REPLACEMENT, TOTAL  12/4/12    Dr. Harry, left knee   • COLONOSCOPY  8/3/2010    diverticulosis only, due in 2020   • HYSTERECTOMY, TOTAL ABDOMINAL  1980s    has one half of an ovary and a fallopian tube     Family History   Problem Relation Age of Onset   • Hypertension Mother    • Psychiatric Illness Mother         paranoid schizophrenic   • Hypertension Father         heavy smoker   • Diabetes Other          niece   • Cancer Sister         skin   • Cancer Paternal Aunt         breast   • Psychiatric Illness Sister         paranoid schizophrenic     Social History     Socioeconomic History   • Marital status:      Spouse name: Not on file   • Number of children: Not on file   • Years of education: Not on file   • Highest education level: Not on file   Occupational History   • Not on file   Tobacco Use   • Smoking status: Former Smoker     Packs/day: 1.00     Types: Cigarettes     Start date: 1964     Quit date: 2000     Years since quittin.8   • Smokeless tobacco: Never Used   Substance and Sexual Activity   • Alcohol use: No     Alcohol/week: 0.0 oz   • Drug use: No   • Sexual activity: Yes   Other Topics Concern   • Not on file   Social History Narrative   • Not on file     Social Determinants of Health     Financial Resource Strain:    • Difficulty of Paying Living Expenses:    Food Insecurity:    • Worried About Running Out of Food in the Last Year:    • Ran Out of Food in the Last Year:    Transportation Needs:    • Lack of Transportation (Medical):    • Lack of Transportation (Non-Medical):    Physical Activity:    • Days of Exercise per Week:    • Minutes of Exercise per Session:    Stress:    • Feeling of Stress :    Social Connections:    • Frequency of Communication with Friends and Family:    • Frequency of Social Gatherings with Friends and Family:    • Attends Jew Services:    • Active Member of Clubs or Organizations:    • Attends Club or Organization Meetings:    • Marital Status:    Intimate Partner Violence:    • Fear of Current or Ex-Partner:    • Emotionally Abused:    • Physically Abused:    • Sexually Abused:      Allergies   Allergen Reactions   • Sulfa Drugs Vomiting     Outpatient Encounter Medications as of 2021   Medication Sig Dispense Refill   • TRAMADOL HCL PO Take 50 mg by mouth.     • fosinopril (MONOPRIL) 10 MG Tab TAKE ONE TABLET BY MOUTH EVERY  Tab  "3   • acyclovir (ZOVIRAX) 5 % Ointment Applied to affected area q.i.d. 1 Tube 3   • aspirin EC (ECOTRIN) 81 MG Tablet Delayed Response Take 162 mg by mouth as needed.     • Cholecalciferol (VITAMIN D3) 5000 units Cap Take 1 Cap by mouth every day.     • S-Adenosylmethionine (NYLA-E) 200 MG TABS Take 1 Tab by mouth every day. OTC 30 Tab 0     No facility-administered encounter medications on file as of 2/25/2021.     Review of Systems   Constitutional: Negative for malaise/fatigue.   Respiratory: Negative for shortness of breath.    Cardiovascular: Positive for chest pain and palpitations. Negative for orthopnea, leg swelling and PND.   Gastrointestinal: Negative for abdominal pain.   Musculoskeletal: Negative for falls.   Neurological: Negative for dizziness and loss of consciousness.   Psychiatric/Behavioral: Negative for depression.   All other systems reviewed and are negative.       Objective:   /68 (BP Location: Left arm, Patient Position: Sitting, BP Cuff Size: Adult)   Pulse (!) 48   Resp 14   Ht 1.727 m (5' 8\")   Wt 97.1 kg (214 lb)   SpO2 99%   BMI 32.54 kg/m²     Physical Exam   Constitutional: She is oriented to person, place, and time. She appears well-developed and well-nourished. No distress.   HENT:   Head: Normocephalic and atraumatic.   Eyes: Conjunctivae are normal. No scleral icterus.   Cardiovascular: Regular rhythm. Bradycardia present. Exam reveals no gallop and no friction rub.   Murmur heard.   Systolic murmur is present.  Pulmonary/Chest: Effort normal and breath sounds normal. No respiratory distress. She has no wheezes. She has no rales.   Musculoskeletal:         General: No edema.      Cervical back: Normal range of motion and neck supple.   Neurological: She is alert and oriented to person, place, and time.   Skin: Skin is warm and dry. She is not diaphoretic.   Psychiatric: She has a normal mood and affect. Her behavior is normal. Judgment and thought content normal. "   Nursing note and vitals reviewed.    EKG performed today was personally reviewed and per my interpretation shows sinus bradycardia.  Nonspecific T wave changes.    Assessment:     1. Palpitations  EKG    EC-ECHOCARDIOGRAM COMPLETE W/O CONT    Cardiac Event Monitor    TSH    FREE THYROXINE   2. Chest pain, unspecified type     3. Shortness of breath  EC-ECHOCARDIOGRAM COMPLETE W/O CONT   4. Systolic murmur  EC-ECHOCARDIOGRAM COMPLETE W/O CONT       Medical Decision Making:  Today's Assessment / Status / Plan:     Patient reports having new onset of palpitations.  She will be referred for a Zio patch monitor for further evaluation.  The meantime she has been advised to restrict her caffeine intake to about 1 drink a day and to increase her fluid intake.  Thyroid testing ordered today.  Basic labs have been ordered by PCP and pending at this time.    Patient reports having atypical chest heaviness that occurs with palpitations.  She has no symptoms with exertion.  For now hold off on stress testing.  We will follow up at the next visit.    Patient reports having intermittent dyspnea.  She will be referred for an echocardiogram to evaluate LV function.    Systolic murmur appreciated exam.  Echocardiogram as above.    Return to clinic in 3 months or earlier if needed.    Thank you for allowing me to participate in the care of this patient. Please do not hesitate to contact me with any questions.    Chery Benjamin MD, [facility]  Cardiologist  [facility]    PLEASE NOTE: This dictation was created using voice recognition software.

## 2021-02-25 NOTE — LETTER
[facility]   [address] [place] [address]  [place] [address]  Phone: [phone]  Fax: [phone]              Franchesca Paige  1945    Encounter Date: 2/25/2021    Chery Benjamin M.D.          PROGRESS NOTE:  Chief Complaint   Patient presents with   • Palpitations     NP       Subjective:   Franchesca Paige is a 75 y.o. female who presents today as a referral for palpitations.    Patient was in her usual state of health until last summer when she had an episode of severe palpitations with heart rate ranging in the 60s to 190s beats per minute associated with dizziness.  Her symptoms resolved after she rested and drank a lot of fluids.    In the past few months, she has noted palpitations which she reports is fluttering in the center of her chest associated with this dyspnea that occurs without any clear triggers and usually resolves within a minute.  Her symptoms have been occurring multiple times a week.  She feels that some of her symptoms may be stress related.  She reports chest heaviness that occurs after the palpitations start but also resolves within a minute.  She has been exercising regularly without any anginal symptoms.    She drinks 2 cups of coffee and at best about 36 to 40 ounces of water a day.  She likes eating dark chocolate.    Referring physician: Tate De La Rosa M.D.      Past Medical History:   Diagnosis Date   • Arthritis    • Bladder cancer (HCC)     bladder cancer 2007 , march   • Dyslipidemia, goal LDL below 130    • Hypertension 1992   • Ocular migraine    • Osteopenia     DEXA 9/24/12   • Osteopenia of multiple sites    • Total knee replacement status      Past Surgical History:   Procedure Laterality Date   • OTHER Right 10/07/2018    vein ablation right thigh, Dr. Azevedo   • KNEE REPLACEMENT, TOTAL  12/4/12    Dr. Harry, left knee   • COLONOSCOPY  8/3/2010    diverticulosis only, due in 2020   • HYSTERECTOMY, TOTAL ABDOMINAL  1980s     has one half of an ovary and a fallopian tube     Family History   Problem Relation Age of Onset   • Hypertension Mother    • Psychiatric Illness Mother         paranoid schizophrenic   • Hypertension Father         heavy smoker   • Diabetes Other         niece   • Cancer Sister         skin   • Cancer Paternal Aunt         breast   • Psychiatric Illness Sister         paranoid schizophrenic     Social History     Socioeconomic History   • Marital status:      Spouse name: Not on file   • Number of children: Not on file   • Years of education: Not on file   • Highest education level: Not on file   Occupational History   • Not on file   Tobacco Use   • Smoking status: Former Smoker     Packs/day: 1.00     Types: Cigarettes     Start date: 1964     Quit date: 2000     Years since quittin.8   • Smokeless tobacco: Never Used   Substance and Sexual Activity   • Alcohol use: No     Alcohol/week: 0.0 oz   • Drug use: No   • Sexual activity: Yes   Other Topics Concern   • Not on file   Social History Narrative   • Not on file     Social Determinants of Health     Financial Resource Strain:    • Difficulty of Paying Living Expenses:    Food Insecurity:    • Worried About Running Out of Food in the Last Year:    • Ran Out of Food in the Last Year:    Transportation Needs:    • Lack of Transportation (Medical):    • Lack of Transportation (Non-Medical):    Physical Activity:    • Days of Exercise per Week:    • Minutes of Exercise per Session:    Stress:    • Feeling of Stress :    Social Connections:    • Frequency of Communication with Friends and Family:    • Frequency of Social Gatherings with Friends and Family:    • Attends Evangelical Services:    • Active Member of Clubs or Organizations:    • Attends Club or Organization Meetings:    • Marital Status:    Intimate Partner Violence:    • Fear of Current or Ex-Partner:    • Emotionally Abused:    • Physically Abused:    • Sexually Abused:       "    Allergies   Allergen Reactions   • Sulfa Drugs Vomiting     Outpatient Encounter Medications as of 2/25/2021   Medication Sig Dispense Refill   • TRAMADOL HCL PO Take 50 mg by mouth.     • fosinopril (MONOPRIL) 10 MG Tab TAKE ONE TABLET BY MOUTH EVERY  Tab 3   • acyclovir (ZOVIRAX) 5 % Ointment Applied to affected area q.i.d. 1 Tube 3   • aspirin EC (ECOTRIN) 81 MG Tablet Delayed Response Take 162 mg by mouth as needed.     • Cholecalciferol (VITAMIN D3) 5000 units Cap Take 1 Cap by mouth every day.     • S-Adenosylmethionine (NYLA-E) 200 MG TABS Take 1 Tab by mouth every day. OTC 30 Tab 0     No facility-administered encounter medications on file as of 2/25/2021.     Review of Systems   Constitutional: Negative for malaise/fatigue.   Respiratory: Negative for shortness of breath.    Cardiovascular: Positive for chest pain and palpitations. Negative for orthopnea, leg swelling and PND.   Gastrointestinal: Negative for abdominal pain.   Musculoskeletal: Negative for falls.   Neurological: Negative for dizziness and loss of consciousness.   Psychiatric/Behavioral: Negative for depression.   All other systems reviewed and are negative.       Objective:   /68 (BP Location: Left arm, Patient Position: Sitting, BP Cuff Size: Adult)   Pulse (!) 48   Resp 14   Ht 1.727 m (5' 8\")   Wt 97.1 kg (214 lb)   SpO2 99%   BMI 32.54 kg/m²     Physical Exam   Constitutional: She is oriented to person, place, and time. She appears well-developed and well-nourished. No distress.   HENT:   Head: Normocephalic and atraumatic.   Eyes: Conjunctivae are normal. No scleral icterus.   Cardiovascular: Regular rhythm. Bradycardia present. Exam reveals no gallop and no friction rub.   Murmur heard.   Systolic murmur is present.  Pulmonary/Chest: Effort normal and breath sounds normal. No respiratory distress. She has no wheezes. She has no rales.   Musculoskeletal:         General: No edema.      Cervical back: Normal range " of motion and neck supple.   Neurological: She is alert and oriented to person, place, and time.   Skin: Skin is warm and dry. She is not diaphoretic.   Psychiatric: She has a normal mood and affect. Her behavior is normal. Judgment and thought content normal.   Nursing note and vitals reviewed.    EKG performed today was personally reviewed and per my interpretation shows sinus bradycardia.  Nonspecific T wave changes.    Assessment:     1. Palpitations  EKG    EC-ECHOCARDIOGRAM COMPLETE W/O CONT    Cardiac Event Monitor    TSH    FREE THYROXINE   2. Chest pain, unspecified type     3. Shortness of breath  EC-ECHOCARDIOGRAM COMPLETE W/O CONT   4. Systolic murmur  EC-ECHOCARDIOGRAM COMPLETE W/O CONT       Medical Decision Making:  Today's Assessment / Status / Plan:     Patient reports having new onset of palpitations.  She will be referred for a Zio patch monitor for further evaluation.  The meantime she has been advised to restrict her caffeine intake to about 1 drink a day and to increase her fluid intake.  Thyroid testing ordered today.  Basic labs have been ordered by PCP and pending at this time.    Patient reports having atypical chest heaviness that occurs with palpitations.  She has no symptoms with exertion.  For now hold off on stress testing.  We will follow up at the next visit.    Patient reports having intermittent dyspnea.  She will be referred for an echocardiogram to evaluate LV function.    Systolic murmur appreciated exam.  Echocardiogram as above.    Return to clinic in 3 months or earlier if needed.    Thank you for allowing me to participate in the care of this patient. Please do not hesitate to contact me with any questions.    Chery Benjamin MD, [facility]  Cardiologist  [facility]    PLEASE NOTE: This dictation was created using voice recognition software.           Tate De La Rsoa M.D.  [address] [place] [address] [id number]  [place] [address]  Via In Basket

## 2021-03-01 ENCOUNTER — HOSPITAL ENCOUNTER (OUTPATIENT)
Dept: LAB | Facility: MEDICAL CENTER | Age: 76
End: 2021-03-01
Attending: FAMILY MEDICINE
Payer: MEDICARE

## 2021-03-01 ENCOUNTER — HOSPITAL ENCOUNTER (OUTPATIENT)
Dept: LAB | Facility: MEDICAL CENTER | Age: 76
End: 2021-03-01
Attending: INTERNAL MEDICINE
Payer: MEDICARE

## 2021-03-01 DIAGNOSIS — R73.01 ELEVATED FASTING GLUCOSE: ICD-10-CM

## 2021-03-01 DIAGNOSIS — I10 ESSENTIAL HYPERTENSION: ICD-10-CM

## 2021-03-01 DIAGNOSIS — R00.2 PALPITATIONS: ICD-10-CM

## 2021-03-01 DIAGNOSIS — K21.9 GASTROESOPHAGEAL REFLUX DISEASE WITHOUT ESOPHAGITIS: ICD-10-CM

## 2021-03-01 DIAGNOSIS — E55.9 VITAMIN D DEFICIENCY: ICD-10-CM

## 2021-03-01 DIAGNOSIS — E83.52 HYPERCALCEMIA: ICD-10-CM

## 2021-03-01 DIAGNOSIS — R00.1 BRADYCARDIA: ICD-10-CM

## 2021-03-01 DIAGNOSIS — R00.0 RAPID HEARTBEAT: ICD-10-CM

## 2021-03-01 DIAGNOSIS — R73.09 ELEVATED GLYCOHEMOGLOBIN: ICD-10-CM

## 2021-03-01 LAB
25(OH)D3 SERPL-MCNC: 54 NG/ML (ref 30–100)
ALBUMIN SERPL BCP-MCNC: 4.4 G/DL (ref 3.2–4.9)
ALBUMIN/GLOB SERPL: 1.4 G/DL
ALP SERPL-CCNC: 132 U/L (ref 30–99)
ALT SERPL-CCNC: 15 U/L (ref 2–50)
ANION GAP SERPL CALC-SCNC: 10 MMOL/L (ref 7–16)
AST SERPL-CCNC: 17 U/L (ref 12–45)
BILIRUB SERPL-MCNC: 0.4 MG/DL (ref 0.1–1.5)
BUN SERPL-MCNC: 21 MG/DL (ref 8–22)
CALCIUM SERPL-MCNC: 11 MG/DL (ref 8.5–10.5)
CHLORIDE SERPL-SCNC: 99 MMOL/L (ref 96–112)
CO2 SERPL-SCNC: 25 MMOL/L (ref 20–33)
CREAT SERPL-MCNC: 0.77 MG/DL (ref 0.5–1.4)
ERYTHROCYTE [DISTWIDTH] IN BLOOD BY AUTOMATED COUNT: 43.9 FL (ref 35.9–50)
EST. AVERAGE GLUCOSE BLD GHB EST-MCNC: 111 MG/DL
GLOBULIN SER CALC-MCNC: 3.1 G/DL (ref 1.9–3.5)
GLUCOSE SERPL-MCNC: 87 MG/DL (ref 65–99)
HBA1C MFR BLD: 5.5 % (ref 4–5.6)
HCT VFR BLD AUTO: 41.7 % (ref 37–47)
HGB BLD-MCNC: 13.8 G/DL (ref 12–16)
MCH RBC QN AUTO: 30.3 PG (ref 27–33)
MCHC RBC AUTO-ENTMCNC: 33.1 G/DL (ref 33.6–35)
MCV RBC AUTO: 91.6 FL (ref 81.4–97.8)
PLATELET # BLD AUTO: 245 K/UL (ref 164–446)
PMV BLD AUTO: 9.5 FL (ref 9–12.9)
POTASSIUM SERPL-SCNC: 4.1 MMOL/L (ref 3.6–5.5)
PROT SERPL-MCNC: 7.5 G/DL (ref 6–8.2)
RBC # BLD AUTO: 4.55 M/UL (ref 4.2–5.4)
SODIUM SERPL-SCNC: 134 MMOL/L (ref 135–145)
T4 FREE SERPL-MCNC: 0.83 NG/DL (ref 0.93–1.7)
TSH SERPL DL<=0.005 MIU/L-ACNC: 2.71 UIU/ML (ref 0.38–5.33)
TSH SERPL DL<=0.005 MIU/L-ACNC: 2.75 UIU/ML (ref 0.38–5.33)
WBC # BLD AUTO: 6.8 K/UL (ref 4.8–10.8)

## 2021-03-01 PROCEDURE — 84443 ASSAY THYROID STIM HORMONE: CPT | Mod: 91

## 2021-03-01 PROCEDURE — 36415 COLL VENOUS BLD VENIPUNCTURE: CPT

## 2021-03-01 PROCEDURE — 84443 ASSAY THYROID STIM HORMONE: CPT

## 2021-03-01 PROCEDURE — 82306 VITAMIN D 25 HYDROXY: CPT

## 2021-03-01 PROCEDURE — 80053 COMPREHEN METABOLIC PANEL: CPT

## 2021-03-01 PROCEDURE — 85027 COMPLETE CBC AUTOMATED: CPT

## 2021-03-01 PROCEDURE — 84439 ASSAY OF FREE THYROXINE: CPT

## 2021-03-01 PROCEDURE — 83036 HEMOGLOBIN GLYCOSYLATED A1C: CPT | Mod: GA

## 2021-04-05 ENCOUNTER — TELEPHONE (OUTPATIENT)
Dept: CARDIOLOGY | Facility: MEDICAL CENTER | Age: 76
End: 2021-04-05

## 2021-04-05 ENCOUNTER — NON-PROVIDER VISIT (OUTPATIENT)
Dept: CARDIOLOGY | Facility: MEDICAL CENTER | Age: 76
End: 2021-04-05
Payer: MEDICARE

## 2021-04-05 DIAGNOSIS — R00.2 PALPITATIONS: ICD-10-CM

## 2021-04-05 DIAGNOSIS — I47.10 SVT (SUPRAVENTRICULAR TACHYCARDIA) (HCC): ICD-10-CM

## 2021-04-05 NOTE — TELEPHONE ENCOUNTER
Patient enrolled in the 14 day Zio XT Patch Holter monitoring program per Chery Benjamin MD.  In-Clinic hookup.    >Currently pending EOS.

## 2021-04-07 ENCOUNTER — HOSPITAL ENCOUNTER (OUTPATIENT)
Dept: CARDIOLOGY | Facility: MEDICAL CENTER | Age: 76
End: 2021-04-07
Attending: INTERNAL MEDICINE
Payer: MEDICARE

## 2021-04-07 DIAGNOSIS — R00.2 PALPITATIONS: ICD-10-CM

## 2021-04-07 DIAGNOSIS — R06.02 SHORTNESS OF BREATH: ICD-10-CM

## 2021-04-07 DIAGNOSIS — R01.1 SYSTOLIC MURMUR: ICD-10-CM

## 2021-04-07 PROCEDURE — 93306 TTE W/DOPPLER COMPLETE: CPT

## 2021-04-08 LAB
LV EJECT FRACT  99904: 60
LV EJECT FRACT MOD 2C 99903: 60.88
LV EJECT FRACT MOD 4C 99902: 68.8
LV EJECT FRACT MOD BP 99901: 58.43

## 2021-04-08 PROCEDURE — 93306 TTE W/DOPPLER COMPLETE: CPT | Mod: 26 | Performed by: INTERNAL MEDICINE

## 2021-04-30 PROCEDURE — 93246 EXT ECG>7D<15D RECORDING: CPT | Performed by: INTERNAL MEDICINE

## 2021-04-30 PROCEDURE — 93248 EXT ECG>7D<15D REV&INTERPJ: CPT | Performed by: INTERNAL MEDICINE

## 2021-05-03 ENCOUNTER — OFFICE VISIT (OUTPATIENT)
Dept: CARDIOLOGY | Facility: MEDICAL CENTER | Age: 76
End: 2021-05-03
Payer: MEDICARE

## 2021-05-03 VITALS
HEART RATE: 48 BPM | DIASTOLIC BLOOD PRESSURE: 70 MMHG | BODY MASS INDEX: 32.58 KG/M2 | SYSTOLIC BLOOD PRESSURE: 158 MMHG | OXYGEN SATURATION: 100 % | WEIGHT: 215 LBS | HEIGHT: 68 IN

## 2021-05-03 DIAGNOSIS — R00.2 PALPITATIONS: ICD-10-CM

## 2021-05-03 DIAGNOSIS — R06.02 SHORTNESS OF BREATH: ICD-10-CM

## 2021-05-03 DIAGNOSIS — I10 ESSENTIAL HYPERTENSION: ICD-10-CM

## 2021-05-03 PROCEDURE — 99214 OFFICE O/P EST MOD 30 MIN: CPT | Performed by: INTERNAL MEDICINE

## 2021-05-03 ASSESSMENT — ENCOUNTER SYMPTOMS
ABDOMINAL PAIN: 0
PND: 0
ORTHOPNEA: 0
SHORTNESS OF BREATH: 1
PALPITATIONS: 1
LOSS OF CONSCIOUSNESS: 0
DEPRESSION: 0
DIZZINESS: 0
FALLS: 0

## 2021-05-03 ASSESSMENT — FIBROSIS 4 INDEX: FIB4 SCORE: 1.31

## 2021-05-03 NOTE — Clinical Note
Jalil Edwards,   Can you please call this patient? She had concerns about the diary entries on the monitor. I reviewed it again and the one that showed junctional rhythm correlated with sob and chest pain.   As discussed with her in the clinic appointment today, no further work-up needed at this time. Thank you  AA

## 2021-05-03 NOTE — LETTER
[facility]   [address] [place] [address] [address]  [address] [address]  Phone: [phone]  Fax: [phone]              Franchesca Paige  1945    Encounter Date: 5/3/2021    Chery Benjamin M.D.          PROGRESS NOTE:  Chief Complaint   Patient presents with   • Palpitations     follow up       Subjective:   Franchesca Paige is a 75 y.o. female presenting to clinic for follow-up on palpitations.    Patient continues to report palpitations however they are improved from earlier this year.  She now thinks that perhaps her symptoms were correlating with her  retiring which was causing more stress in their life.  As they have adjusted, her symptoms have improved.    She also believes that her symptoms are related to pollens.  She often gets short of breath when there is a high pollen count and sometimes she can have associated palpitations.  She has a history of allergies and gets monthly allergy shots.    She has cut back on her caffeine and continues to stay hydrated.    Past Medical History:   Diagnosis Date   • Arthritis    • Bladder cancer (HCC)     bladder cancer 2007 , march   • Dyslipidemia, goal LDL below 130    • Hypertension 1992   • Ocular migraine    • Osteopenia     DEXA 9/24/12   • Osteopenia of multiple sites    • Total knee replacement status      Past Surgical History:   Procedure Laterality Date   • OTHER Right 10/07/2018    vein ablation right thigh, Dr. Azevedo   • KNEE REPLACEMENT, TOTAL  12/4/12    Dr. Harry, left knee   • COLONOSCOPY  8/3/2010    diverticulosis only, due in 2020   • HYSTERECTOMY, TOTAL ABDOMINAL  1980s    has one half of an ovary and a fallopian tube     Family History   Problem Relation Age of Onset   • Hypertension Mother    • Psychiatric Illness Mother         paranoid schizophrenic   • Hypertension Father         heavy smoker   • Diabetes Other         niece   • Cancer Sister         skin   • Cancer Paternal Aunt         breast     • Psychiatric Illness Sister         paranoid schizophrenic     Social History     Socioeconomic History   • Marital status:      Spouse name: Not on file   • Number of children: Not on file   • Years of education: Not on file   • Highest education level: Not on file   Occupational History   • Not on file   Tobacco Use   • Smoking status: Former Smoker     Packs/day: 1.00     Types: Cigarettes     Start date: 1964     Quit date: 2000     Years since quittin.0   • Smokeless tobacco: Never Used   Substance and Sexual Activity   • Alcohol use: No     Alcohol/week: 0.0 oz   • Drug use: No   • Sexual activity: Yes   Other Topics Concern   • Not on file   Social History Narrative   • Not on file     Social Determinants of Health     Financial Resource Strain:    • Difficulty of Paying Living Expenses:    Food Insecurity:    • Worried About Running Out of Food in the Last Year:    • Ran Out of Food in the Last Year:    Transportation Needs:    • Lack of Transportation (Medical):    • Lack of Transportation (Non-Medical):    Physical Activity:    • Days of Exercise per Week:    • Minutes of Exercise per Session:    Stress:    • Feeling of Stress :    Social Connections:    • Frequency of Communication with Friends and Family:    • Frequency of Social Gatherings with Friends and Family:    • Attends Islam Services:    • Active Member of Clubs or Organizations:    • Attends Club or Organization Meetings:    • Marital Status:    Intimate Partner Violence:    • Fear of Current or Ex-Partner:    • Emotionally Abused:    • Physically Abused:    • Sexually Abused:      Allergies   Allergen Reactions   • Sulfa Drugs Vomiting     Outpatient Encounter Medications as of 5/3/2021   Medication Sig Dispense Refill   • aspirin EC (ECOTRIN) 325 MG Tablet Delayed Response Take 325 mg by mouth every day.     • TRAMADOL HCL PO Take 50 mg by mouth. 1/4 tablet daily     • fosinopril (MONOPRIL) 10 MG Tab TAKE ONE  "TABLET BY MOUTH EVERY  Tab 3   • acyclovir (ZOVIRAX) 5 % Ointment Applied to affected area q.i.d. 1 Tube 3   • Cholecalciferol (VITAMIN D3) 5000 units Cap Take 1 Cap by mouth every day.     • S-Adenosylmethionine (YNLA-E) 200 MG TABS Take 1 Tab by mouth every day. OTC 30 Tab 0   • [DISCONTINUED] aspirin EC (ECOTRIN) 81 MG Tablet Delayed Response Take 325 mg by mouth every day. Indications: Pain       No facility-administered encounter medications on file as of 5/3/2021.     Review of Systems   Constitutional: Negative for malaise/fatigue.   Respiratory: Positive for shortness of breath.    Cardiovascular: Positive for palpitations. Negative for chest pain, orthopnea, leg swelling and PND.   Gastrointestinal: Negative for abdominal pain.   Musculoskeletal: Negative for falls.   Neurological: Negative for dizziness and loss of consciousness.   Psychiatric/Behavioral: Negative for depression.   All other systems reviewed and are negative.       Objective:   /70 (BP Location: Left arm, Patient Position: Sitting)   Pulse (!) 48   Ht 1.727 m (5' 8\")   Wt 97.5 kg (215 lb)   SpO2 100%   BMI 32.69 kg/m²     Physical Exam   Constitutional: She is oriented to person, place, and time. She appears well-developed and well-nourished. No distress.   HENT:   Head: Normocephalic and atraumatic.   Eyes: Conjunctivae are normal. No scleral icterus.   Cardiovascular: Regular rhythm. Bradycardia present. Exam reveals no gallop and no friction rub.   Murmur heard.   Systolic murmur is present.  Pulmonary/Chest: Effort normal and breath sounds normal. No respiratory distress. She has no wheezes. She has no rales.   Musculoskeletal:         General: No edema.      Cervical back: Normal range of motion and neck supple.   Neurological: She is alert and oriented to person, place, and time.   Skin: Skin is warm and dry. She is not diaphoretic.   Psychiatric: She has a normal mood and affect. Her behavior is normal. Judgment and " thought content normal.   Nursing note and vitals reviewed.    Labs performed March 2021 were reviewed and showed normal renal function.    Echocardiogram performed April 2021 was personally reviewed and per my interpretation showed preserved LV systolic function.  Aortic sclerosis.    Ziopatch monitor performed April 2021 was personally reviewed and per my interpretation showed sinus bradycardia, average rate 58 bpm.  Intermittent junctional rhythm with rates in the 80s beats per minute noted associated patient triggered event.    Assessment:     1. Shortness of breath  NM-CARDIAC STRESS TEST   2. Palpitations         Medical Decision Making:  Today's Assessment / Status / Plan:     Patient continues to report palpitations however her symptoms have improved.  Possible correlation with allergens.  We discussed a referral to pulmonary to see if she may benefit with a methacholine challenge test however patient believes that her symptoms are stress related and would like to hold off for now.    Her ambulatory monitor did not show any significant arrhythmias.  One diary entries correlated with accelerated junctional rhythm with rates in the 80s beats per minute that was transient.  No change in plan for now.    In terms of her dyspnea, she will be referred for a pharmacologic myocardial perfusion study for ischemic evaluation.  Her echocardiogram did not show any significant findings.  If her symptoms worsen, she will let us know.    Hypertension not at goal.  For now continue fosinopril at current dose.  Patient reports being under stress.  If pressure stays elevated, would consider medication adjustment.    Return to clinic in 6 months or earlier if needed.    Thank you for allowing me to participate in the care of this patient. Please do not hesitate to contact me with any questions.    Chery Benjamin MD, [facility]  Cardiologist  [facility]    PLEASE NOTE: This dictation was created  using voice recognition software.           Tate De La Rosa M.D.  [address] [address]  Via In Basket

## 2021-05-17 ENCOUNTER — RX ONLY (OUTPATIENT)
Age: 76
Setting detail: RX ONLY
End: 2021-05-17

## 2021-05-17 RX ORDER — KETOCONAZOLE 20 MG/G
CREAM TOPICAL
Qty: 1 | Refills: 3 | Status: CANCELLED
Stop reason: CLARIF

## 2021-05-26 ENCOUNTER — TELEPHONE (OUTPATIENT)
Dept: CARDIOLOGY | Facility: MEDICAL CENTER | Age: 76
End: 2021-05-26

## 2021-05-26 NOTE — TELEPHONE ENCOUNTER
Received clearance request from [facility] for Colonoscopy with Deep (Propofol) Sedation on 7/7/2021 with Dr. Alex Mcelroy.     Placed in Dr. Benjamin's folder to be signed

## 2021-06-21 ENCOUNTER — HOSPITAL ENCOUNTER (OUTPATIENT)
Dept: RADIOLOGY | Facility: MEDICAL CENTER | Age: 76
End: 2021-06-21
Attending: INTERNAL MEDICINE
Payer: MEDICARE

## 2021-06-21 DIAGNOSIS — R06.02 SHORTNESS OF BREATH: ICD-10-CM

## 2021-06-21 PROCEDURE — 93018 CV STRESS TEST I&R ONLY: CPT | Performed by: INTERNAL MEDICINE

## 2021-06-21 PROCEDURE — A9502 TC99M TETROFOSMIN: HCPCS

## 2021-06-21 PROCEDURE — 78452 HT MUSCLE IMAGE SPECT MULT: CPT | Mod: 26 | Performed by: INTERNAL MEDICINE

## 2021-06-28 NOTE — TELEPHONE ENCOUNTER
Cannot locate the clearance form. Contacted [facility] and Sarahi refaxed it and it is now received.     Clearance placed in Dr. Benjamin's folder. She will be back in the office on Wednesday.

## 2021-06-28 NOTE — TELEPHONE ENCOUNTER
Sarahi from [facility] called regarding Pts medical clearance. Please call Sarahi back at [phone] ext [phone].    Thank you

## 2021-07-21 ENCOUNTER — TELEPHONE (OUTPATIENT)
Dept: CARDIOLOGY | Facility: MEDICAL CENTER | Age: 76
End: 2021-07-21

## 2021-08-16 DIAGNOSIS — Z12.31 ENCOUNTER FOR SCREENING MAMMOGRAM FOR BREAST CANCER: ICD-10-CM

## 2021-08-18 ENCOUNTER — APPOINTMENT (RX ONLY)
Dept: URBAN - METROPOLITAN AREA CLINIC 35 | Facility: CLINIC | Age: 76
Setting detail: DERMATOLOGY
End: 2021-08-18

## 2021-08-18 DIAGNOSIS — L57.0 ACTINIC KERATOSIS: ICD-10-CM

## 2021-08-18 DIAGNOSIS — L82.1 OTHER SEBORRHEIC KERATOSIS: ICD-10-CM

## 2021-08-18 DIAGNOSIS — Z71.89 OTHER SPECIFIED COUNSELING: ICD-10-CM

## 2021-08-18 DIAGNOSIS — D22 MELANOCYTIC NEVI: ICD-10-CM

## 2021-08-18 DIAGNOSIS — L81.4 OTHER MELANIN HYPERPIGMENTATION: ICD-10-CM

## 2021-08-18 DIAGNOSIS — D18.0 HEMANGIOMA: ICD-10-CM

## 2021-08-18 PROBLEM — D18.01 HEMANGIOMA OF SKIN AND SUBCUTANEOUS TISSUE: Status: ACTIVE | Noted: 2021-08-18

## 2021-08-18 PROBLEM — D22.62 MELANOCYTIC NEVI OF LEFT UPPER LIMB, INCLUDING SHOULDER: Status: ACTIVE | Noted: 2021-08-18

## 2021-08-18 PROBLEM — D22.5 MELANOCYTIC NEVI OF TRUNK: Status: ACTIVE | Noted: 2021-08-18

## 2021-08-18 PROCEDURE — 17003 DESTRUCT PREMALG LES 2-14: CPT

## 2021-08-18 PROCEDURE — ? OBSERVATION AND MEASURE

## 2021-08-18 PROCEDURE — 17000 DESTRUCT PREMALG LESION: CPT

## 2021-08-18 PROCEDURE — ? COUNSELING

## 2021-08-18 PROCEDURE — ? LIQUID NITROGEN

## 2021-08-18 PROCEDURE — ? SUNSCREEN RECOMMENDATIONS

## 2021-08-18 PROCEDURE — 99213 OFFICE O/P EST LOW 20 MIN: CPT | Mod: 25

## 2021-08-18 ASSESSMENT — LOCATION SIMPLE DESCRIPTION DERM
LOCATION SIMPLE: RIGHT CHEEK
LOCATION SIMPLE: LEFT NOSE
LOCATION SIMPLE: RIGHT SHOULDER
LOCATION SIMPLE: INFERIOR FOREHEAD
LOCATION SIMPLE: CHEST
LOCATION SIMPLE: RIGHT BUTTOCK
LOCATION SIMPLE: LEFT UPPER BACK
LOCATION SIMPLE: RIGHT HAND
LOCATION SIMPLE: LEFT SHOULDER

## 2021-08-18 ASSESSMENT — LOCATION ZONE DERM
LOCATION ZONE: ARM
LOCATION ZONE: FACE
LOCATION ZONE: TRUNK
LOCATION ZONE: HAND
LOCATION ZONE: NOSE

## 2021-08-18 ASSESSMENT — LOCATION DETAILED DESCRIPTION DERM
LOCATION DETAILED: INFERIOR MID FOREHEAD
LOCATION DETAILED: LEFT NASAL SIDEWALL
LOCATION DETAILED: LEFT LATERAL SHOULDER
LOCATION DETAILED: LEFT INFERIOR MEDIAL UPPER BACK
LOCATION DETAILED: RIGHT INFERIOR LATERAL MALAR CHEEK
LOCATION DETAILED: RIGHT RADIAL DORSAL HAND
LOCATION DETAILED: RIGHT BUTTOCK
LOCATION DETAILED: RIGHT CENTRAL MALAR CHEEK
LOCATION DETAILED: RIGHT POSTERIOR SHOULDER
LOCATION DETAILED: RIGHT LATERAL SUPERIOR CHEST
LOCATION DETAILED: LEFT MEDIAL UPPER BACK

## 2021-08-18 NOTE — PROCEDURE: LIQUID NITROGEN
Duration Of Freeze Thaw-Cycle (Seconds): 2
Number Of Freeze-Thaw Cycles: 2 freeze-thaw cycles
Post-Care Instructions: I reviewed with the patient in detail post-care instructions. Patient is to wear sunprotection, and avoid picking at any of the treated lesions. Pt may apply Vaseline to crusted or scabbing areas.
Show Applicator Variable?: Yes
Detail Level: Detailed
Render Post-Care Instructions In Note?: no
Consent: The patient's consent was obtained including but not limited to risks of crusting, scabbing, blistering, scarring, darker or lighter pigmentary change, recurrence, incomplete removal and infection.

## 2021-08-24 DIAGNOSIS — M25.571 CHRONIC PAIN OF RIGHT ANKLE: ICD-10-CM

## 2021-08-24 DIAGNOSIS — G89.29 CHRONIC PAIN OF RIGHT ANKLE: ICD-10-CM

## 2021-08-25 RX ORDER — TRAMADOL HYDROCHLORIDE 50 MG/1
50 TABLET ORAL DAILY
Qty: 30 TABLET | Refills: 0 | Status: SHIPPED | OUTPATIENT
Start: 2021-08-25 | End: 2021-09-24

## 2021-08-25 NOTE — TELEPHONE ENCOUNTER
Last office visit February of this year.  Will need further office visit to continue her tramadol prescription.  She needs to set up an appointment with me.  She uses the medication sparingly.  I am able to send in a prescription for 30 tablets but I need to see her soon.

## 2021-08-26 VITALS — SYSTOLIC BLOOD PRESSURE: 125 MMHG | DIASTOLIC BLOOD PRESSURE: 72 MMHG | HEART RATE: 52 BPM

## 2021-09-22 ENCOUNTER — HOSPITAL ENCOUNTER (OUTPATIENT)
Dept: RADIOLOGY | Facility: MEDICAL CENTER | Age: 76
End: 2021-09-22
Attending: FAMILY MEDICINE
Payer: MEDICARE

## 2021-09-22 DIAGNOSIS — Z12.31 ENCOUNTER FOR SCREENING MAMMOGRAM FOR BREAST CANCER: ICD-10-CM

## 2021-09-22 PROCEDURE — 77063 BREAST TOMOSYNTHESIS BI: CPT

## 2021-09-27 ENCOUNTER — HOSPITAL ENCOUNTER (OUTPATIENT)
Dept: RADIOLOGY | Facility: MEDICAL CENTER | Age: 76
End: 2021-09-27
Payer: COMMERCIAL

## 2021-11-09 DIAGNOSIS — I10 ESSENTIAL HYPERTENSION: ICD-10-CM

## 2021-11-09 RX ORDER — FOSINOPRIL SODIUM 10 MG/1
TABLET ORAL
Qty: 100 TABLET | Refills: 3 | Status: SHIPPED | OUTPATIENT
Start: 2021-11-09 | End: 2022-10-25

## 2022-02-04 SDOH — HEALTH STABILITY: PHYSICAL HEALTH: ON AVERAGE, HOW MANY DAYS PER WEEK DO YOU ENGAGE IN MODERATE TO STRENUOUS EXERCISE (LIKE A BRISK WALK)?: 2 DAYS

## 2022-02-04 SDOH — ECONOMIC STABILITY: TRANSPORTATION INSECURITY
IN THE PAST 12 MONTHS, HAS THE LACK OF TRANSPORTATION KEPT YOU FROM MEDICAL APPOINTMENTS OR FROM GETTING MEDICATIONS?: NO

## 2022-02-04 SDOH — ECONOMIC STABILITY: FOOD INSECURITY: WITHIN THE PAST 12 MONTHS, THE FOOD YOU BOUGHT JUST DIDN'T LAST AND YOU DIDN'T HAVE MONEY TO GET MORE.: NEVER TRUE

## 2022-02-04 SDOH — ECONOMIC STABILITY: HOUSING INSECURITY
IN THE LAST 12 MONTHS, WAS THERE A TIME WHEN YOU DID NOT HAVE A STEADY PLACE TO SLEEP OR SLEPT IN A SHELTER (INCLUDING NOW)?: NO

## 2022-02-04 SDOH — HEALTH STABILITY: MENTAL HEALTH
STRESS IS WHEN SOMEONE FEELS TENSE, NERVOUS, ANXIOUS, OR CAN'T SLEEP AT NIGHT BECAUSE THEIR MIND IS TROUBLED. HOW STRESSED ARE YOU?: TO SOME EXTENT

## 2022-02-04 SDOH — HEALTH STABILITY: PHYSICAL HEALTH: ON AVERAGE, HOW MANY MINUTES DO YOU ENGAGE IN EXERCISE AT THIS LEVEL?: 30 MIN

## 2022-02-04 SDOH — ECONOMIC STABILITY: FOOD INSECURITY: WITHIN THE PAST 12 MONTHS, YOU WORRIED THAT YOUR FOOD WOULD RUN OUT BEFORE YOU GOT MONEY TO BUY MORE.: NEVER TRUE

## 2022-02-04 SDOH — ECONOMIC STABILITY: HOUSING INSECURITY

## 2022-02-04 SDOH — ECONOMIC STABILITY: INCOME INSECURITY: IN THE LAST 12 MONTHS, WAS THERE A TIME WHEN YOU WERE NOT ABLE TO PAY THE MORTGAGE OR RENT ON TIME?: NO

## 2022-02-04 SDOH — ECONOMIC STABILITY: TRANSPORTATION INSECURITY
IN THE PAST 12 MONTHS, HAS LACK OF TRANSPORTATION KEPT YOU FROM MEETINGS, WORK, OR FROM GETTING THINGS NEEDED FOR DAILY LIVING?: NO

## 2022-02-04 SDOH — ECONOMIC STABILITY: INCOME INSECURITY: HOW HARD IS IT FOR YOU TO PAY FOR THE VERY BASICS LIKE FOOD, HOUSING, MEDICAL CARE, AND HEATING?: NOT HARD AT ALL

## 2022-02-04 SDOH — ECONOMIC STABILITY: TRANSPORTATION INSECURITY
IN THE PAST 12 MONTHS, HAS LACK OF RELIABLE TRANSPORTATION KEPT YOU FROM MEDICAL APPOINTMENTS, MEETINGS, WORK OR FROM GETTING THINGS NEEDED FOR DAILY LIVING?: NO

## 2022-02-04 ASSESSMENT — SOCIAL DETERMINANTS OF HEALTH (SDOH)
DO YOU BELONG TO ANY CLUBS OR ORGANIZATIONS SUCH AS CHURCH GROUPS UNIONS, FRATERNAL OR ATHLETIC GROUPS, OR SCHOOL GROUPS?: NO
WITHIN THE PAST 12 MONTHS, YOU WORRIED THAT YOUR FOOD WOULD RUN OUT BEFORE YOU GOT THE MONEY TO BUY MORE: NEVER TRUE
HOW OFTEN DO YOU ATTENT MEETINGS OF THE CLUB OR ORGANIZATION YOU BELONG TO?: NEVER
IN A TYPICAL WEEK, HOW MANY TIMES DO YOU TALK ON THE PHONE WITH FAMILY, FRIENDS, OR NEIGHBORS?: NEVER
DO YOU BELONG TO ANY CLUBS OR ORGANIZATIONS SUCH AS CHURCH GROUPS UNIONS, FRATERNAL OR ATHLETIC GROUPS, OR SCHOOL GROUPS?: NO
HOW HARD IS IT FOR YOU TO PAY FOR THE VERY BASICS LIKE FOOD, HOUSING, MEDICAL CARE, AND HEATING?: NOT HARD AT ALL
HOW OFTEN DO YOU HAVE SIX OR MORE DRINKS ON ONE OCCASION: NEVER
HOW OFTEN DO YOU ATTEND CHURCH OR RELIGIOUS SERVICES?: NEVER
HOW OFTEN DO YOU ATTENT MEETINGS OF THE CLUB OR ORGANIZATION YOU BELONG TO?: NEVER
IN A TYPICAL WEEK, HOW MANY TIMES DO YOU TALK ON THE PHONE WITH FAMILY, FRIENDS, OR NEIGHBORS?: NEVER
HOW OFTEN DO YOU ATTEND CHURCH OR RELIGIOUS SERVICES?: NEVER
HOW OFTEN DO YOU HAVE A DRINK CONTAINING ALCOHOL: NEVER
HOW OFTEN DO YOU GET TOGETHER WITH FRIENDS OR RELATIVES?: NEVER
HOW OFTEN DO YOU GET TOGETHER WITH FRIENDS OR RELATIVES?: NEVER

## 2022-02-04 ASSESSMENT — LIFESTYLE VARIABLES
HOW OFTEN DO YOU HAVE A DRINK CONTAINING ALCOHOL: NEVER
HOW OFTEN DO YOU HAVE SIX OR MORE DRINKS ON ONE OCCASION: NEVER

## 2022-02-11 ENCOUNTER — OFFICE VISIT (OUTPATIENT)
Dept: MEDICAL GROUP | Facility: MEDICAL CENTER | Age: 77
End: 2022-02-11
Payer: MEDICARE

## 2022-02-11 VITALS
SYSTOLIC BLOOD PRESSURE: 126 MMHG | HEART RATE: 50 BPM | OXYGEN SATURATION: 98 % | RESPIRATION RATE: 14 BRPM | DIASTOLIC BLOOD PRESSURE: 64 MMHG | BODY MASS INDEX: 32.91 KG/M2 | WEIGHT: 217.15 LBS | TEMPERATURE: 96.9 F | HEIGHT: 68 IN

## 2022-02-11 DIAGNOSIS — Z96.652 STATUS POST TOTAL LEFT KNEE REPLACEMENT: ICD-10-CM

## 2022-02-11 DIAGNOSIS — M25.571 CHRONIC PAIN OF RIGHT ANKLE: ICD-10-CM

## 2022-02-11 DIAGNOSIS — Z00.00 MEDICARE ANNUAL WELLNESS VISIT, SUBSEQUENT: ICD-10-CM

## 2022-02-11 DIAGNOSIS — E78.5 DYSLIPIDEMIA, GOAL LDL BELOW 130: ICD-10-CM

## 2022-02-11 DIAGNOSIS — M15.9 PRIMARY OSTEOARTHRITIS INVOLVING MULTIPLE JOINTS: ICD-10-CM

## 2022-02-11 DIAGNOSIS — K21.9 GASTROESOPHAGEAL REFLUX DISEASE WITHOUT ESOPHAGITIS: ICD-10-CM

## 2022-02-11 DIAGNOSIS — Z29.89 NEED FOR SUBACUTE BACTERIAL ENDOCARDITIS PROPHYLAXIS: ICD-10-CM

## 2022-02-11 DIAGNOSIS — R00.1 BRADYCARDIA: ICD-10-CM

## 2022-02-11 DIAGNOSIS — E66.9 OBESITY (BMI 30.0-34.9): ICD-10-CM

## 2022-02-11 DIAGNOSIS — F43.21 SITUATIONAL DEPRESSION: ICD-10-CM

## 2022-02-11 DIAGNOSIS — I10 ESSENTIAL HYPERTENSION: ICD-10-CM

## 2022-02-11 DIAGNOSIS — M85.89 OSTEOPENIA OF MULTIPLE SITES: ICD-10-CM

## 2022-02-11 DIAGNOSIS — Z63.6 CAREGIVER STRESS: ICD-10-CM

## 2022-02-11 DIAGNOSIS — Z85.51 HISTORY OF BLADDER CANCER: ICD-10-CM

## 2022-02-11 DIAGNOSIS — J30.1 SEASONAL ALLERGIC RHINITIS DUE TO POLLEN: ICD-10-CM

## 2022-02-11 DIAGNOSIS — H70.11 CHRONIC RIGHT MASTOIDITIS: ICD-10-CM

## 2022-02-11 DIAGNOSIS — G43.109 OCULAR MIGRAINE: ICD-10-CM

## 2022-02-11 DIAGNOSIS — G89.29 CHRONIC PAIN OF RIGHT ANKLE: ICD-10-CM

## 2022-02-11 PROBLEM — N13.30 HYDRONEPHROSIS: Status: RESOLVED | Noted: 2019-02-11 | Resolved: 2022-02-11

## 2022-02-11 PROBLEM — R42 DIZZINESS: Status: RESOLVED | Noted: 2018-02-22 | Resolved: 2022-02-11

## 2022-02-11 PROCEDURE — G0439 PPPS, SUBSEQ VISIT: HCPCS | Performed by: FAMILY MEDICINE

## 2022-02-11 RX ORDER — TRAMADOL HYDROCHLORIDE 50 MG/1
50 TABLET ORAL EVERY 6 HOURS PRN
Qty: 28 TABLET | Refills: 0 | Status: SHIPPED | OUTPATIENT
Start: 2022-02-11 | End: 2022-02-18

## 2022-02-11 RX ORDER — LOTEPREDNOL ETABONATE 5 MG/G
GEL OPHTHALMIC
COMMUNITY
Start: 2022-01-11 | End: 2022-08-25

## 2022-02-11 RX ORDER — TRAMADOL HYDROCHLORIDE 50 MG/1
50 TABLET ORAL EVERY 6 HOURS PRN
Qty: 60 TABLET | Refills: 2 | Status: SHIPPED | OUTPATIENT
Start: 2022-02-19 | End: 2022-08-25 | Stop reason: SDUPTHER

## 2022-02-11 RX ORDER — TRAMADOL HYDROCHLORIDE 50 MG/1
TABLET ORAL
COMMUNITY
Start: 2021-12-01 | End: 2022-02-11

## 2022-02-11 SDOH — SOCIAL STABILITY - SOCIAL INSECURITY: DEPENDENT RELATIVE NEEDING CARE AT HOME: Z63.6

## 2022-02-11 ASSESSMENT — PATIENT HEALTH QUESTIONNAIRE - PHQ9
5. POOR APPETITE OR OVEREATING: 0 - NOT AT ALL
CLINICAL INTERPRETATION OF PHQ2 SCORE: 1
SUM OF ALL RESPONSES TO PHQ QUESTIONS 1-9: 5

## 2022-02-11 ASSESSMENT — ACTIVITIES OF DAILY LIVING (ADL): BATHING_REQUIRES_ASSISTANCE: 0

## 2022-02-11 ASSESSMENT — FIBROSIS 4 INDEX: FIB4 SCORE: 1.33

## 2022-02-11 ASSESSMENT — ENCOUNTER SYMPTOMS: GENERAL WELL-BEING: GOOD

## 2022-03-16 ENCOUNTER — HOSPITAL ENCOUNTER (OUTPATIENT)
Dept: LAB | Facility: MEDICAL CENTER | Age: 77
End: 2022-03-16
Attending: FAMILY MEDICINE
Payer: MEDICARE

## 2022-03-16 DIAGNOSIS — I10 ESSENTIAL HYPERTENSION: ICD-10-CM

## 2022-03-16 DIAGNOSIS — E78.5 DYSLIPIDEMIA, GOAL LDL BELOW 130: ICD-10-CM

## 2022-03-16 DIAGNOSIS — K21.9 GASTROESOPHAGEAL REFLUX DISEASE WITHOUT ESOPHAGITIS: ICD-10-CM

## 2022-03-16 DIAGNOSIS — R00.1 BRADYCARDIA: ICD-10-CM

## 2022-03-16 DIAGNOSIS — Z85.51 HISTORY OF BLADDER CANCER: ICD-10-CM

## 2022-03-16 LAB
ALBUMIN SERPL BCP-MCNC: 4.5 G/DL (ref 3.2–4.9)
ALBUMIN/GLOB SERPL: 1.9 G/DL
ALP SERPL-CCNC: 137 U/L (ref 30–99)
ALT SERPL-CCNC: 19 U/L (ref 2–50)
ANION GAP SERPL CALC-SCNC: 10 MMOL/L (ref 7–16)
APPEARANCE UR: CLEAR
AST SERPL-CCNC: 18 U/L (ref 12–45)
BILIRUB SERPL-MCNC: 0.4 MG/DL (ref 0.1–1.5)
BILIRUB UR QL STRIP.AUTO: NEGATIVE
BUN SERPL-MCNC: 19 MG/DL (ref 8–22)
CALCIUM SERPL-MCNC: 11.1 MG/DL (ref 8.5–10.5)
CHLORIDE SERPL-SCNC: 105 MMOL/L (ref 96–112)
CHOLEST SERPL-MCNC: 215 MG/DL (ref 100–199)
CO2 SERPL-SCNC: 25 MMOL/L (ref 20–33)
COLOR UR: YELLOW
CREAT SERPL-MCNC: 0.62 MG/DL (ref 0.5–1.4)
ERYTHROCYTE [DISTWIDTH] IN BLOOD BY AUTOMATED COUNT: 43.8 FL (ref 35.9–50)
FASTING STATUS PATIENT QL REPORTED: NORMAL
GFR SERPLBLD CREATININE-BSD FMLA CKD-EPI: 93 ML/MIN/1.73 M 2
GLOBULIN SER CALC-MCNC: 2.4 G/DL (ref 1.9–3.5)
GLUCOSE SERPL-MCNC: 80 MG/DL (ref 65–99)
GLUCOSE UR STRIP.AUTO-MCNC: NEGATIVE MG/DL
HCT VFR BLD AUTO: 42.3 % (ref 37–47)
HDLC SERPL-MCNC: 83 MG/DL
HGB BLD-MCNC: 13.7 G/DL (ref 12–16)
KETONES UR STRIP.AUTO-MCNC: NEGATIVE MG/DL
LDLC SERPL CALC-MCNC: 112 MG/DL
LEUKOCYTE ESTERASE UR QL STRIP.AUTO: NEGATIVE
MCH RBC QN AUTO: 29.7 PG (ref 27–33)
MCHC RBC AUTO-ENTMCNC: 32.4 G/DL (ref 33.6–35)
MCV RBC AUTO: 91.6 FL (ref 81.4–97.8)
MICRO URNS: NORMAL
NITRITE UR QL STRIP.AUTO: NEGATIVE
PH UR STRIP.AUTO: 7 [PH] (ref 5–8)
PLATELET # BLD AUTO: 225 K/UL (ref 164–446)
PMV BLD AUTO: 9.6 FL (ref 9–12.9)
POTASSIUM SERPL-SCNC: 5.1 MMOL/L (ref 3.6–5.5)
PROT SERPL-MCNC: 6.9 G/DL (ref 6–8.2)
PROT UR QL STRIP: NEGATIVE MG/DL
RBC # BLD AUTO: 4.62 M/UL (ref 4.2–5.4)
RBC UR QL AUTO: NEGATIVE
SODIUM SERPL-SCNC: 140 MMOL/L (ref 135–145)
SP GR UR STRIP.AUTO: 1.01
TRIGL SERPL-MCNC: 101 MG/DL (ref 0–149)
TSH SERPL DL<=0.005 MIU/L-ACNC: 3.47 UIU/ML (ref 0.38–5.33)
UROBILINOGEN UR STRIP.AUTO-MCNC: 0.2 MG/DL
WBC # BLD AUTO: 5.7 K/UL (ref 4.8–10.8)

## 2022-03-16 PROCEDURE — 36415 COLL VENOUS BLD VENIPUNCTURE: CPT

## 2022-03-16 PROCEDURE — 84443 ASSAY THYROID STIM HORMONE: CPT

## 2022-03-16 PROCEDURE — 81003 URINALYSIS AUTO W/O SCOPE: CPT

## 2022-03-16 PROCEDURE — 85027 COMPLETE CBC AUTOMATED: CPT

## 2022-03-16 PROCEDURE — 80061 LIPID PANEL: CPT

## 2022-03-16 PROCEDURE — 80053 COMPREHEN METABOLIC PANEL: CPT

## 2022-06-01 NOTE — PROGRESS NOTES
Chief Complaint   Patient presents with   • Palpitations     follow up       Subjective:   Franchesca Paige is a 75 y.o. female presenting to clinic for follow-up on palpitations.    Patient continues to report palpitations however they are improved from earlier this year.  She now thinks that perhaps her symptoms were correlating with her  retiring which was causing more stress in their life.  As they have adjusted, her symptoms have improved.    She also believes that her symptoms are related to pollens.  She often gets short of breath when there is a high pollen count and sometimes she can have associated palpitations.  She has a history of allergies and gets monthly allergy shots.    She has cut back on her caffeine and continues to stay hydrated.    Past Medical History:   Diagnosis Date   • Arthritis    • Bladder cancer (HCC)     bladder cancer 2007 , march   • Dyslipidemia, goal LDL below 130    • Hypertension 1992   • Ocular migraine    • Osteopenia     DEXA 9/24/12   • Osteopenia of multiple sites    • Total knee replacement status      Past Surgical History:   Procedure Laterality Date   • OTHER Right 10/07/2018    vein ablation right thigh, Dr. Azevedo   • KNEE REPLACEMENT, TOTAL  12/4/12    Dr. Harry, left knee   • COLONOSCOPY  8/3/2010    diverticulosis only, due in 2020   • HYSTERECTOMY, TOTAL ABDOMINAL  1980s    has one half of an ovary and a fallopian tube     Family History   Problem Relation Age of Onset   • Hypertension Mother    • Psychiatric Illness Mother         paranoid schizophrenic   • Hypertension Father         heavy smoker   • Diabetes Other         niece   • Cancer Sister         skin   • Cancer Paternal Aunt         breast   • Psychiatric Illness Sister         paranoid schizophrenic     Social History     Socioeconomic History   • Marital status:      Spouse name: Not on file   • Number of children: Not on file   • Years of education: Not on file   • Highest education  level: Not on file   Occupational History   • Not on file   Tobacco Use   • Smoking status: Former Smoker     Packs/day: 1.00     Types: Cigarettes     Start date: 1964     Quit date: 2000     Years since quittin.0   • Smokeless tobacco: Never Used   Substance and Sexual Activity   • Alcohol use: No     Alcohol/week: 0.0 oz   • Drug use: No   • Sexual activity: Yes   Other Topics Concern   • Not on file   Social History Narrative   • Not on file     Social Determinants of Health     Financial Resource Strain:    • Difficulty of Paying Living Expenses:    Food Insecurity:    • Worried About Running Out of Food in the Last Year:    • Ran Out of Food in the Last Year:    Transportation Needs:    • Lack of Transportation (Medical):    • Lack of Transportation (Non-Medical):    Physical Activity:    • Days of Exercise per Week:    • Minutes of Exercise per Session:    Stress:    • Feeling of Stress :    Social Connections:    • Frequency of Communication with Friends and Family:    • Frequency of Social Gatherings with Friends and Family:    • Attends Sabianist Services:    • Active Member of Clubs or Organizations:    • Attends Club or Organization Meetings:    • Marital Status:    Intimate Partner Violence:    • Fear of Current or Ex-Partner:    • Emotionally Abused:    • Physically Abused:    • Sexually Abused:      Allergies   Allergen Reactions   • Sulfa Drugs Vomiting     Outpatient Encounter Medications as of 5/3/2021   Medication Sig Dispense Refill   • aspirin EC (ECOTRIN) 325 MG Tablet Delayed Response Take 325 mg by mouth every day.     • TRAMADOL HCL PO Take 50 mg by mouth. 1/4 tablet daily     • fosinopril (MONOPRIL) 10 MG Tab TAKE ONE TABLET BY MOUTH EVERY  Tab 3   • acyclovir (ZOVIRAX) 5 % Ointment Applied to affected area q.i.d. 1 Tube 3   • Cholecalciferol (VITAMIN D3) 5000 units Cap Take 1 Cap by mouth every day.     • S-Adenosylmethionine (NYLA-E) 200 MG TABS Take 1 Tab by mouth  "every day. OTC 30 Tab 0   • [DISCONTINUED] aspirin EC (ECOTRIN) 81 MG Tablet Delayed Response Take 325 mg by mouth every day. Indications: Pain       No facility-administered encounter medications on file as of 5/3/2021.     Review of Systems   Constitutional: Negative for malaise/fatigue.   Respiratory: Positive for shortness of breath.    Cardiovascular: Positive for palpitations. Negative for chest pain, orthopnea, leg swelling and PND.   Gastrointestinal: Negative for abdominal pain.   Musculoskeletal: Negative for falls.   Neurological: Negative for dizziness and loss of consciousness.   Psychiatric/Behavioral: Negative for depression.   All other systems reviewed and are negative.       Objective:   /70 (BP Location: Left arm, Patient Position: Sitting)   Pulse (!) 48   Ht 1.727 m (5' 8\")   Wt 97.5 kg (215 lb)   SpO2 100%   BMI 32.69 kg/m²     Physical Exam   Constitutional: She is oriented to person, place, and time. She appears well-developed and well-nourished. No distress.   HENT:   Head: Normocephalic and atraumatic.   Eyes: Conjunctivae are normal. No scleral icterus.   Cardiovascular: Regular rhythm. Bradycardia present. Exam reveals no gallop and no friction rub.   Murmur heard.   Systolic murmur is present.  Pulmonary/Chest: Effort normal and breath sounds normal. No respiratory distress. She has no wheezes. She has no rales.   Musculoskeletal:         General: No edema.      Cervical back: Normal range of motion and neck supple.   Neurological: She is alert and oriented to person, place, and time.   Skin: Skin is warm and dry. She is not diaphoretic.   Psychiatric: She has a normal mood and affect. Her behavior is normal. Judgment and thought content normal.   Nursing note and vitals reviewed.    Labs performed March 2021 were reviewed and showed normal renal function.    Echocardiogram performed April 2021 was personally reviewed and per my interpretation showed preserved LV systolic " function.  Aortic sclerosis.    Ziopatch monitor performed April 2021 was personally reviewed and per my interpretation showed sinus bradycardia, average rate 58 bpm.  Intermittent junctional rhythm with rates in the 80s beats per minute noted associated patient triggered event.    Assessment:     1. Shortness of breath  NM-CARDIAC STRESS TEST   2. Palpitations         Medical Decision Making:  Today's Assessment / Status / Plan:     Patient continues to report palpitations however her symptoms have improved.  Possible correlation with allergens.  We discussed a referral to pulmonary to see if she may benefit with a methacholine challenge test however patient believes that her symptoms are stress related and would like to hold off for now.    Her ambulatory monitor did not show any significant arrhythmias.  One diary entries correlated with accelerated junctional rhythm with rates in the 80s beats per minute that was transient.  No change in plan for now.    In terms of her dyspnea, she will be referred for a pharmacologic myocardial perfusion study for ischemic evaluation.  Her echocardiogram did not show any significant findings.  If her symptoms worsen, she will let us know.    Hypertension not at goal.  For now continue fosinopril at current dose.  Patient reports being under stress.  If pressure stays elevated, would consider medication adjustment.    Return to clinic in 6 months or earlier if needed.    Thank you for allowing me to participate in the care of this patient. Please do not hesitate to contact me with any questions.    Chery Benjamin MD, [facility]  Cardiologist  [facility]    PLEASE NOTE: This dictation was created using voice recognition software.      PAST MEDICAL HISTORY:  Breast cancer right breast CA s/p right breast lumpectomy (8/2021) and chemo/radiation treatment    GERD (gastroesophageal reflux disease) Nexium PRN    H/O osteoporosis on Repatha 2x per month    Hypothyroidism     Moderate mitral regurgitation Mild-To-Moderate - On echo report from 8/2020    Obesity     Other appendicitis Pending Laparoscopic Appendectomy with Dr. Verde on 6/3/22    Vitamin D deficiency

## 2022-08-17 ENCOUNTER — APPOINTMENT (RX ONLY)
Dept: URBAN - METROPOLITAN AREA CLINIC 35 | Facility: CLINIC | Age: 77
Setting detail: DERMATOLOGY
End: 2022-08-17

## 2022-08-17 DIAGNOSIS — D18.0 HEMANGIOMA: ICD-10-CM

## 2022-08-17 DIAGNOSIS — L82.1 OTHER SEBORRHEIC KERATOSIS: ICD-10-CM

## 2022-08-17 DIAGNOSIS — I78.8 OTHER DISEASES OF CAPILLARIES: ICD-10-CM

## 2022-08-17 DIAGNOSIS — L57.0 ACTINIC KERATOSIS: ICD-10-CM

## 2022-08-17 DIAGNOSIS — L81.4 OTHER MELANIN HYPERPIGMENTATION: ICD-10-CM

## 2022-08-17 DIAGNOSIS — D22 MELANOCYTIC NEVI: ICD-10-CM

## 2022-08-17 DIAGNOSIS — Z71.89 OTHER SPECIFIED COUNSELING: ICD-10-CM

## 2022-08-17 DIAGNOSIS — L91.8 OTHER HYPERTROPHIC DISORDERS OF THE SKIN: ICD-10-CM

## 2022-08-17 PROBLEM — D18.01 HEMANGIOMA OF SKIN AND SUBCUTANEOUS TISSUE: Status: ACTIVE | Noted: 2022-08-17

## 2022-08-17 PROBLEM — D22.5 MELANOCYTIC NEVI OF TRUNK: Status: ACTIVE | Noted: 2022-08-17

## 2022-08-17 PROBLEM — D22.62 MELANOCYTIC NEVI OF LEFT UPPER LIMB, INCLUDING SHOULDER: Status: ACTIVE | Noted: 2022-08-17

## 2022-08-17 PROCEDURE — ? LIQUID NITROGEN

## 2022-08-17 PROCEDURE — 17000 DESTRUCT PREMALG LESION: CPT

## 2022-08-17 PROCEDURE — ? COUNSELING

## 2022-08-17 PROCEDURE — 17003 DESTRUCT PREMALG LES 2-14: CPT

## 2022-08-17 PROCEDURE — 99213 OFFICE O/P EST LOW 20 MIN: CPT | Mod: 25

## 2022-08-17 PROCEDURE — ? SUNSCREEN RECOMMENDATIONS

## 2022-08-17 PROCEDURE — ? OBSERVATION AND MEASURE

## 2022-08-17 PROCEDURE — ? BENIGN DESTRUCTION COSMETIC MULTI

## 2022-08-17 ASSESSMENT — LOCATION SIMPLE DESCRIPTION DERM
LOCATION SIMPLE: ABDOMEN
LOCATION SIMPLE: LEFT ZYGOMA
LOCATION SIMPLE: RIGHT FOREARM
LOCATION SIMPLE: LEFT UPPER BACK
LOCATION SIMPLE: RIGHT CHEEK
LOCATION SIMPLE: LEFT EYEBROW
LOCATION SIMPLE: NECK
LOCATION SIMPLE: LEFT CHEEK
LOCATION SIMPLE: LEFT SHOULDER
LOCATION SIMPLE: NOSE
LOCATION SIMPLE: CHEST
LOCATION SIMPLE: RIGHT AXILLARY VAULT
LOCATION SIMPLE: LEFT AXILLARY VAULT
LOCATION SIMPLE: RIGHT BUTTOCK

## 2022-08-17 ASSESSMENT — LOCATION DETAILED DESCRIPTION DERM
LOCATION DETAILED: LEFT LATERAL SHOULDER
LOCATION DETAILED: RIGHT LATERAL SUPERIOR CHEST
LOCATION DETAILED: RIGHT BUTTOCK
LOCATION DETAILED: LEFT MEDIAL UPPER BACK
LOCATION DETAILED: LEFT CENTRAL LATERAL NECK
LOCATION DETAILED: RIGHT CENTRAL MALAR CHEEK
LOCATION DETAILED: LEFT CENTRAL ZYGOMA
LOCATION DETAILED: RIGHT PROXIMAL RADIAL DORSAL FOREARM
LOCATION DETAILED: EPIGASTRIC SKIN
LOCATION DETAILED: NASAL SUPRATIP
LOCATION DETAILED: LEFT LATERAL EYEBROW
LOCATION DETAILED: RIGHT AXILLARY VAULT
LOCATION DETAILED: LEFT AXILLARY VAULT
LOCATION DETAILED: LEFT INFERIOR MEDIAL UPPER BACK
LOCATION DETAILED: LEFT CENTRAL MALAR CHEEK

## 2022-08-17 ASSESSMENT — LOCATION ZONE DERM
LOCATION ZONE: AXILLAE
LOCATION ZONE: ARM
LOCATION ZONE: NECK
LOCATION ZONE: TRUNK
LOCATION ZONE: NOSE
LOCATION ZONE: FACE

## 2022-08-17 NOTE — PROCEDURE: LIQUID NITROGEN
Show Applicator Variable?: Yes
Duration Of Freeze Thaw-Cycle (Seconds): 2
Post-Care Instructions: I reviewed with the patient in detail post-care instructions. Patient is to wear sunprotection, and avoid picking at any of the treated lesions. Pt may apply Vaseline to crusted or scabbing areas.
Render Note In Bullet Format When Appropriate: No
Consent: The patient's consent was obtained including but not limited to risks of crusting, scabbing, blistering, scarring, darker or lighter pigmentary change, recurrence, incomplete removal and infection.
Detail Level: Detailed
Number Of Freeze-Thaw Cycles: 2 freeze-thaw cycles

## 2022-08-25 ENCOUNTER — OFFICE VISIT (OUTPATIENT)
Dept: MEDICAL GROUP | Facility: MEDICAL CENTER | Age: 77
End: 2022-08-25
Payer: MEDICARE

## 2022-08-25 VITALS
TEMPERATURE: 97.5 F | DIASTOLIC BLOOD PRESSURE: 62 MMHG | BODY MASS INDEX: 32.64 KG/M2 | HEIGHT: 68 IN | SYSTOLIC BLOOD PRESSURE: 146 MMHG | OXYGEN SATURATION: 97 % | WEIGHT: 215.39 LBS | HEART RATE: 50 BPM

## 2022-08-25 DIAGNOSIS — Z78.0 POSTMENOPAUSAL STATUS: ICD-10-CM

## 2022-08-25 DIAGNOSIS — M15.9 PRIMARY OSTEOARTHRITIS INVOLVING MULTIPLE JOINTS: ICD-10-CM

## 2022-08-25 DIAGNOSIS — K21.9 GASTROESOPHAGEAL REFLUX DISEASE WITHOUT ESOPHAGITIS: ICD-10-CM

## 2022-08-25 DIAGNOSIS — Z12.31 ENCOUNTER FOR SCREENING MAMMOGRAM FOR BREAST CANCER: ICD-10-CM

## 2022-08-25 DIAGNOSIS — E78.5 DYSLIPIDEMIA, GOAL LDL BELOW 130: ICD-10-CM

## 2022-08-25 DIAGNOSIS — I10 ESSENTIAL HYPERTENSION: ICD-10-CM

## 2022-08-25 DIAGNOSIS — E66.9 OBESITY, CLASS I, BMI 30-34.9: ICD-10-CM

## 2022-08-25 DIAGNOSIS — G89.29 CHRONIC PAIN OF RIGHT ANKLE: ICD-10-CM

## 2022-08-25 DIAGNOSIS — M25.571 CHRONIC PAIN OF RIGHT ANKLE: ICD-10-CM

## 2022-08-25 PROCEDURE — 99214 OFFICE O/P EST MOD 30 MIN: CPT | Performed by: FAMILY MEDICINE

## 2022-08-25 RX ORDER — VITAMIN E ACETATE 125/ML
LIQUID (ML) MISCELLANEOUS
COMMUNITY
End: 2022-11-30

## 2022-08-25 RX ORDER — TRAMADOL HYDROCHLORIDE 50 MG/1
50 TABLET ORAL EVERY 4 HOURS PRN
COMMUNITY
End: 2022-08-25

## 2022-08-25 RX ORDER — TRAMADOL HYDROCHLORIDE 50 MG/1
50 TABLET ORAL EVERY 8 HOURS PRN
Qty: 90 TABLET | Refills: 2 | Status: SHIPPED | OUTPATIENT
Start: 2022-08-25 | End: 2022-11-23

## 2022-08-25 ASSESSMENT — FIBROSIS 4 INDEX: FIB4 SCORE: 1.36

## 2022-08-25 NOTE — PROGRESS NOTES
Chief Complaint   Patient presents with    Pharmacy Follow-up     Problems getting Tramadol filled    Other     [place] [facility] Cortisone injection into R knee & hip-Dr. Watkins    Medication Management    Incontinence     Pt c/o less bladder control    Shoulder Pain     R shoulder pain. Pt states the pain is similar to a burning sensation.        Subjective:     HPI:   Franchesca Paige presents today with the followin. Primary osteoarthritis involving multiple joints  She has significant arthritis which causes pain and stiffness.  She has significant left hip problem.  She is addressing this with the orthopedist.  Getting burning right shoulder pain if she lifts too much, it does come and go.     2. Chronic pain of right ankle  Chronic right ankle arthritis.    3. Dyslipidemia, goal LDL below 130  Patient denies chest pain, chest pressure, palpitations or exertional shortness of breath. Patient is not on lipid lowering medication.  She has an excellent risk ratio. Patient is a former smoker. Quit many years ago. Patient takes six 81 mg aspirin daily. Primarily to control her arthritis pain.  Patient has no history of myocardial infarction, stroke or PVD.  Follow up lab orders discussed and placed.    4. Essential hypertension  HTN - Chronic condition stable. Currently taking all meds as directed.   She is taking baby aspirin daily.   She is monitoring BP at home. The hip shot raised her blood pressure a great deal.  It did normalize.  Generally 120s/70s.  Denies symptoms low BP: light-headed, tunnel-vision, unusual fatigue.   Denies symptoms high BP:pounding headache, visual changes, palpitations, flushed face.   Denies medicine side effects: unusual fatigue, slow heartbeat, foot/leg swelling, cough.    5. Gastroesophageal reflux disease without esophagitis  No reflux symptoms at this time.  She is being careful to eat with the aspirin.  Denies any visible blood in the stool.    6. Obesity, Class I, BMI  30-34.9  Weight is very stable.      7. Encounter for screening mammogram for breast cancer  Mammogram order discussed and placed    8. Postmenopausal status  DEXA order placed.    Some loss of bladder control since the cortisone shot.  It is getting better.    Brief discussion of home issues.  Feels things are going okay at home.  Have assured her that the opportunity for counseling is available.    Patient Active Problem List    Diagnosis Date Noted    Caregiver stress 02/11/2022    Situational depression 02/11/2022    Bradycardia 11/01/2018    Ocular migraine 04/16/2018    Osteopenia of multiple sites     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/21/2018    Chronic pain of right ankle 02/08/2018    Obesity, Class I, BMI 30-34.9 02/24/2017    Need for subacute bacterial endocarditis prophylaxis 01/21/2016    Essential hypertension 06/11/2015    Status post total left knee replacement 06/11/2015    Gastroesophageal reflux disease without esophagitis 06/11/2015    Allergic rhinitis due to pollen 06/11/2015    History of bladder cancer 06/11/2015    Dyslipidemia, goal LDL below 130     Primary osteoarthritis involving multiple joints        Current medicines (including changes today)  Current Outpatient Medications   Medication Sig Dispense Refill    Vitamin E Acetate 125 UNIT/ML Liquid       aspirin EC (ECOTRIN) 81 MG Tablet Delayed Response Take 2 Tablets by mouth 4 times a day. 240 Tablet 11    traMADol (ULTRAM) 50 MG Tab Take 1 Tablet by mouth every 8 hours as needed for Moderate Pain (arthritis pain) for up to 90 days. 90 tablets is a 30 day quantity 90 Tablet 2    fosinopril (MONOPRIL) 10 MG Tab TAKE ONE TABLET BY MOUTH EVERY  Tablet 3    acyclovir (ZOVIRAX) 5 % Ointment Applied to affected area q.i.d. 1 Tube 3    Cholecalciferol (VITAMIN D3) 5000 units Cap Take 1 Cap by mouth every day.      diclofenac sodium (VOLTAREN) 1 % Gel Apply  topically 4 times a day as needed. (Patient not taking: Reported on 8/25/2022)    "    No current facility-administered medications for this visit.       Allergies   Allergen Reactions    Sulfa Drugs Vomiting       ROS: As per HPI       Objective:     BP (!) 146/62 (BP Location: Right arm, Patient Position: Sitting, BP Cuff Size: Large adult)   Pulse (!) 50   Temp 36.4 °C (97.5 °F) (Temporal)   Ht 1.727 m (5' 8\")   Wt 97.7 kg (215 lb 6.2 oz)   SpO2 97%  Body mass index is 32.75 kg/m².    Physical Exam:  Constitutional: Well-developed and well-nourished. Not diaphoretic. No distress. Lucid and fluent.  Patient, physician and staff all wearing masks.  Skin: Skin is warm and dry. No rash noted.  Head: Atraumatic without lesions.  Eyes: Conjunctivae and extraocular motions are normal. Pupils are equal, round, and reactive to light. No scleral icterus.   Ears:  External ears unremarkable.   Neck: Supple, trachea midline. No thyromegaly present. No cervical or supraclavicular lymphadenopathy. No JVD or carotid bruits appreciated  Cardiovascular: Regular rate and rhythm.  Normal S1, S2 without murmur appreciated.  Chest: Effort normal. Clear to auscultation throughout. No adventitious sounds.   Abdomen: Soft, non tender, and without distention. Active bowel sounds in all four quadrants. No rebound, guarding, masses or hepatosplenomegaly.  Extremities: No cyanosis, clubbing, erythema, nor edema.   Neurological: Alert and oriented x 3. No tremor appreciated.  Psychiatric:  Behavior, mood, and affect are appropriate.       Assessment and Plan:     76 y.o. female with the following issues:    1. Primary osteoarthritis involving multiple joints  aspirin EC (ECOTRIN) 81 MG Tablet Delayed Response    traMADol (ULTRAM) 50 MG Tab      2. Chronic pain of right ankle  aspirin EC (ECOTRIN) 81 MG Tablet Delayed Response    traMADol (ULTRAM) 50 MG Tab      3. Dyslipidemia, goal LDL below 130  Comp Metabolic Panel    Lipid Profile    TSH      4. Essential hypertension  Comp Metabolic Panel    Lipid Profile    TSH "      5. Gastroesophageal reflux disease without esophagitis  CBC WITHOUT DIFFERENTIAL      6. Obesity, Class I, BMI 30-34.9        7. Encounter for screening mammogram for breast cancer  MA-SCREENING MAMMO BILAT W/TOMOSYNTHESIS W/CAD      8. Postmenopausal status  DS-BONE DENSITY STUDY (DEXA)        Consequences of Chronic Opiate therapy:  (5 A's)  Analgesia:  improved  Activity:  improved  Adverse Events:  none reported or observed  Aberrant Behaviors:  none reported or observed  Affect/Mood: good grooming, full facial expressions, normal speech pattern and content, normal thought patterns, normal perception, good insight, normal reasoning  Last CMP:   March, normal  Appropriate Imaging done:   yes, working with orthopedics.  Patient uses the tramadol very rarely        Followup: Return in about 6 months (around 2/25/2023), or if symptoms worsen or fail to improve.

## 2022-09-16 LAB
ALBUMIN SERPL-MCNC: 4.4 G/DL (ref 3.7–4.7)
ALBUMIN/GLOB SERPL: 1.8 {RATIO} (ref 1.2–2.2)
ALP SERPL-CCNC: 135 IU/L (ref 44–121)
ALT SERPL-CCNC: 16 IU/L (ref 0–32)
AST SERPL-CCNC: 19 IU/L (ref 0–40)
BILIRUB SERPL-MCNC: 0.4 MG/DL (ref 0–1.2)
BUN SERPL-MCNC: 17 MG/DL (ref 8–27)
BUN/CREAT SERPL: 26 (ref 12–28)
CALCIUM SERPL-MCNC: 11.1 MG/DL (ref 8.7–10.3)
CHLORIDE SERPL-SCNC: 105 MMOL/L (ref 96–106)
CHOLEST SERPL-MCNC: 219 MG/DL (ref 100–199)
CO2 SERPL-SCNC: 25 MMOL/L (ref 20–29)
CREAT SERPL-MCNC: 0.65 MG/DL (ref 0.57–1)
EGFRCR SERPLBLD CKD-EPI 2021: 92 ML/MIN/1.73
ERYTHROCYTE [DISTWIDTH] IN BLOOD BY AUTOMATED COUNT: 12.9 % (ref 11.7–15.4)
GLOBULIN SER CALC-MCNC: 2.4 G/DL (ref 1.5–4.5)
GLUCOSE SERPL-MCNC: 91 MG/DL (ref 65–99)
HCT VFR BLD AUTO: 41.9 % (ref 34–46.6)
HDLC SERPL-MCNC: 79 MG/DL
HGB BLD-MCNC: 13.8 G/DL (ref 11.1–15.9)
LABORATORY COMMENT REPORT: ABNORMAL
LDLC SERPL CALC-MCNC: 118 MG/DL (ref 0–99)
MCH RBC QN AUTO: 29.7 PG (ref 26.6–33)
MCHC RBC AUTO-ENTMCNC: 32.9 G/DL (ref 31.5–35.7)
MCV RBC AUTO: 90 FL (ref 79–97)
NRBC BLD AUTO-RTO: NORMAL %
PLATELET # BLD AUTO: 248 X10E3/UL (ref 150–450)
POTASSIUM SERPL-SCNC: 4.5 MMOL/L (ref 3.5–5.2)
PROT SERPL-MCNC: 6.8 G/DL (ref 6–8.5)
RBC # BLD AUTO: 4.65 X10E6/UL (ref 3.77–5.28)
SODIUM SERPL-SCNC: 140 MMOL/L (ref 134–144)
TRIGL SERPL-MCNC: 125 MG/DL (ref 0–149)
TSH SERPL DL<=0.005 MIU/L-ACNC: 3.17 UIU/ML (ref 0.45–4.5)
VLDLC SERPL CALC-MCNC: 22 MG/DL (ref 5–40)
WBC # BLD AUTO: 7.3 X10E3/UL (ref 3.4–10.8)

## 2022-10-20 ENCOUNTER — TELEPHONE (OUTPATIENT)
Dept: MEDICAL GROUP | Facility: MEDICAL CENTER | Age: 77
End: 2022-10-20
Payer: MEDICARE

## 2022-10-20 NOTE — TELEPHONE ENCOUNTER
Pt LVM stating she will be having a hip replacement on 11/18 @ [facility]. Pt is in need of surgical clearance. Please advise.

## 2022-10-21 NOTE — TELEPHONE ENCOUNTER
She last saw me August 25 in the office which is within the 3-month window for surgical clearance so I will be able to do that for her.  Does she know if they are sending me a form?

## 2022-10-24 ENCOUNTER — HOSPITAL ENCOUNTER (OUTPATIENT)
Dept: RADIOLOGY | Facility: MEDICAL CENTER | Age: 77
End: 2022-10-24
Attending: FAMILY MEDICINE
Payer: MEDICARE

## 2022-10-24 DIAGNOSIS — Z12.31 ENCOUNTER FOR SCREENING MAMMOGRAM FOR BREAST CANCER: ICD-10-CM

## 2022-10-24 DIAGNOSIS — I10 ESSENTIAL HYPERTENSION: ICD-10-CM

## 2022-10-24 DIAGNOSIS — Z78.0 POSTMENOPAUSAL STATUS: ICD-10-CM

## 2022-10-24 PROCEDURE — 77063 BREAST TOMOSYNTHESIS BI: CPT

## 2022-10-24 PROCEDURE — 77080 DXA BONE DENSITY AXIAL: CPT

## 2022-10-25 RX ORDER — FOSINOPRIL SODIUM 10 MG/1
TABLET ORAL
Qty: 100 TABLET | Refills: 3 | Status: SHIPPED
Start: 2022-10-25 | End: 2023-02-01

## 2022-10-29 ENCOUNTER — TELEPHONE (OUTPATIENT)
Dept: MEDICAL GROUP | Facility: MEDICAL CENTER | Age: 77
End: 2022-10-29
Payer: MEDICARE

## 2022-10-29 NOTE — LETTER
2022         Dr. Roland Moss [facility]    Fax number [phone]    Patient: Franchesca Paige   MR Number: [id number]   YOB: 1945   Date of Visit: 2022     Dear Dr. Watkins;    REASON FOR VISIT: Pre-Op Consultation  Consultation Requested by: [facility]  Procedure date and type: 2022  Left total hip arthroplasty    History of condition for which surgery is planned:severe progressive osteoarthritis    Current chronic conditions:  has a past medical history of Arthritis, Bladder cancer (HCC), Caregiver stress (2022), Dizziness (2018), Dyslipidemia, goal LDL below 130, Hydronephrosis (2019), Hypertension (), Ocular migraine, Osteopenia, Osteopenia of multiple sites, and Total knee replacement status.    Past medical history:  has a past medical history of Arthritis, Bladder cancer (HCC), Caregiver stress (2022), Dizziness (2018), Dyslipidemia, goal LDL below 130, Hydronephrosis (2019), Hypertension (), Ocular migraine, Osteopenia, Osteopenia of multiple sites, and Total knee replacement status.. Negative for: CAD, SBE, CVA, TIA, DVT, PE, bleeding requiring transfusion, intubation.  Surgical and anesthetic history:  has a past surgical history that includes hysterectomy, total abdominal (); colonoscopy (8/3/2010); knee replacement, total (12); and other (Right, 10/07/2018). Prior surgery without complication, bleeding, reaction to anesthetic, prolonged recovery  Habits:   Social History     Tobacco Use   • Smoking status: Former     Packs/day: 1.00     Types: Cigarettes     Start date: 1964     Quit date: 2000     Years since quittin.5   • Smokeless tobacco: Never   Vaping Use   • Vaping Use: Never used   Substance Use Topics   • Alcohol use: No     Alcohol/week: 0.0 oz   • Drug use: No     Allergies: Sulfa drugs No known allergy to Anesthetic, or Latex.     Current medicines:   Current Outpatient Medications    Medication Sig Dispense Refill   • fosinopril (MONOPRIL) 10 MG Tab TAKE ONE TABLET BY MOUTH EVERY  Tablet 3   • Vitamin E Acetate 125 UNIT/ML Liquid      • aspirin EC (ECOTRIN) 81 MG Tablet Delayed Response Take 2 Tablets by mouth 4 times a day. 240 Tablet 11   • traMADol (ULTRAM) 50 MG Tab Take 1 Tablet by mouth every 8 hours as needed for Moderate Pain (arthritis pain) for up to 90 days. 90 tablets is a 30 day quantity 90 Tablet 2   • diclofenac sodium (VOLTAREN) 1 % Gel Apply  topically 4 times a day as needed. (Patient not taking: Reported on 8/25/2022)     • acyclovir (ZOVIRAX) 5 % Ointment Applied to affected area q.i.d. 1 Tube 3   • Cholecalciferol (VITAMIN D3) 5000 units Cap Take 1 Cap by mouth every day.       No current facility-administered medications for this visit.     Anticoagulant: she will discontinue aspirin 10 days prior to procedure   Herbals: none             ROS: negative for: CP, SOB, LANE, Orthopnea, wheezing, leg edema, polydipsia, polyuria, fevers, chills, sweats, cough, cold, congestion, abd pain, reflux, black or bloody stools, weight loss/gain.  Functional Status: ambulatory, uses assistive device, prolonged immobility, lives alone, requires PT/FT care      PHYSICAL EXAMINATION:  VITAL SIGNS:  /62, pulse 50, temp 97.5F, respirations 14, weight 215.39  Height 5'8'  SpO2 97  HEENT: EOMI, PERRL. Oropharynx pink, moist. Mallampati: uvula, fauces and tonsillar pillars visible   Neck supple, no cervical lymphadenopathy.   LUNGS: CTAB good excursion.   HEART: RRR no murmur.  ABDOMEN: soft, nondistended, nontender normal BS. No HSM.  LOWER EXTREMITIES: warm and well perfused with no edema.    Labs: See attached.    IMPRESSION:  1. Severe left hip osteoarthritis limiting activity  2. Planned surgery: left total hip arthroplasty   3. High risk medical conditions: negative for Cardiac, Pulmonary, Bleeding, Poor healing, Thrombosis, Debility    PLAN:  1. Chronic medical conditions:  Stable and controlled. Continue current medicines.   2. Avoid drugs that potentiate bleeding as advised by surgeon  3. Discontinue all herbal supplements 2 weeks prior to surgery.  4. Need for SBE prophylaxis: no  5. This patient is considered low risk for cardiopulmonary complications for this planned surgery.    Mario Index for assessing perioperative cardiovascular risk [Circulation 1999;100:1047]:   (one point each for * high-risk surgery [ intrathoracic, suprainguinal vascular, intraperitoneal ],* Hx ischemic heart dz, * Hx CHF, *Hx TIA or CVA, *IDDM, *Serum Cr >2.0)   Interpretation of risk: (Complication = MI, Pulm edema, v-fib or cardiac arrest, complete heart block)  Very Low: 0 points = 0.4 % complication. Low: 1 point = 0.9 %, Moderate: 2 points = 6.6 %, High: 3+ points = 11%.    Sincerely,                        Tate De La Rosa M.D.

## 2022-11-09 ENCOUNTER — PATIENT MESSAGE (OUTPATIENT)
Dept: HEALTH INFORMATION MANAGEMENT | Facility: OTHER | Age: 77
End: 2022-11-09

## 2022-11-30 PROBLEM — M16.12 OSTEOARTHRITIS OF LEFT HIP: Status: ACTIVE | Noted: 2022-11-30

## 2022-11-30 RX ORDER — TRAMADOL HYDROCHLORIDE 50 MG/1
50 TABLET ORAL EVERY 4 HOURS PRN
Status: ON HOLD | COMMUNITY
End: 2023-10-07

## 2022-11-30 RX ORDER — ACETAMINOPHEN 650 MG/1
650 SUPPOSITORY RECTAL EVERY 4 HOURS PRN
COMMUNITY
End: 2023-02-01

## 2023-01-10 ENCOUNTER — PRE-ADMISSION TESTING (OUTPATIENT)
Dept: ADMISSIONS | Facility: MEDICAL CENTER | Age: 78
End: 2023-01-10
Attending: ORTHOPAEDIC SURGERY
Payer: MEDICARE

## 2023-01-10 DIAGNOSIS — Z01.812 PRE-OPERATIVE LABORATORY EXAMINATION: ICD-10-CM

## 2023-01-10 DIAGNOSIS — M16.12 PRIMARY OSTEOARTHRITIS OF LEFT HIP: ICD-10-CM

## 2023-01-10 LAB
ABO GROUP BLD: NORMAL
ANION GAP SERPL CALC-SCNC: 11 MMOL/L (ref 7–16)
APPEARANCE UR: ABNORMAL
APTT PPP: 28.7 SEC (ref 24.7–36)
BASOPHILS # BLD AUTO: 0.4 % (ref 0–1.8)
BASOPHILS # BLD: 0.03 K/UL (ref 0–0.12)
BILIRUB UR QL STRIP.AUTO: NEGATIVE
BUN SERPL-MCNC: 19 MG/DL (ref 8–22)
CALCIUM SERPL-MCNC: 11.3 MG/DL (ref 8.4–10.2)
CHLORIDE SERPL-SCNC: 104 MMOL/L (ref 96–112)
CO2 SERPL-SCNC: 22 MMOL/L (ref 20–33)
COLOR UR: YELLOW
CREAT SERPL-MCNC: 0.62 MG/DL (ref 0.5–1.4)
EKG IMPRESSION: NORMAL
EOSINOPHIL # BLD AUTO: 0.05 K/UL (ref 0–0.51)
EOSINOPHIL NFR BLD: 0.7 % (ref 0–6.9)
EPI CELLS #/AREA URNS HPF: NORMAL /HPF
ERYTHROCYTE [DISTWIDTH] IN BLOOD BY AUTOMATED COUNT: 42.8 FL (ref 35.9–50)
GFR SERPLBLD CREATININE-BSD FMLA CKD-EPI: 93 ML/MIN/1.73 M 2
GLUCOSE SERPL-MCNC: 124 MG/DL (ref 65–99)
GLUCOSE UR STRIP.AUTO-MCNC: NEGATIVE MG/DL
HCT VFR BLD AUTO: 41.2 % (ref 37–47)
HGB BLD-MCNC: 13.9 G/DL (ref 12–16)
IMM GRANULOCYTES # BLD AUTO: 0.02 K/UL (ref 0–0.11)
IMM GRANULOCYTES NFR BLD AUTO: 0.3 % (ref 0–0.9)
INR PPP: 1.03 (ref 0.87–1.13)
KETONES UR STRIP.AUTO-MCNC: NEGATIVE MG/DL
LEUKOCYTE ESTERASE UR QL STRIP.AUTO: NEGATIVE
LYMPHOCYTES # BLD AUTO: 2.16 K/UL (ref 1–4.8)
LYMPHOCYTES NFR BLD: 29.3 % (ref 22–41)
MCH RBC QN AUTO: 30.1 PG (ref 27–33)
MCHC RBC AUTO-ENTMCNC: 33.7 G/DL (ref 33.6–35)
MCV RBC AUTO: 89.2 FL (ref 81.4–97.8)
MICRO URNS: ABNORMAL
MONOCYTES # BLD AUTO: 0.52 K/UL (ref 0–0.85)
MONOCYTES NFR BLD AUTO: 7 % (ref 0–13.4)
MUCOUS THREADS #/AREA URNS HPF: NORMAL /HPF
NEUTROPHILS # BLD AUTO: 4.6 K/UL (ref 2–7.15)
NEUTROPHILS NFR BLD: 62.3 % (ref 44–72)
NITRITE UR QL STRIP.AUTO: NEGATIVE
NRBC # BLD AUTO: 0 K/UL
NRBC BLD-RTO: 0 /100 WBC
PH UR STRIP.AUTO: 6.5 [PH] (ref 5–8)
PLATELET # BLD AUTO: 276 K/UL (ref 164–446)
PMV BLD AUTO: 9.6 FL (ref 9–12.9)
POTASSIUM SERPL-SCNC: 3.9 MMOL/L (ref 3.6–5.5)
PROT UR QL STRIP: NEGATIVE MG/DL
PROTHROMBIN TIME: 13.4 SEC (ref 12–14.6)
RBC # BLD AUTO: 4.62 M/UL (ref 4.2–5.4)
RBC UR QL AUTO: NEGATIVE
RH BLD: NORMAL
SCCMEC + MECA PNL NOSE NAA+PROBE: NEGATIVE
SCCMEC + MECA PNL NOSE NAA+PROBE: POSITIVE
SODIUM SERPL-SCNC: 137 MMOL/L (ref 135–145)
SP GR UR STRIP.AUTO: 1.01
UNIDENT CRYS URNS QL MICRO: NORMAL /HPF
WBC # BLD AUTO: 7.4 K/UL (ref 4.8–10.8)
WBC #/AREA URNS HPF: NORMAL /HPF

## 2023-01-10 PROCEDURE — 36415 COLL VENOUS BLD VENIPUNCTURE: CPT

## 2023-01-10 PROCEDURE — 85610 PROTHROMBIN TIME: CPT

## 2023-01-10 PROCEDURE — 93010 ELECTROCARDIOGRAM REPORT: CPT | Performed by: INTERNAL MEDICINE

## 2023-01-10 PROCEDURE — 85730 THROMBOPLASTIN TIME PARTIAL: CPT

## 2023-01-10 PROCEDURE — 86901 BLOOD TYPING SEROLOGIC RH(D): CPT

## 2023-01-10 PROCEDURE — 87641 MR-STAPH DNA AMP PROBE: CPT

## 2023-01-10 PROCEDURE — 81001 URINALYSIS AUTO W/SCOPE: CPT

## 2023-01-10 PROCEDURE — G0475 HIV COMBINATION ASSAY: HCPCS

## 2023-01-10 PROCEDURE — 93005 ELECTROCARDIOGRAM TRACING: CPT

## 2023-01-10 PROCEDURE — 86900 BLOOD TYPING SEROLOGIC ABO: CPT

## 2023-01-10 PROCEDURE — 85025 COMPLETE CBC W/AUTO DIFF WBC: CPT

## 2023-01-10 PROCEDURE — 80048 BASIC METABOLIC PNL TOTAL CA: CPT

## 2023-01-10 PROCEDURE — 87640 STAPH A DNA AMP PROBE: CPT | Mod: XU

## 2023-01-10 ASSESSMENT — FIBROSIS 4 INDEX: FIB4 SCORE: 1.44

## 2023-01-10 NOTE — DISCHARGE PLANNING
DISCHARGE PLANNING NOTE - TOTAL JOINT    Procedure: Procedure(s):  LEFT TOTAL HIP ARTHROPLASTY ANTERIOR APPROACH  Procedure Date: 1/24/2023  Insurance: Payor: MEDICARE / Plan: MEDICARE PART A & B / Product Type: *No Product type* /    Equipment currently available at home?  front-wheel walker, raised toilet seat, and shower chair  Steps into the home? 2  Steps within the home? 0  Toilet height? ADA  Type of shower? walk-in shower  Who will be with you during your recovery? spouse,   Is Outpatient Physical Therapy set up after surgery? No   Did you take the Total Joint Class and where? Yes  Planning same day discharge?Yes     This writer met with pt during her preadmission appt and educated to preop showers, nasal mrsa screen and potential overnight stay.Home safety checklist reviewed and copy given to pt. Pt educated to dc criteria. All questions answered and verbalizes understanding of all instructions. No dc needs identified at this time. Anticipate dc to home without barriers.

## 2023-01-10 NOTE — OR NURSING
Patient instructed to continue prescribed medications through the day before surgery, verbal and written instructions given to take the following medications the day of surgery per anesthesia protocol: TYLENOL, ULTRAM, pt repeated which meds to take DOS.          Verbal and written, and pre-admit video website instructions provided via email. Pt and  states they watched the video.    Questions answered and denies concerns.    FALL RISK ordered and protocol initiated.

## 2023-01-11 LAB — HIV 1+2 AB+HIV1 P24 AG SERPL QL IA: NORMAL

## 2023-01-23 NOTE — H&P
Surgery Orthopedic History & Physical Note    Date  1/23/2023    Primary Care Physician  Tate De La Rosa M.D.    CC  Pre-Op Diagnosis Codes:     * Osteoarthritis of left hip [M16.12]    HPI  This is a 77 y.o. female who presented with severe pain left hip which is been going on for over 10 years.  Says it is gradually gotten worse and especially in the last couple of years its become extremely debilitating.  She has difficulty walking without a walker.  She is tried for injections in the past and they have stopped working.  Anti-inflammatories are not getting rid of the pain.  Activities a day living which require standing walking are difficult.  Pain Assessment  Pain Assessment: 0-10  Pain Score: 8    Past Medical History:   Diagnosis Date    Arthritis     Bladder cancer (HCC)     bladder cancer 2007 , march    Caregiver stress 02/11/2022    COVID-19     Childhood and mumps and measles    Delayed emergence from general anesthesia     Dizziness 02/22/2018    Dyslipidemia, goal LDL below 130     Hydronephrosis 02/11/2019    Hypertension 1992    Ocular migraine     Osteopenia     DEXA 9/24/12    Osteopenia of multiple sites     Total knee replacement status        Past Surgical History:   Procedure Laterality Date    OTHER Right 10/07/2018    vein ablation right thigh, Dr. Azevedo    KNEE REPLACEMENT, TOTAL  12/04/2012    Dr. Harry, left knee    COLONOSCOPY  08/03/2010    diverticulosis only, due in 2020    ORIF, WRIST Left 2008    HARDWARE REMOVAL ORTHO Left 2008    wrist    HYSTERECTOMY, TOTAL ABDOMINAL  1980s    has one half of an ovary and a fallopian tube       No current facility-administered medications for this encounter.     Current Outpatient Medications   Medication Sig Dispense Refill    acetaminophen (TYLENOL) 650 MG Suppos Insert 650 mg into the rectum every four hours as needed. 3 TIMES A DAY      traMADol (ULTRAM) 50 MG Tab Take 50 mg by mouth every four hours as needed. 1/4 OF A PILL A DAY       mupirocin (BACTROBAN) 2 % Ointment Swab 2x daily in each nostril for 5 days 22 g 0    fosinopril (MONOPRIL) 10 MG Tab TAKE ONE TABLET BY MOUTH EVERY  Tablet 3    diclofenac sodium (VOLTAREN) 1 % Gel Apply  topically 4 times a day as needed.      Cholecalciferol (VITAMIN D3) 5000 units Cap Take 1 Cap by mouth every day.         Social History     Occupational History    Not on file   Tobacco Use    Smoking status: Former     Packs/day: 1.00     Types: Cigarettes     Start date: 1964     Quit date: 2000     Years since quittin.7    Smokeless tobacco: Never   Vaping Use    Vaping Use: Never used   Substance and Sexual Activity    Alcohol use: No     Alcohol/week: 0.0 oz    Drug use: No    Sexual activity: Yes       Family History   Problem Relation Age of Onset    Hypertension Mother     Psychiatric Illness Mother         paranoid schizophrenic    Hypertension Father         heavy smoker    Diabetes Other         niece    Cancer Sister         skin    Cancer Paternal Aunt         breast    Psychiatric Illness Sister         paranoid schizophrenic       Allergies  Sulfa drugs    Review of Systems  Pertinent ROS in HPI. Other ROS reviewed and found to be otherwise unremarkable.       Physical Exam  Cardiovascular:      Pulses: Normal pulses.   Pulmonary:      Effort: Pulmonary effort is normal.   Ortho Exam  Patient walks antalgic gait favoring the left side  Severe pain internal rotation left hip  Difficulty flexing past 90 degrees left hip  Cannot internally rotate past 10 degrees left hip  Neurovascularly intact left lower extremity    Radiology:  Severe degenerative arthritis bone-on-bone loss of joint space ossified production cyst formation left hip      Assessment/Plan:  Pre-Op Diagnosis Codes:     * Osteoarthritis of left hip [M16.12]  Procedure(s):  LEFT TOTAL HIP ARTHROPLASTY ANTERIOR APPROACH    Patient is failed conservative measures over the last 10 years for left hip arthritis.  We will  set her up for total hip replacement.  Risk and benefits of surgery were discussed with patient.    Patient has been educated about choice for planned prosthesis and rationale for its use.  Patient understands and wishes to proceed.

## 2023-01-24 ENCOUNTER — HOSPITAL ENCOUNTER (OUTPATIENT)
Facility: MEDICAL CENTER | Age: 78
End: 2023-01-24
Attending: ORTHOPAEDIC SURGERY | Admitting: ORTHOPAEDIC SURGERY
Payer: MEDICARE

## 2023-01-24 ENCOUNTER — APPOINTMENT (OUTPATIENT)
Dept: RADIOLOGY | Facility: MEDICAL CENTER | Age: 78
End: 2023-01-24
Attending: ORTHOPAEDIC SURGERY
Payer: MEDICARE

## 2023-01-24 VITALS
SYSTOLIC BLOOD PRESSURE: 235 MMHG | TEMPERATURE: 97 F | WEIGHT: 211.53 LBS | HEART RATE: 52 BPM | DIASTOLIC BLOOD PRESSURE: 102 MMHG | BODY MASS INDEX: 32.06 KG/M2 | HEIGHT: 68 IN | OXYGEN SATURATION: 98 %

## 2023-01-24 DIAGNOSIS — Z01.812 PRE-OPERATIVE LABORATORY EXAMINATION: ICD-10-CM

## 2023-01-24 PROCEDURE — A9270 NON-COVERED ITEM OR SERVICE: HCPCS | Performed by: STUDENT IN AN ORGANIZED HEALTH CARE EDUCATION/TRAINING PROGRAM

## 2023-01-24 PROCEDURE — 700102 HCHG RX REV CODE 250 W/ 637 OVERRIDE(OP): Performed by: STUDENT IN AN ORGANIZED HEALTH CARE EDUCATION/TRAINING PROGRAM

## 2023-01-24 RX ORDER — ACETAMINOPHEN 500 MG
1000 TABLET ORAL ONCE
Status: COMPLETED | OUTPATIENT
Start: 2023-01-24 | End: 2023-01-24

## 2023-01-24 RX ORDER — CEFAZOLIN SODIUM 1 G/3ML
2 INJECTION, POWDER, FOR SOLUTION INTRAMUSCULAR; INTRAVENOUS ONCE
Status: DISCONTINUED | OUTPATIENT
Start: 2023-01-24 | End: 2023-01-24 | Stop reason: HOSPADM

## 2023-01-24 RX ORDER — CELECOXIB 200 MG/1
200 CAPSULE ORAL ONCE
Status: COMPLETED | OUTPATIENT
Start: 2023-01-24 | End: 2023-01-24

## 2023-01-24 RX ORDER — OXYCODONE HCL 10 MG/1
10 TABLET, FILM COATED, EXTENDED RELEASE ORAL ONCE
Status: COMPLETED | OUTPATIENT
Start: 2023-01-24 | End: 2023-01-24

## 2023-01-24 RX ORDER — TRANEXAMIC ACID 100 MG/ML
1000 INJECTION, SOLUTION INTRAVENOUS ONCE
Status: DISCONTINUED | OUTPATIENT
Start: 2023-01-24 | End: 2023-01-24 | Stop reason: HOSPADM

## 2023-01-24 RX ORDER — SODIUM CHLORIDE, SODIUM LACTATE, POTASSIUM CHLORIDE, CALCIUM CHLORIDE 600; 310; 30; 20 MG/100ML; MG/100ML; MG/100ML; MG/100ML
INJECTION, SOLUTION INTRAVENOUS CONTINUOUS
Status: DISCONTINUED | OUTPATIENT
Start: 2023-01-24 | End: 2023-01-24 | Stop reason: HOSPADM

## 2023-01-24 RX ORDER — HYDRALAZINE HYDROCHLORIDE 20 MG/ML
20 INJECTION INTRAMUSCULAR; INTRAVENOUS
Status: CANCELLED | OUTPATIENT
Start: 2023-01-24 | End: 2023-01-25

## 2023-01-24 RX ADMIN — ACETAMINOPHEN 1000 MG: 500 TABLET ORAL at 08:05

## 2023-01-24 RX ADMIN — CELECOXIB 200 MG: 200 CAPSULE ORAL at 08:05

## 2023-01-24 RX ADMIN — OXYCODONE HYDROCHLORIDE 10 MG: 10 TABLET, FILM COATED, EXTENDED RELEASE ORAL at 08:05

## 2023-01-24 ASSESSMENT — FIBROSIS 4 INDEX: FIB4 SCORE: 1.29

## 2023-01-24 NOTE — LETTER
December 20, 2022    Patient Name: Franchesca Paige  Surgeon Name: Arvind Diaz M.D.  [facility] [facility] [facility]  Surgery Date: 1/24/2023    The time of your surgery is not final and may change up to and until the day of your surgery. You will be contacted 24-48 hours prior to your surgery date with your check-in and surgery time.    If you will not be at one of the below numbers please call the surgery scheduler at [phone]  Preferred Phone: [phone]    BEFORE YOUR SURGERY   Pre Registration and/or Lab Work must be done within and no earlier than 28 days prior to your surgery date. Please call [facility] at [phone] for an appointment as soon as possible.    DAY OF YOUR SURGERY  Nothing to eat or drink after midnight     Refrain from smoking any substance after midnight prior to surgery. Smoking may interfere with the anesthetic and frequently produces nausea during the recovery period.    Continue taking all lifesaving medications. Including the morning of your surgery with small sip of water.    Please do NOT take on the day of surgery:  Diuretics: examples- furosemide (Lasix), spironolactone, hydrochlorothiazide  ACE-inhibitors: examples- lisinopril, ramipril, enalapril  “ARBs”: examples- losartan, Olmesartan, valsartan    Please arrive at the hospital/surgery center at the check-in time provided.     An adult will need to bring you and take you home after your surgery.     AFTER YOUR SURGERY  Post op Appointment:   Date: 2/8   Time: 8:30 AM   With: Arvind Diaz M.D.   Location: [address] [place] [address]    - No dental work for 3-6 months after your surgery.  - Post Surgery - You will need someone to stay with you for 24 hours         TIME OFF WORK  FMLA or Disability forms can be faxed directly to: [phone] or you may drop them off at [address] [place] [address]. Our office charges  a $35.00 fee per form. Forms will be completed within 10 business days of drop off and payment received. For the status of your forms you may contact our disability office directly [phone].    MEDICATION INSTRUCTIONS **Please read section completely**    The following medications should be stopped a minimum of 10 days prior to surgery:  All over the counter, Supplements & Herbal medications    Anorectics: Phentermine (Adipex-P, Lomaira and Suprenza), Phentermine-topiramate (Qsymia), Bupropion-naltrexone (Contrave)    Opiod Partial Agonists/Opioid Antagonists: Buprenorphine (Subocone, Belbuca, Butrans, Probuphine Implant, Sublocade), Naltrexone (ReVia, Vivitrol), Naloxone    Amphetamines: Dextroamphetamine/Amphetamine (Adderall, Mydayis), Methylphenidate Hydrochloride (Concerta, Metadate, Methylin, Ritalin)    The following medications should be stopped 5 days prior to surgery:  Blood Thinners: Any Aspirin, Aspirin products, anti-inflammatories such as ibuprofen and any blood thinners such as Coumadin and Plavix. Please consult your prescribing physician if you are on life saving blood thinners, in regards to when to stop medications prior to surgery.     The following medications should be stopped a minimum of 3 days prior to surgery:  PDE-5 inhibitors: Sildenafil (Viagra), Tadalafil (Cialis), Vardenafil (Levitra), Avanafil (Stendra)    MAO Inhibitors: Rasagiline (Azilect), Selegiline (Eldepryl, Emsam, Selapar), Isocarboxazid (Marplan), Phenelzine (Nardil)

## 2023-01-24 NOTE — OR NURSING
Pt BP elevated (see flowsheets), Dr. Gerber notified. Recommended giving multimodals as pt states being in significant pain. Relaxing interventions provided, medicated per MAR.     0840 Pt BP still elevated, Dr. Gerber notified. Case to be canceled due to elevated BP.     0923 Dr. Gerber at bedside, pt educated. Provided with clothing to get dressed.     0940 No IV placed, pt escorted to waiting area with . All belongings with pt when leaving

## 2023-02-01 ENCOUNTER — OFFICE VISIT (OUTPATIENT)
Dept: MEDICAL GROUP | Facility: MEDICAL CENTER | Age: 78
End: 2023-02-01
Payer: MEDICARE

## 2023-02-01 VITALS
HEIGHT: 68 IN | TEMPERATURE: 97 F | RESPIRATION RATE: 14 BRPM | OXYGEN SATURATION: 99 % | WEIGHT: 215.8 LBS | HEART RATE: 53 BPM | DIASTOLIC BLOOD PRESSURE: 80 MMHG | BODY MASS INDEX: 32.71 KG/M2 | SYSTOLIC BLOOD PRESSURE: 164 MMHG

## 2023-02-01 DIAGNOSIS — M81.0 OSTEOPOROSIS OF FEMUR WITHOUT PATHOLOGICAL FRACTURE: ICD-10-CM

## 2023-02-01 DIAGNOSIS — E83.52 SERUM CALCIUM ELEVATED: ICD-10-CM

## 2023-02-01 DIAGNOSIS — M16.12 PRIMARY OSTEOARTHRITIS OF LEFT HIP: ICD-10-CM

## 2023-02-01 DIAGNOSIS — I10 ESSENTIAL HYPERTENSION: ICD-10-CM

## 2023-02-01 DIAGNOSIS — E66.9 OBESITY, CLASS I, BMI 30-34.9: ICD-10-CM

## 2023-02-01 DIAGNOSIS — E55.9 VITAMIN D DEFICIENCY: ICD-10-CM

## 2023-02-01 DIAGNOSIS — M85.89 OSTEOPENIA OF MULTIPLE SITES: ICD-10-CM

## 2023-02-01 PROCEDURE — 99214 OFFICE O/P EST MOD 30 MIN: CPT | Performed by: FAMILY MEDICINE

## 2023-02-01 RX ORDER — FOSINOPRIL SODIUM 20 MG/1
20 TABLET ORAL
Qty: 90 TABLET | Refills: 3 | Status: SHIPPED | OUTPATIENT
Start: 2023-02-01 | End: 2023-02-06 | Stop reason: SDUPTHER

## 2023-02-01 RX ORDER — MULTIVIT-MIN/IRON/FOLIC ACID/K 18-600-40
1 CAPSULE ORAL DAILY
Qty: 30 CAPSULE | Refills: 11 | COMMUNITY
Start: 2023-02-01

## 2023-02-01 RX ORDER — AMLODIPINE BESYLATE 5 MG/1
5 TABLET ORAL DAILY
Qty: 90 TABLET | Refills: 3 | Status: SHIPPED | OUTPATIENT
Start: 2023-02-01 | End: 2023-12-13

## 2023-02-01 ASSESSMENT — PATIENT HEALTH QUESTIONNAIRE - PHQ9
SUM OF ALL RESPONSES TO PHQ QUESTIONS 1-9: 4
5. POOR APPETITE OR OVEREATING: 0 - NOT AT ALL
CLINICAL INTERPRETATION OF PHQ2 SCORE: 2

## 2023-02-01 ASSESSMENT — FIBROSIS 4 INDEX: FIB4 SCORE: 1.29

## 2023-02-01 NOTE — PROGRESS NOTES
Chief Complaint   Patient presents with    Hypertension     Pt was scheduled for hip replacement x1w ago due to BP elevation.        Subjective:     HPI:   Franchesca Paige presents today with the followin. Essential hypertension  HTN - Chronic condition stable. Currently taking all meds as directed.   She is taking baby aspirin daily.  She is taking 9 per day.  She is taking this for pain but it was giving her GI upset.  She is monitoring BP at home. Has been 180-138 over 80s.  Better off the high dose tylenol.  Pressure significantly high today.  Denies symptoms low BP: light-headed, tunnel-vision, unusual fatigue.   Denies symptoms high BP:pounding headache, visual changes, palpitations, flushed face.   Denies medicine side effects: unusual fatigue, slow heartbeat, foot/leg swelling, cough.  Have increased the fosinopril to 20 mg.   Have added amlodipine 5 mg one per day.  At surgery she was 200/90 then 220/100.  Surgery was stopped.  This will be rescheduled once she is under good BP control.  She will communicate with me via [facility] and give me her blood pressure results.    2. Primary osteoarthritis of left hip  Patient has severe left hip osteoarthritis which is a source of significant chronic pain.  She is scheduled for hip replacement but this of course could go forward until her blood pressure is in better control.    3. Vitamin D deficiency/Osteopenia of multiple sites/Serum calcium elevated/osteoporosis left femur without fracture  Patient does have osteoporosis of the left femur on her last imaging and osteopenia in the lumbar spine.  She does have known vitamin D deficiency.  She is taking vitamin D, this is emphasized.  Weightbearing exercise is also emphasized.  She may need other treatment but this cannot be initiated prior to surgery.    4. Obesity, Class I, BMI 30-34.9  Patient has been trying very hard to continue her gradual weight loss to reduce the pressure on her  "joints.        Patient Active Problem List    Diagnosis Date Noted    Osteoporosis of femur without pathological fracture 02/01/2023    Osteoarthritis of left hip 11/30/2022    Caregiver stress 02/11/2022    Situational depression 02/11/2022    Bradycardia 11/01/2018    Ocular migraine 04/16/2018    Osteopenia of multiple sites     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/21/2018    Chronic pain of right ankle 02/08/2018    Obesity, Class I, BMI 30-34.9 02/24/2017    Need for subacute bacterial endocarditis prophylaxis 01/21/2016    Essential hypertension 06/11/2015    Status post total left knee replacement 06/11/2015    Gastroesophageal reflux disease without esophagitis 06/11/2015    Allergic rhinitis due to pollen 06/11/2015    History of bladder cancer 06/11/2015    Dyslipidemia, goal LDL below 130     Primary osteoarthritis involving multiple joints        Current medicines (including changes today)  Current Outpatient Medications   Medication Sig Dispense Refill    fosinopril (MONOPRIL) 20 MG Tab Take 1 Tablet by mouth every day. 90 Tablet 3    amLODIPine (NORVASC) 5 MG Tab Take 1 Tablet by mouth every day. 90 Tablet 3    Cholecalciferol (VITAMIN D) 50 MCG (2000 UT) Cap Take 1 Capsule by mouth every day. 30 Capsule 11    mupirocin (BACTROBAN) 2 % Ointment Swab 2x daily in each nostril for 5 days 22 g 0    traMADol (ULTRAM) 50 MG Tab Take 50 mg by mouth every four hours as needed. 1/4 OF A PILL A DAY      diclofenac sodium (VOLTAREN) 1 % Gel Apply  topically 4 times a day as needed.       No current facility-administered medications for this visit.       Allergies   Allergen Reactions    Sulfa Drugs Vomiting       ROS: As per HPI.  Denies chest pain or shortness of breath.         Objective:     BP (!) 164/80 (BP Location: Right arm, Patient Position: Sitting, BP Cuff Size: Large adult)   Pulse (!) 53   Temp 36.1 °C (97 °F) (Temporal)   Ht 1.727 m (5' 8\")   Wt 97.9 kg (215 lb 12.8 oz)   SpO2 99%  Body mass index is " 32.81 kg/m².    Physical Exam:  Constitutional: Well-developed and well-nourished. Not diaphoretic. No distress. Lucid and fluent.  Patient, physician and staff all wearing masks.  Skin: Skin is warm and dry. No rash noted.  Head: Atraumatic without lesions.  Eyes: Conjunctivae and extraocular motions are normal. Pupils are equal, round, and reactive to light. No scleral icterus.   Ears:  External ears unremarkable.   Neck: Supple, trachea midline. No thyromegaly present. No cervical or supraclavicular lymphadenopathy. No JVD or carotid bruits appreciated  Cardiovascular: Regular rate and rhythm.  Normal S1, S2 without murmur appreciated.  Chest: Effort normal. Clear to auscultation throughout. No adventitious sounds.   Abdomen: Soft, non tender, and without distention. Active bowel sounds in all four quadrants. No rebound, guarding, masses or hepatosplenomegaly.  Extremities: No cyanosis, clubbing, erythema, nor edema.   Neurological: Alert and oriented x 3. No tremor appreciated.  Psychiatric:  Behavior, mood, and affect are appropriate.       Assessment and Plan:     77 y.o. female with the following issues:    1. Essential hypertension  fosinopril (MONOPRIL) 20 MG Tab    amLODIPine (NORVASC) 5 MG Tab      2. Primary osteoarthritis of left hip        3. Vitamin D deficiency        4. Osteopenia of multiple sites        5. Osteoporosis of femur without pathological fracture        6. Serum calcium elevated  Cholecalciferol (VITAMIN D) 50 MCG (2000 UT) Cap      7. Obesity, Class I, BMI 30-34.9              Followup: Return in about 3 months (around 5/1/2023), or if symptoms worsen or fail to improve.

## 2023-02-06 ENCOUNTER — TELEPHONE (OUTPATIENT)
Dept: MEDICAL GROUP | Facility: MEDICAL CENTER | Age: 78
End: 2023-02-06
Payer: MEDICARE

## 2023-02-06 DIAGNOSIS — I10 ESSENTIAL HYPERTENSION: ICD-10-CM

## 2023-02-06 RX ORDER — FOSINOPRIL SODIUM 20 MG/1
20 TABLET ORAL
Qty: 90 TABLET | Refills: 3 | Status: SHIPPED | OUTPATIENT
Start: 2023-02-06 | End: 2023-12-13

## 2023-02-06 NOTE — TELEPHONE ENCOUNTER
Pt LVM stating the pharmacy has cancelled her Lisinopril rx due to confusion with dosage adjustments. Please send new rx to pharmacy, in order for the pt to  the new 20MG dose.

## 2023-02-08 ENCOUNTER — TELEPHONE (OUTPATIENT)
Dept: MEDICAL GROUP | Facility: MEDICAL CENTER | Age: 78
End: 2023-02-08
Payer: MEDICARE

## 2023-02-08 NOTE — LETTER
February 8, 2023           Dr. Arvind Diaz  [facility]  [address] [place] [address]    Fax number [phone]        Patient: Franchesca Paige   MR Number: [id number]   YOB: 1945   Date of Visit: 2/8/2023     Preoperative clearance for very severe high blood pressure      Dear Dr. Diaz;,    This letter concerns our mutual patient, Franchesca Paige.  As you recall her recent hip surgery had to be canceled due to high blood pressure.  Please see my office note concerning this.  She is now on the new blood pressure medication regimen and her blood pressure is under better control.  I believe she is medically able to proceed with surgery.    She  has a past medical history of Arthritis, Bladder cancer (HCC), Caregiver stress (02/11/2022), COVID-19, Delayed emergence from general anesthesia, Dizziness (02/22/2018), Dyslipidemia, goal LDL below 130, Hydronephrosis (02/11/2019), Hypertension (1992), Ocular migraine, Osteopenia, Osteopenia of multiple sites, Osteoporosis of femur without pathological fracture (2/1/2023), and Total knee replacement status. Her  has a past surgical history that includes hysterectomy, total abdominal (1980s); colonoscopy (08/03/2010); knee replacement, total (12/04/2012); other (Right, 10/07/2018); orif, wrist (Left, 2008); and hardware removal ortho (Left, 2008). She  reports that she quit smoking about 22 years ago. Her smoking use included cigarettes. She started smoking about 58 years ago. She smoked an average of 1 pack per day. She has never used smokeless tobacco. She reports that she does not drink alcohol and does not use drugs.    She has a current medication list which includes the following prescription(s): fosinopril, amlodipine, vitamin d, mupirocin, tramadol, and diclofenac sodium. She is allergic to sulfa drugs.      She is medically in much better control and should be a good candidate for surgery and anesthesia.  She is able to proceed with  surgery.    Sincerely,                        Tate De La Rosa M.D.

## 2023-02-08 NOTE — TELEPHONE ENCOUNTER
Preop clearance letter to Dr. Diaz at [facility] written.  Recent office visit evaluation printed.

## 2023-02-28 ENCOUNTER — PRE-ADMISSION TESTING (OUTPATIENT)
Dept: ADMISSIONS | Facility: MEDICAL CENTER | Age: 78
End: 2023-02-28
Attending: ORTHOPAEDIC SURGERY
Payer: MEDICARE

## 2023-02-28 DIAGNOSIS — M16.12 PRIMARY OSTEOARTHRITIS OF LEFT HIP: ICD-10-CM

## 2023-02-28 LAB
ANION GAP SERPL CALC-SCNC: 11 MMOL/L (ref 7–16)
APPEARANCE UR: ABNORMAL
APTT PPP: 30.3 SEC (ref 24.7–36)
BASOPHILS # BLD AUTO: 0.3 % (ref 0–1.8)
BASOPHILS # BLD: 0.02 K/UL (ref 0–0.12)
BILIRUB UR QL STRIP.AUTO: NEGATIVE
BUN SERPL-MCNC: 15 MG/DL (ref 8–22)
CALCIUM SERPL-MCNC: 10.7 MG/DL (ref 8.4–10.2)
CHLORIDE SERPL-SCNC: 103 MMOL/L (ref 96–112)
CO2 SERPL-SCNC: 22 MMOL/L (ref 20–33)
COLOR UR: YELLOW
CREAT SERPL-MCNC: 0.6 MG/DL (ref 0.5–1.4)
EOSINOPHIL # BLD AUTO: 0.04 K/UL (ref 0–0.51)
EOSINOPHIL NFR BLD: 0.5 % (ref 0–6.9)
EPI CELLS #/AREA URNS HPF: NORMAL /HPF
ERYTHROCYTE [DISTWIDTH] IN BLOOD BY AUTOMATED COUNT: 40.8 FL (ref 35.9–50)
GFR SERPLBLD CREATININE-BSD FMLA CKD-EPI: 93 ML/MIN/1.73 M 2
GLUCOSE SERPL-MCNC: 95 MG/DL (ref 65–99)
GLUCOSE UR STRIP.AUTO-MCNC: NEGATIVE MG/DL
HCT VFR BLD AUTO: 40.7 % (ref 37–47)
HGB BLD-MCNC: 13.7 G/DL (ref 12–16)
HIV 1+2 AB+HIV1 P24 AG SERPL QL IA: NORMAL
IMM GRANULOCYTES # BLD AUTO: 0.03 K/UL (ref 0–0.11)
IMM GRANULOCYTES NFR BLD AUTO: 0.4 % (ref 0–0.9)
INR PPP: 1 (ref 0.87–1.13)
KETONES UR STRIP.AUTO-MCNC: NEGATIVE MG/DL
LEUKOCYTE ESTERASE UR QL STRIP.AUTO: NEGATIVE
LYMPHOCYTES # BLD AUTO: 1.85 K/UL (ref 1–4.8)
LYMPHOCYTES NFR BLD: 23.2 % (ref 22–41)
MCH RBC QN AUTO: 30 PG (ref 27–33)
MCHC RBC AUTO-ENTMCNC: 33.7 G/DL (ref 33.6–35)
MCV RBC AUTO: 89.1 FL (ref 81.4–97.8)
MICRO URNS: ABNORMAL
MONOCYTES # BLD AUTO: 0.58 K/UL (ref 0–0.85)
MONOCYTES NFR BLD AUTO: 7.3 % (ref 0–13.4)
NEUTROPHILS # BLD AUTO: 5.44 K/UL (ref 2–7.15)
NEUTROPHILS NFR BLD: 68.3 % (ref 44–72)
NITRITE UR QL STRIP.AUTO: NEGATIVE
NRBC # BLD AUTO: 0 K/UL
NRBC BLD-RTO: 0 /100 WBC
PH UR STRIP.AUTO: 6.5 [PH] (ref 5–8)
PLATELET # BLD AUTO: 261 K/UL (ref 164–446)
PMV BLD AUTO: 9.7 FL (ref 9–12.9)
POTASSIUM SERPL-SCNC: 4.2 MMOL/L (ref 3.6–5.5)
PROT UR QL STRIP: NEGATIVE MG/DL
PROTHROMBIN TIME: 13.1 SEC (ref 12–14.6)
RBC # BLD AUTO: 4.57 M/UL (ref 4.2–5.4)
RBC # URNS HPF: NORMAL /HPF
RBC UR QL AUTO: NEGATIVE
SCCMEC + MECA PNL NOSE NAA+PROBE: NEGATIVE
SCCMEC + MECA PNL NOSE NAA+PROBE: NEGATIVE
SODIUM SERPL-SCNC: 136 MMOL/L (ref 135–145)
SP GR UR STRIP.AUTO: 1.01
UNIDENT CRYS URNS QL MICRO: NORMAL /HPF
WBC # BLD AUTO: 8 K/UL (ref 4.8–10.8)
WBC #/AREA URNS HPF: NORMAL /HPF

## 2023-02-28 PROCEDURE — 85025 COMPLETE CBC W/AUTO DIFF WBC: CPT

## 2023-02-28 PROCEDURE — 87640 STAPH A DNA AMP PROBE: CPT

## 2023-02-28 PROCEDURE — 36415 COLL VENOUS BLD VENIPUNCTURE: CPT

## 2023-02-28 PROCEDURE — 87641 MR-STAPH DNA AMP PROBE: CPT

## 2023-02-28 PROCEDURE — 80048 BASIC METABOLIC PNL TOTAL CA: CPT

## 2023-02-28 PROCEDURE — 85730 THROMBOPLASTIN TIME PARTIAL: CPT

## 2023-02-28 PROCEDURE — G0475 HIV COMBINATION ASSAY: HCPCS

## 2023-02-28 PROCEDURE — 81001 URINALYSIS AUTO W/SCOPE: CPT

## 2023-02-28 PROCEDURE — 85610 PROTHROMBIN TIME: CPT

## 2023-02-28 ASSESSMENT — FIBROSIS 4 INDEX: FIB4 SCORE: 1.29

## 2023-02-28 NOTE — DISCHARGE PLANNING
DISCHARGE PLANNING NOTE - TOTAL JOINT    Procedure: Procedure(s):  LEFT TOTAL HIP ARTHROPLASTY, ANTERIOR APPROACH  Procedure Date: 3/14/2023  Insurance: Payor: MEDICARE / Plan: MEDICARE PART A & B / Product Type: *No Product type* /    Equipment currently available at home?  front-wheel walker, shower chair  Steps into the home? 2  Steps within the home? 1  Toilet height? ADA  Type of shower? walk-in shower  Who will be with you during your recovery? spouse  Is Outpatient Physical Therapy set up after surgery? No   Did you take the Total Joint Class and where? Yes  Planning same day discharge?Yes     This writer met with pt during her preadmission appt and educated to preop showers, nasal mrsa screen and potential for overnight stay.Home safety checklist reviewed and copy given to pt. Pt educated to dc criteria. All questions answered and verbalizes understanding of all instructions. No dc needs identified at this time. Anticipate dc to home without barriers.

## 2023-02-28 NOTE — PREPROCEDURE INSTRUCTIONS
Pre admit apt: Pt. Instructed to continue regularly prescribed medications through day before surgery.  Instructed to take the following medications, the day of surgery, with a sip of water per anesthesia protocol: tramadol  METS-4  Pt states with last surgery, she had PONV and slow to wake up  Instructed pt to notify Dr. Diaz if she develops any new sxs of covid/illness prior to surgery.    Pt states she had a dental, teeth cleaning on 2/21/23and did take oral antibiotics, due to previous knee replacement surgery.  Pt also intends to have a tooth filling done on 3/1/23, and an allergy shot on 3/9 prior to surgery.  Instructed pt to notify Dr. Diaz office with all of this information, she agreed she would.  I notified office of this information as well.

## 2023-03-13 ENCOUNTER — ANESTHESIA EVENT (OUTPATIENT)
Dept: SURGERY | Facility: MEDICAL CENTER | Age: 78
End: 2023-03-13
Payer: MEDICARE

## 2023-03-13 NOTE — H&P
Surgery Orthopedic History & Physical Note    Date  3/12/2023    Primary Care Physician  Tate De La Rosa M.D.    CC  Pre-Op Diagnosis Codes:     * Osteoarthritis of left hip [M16.12]    HPI  This is a 77 y.o. female who presented with severe pain left hip which is been going on for over 10 years.  Says it is gradually gotten worse and especially in the last couple of years its become extremely debilitating.  She has difficulty walking without a walker.  She is tried for injections in the past and they have stopped working.  Anti-inflammatories are not getting rid of the pain.  Activities a day living which require standing walking are difficult.  Pain Assessment  Pain Assessment: 0-10  Pain Score: 8    Past Medical History:   Diagnosis Date    Anesthesia     PONV, difficulty waking up    Arthritis     Bladder cancer (HCC)     bladder cancer 2007 , march    Cancer (HCC) 2003    Bladder Cancer    Caregiver stress 02/11/2022    COVID-19     Childhood and mumps and measles    Delayed emergence from general anesthesia     Dizziness 02/22/2018    Dyslipidemia, goal LDL below 130     Hydronephrosis 02/11/2019    Hypertension 1992    Ocular migraine     Osteopenia     DEXA 9/24/12    Osteopenia of multiple sites     Osteoporosis of femur without pathological fracture 02/01/2023    PONV (postoperative nausea and vomiting)     Total knee replacement status        Past Surgical History:   Procedure Laterality Date    OTHER Right 10/07/2018    vein ablation right thigh, Dr. Azevedo    KNEE REPLACEMENT, TOTAL  12/04/2012    Dr. Harry, left knee    COLONOSCOPY  08/03/2010    diverticulosis only, due in 2020    ORIF, WRIST Left 2008    HARDWARE REMOVAL ORTHO Left 2008    wrist    HYSTERECTOMY, TOTAL ABDOMINAL  1980s    has one half of an ovary and a fallopian tube       No current facility-administered medications for this encounter.     Current Outpatient Medications   Medication Sig Dispense Refill    aspirin EC (ECOTRIN) 81  MG Tablet Delayed Response Take 81 mg by mouth every day. Pt takes 3 tablets, by mouth, daily      fosinopril (MONOPRIL) 20 MG Tab Take 1 Tablet by mouth every day. 90 Tablet 3    amLODIPine (NORVASC) 5 MG Tab Take 1 Tablet by mouth every day. 90 Tablet 3    Cholecalciferol (VITAMIN D) 50 MCG ( UT) Cap Take 1 Capsule by mouth every day. 30 Capsule 11    traMADol (ULTRAM) 50 MG Tab Take 50 mg by mouth every four hours as needed. 1/4 OF A PILL A DAY      diclofenac sodium (VOLTAREN) 1 % Gel Apply  topically 4 times a day as needed.         Social History     Occupational History    Not on file   Tobacco Use    Smoking status: Former     Packs/day: 1.00     Types: Cigarettes     Start date: 1964     Quit date: 2000     Years since quittin.8    Smokeless tobacco: Never   Vaping Use    Vaping Use: Never used   Substance and Sexual Activity    Alcohol use: No     Alcohol/week: 0.0 oz    Drug use: No    Sexual activity: Yes       Family History   Problem Relation Age of Onset    Hypertension Mother     Psychiatric Illness Mother         paranoid schizophrenic    Hypertension Father         heavy smoker    Diabetes Other         niece    Cancer Sister         skin    Cancer Paternal Aunt         breast    Psychiatric Illness Sister         paranoid schizophrenic       Allergies  Sulfa drugs    Review of Systems  Pertinent ROS in HPI. Other ROS reviewed and found to be otherwise unremarkable.       Physical Exam  Cardiovascular:      Pulses: Normal pulses.   Pulmonary:      Effort: Pulmonary effort is normal.   Ortho Exam  Patient walks antalgic gait favoring the left side  Severe pain internal rotation left hip  Difficulty flexing past 90 degrees left hip  Cannot internally rotate past 10 degrees left hip  Neurovascularly intact left lower extremity       Radiology:  Severe degenerative arthritis bone-on-bone loss of joint space ossified production cyst formation left hip     Assessment/Plan:  Pre-Op  Diagnosis Codes:     * Osteoarthritis of left hip [M16.12]  Procedure(s):  LEFT TOTAL HIP ARTHROPLASTY, ANTERIOR APPROACH    Patient is failed conservative measures over the last 10 years for left hip arthritis.  We will set her up for total hip replacement.  Risk and benefits of surgery were discussed with patient.    Patient has been educated about choice for planned prosthesis and rationale for its use.  Patient understands and wishes to proceed.

## 2023-03-14 ENCOUNTER — ANESTHESIA (OUTPATIENT)
Dept: SURGERY | Facility: MEDICAL CENTER | Age: 78
End: 2023-03-14
Payer: MEDICARE

## 2023-03-14 ENCOUNTER — APPOINTMENT (OUTPATIENT)
Dept: RADIOLOGY | Facility: MEDICAL CENTER | Age: 78
End: 2023-03-14
Attending: ORTHOPAEDIC SURGERY
Payer: MEDICARE

## 2023-03-14 ENCOUNTER — HOSPITAL ENCOUNTER (OUTPATIENT)
Facility: MEDICAL CENTER | Age: 78
End: 2023-03-14
Attending: ORTHOPAEDIC SURGERY | Admitting: ORTHOPAEDIC SURGERY
Payer: MEDICARE

## 2023-03-14 VITALS
RESPIRATION RATE: 17 BRPM | WEIGHT: 211.2 LBS | OXYGEN SATURATION: 92 % | HEART RATE: 60 BPM | TEMPERATURE: 98.1 F | DIASTOLIC BLOOD PRESSURE: 73 MMHG | BODY MASS INDEX: 32.01 KG/M2 | SYSTOLIC BLOOD PRESSURE: 142 MMHG | HEIGHT: 68 IN

## 2023-03-14 DIAGNOSIS — Z96.642 STATUS POST LEFT HIP REPLACEMENT: Primary | ICD-10-CM

## 2023-03-14 LAB
ABO GROUP BLD: NORMAL
BLD GP AB SCN SERPL QL: NORMAL
RH BLD: NORMAL

## 2023-03-14 PROCEDURE — 700101 HCHG RX REV CODE 250: Performed by: ORTHOPAEDIC SURGERY

## 2023-03-14 PROCEDURE — 97165 OT EVAL LOW COMPLEX 30 MIN: CPT

## 2023-03-14 PROCEDURE — 160048 HCHG OR STATISTICAL LEVEL 1-5: Performed by: ORTHOPAEDIC SURGERY

## 2023-03-14 PROCEDURE — 160002 HCHG RECOVERY MINUTES (STAT): Performed by: ORTHOPAEDIC SURGERY

## 2023-03-14 PROCEDURE — A9270 NON-COVERED ITEM OR SERVICE: HCPCS | Performed by: STUDENT IN AN ORGANIZED HEALTH CARE EDUCATION/TRAINING PROGRAM

## 2023-03-14 PROCEDURE — 97535 SELF CARE MNGMENT TRAINING: CPT

## 2023-03-14 PROCEDURE — 700101 HCHG RX REV CODE 250: Performed by: STUDENT IN AN ORGANIZED HEALTH CARE EDUCATION/TRAINING PROGRAM

## 2023-03-14 PROCEDURE — 27130 TOTAL HIP ARTHROPLASTY: CPT | Mod: ASROC,LT | Performed by: PHYSICIAN ASSISTANT

## 2023-03-14 PROCEDURE — 160036 HCHG PACU - EA ADDL 30 MINS PHASE I: Performed by: ORTHOPAEDIC SURGERY

## 2023-03-14 PROCEDURE — 700111 HCHG RX REV CODE 636 W/ 250 OVERRIDE (IP): Performed by: STUDENT IN AN ORGANIZED HEALTH CARE EDUCATION/TRAINING PROGRAM

## 2023-03-14 PROCEDURE — 700105 HCHG RX REV CODE 258: Performed by: ORTHOPAEDIC SURGERY

## 2023-03-14 PROCEDURE — G0378 HOSPITAL OBSERVATION PER HR: HCPCS

## 2023-03-14 PROCEDURE — C1776 JOINT DEVICE (IMPLANTABLE): HCPCS | Performed by: ORTHOPAEDIC SURGERY

## 2023-03-14 PROCEDURE — 160035 HCHG PACU - 1ST 60 MINS PHASE I: Performed by: ORTHOPAEDIC SURGERY

## 2023-03-14 PROCEDURE — 86850 RBC ANTIBODY SCREEN: CPT

## 2023-03-14 PROCEDURE — C1713 ANCHOR/SCREW BN/BN,TIS/BN: HCPCS | Performed by: ORTHOPAEDIC SURGERY

## 2023-03-14 PROCEDURE — 160031 HCHG SURGERY MINUTES - 1ST 30 MINS LEVEL 5: Performed by: ORTHOPAEDIC SURGERY

## 2023-03-14 PROCEDURE — 160009 HCHG ANES TIME/MIN: Performed by: ORTHOPAEDIC SURGERY

## 2023-03-14 PROCEDURE — 700105 HCHG RX REV CODE 258: Performed by: STUDENT IN AN ORGANIZED HEALTH CARE EDUCATION/TRAINING PROGRAM

## 2023-03-14 PROCEDURE — 700111 HCHG RX REV CODE 636 W/ 250 OVERRIDE (IP): Performed by: ORTHOPAEDIC SURGERY

## 2023-03-14 PROCEDURE — 160042 HCHG SURGERY MINUTES - EA ADDL 1 MIN LEVEL 5: Performed by: ORTHOPAEDIC SURGERY

## 2023-03-14 PROCEDURE — 86900 BLOOD TYPING SEROLOGIC ABO: CPT

## 2023-03-14 PROCEDURE — 97162 PT EVAL MOD COMPLEX 30 MIN: CPT

## 2023-03-14 PROCEDURE — 700102 HCHG RX REV CODE 250 W/ 637 OVERRIDE(OP): Performed by: STUDENT IN AN ORGANIZED HEALTH CARE EDUCATION/TRAINING PROGRAM

## 2023-03-14 PROCEDURE — 01214 ANES OPEN PX TOT HIP ARTHRP: CPT | Performed by: STUDENT IN AN ORGANIZED HEALTH CARE EDUCATION/TRAINING PROGRAM

## 2023-03-14 PROCEDURE — 99100 ANES PT EXTEME AGE<1 YR&>70: CPT | Performed by: STUDENT IN AN ORGANIZED HEALTH CARE EDUCATION/TRAINING PROGRAM

## 2023-03-14 PROCEDURE — 27130 TOTAL HIP ARTHROPLASTY: CPT | Mod: LT | Performed by: ORTHOPAEDIC SURGERY

## 2023-03-14 PROCEDURE — 86901 BLOOD TYPING SEROLOGIC RH(D): CPT

## 2023-03-14 DEVICE — IMPLANT REF SPHER HEAD SCREW 15MM (1EA): Type: IMPLANTABLE DEVICE | Site: HIP | Status: FUNCTIONAL

## 2023-03-14 DEVICE — IMPLANT R3 0 DEG XLPE ACET LNR 36MM X 52MM (1EA): Type: IMPLANTABLE DEVICE | Site: HIP | Status: FUNCTIONAL

## 2023-03-14 DEVICE — IMPLANT REF THREADED HOLE COVER (1EA): Type: IMPLANTABLE DEVICE | Site: HIP | Status: FUNCTIONAL

## 2023-03-14 DEVICE — STEM POLAR CEMENTLESS WITH COLLAR 2: Type: IMPLANTABLE DEVICE | Site: HIP | Status: FUNCTIONAL

## 2023-03-14 DEVICE — IMPLANT REF SPHER HEAD SCREW 30MM (1EA): Type: IMPLANTABLE DEVICE | Site: HIP | Status: FUNCTIONAL

## 2023-03-14 DEVICE — IMPLANT OXINIUM FEM HD 12/14 36 MM M/+4 (1EA): Type: IMPLANTABLE DEVICE | Site: HIP | Status: FUNCTIONAL

## 2023-03-14 DEVICE — IMPLANT R3 3 HOLE ACET SHELL 52MM (1EA): Type: IMPLANTABLE DEVICE | Site: HIP | Status: FUNCTIONAL

## 2023-03-14 RX ORDER — BISACODYL 10 MG
10 SUPPOSITORY, RECTAL RECTAL
Status: DISCONTINUED | OUTPATIENT
Start: 2023-03-14 | End: 2023-03-14 | Stop reason: HOSPADM

## 2023-03-14 RX ORDER — ONDANSETRON 2 MG/ML
INJECTION INTRAMUSCULAR; INTRAVENOUS PRN
Status: DISCONTINUED | OUTPATIENT
Start: 2023-03-14 | End: 2023-03-14 | Stop reason: SURG

## 2023-03-14 RX ORDER — ROCURONIUM BROMIDE 10 MG/ML
INJECTION, SOLUTION INTRAVENOUS PRN
Status: DISCONTINUED | OUTPATIENT
Start: 2023-03-14 | End: 2023-03-14 | Stop reason: SURG

## 2023-03-14 RX ORDER — ONDANSETRON 2 MG/ML
4 INJECTION INTRAMUSCULAR; INTRAVENOUS EVERY 4 HOURS PRN
Status: DISCONTINUED | OUTPATIENT
Start: 2023-03-14 | End: 2023-03-14 | Stop reason: HOSPADM

## 2023-03-14 RX ORDER — TRANEXAMIC ACID 100 MG/ML
INJECTION, SOLUTION INTRAVENOUS PRN
Status: DISCONTINUED | OUTPATIENT
Start: 2023-03-14 | End: 2023-03-14 | Stop reason: SURG

## 2023-03-14 RX ORDER — OXYCODONE HYDROCHLORIDE 5 MG/1
5 TABLET ORAL
Status: DISCONTINUED | OUTPATIENT
Start: 2023-03-14 | End: 2023-03-14 | Stop reason: HOSPADM

## 2023-03-14 RX ORDER — HALOPERIDOL 5 MG/ML
1 INJECTION INTRAMUSCULAR EVERY 6 HOURS PRN
Status: DISCONTINUED | OUTPATIENT
Start: 2023-03-14 | End: 2023-03-14 | Stop reason: HOSPADM

## 2023-03-14 RX ORDER — CEFAZOLIN SODIUM 1 G/3ML
2 INJECTION, POWDER, FOR SOLUTION INTRAMUSCULAR; INTRAVENOUS ONCE
Status: DISCONTINUED | OUTPATIENT
Start: 2023-03-14 | End: 2023-03-14 | Stop reason: HOSPADM

## 2023-03-14 RX ORDER — DEXAMETHASONE SODIUM PHOSPHATE 4 MG/ML
4 INJECTION, SOLUTION INTRA-ARTICULAR; INTRALESIONAL; INTRAMUSCULAR; INTRAVENOUS; SOFT TISSUE
Status: DISCONTINUED | OUTPATIENT
Start: 2023-03-14 | End: 2023-03-14 | Stop reason: HOSPADM

## 2023-03-14 RX ORDER — OXYCODONE HCL 5 MG/5 ML
5 SOLUTION, ORAL ORAL
Status: COMPLETED | OUTPATIENT
Start: 2023-03-14 | End: 2023-03-14

## 2023-03-14 RX ORDER — HYDROCODONE BITARTRATE AND ACETAMINOPHEN 5; 325 MG/1; MG/1
1 TABLET ORAL EVERY 4 HOURS PRN
Qty: 42 TABLET | Refills: 0 | Status: SHIPPED | OUTPATIENT
Start: 2023-03-14 | End: 2023-03-21

## 2023-03-14 RX ORDER — AMOXICILLIN 250 MG
1 CAPSULE ORAL
Status: DISCONTINUED | OUTPATIENT
Start: 2023-03-14 | End: 2023-03-14 | Stop reason: HOSPADM

## 2023-03-14 RX ORDER — CEFAZOLIN SODIUM 1 G/3ML
INJECTION, POWDER, FOR SOLUTION INTRAMUSCULAR; INTRAVENOUS PRN
Status: DISCONTINUED | OUTPATIENT
Start: 2023-03-14 | End: 2023-03-14 | Stop reason: SURG

## 2023-03-14 RX ORDER — HYDROMORPHONE HYDROCHLORIDE 1 MG/ML
0.1 INJECTION, SOLUTION INTRAMUSCULAR; INTRAVENOUS; SUBCUTANEOUS
Status: DISCONTINUED | OUTPATIENT
Start: 2023-03-14 | End: 2023-03-14 | Stop reason: HOSPADM

## 2023-03-14 RX ORDER — TRAMADOL HYDROCHLORIDE 50 MG/1
50 TABLET ORAL EVERY 4 HOURS PRN
Status: DISCONTINUED | OUTPATIENT
Start: 2023-03-14 | End: 2023-03-14 | Stop reason: HOSPADM

## 2023-03-14 RX ORDER — OXYCODONE HYDROCHLORIDE 5 MG/1
2.5 TABLET ORAL
Status: DISCONTINUED | OUTPATIENT
Start: 2023-03-14 | End: 2023-03-14 | Stop reason: HOSPADM

## 2023-03-14 RX ORDER — TRANEXAMIC ACID 100 MG/ML
1000 INJECTION, SOLUTION INTRAVENOUS ONCE
Status: DISCONTINUED | OUTPATIENT
Start: 2023-03-14 | End: 2023-03-14 | Stop reason: HOSPADM

## 2023-03-14 RX ORDER — SODIUM CHLORIDE 9 MG/ML
INJECTION, SOLUTION INTRAMUSCULAR; INTRAVENOUS; SUBCUTANEOUS
Status: DISCONTINUED | OUTPATIENT
Start: 2023-03-14 | End: 2023-03-14 | Stop reason: HOSPADM

## 2023-03-14 RX ORDER — DIPHENHYDRAMINE HYDROCHLORIDE 50 MG/ML
25 INJECTION INTRAMUSCULAR; INTRAVENOUS EVERY 6 HOURS PRN
Status: DISCONTINUED | OUTPATIENT
Start: 2023-03-14 | End: 2023-03-14 | Stop reason: HOSPADM

## 2023-03-14 RX ORDER — BUPIVACAINE HYDROCHLORIDE AND EPINEPHRINE 2.5; 5 MG/ML; UG/ML
INJECTION, SOLUTION EPIDURAL; INFILTRATION; INTRACAUDAL; PERINEURAL
Status: DISCONTINUED | OUTPATIENT
Start: 2023-03-14 | End: 2023-03-14 | Stop reason: HOSPADM

## 2023-03-14 RX ORDER — FOSINOPRIL SODIUM 20 MG/1
20 TABLET ORAL
Status: DISCONTINUED | OUTPATIENT
Start: 2023-03-14 | End: 2023-03-14 | Stop reason: HOSPADM

## 2023-03-14 RX ORDER — HYDROMORPHONE HYDROCHLORIDE 1 MG/ML
0.4 INJECTION, SOLUTION INTRAMUSCULAR; INTRAVENOUS; SUBCUTANEOUS
Status: DISCONTINUED | OUTPATIENT
Start: 2023-03-14 | End: 2023-03-14 | Stop reason: HOSPADM

## 2023-03-14 RX ORDER — DIPHENHYDRAMINE HYDROCHLORIDE 50 MG/ML
12.5 INJECTION INTRAMUSCULAR; INTRAVENOUS
Status: DISCONTINUED | OUTPATIENT
Start: 2023-03-14 | End: 2023-03-14 | Stop reason: HOSPADM

## 2023-03-14 RX ORDER — SCOLOPAMINE TRANSDERMAL SYSTEM 1 MG/1
1 PATCH, EXTENDED RELEASE TRANSDERMAL
Status: DISCONTINUED | OUTPATIENT
Start: 2023-03-14 | End: 2023-03-14 | Stop reason: HOSPADM

## 2023-03-14 RX ORDER — LIDOCAINE HYDROCHLORIDE 20 MG/ML
INJECTION, SOLUTION EPIDURAL; INFILTRATION; INTRACAUDAL; PERINEURAL PRN
Status: DISCONTINUED | OUTPATIENT
Start: 2023-03-14 | End: 2023-03-14 | Stop reason: SURG

## 2023-03-14 RX ORDER — DOCUSATE SODIUM 100 MG/1
100 CAPSULE, LIQUID FILLED ORAL 2 TIMES DAILY
Status: DISCONTINUED | OUTPATIENT
Start: 2023-03-14 | End: 2023-03-14 | Stop reason: HOSPADM

## 2023-03-14 RX ORDER — AMOXICILLIN 250 MG
1 CAPSULE ORAL NIGHTLY
Status: DISCONTINUED | OUTPATIENT
Start: 2023-03-14 | End: 2023-03-14 | Stop reason: HOSPADM

## 2023-03-14 RX ORDER — ONDANSETRON 2 MG/ML
4 INJECTION INTRAMUSCULAR; INTRAVENOUS
Status: COMPLETED | OUTPATIENT
Start: 2023-03-14 | End: 2023-03-14

## 2023-03-14 RX ORDER — OXYCODONE HCL 5 MG/5 ML
10 SOLUTION, ORAL ORAL
Status: COMPLETED | OUTPATIENT
Start: 2023-03-14 | End: 2023-03-14

## 2023-03-14 RX ORDER — DIPHENHYDRAMINE HCL 25 MG
25 TABLET ORAL EVERY 6 HOURS PRN
Status: DISCONTINUED | OUTPATIENT
Start: 2023-03-14 | End: 2023-03-14 | Stop reason: HOSPADM

## 2023-03-14 RX ORDER — HYDROMORPHONE HYDROCHLORIDE 1 MG/ML
0.2 INJECTION, SOLUTION INTRAMUSCULAR; INTRAVENOUS; SUBCUTANEOUS
Status: DISCONTINUED | OUTPATIENT
Start: 2023-03-14 | End: 2023-03-14 | Stop reason: HOSPADM

## 2023-03-14 RX ORDER — SODIUM CHLORIDE, SODIUM LACTATE, POTASSIUM CHLORIDE, CALCIUM CHLORIDE 600; 310; 30; 20 MG/100ML; MG/100ML; MG/100ML; MG/100ML
INJECTION, SOLUTION INTRAVENOUS CONTINUOUS
Status: ACTIVE | OUTPATIENT
Start: 2023-03-14 | End: 2023-03-14

## 2023-03-14 RX ORDER — DEXTROSE MONOHYDRATE, SODIUM CHLORIDE, AND POTASSIUM CHLORIDE 50; 1.49; 4.5 G/1000ML; G/1000ML; G/1000ML
INJECTION, SOLUTION INTRAVENOUS CONTINUOUS
Status: DISCONTINUED | OUTPATIENT
Start: 2023-03-14 | End: 2023-03-14 | Stop reason: HOSPADM

## 2023-03-14 RX ORDER — SODIUM CHLORIDE, SODIUM LACTATE, POTASSIUM CHLORIDE, CALCIUM CHLORIDE 600; 310; 30; 20 MG/100ML; MG/100ML; MG/100ML; MG/100ML
INJECTION, SOLUTION INTRAVENOUS CONTINUOUS
Status: DISCONTINUED | OUTPATIENT
Start: 2023-03-14 | End: 2023-03-14 | Stop reason: HOSPADM

## 2023-03-14 RX ORDER — AMLODIPINE BESYLATE 5 MG/1
5 TABLET ORAL DAILY
Status: DISCONTINUED | OUTPATIENT
Start: 2023-03-14 | End: 2023-03-14 | Stop reason: HOSPADM

## 2023-03-14 RX ORDER — DEXAMETHASONE SODIUM PHOSPHATE 4 MG/ML
INJECTION, SOLUTION INTRA-ARTICULAR; INTRALESIONAL; INTRAMUSCULAR; INTRAVENOUS; SOFT TISSUE PRN
Status: DISCONTINUED | OUTPATIENT
Start: 2023-03-14 | End: 2023-03-14 | Stop reason: SURG

## 2023-03-14 RX ORDER — HYDRALAZINE HYDROCHLORIDE 20 MG/ML
5 INJECTION INTRAMUSCULAR; INTRAVENOUS
Status: DISCONTINUED | OUTPATIENT
Start: 2023-03-14 | End: 2023-03-14 | Stop reason: HOSPADM

## 2023-03-14 RX ORDER — SODIUM CHLORIDE, SODIUM LACTATE, POTASSIUM CHLORIDE, CALCIUM CHLORIDE 600; 310; 30; 20 MG/100ML; MG/100ML; MG/100ML; MG/100ML
INJECTION, SOLUTION INTRAVENOUS
Status: DISCONTINUED | OUTPATIENT
Start: 2023-03-14 | End: 2023-03-14 | Stop reason: SURG

## 2023-03-14 RX ORDER — LABETALOL HYDROCHLORIDE 5 MG/ML
5 INJECTION, SOLUTION INTRAVENOUS
Status: DISCONTINUED | OUTPATIENT
Start: 2023-03-14 | End: 2023-03-14 | Stop reason: HOSPADM

## 2023-03-14 RX ORDER — OMEPRAZOLE 20 MG/1
20 CAPSULE, DELAYED RELEASE ORAL 2 TIMES DAILY PRN
Status: DISCONTINUED | OUTPATIENT
Start: 2023-03-14 | End: 2023-03-14 | Stop reason: HOSPADM

## 2023-03-14 RX ORDER — HYDROMORPHONE HYDROCHLORIDE 2 MG/ML
INJECTION, SOLUTION INTRAMUSCULAR; INTRAVENOUS; SUBCUTANEOUS PRN
Status: DISCONTINUED | OUTPATIENT
Start: 2023-03-14 | End: 2023-03-14 | Stop reason: SURG

## 2023-03-14 RX ORDER — POLYETHYLENE GLYCOL 3350 17 G/17G
1 POWDER, FOR SOLUTION ORAL 2 TIMES DAILY PRN
Status: DISCONTINUED | OUTPATIENT
Start: 2023-03-14 | End: 2023-03-14 | Stop reason: HOSPADM

## 2023-03-14 RX ORDER — ENEMA 19; 7 G/133ML; G/133ML
1 ENEMA RECTAL
Status: DISCONTINUED | OUTPATIENT
Start: 2023-03-14 | End: 2023-03-14 | Stop reason: HOSPADM

## 2023-03-14 RX ORDER — HYDROMORPHONE HYDROCHLORIDE 1 MG/ML
0.25 INJECTION, SOLUTION INTRAMUSCULAR; INTRAVENOUS; SUBCUTANEOUS
Status: DISCONTINUED | OUTPATIENT
Start: 2023-03-14 | End: 2023-03-14 | Stop reason: HOSPADM

## 2023-03-14 RX ORDER — KETOROLAC TROMETHAMINE 30 MG/ML
INJECTION, SOLUTION INTRAMUSCULAR; INTRAVENOUS
Status: DISCONTINUED | OUTPATIENT
Start: 2023-03-14 | End: 2023-03-14 | Stop reason: HOSPADM

## 2023-03-14 RX ADMIN — PROPOFOL 200 MG: 10 INJECTION, EMULSION INTRAVENOUS at 07:08

## 2023-03-14 RX ADMIN — FENTANYL CITRATE 100 MCG: 50 INJECTION, SOLUTION INTRAMUSCULAR; INTRAVENOUS at 07:08

## 2023-03-14 RX ADMIN — ONDANSETRON 4 MG: 2 INJECTION INTRAMUSCULAR; INTRAVENOUS at 10:18

## 2023-03-14 RX ADMIN — ONDANSETRON 4 MG: 2 INJECTION INTRAMUSCULAR; INTRAVENOUS at 08:03

## 2023-03-14 RX ADMIN — SODIUM CHLORIDE, POTASSIUM CHLORIDE, SODIUM LACTATE AND CALCIUM CHLORIDE: 600; 310; 30; 20 INJECTION, SOLUTION INTRAVENOUS at 07:04

## 2023-03-14 RX ADMIN — TRANEXAMIC ACID 1000 MG: 100 INJECTION, SOLUTION INTRAVENOUS at 07:13

## 2023-03-14 RX ADMIN — OXYCODONE HYDROCHLORIDE 10 MG: 5 SOLUTION ORAL at 08:42

## 2023-03-14 RX ADMIN — CEFAZOLIN 2 G: 330 INJECTION, POWDER, FOR SOLUTION INTRAMUSCULAR; INTRAVENOUS at 07:13

## 2023-03-14 RX ADMIN — ROCURONIUM BROMIDE 50 MG: 10 INJECTION, SOLUTION INTRAVENOUS at 07:08

## 2023-03-14 RX ADMIN — SUGAMMADEX 200 MG: 100 INJECTION, SOLUTION INTRAVENOUS at 08:03

## 2023-03-14 RX ADMIN — DEXAMETHASONE SODIUM PHOSPHATE 4 MG: 4 INJECTION, SOLUTION INTRA-ARTICULAR; INTRALESIONAL; INTRAMUSCULAR; INTRAVENOUS; SOFT TISSUE at 07:20

## 2023-03-14 RX ADMIN — LIDOCAINE HYDROCHLORIDE 100 MG: 20 INJECTION, SOLUTION EPIDURAL; INFILTRATION; INTRACAUDAL at 07:08

## 2023-03-14 RX ADMIN — HYDROMORPHONE HYDROCHLORIDE 0.4 MG: 2 INJECTION INTRAMUSCULAR; INTRAVENOUS; SUBCUTANEOUS at 07:22

## 2023-03-14 RX ADMIN — SODIUM CHLORIDE, POTASSIUM CHLORIDE, SODIUM LACTATE AND CALCIUM CHLORIDE: 600; 310; 30; 20 INJECTION, SOLUTION INTRAVENOUS at 07:06

## 2023-03-14 RX ADMIN — FENTANYL CITRATE 50 MCG: 50 INJECTION, SOLUTION INTRAMUSCULAR; INTRAVENOUS at 09:06

## 2023-03-14 RX ADMIN — FENTANYL CITRATE 25 MCG: 50 INJECTION, SOLUTION INTRAMUSCULAR; INTRAVENOUS at 09:27

## 2023-03-14 RX ADMIN — HYDROMORPHONE HYDROCHLORIDE 0.4 MG: 2 INJECTION INTRAMUSCULAR; INTRAVENOUS; SUBCUTANEOUS at 07:34

## 2023-03-14 RX ADMIN — FENTANYL CITRATE 50 MCG: 50 INJECTION, SOLUTION INTRAMUSCULAR; INTRAVENOUS at 08:57

## 2023-03-14 ASSESSMENT — COGNITIVE AND FUNCTIONAL STATUS - GENERAL
TURNING FROM BACK TO SIDE WHILE IN FLAT BAD: A LITTLE
HELP NEEDED FOR BATHING: A LITTLE
SUGGESTED CMS G CODE MODIFIER DAILY ACTIVITY: CJ
DRESSING REGULAR LOWER BODY CLOTHING: A LITTLE
TOILETING: A LITTLE
STANDING UP FROM CHAIR USING ARMS: A LITTLE
WALKING IN HOSPITAL ROOM: A LITTLE
DAILY ACTIVITIY SCORE: 22
SUGGESTED CMS G CODE MODIFIER DAILY ACTIVITY: CJ
MOBILITY SCORE: 22
MOBILITY SCORE: 19
DAILY ACTIVITIY SCORE: 22
SUGGESTED CMS G CODE MODIFIER MOBILITY: CJ
MOVING FROM LYING ON BACK TO SITTING ON SIDE OF FLAT BED: A LITTLE
DRESSING REGULAR LOWER BODY CLOTHING: A LITTLE
CLIMB 3 TO 5 STEPS WITH RAILING: A LITTLE
SUGGESTED CMS G CODE MODIFIER MOBILITY: CK
MOVING TO AND FROM BED TO CHAIR: A LITTLE
MOVING TO AND FROM BED TO CHAIR: A LITTLE

## 2023-03-14 ASSESSMENT — LIFESTYLE VARIABLES
AVERAGE NUMBER OF DAYS PER WEEK YOU HAVE A DRINK CONTAINING ALCOHOL: 0
TOTAL SCORE: 0
HAVE PEOPLE ANNOYED YOU BY CRITICIZING YOUR DRINKING: NO
ALCOHOL_USE: NO
ON A TYPICAL DAY WHEN YOU DRINK ALCOHOL HOW MANY DRINKS DO YOU HAVE: 0
HAVE YOU EVER FELT YOU SHOULD CUT DOWN ON YOUR DRINKING: NO
HOW MANY TIMES IN THE PAST YEAR HAVE YOU HAD 5 OR MORE DRINKS IN A DAY: 0
CONSUMPTION TOTAL: NEGATIVE
EVER FELT BAD OR GUILTY ABOUT YOUR DRINKING: NO
EVER HAD A DRINK FIRST THING IN THE MORNING TO STEADY YOUR NERVES TO GET RID OF A HANGOVER: NO

## 2023-03-14 ASSESSMENT — FIBROSIS 4 INDEX
FIB4 SCORE: 1.36

## 2023-03-14 ASSESSMENT — PATIENT HEALTH QUESTIONNAIRE - PHQ9
1. LITTLE INTEREST OR PLEASURE IN DOING THINGS: NOT AT ALL
SUM OF ALL RESPONSES TO PHQ9 QUESTIONS 1 AND 2: 0
2. FEELING DOWN, DEPRESSED, IRRITABLE, OR HOPELESS: NOT AT ALL

## 2023-03-14 ASSESSMENT — GAIT ASSESSMENTS
ASSISTIVE DEVICE: FRONT WHEEL WALKER
DEVIATION: STEP TO
DISTANCE (FEET): 15
DISTANCE (FEET): 150
GAIT LEVEL OF ASSIST: SUPERVISED

## 2023-03-14 ASSESSMENT — PAIN DESCRIPTION - PAIN TYPE
TYPE: SURGICAL PAIN
TYPE: CHRONIC PAIN
TYPE: SURGICAL PAIN

## 2023-03-14 ASSESSMENT — ACTIVITIES OF DAILY LIVING (ADL): TOILETING: INDEPENDENT

## 2023-03-14 NOTE — OP REPORT
DIAGNOSIS: Osteoarthritis, left hip.    PROCEDURE: left Total hip arthroplasty.    ANESTHESIA: General.    COMPLICATIONS: None.    SURGEON: Arvind Diaz MD.    ASSISTANT: Vivian Mack    INDICATIONS: This is a patient with severe osteoarthritis causing pain,   having failed conservative treatments.    DESCRIPTION OF PROCEDURE: Patient was identified in the preop area, site was   marked, taken back to the operating room and underwent general anesthesia.   left lower extremity was prepped and draped in sterile manner. Preoperative   timeout was held and antibiotics were given. Incision was made coming off the  ASIS. Soft tissue dissected down to fascia. Fascia was split in line with   the tensor. Tensor was retracted laterally. Deep fascia was incised and   vessels were ligated. A capsulotomy was performed and then an osteotomy of   the femoral neck. The acetabulum was then reamed up to a 52 and a 52 R3 cluster   hole cup by Smith and Nephew was placed. A liner was placed for a 36 head.   Osteophytes were debrided and then the femur was externally rotated and   extended. This was then broached up to a size 2, and a 2 standard offset polar stem  by Smith and Nephew was placed. Final trialing showed equal leg lengths with  a +4 head, the +4 36 Oxinium head by Smith and Nephew was placed. Wound was   soaked with dilute Betadine solution and was injected with Marcaine. Vicryl   was used for the fascia, Monocryl soft tissue skin and Dermabond for the final  skin layer. Patient was woken up, taken back to PACU, will be weightbear as   tolerated.

## 2023-03-14 NOTE — ANESTHESIA TIME REPORT
Anesthesia Start and Stop Event Times     Date Time Event    3/14/2023 0652 Ready for Procedure     0704 Anesthesia Start     0818 Anesthesia Stop        Responsible Staff  03/14/23    Name Role Begin End    Jonathon Lynn M.D. Anesth 0704 0818        Overtime Reason:  no overtime (within assigned shift)    Comments:

## 2023-03-14 NOTE — ANESTHESIA PROCEDURE NOTES
Airway    Date/Time: 3/14/2023 7:11 AM  Performed by: Jonathon Lynn M.D.  Authorized by: Jonathon Lynn M.D.     Location:  OR  Urgency:  Elective  Difficult Airway: No    Indications for Airway Management:  Anesthesia      Spontaneous Ventilation: absent    Sedation Level:  Deep  Preoxygenated: Yes    Patient Position:  Sniffing  Mask Difficulty Assessment:  2 - vent by mask + OA or adjuvant +/- NMBA  Final Airway Type:  Endotracheal airway  Final Endotracheal Airway:  ETT  Cuffed: Yes    Technique Used for Successful ETT Placement:  Direct laryngoscopy    Insertion Site:  Oral  Blade Type:  Johnathon  Laryngoscope Blade/Videolaryngoscope Blade Size:  3  ETT Size (mm):  6.5  Measured from:  Teeth  ETT to Teeth (cm):  21  Placement Verified by: auscultation and capnometry    Cormack-Lehane Classification:  Grade I - full view of glottis  Number of Attempts at Approach:  1

## 2023-03-14 NOTE — THERAPY
Occupational Therapy   Initial Evaluation     Patient Name: Franchesca Paige  Age:  77 y.o., Sex:  female  Medical Record #: [id number]  Today's Date: 3/14/2023     Precautions  Precautions: (P) Anterior Hip Precautions, Weight Bearing As Tolerated Left Lower Extremity    Assessment  Patient is 77 y.o. female with a diagnosis of Left NBA.  Additional factors influencing patient status / progress: Anterior hip precautions, Left hip pain/discomfort.  Pt and  reside in a [facility] in [place], [place].   is available to assist as needed.  PLOF Indep for ADL's, transfers and functional mobility w/out a device.  Pt demonstrated Supervision supine to EOB sitting, Min assist EOB sitting to supine, additional time required during OOB activity secondary L hip pain/discomfort, CGA toilet transfer, Min assist toileting, Min assist LBCM, Indep UBCM, Mod I standing G&H.  Therapist reviewed/educated pt on environmental/home safety, fall precautions, anterior hip precautions, AE/DME, ADL's and transfers.  No further skilled occupational therapy recommended upon discharge.    Plan    DC Equipment Recommendations: (P) None  Discharge Recommendations: (P) Anticipate that the patient will have no further occupational therapy needs after discharge from the hospital     Subjective    Pt was alert and cooperative w/ tx.     Objective       03/14/23 1523    Services   Is patient using  services for this encounter? No   Initial Contact Note    Initial Contact Note Order Received and Verified, Evaluation Only - Patient Does Not Require Further Acute Occupational Therapy at this Time.  However, May Benefit from Post Acute Therapy for Higher Level Functional Deficits.   Prior Living Situation   Prior Services Home-Independent   Housing / Facility 1 Story House   Steps Into Home 3   Steps In Home 2   Rail Both Rail (Steps into Home)   Bathroom Set up Walk In Shower;Shower Glass Doors   Equipment Owned Front-Wheel Walker;Bed  Side Commode;Reacher   Lives with - Patient's Self Care Capacity Spouse   Comments Pt and  reside in a [facility] in [place], [place].   is available to assist as needed.  PLOF Indep for ADL's, transfers and functional mobility w/out a device.   Prior Level of ADL Function   Self Feeding Independent   Grooming / Hygiene Independent   Bathing Independent   Dressing Independent   Toileting Independent   Prior Level of IADL Function   Medication Management Independent   Laundry Independent   Kitchen Mobility Independent   Finances Independent   Home Management Independent   Shopping Independent   Prior Level Of Mobility Independent Without Device in Community;Independent With Steps in Community;Independent Without Device in Home;Independent With Steps in Home   Driving / Transportation Driving Independent   Occupation (Pre-Hospital Vocational) Retired Due To Age   History of Falls   History of Falls No   Precautions   Precautions Anterior Hip Precautions;Weight Bearing As Tolerated Left Lower Extremity   Vitals   Pulse Oximetry 92 %   O2 Delivery Device None - Room Air   Pain   Intervention Cold Pack;Rest;Repositioned   Pain 0 - 10 Group   Location Hip   Location Orientation Left   Comfort Goal Comfort with Movement;Perform Activity   Therapist Pain Assessment During Activity;4   Non Verbal Descriptors   Non Verbal Scale  Calm;Unlabored Breathing   Cognition    Cognition / Consciousness WDL   Passive ROM Upper Body   Passive ROM Upper Body WDL   Active ROM Upper Body   Active ROM Upper Body  WDL   Dominant Hand Right   Strength Upper Body   Upper Body Strength  WDL   Upper Body Muscle Tone   Upper Body Muscle Tone  WDL   Coordination Upper Body   Coordination WDL   Balance Assessment   Sitting Balance (Static) Good   Sitting Balance (Dynamic) Fair +   Standing Balance (Static) Fair +   Standing Balance (Dynamic) Fair   Weight Shift Sitting Good   Weight Shift Standing Fair   Comments OOB FWW   Bed Mobility    Supine to  Sit Supervised   Sit to Supine Minimal Assist   ADL Assessment   Eating Independent   Grooming Modified Independent;Standing   Upper Body Dressing Independent   Lower Body Dressing Minimal Assist   Toileting Modified Independent   How much help from another person does the patient currently need...   Putting on and taking off regular lower body clothing? 3   Bathing (including washing, rinsing, and drying)? 3   Toileting, which includes using a toilet, bedpan, or urinal? 4   Putting on and taking off regular upper body clothing? 4   Taking care of personal grooming such as brushing teeth? 4   Eating meals? 4   6 Clicks Daily Activity Score 22   Functional Mobility   Sit to Stand Supervised   Bed, Chair, Wheelchair Transfer Supervised   Toilet Transfers Contact Guard Assist   Transfer Method Stand Step   Mobility SBA FWW, EOB<>commode + sink   Distance (Feet) 15   # of Times Distance was Traveled 2   Edema / Skin Assessment   Edema / Skin  WDL   Comments Incision site clean, dry, intact.   Activity Tolerance   Sitting in Chair 10   Sitting Edge of Bed 10   Standing 5x2   Comments sitting/commode 10   Education Group   Education Provided Hip Precautions;Joint Protection;Home Safety;Transfers;Role of Occupational Therapist;Activities of Daily Living;Adaptive Equipment   Role of Occupational Therapist Patient Response Patient;Significant Other;Acceptance;Explanation;Verbal Demonstration   Hip Precautions Patient Response Patient;Significant Other;Acceptance;Explanation;Demonstration;Handout;Verbal Demonstration;Action Demonstration   Joint Protection Patient Response Patient;Significant Other;Acceptance;Explanation;Demonstration;Handout;Verbal Demonstration;Action Demonstration   Home Safety Patient Response Patient;Significant Other;Acceptance;Explanation;Demonstration;Verbal Demonstration;Action Demonstration   Transfers Patient Response Patient;Significant Other;Acceptance;Explanation;Demonstration;Verbal  Demonstration;Action Demonstration   ADL Patient Response Patient;Significant Other;Acceptance;Explanation;Demonstration;Verbal Demonstration;Action Demonstration   Adaptive Equipment Patient Response Patient;Significant Other;Acceptance;Explanation;Demonstration;Verbal Demonstration;Action Demonstration   Anticipated Discharge Equipment and Recommendations   DC Equipment Recommendations None   Discharge Recommendations Anticipate that the patient will have no further occupational therapy needs after discharge from the hospital

## 2023-03-14 NOTE — DISCHARGE INSTR - OTHER INFO
Weight bearing as tolerated.   Ice 20 minutes on/20 minutes off.    Leave dressing in place until follow-up in 2 weeks.  Ok to get bandage wet in shower.

## 2023-03-14 NOTE — DISCHARGE INSTRUCTIONS
What to Expect Post Anesthesia    Rest and take it easy for the first 24 hours.  A responsible adult is recommended to remain with you during that time.  It is normal to feel sleepy.  We encourage you to not do anything that requires balance, judgment or coordination.    FOR 24 HOURS DO NOT:  Drive, operate machinery or run household appliances.  Drink beer or alcoholic beverages.  Make important decisions or sign legal documents.    To avoid nausea, slowly advance diet as tolerated, avoiding spicy or greasy foods for the first day.  Add more substantial food to your diet according to your provider's instructions.  Babies can be fed formula or breast milk as soon as they are hungry.  INCREASE FLUIDS AND FIBER TO AVOID CONSTIPATION.    MILD FLU-LIKE SYMPTOMS ARE NORMAL.  YOU MAY EXPERIENCE GENERALIZED MUSCLE ACHES, THROAT IRRITATION, HEADACHE AND/OR SOME NAUSEA.    If any questions arise, call your provider.  If your provider is not available, please feel free to call the Surgical Center at [phone].    MEDICATIONS: Resume taking daily medication.  Take prescribed pain medication with food.  If no medication is prescribed, you may take non-aspirin pain medication if needed.  PAIN MEDICATION CAN BE VERY CONSTIPATING.  Take a stool softener or laxative such as senokot, pericolace, or milk of magnesia if needed.    Last pain medication (Oxycodone 10) given at 8:45 am    Anterior Hip Precautions:  1.     No crossing legs or bringing them together.  2.     No turning your surgical foot and leg outward.  3.     No extending your surgical leg behind you.  4.     Avoid sitting on low sofas, chairs and especially on toilet (use a raised toilet seat).  5.     Avoid reaching to pull blankets or covers.   6.     Avoid sitting for longer than one (1) hour.  7.     Avoid getting into a bathtub unless using a tub chair.      Total Hip Replacement, Anterior, Care After  This sheet gives you information about how to care for  yourself after your procedure. Your doctor may also give you more specific instructions. If you have problems or questions, contact your doctor.  What can I expect after the procedure?  After the procedure, it is common to have:  Pain.  Stiffness.  Discomfort.  Follow these instructions at home:  Medicines  Take over-the-counter and prescription medicines only as told by your doctor.  If you were prescribed a medicine to thin your blood (anticoagulant), take it as told by your doctor.  Surgery cut care    Follow instructions from your doctor about how to take care of your cut (incision) from surgery. Make sure you:  Wash your hands with soap and water before you change your bandage (dressing). If you cannot use soap and water, use alcohol-based hand .  Change your bandage as told by your doctor.  Leave stitches (sutures), skin glue, or skin tape (adhesive) strips in place. They may need to stay in place for 2 weeks or longer. If tape strips get loose and curl up, you may trim the loose edges. Do not remove tape strips completely unless your doctor tells you to do that.  Check your surgical cut every day for signs of infection. Check for:  Redness, swelling, or pain.  Fluid or blood.  Warmth.  Pus or a bad smell.  Bathing  Do not take baths, swim, or use a hot tub until your doctor says it is okay.  Keep the bandage dry until your doctor says it can be removed.  Managing pain, stiffness, and swelling    If directed, put ice on the hip area.  Put ice in a plastic bag.  Place a towel between your skin and the bag.  Leave the ice on for 20 minutes, 2-3 times a day.  Move your toes often to avoid stiffness and to lessen swelling.  Raise (elevate) your leg above the level of your heart while you are sitting or lying down.  Activity  Rest as told by your doctor.  Do not sit for a long time without moving. Get up to take short walks every 1-2 hours. This is important. Ask for help if you feel weak or unsteady.  Do  exercises as told by your doctor or physical therapist.  Use a walker, crutches, or a cane as told by your doctor.  You may use your legs to support (bear) your body weight as told by your doctor. Follow instructions about how much weight you may safely support on your affected leg (weight-bearing restrictions).  A physical therapist may show you how to get out of a bed and chair and how to go up and down stairs. You will first do this with a walker, crutches, or a cane. Then you will do it without any of these devices.  Once you are able to walk without a limp, you may stop using a walker, crutches, or cane.  Return to your normal activities as told by your doctor. Ask your doctor what activities are safe for you.  Safety  To help prevent falls:  Keep floors clear of objects you may trip over.  Place items that you may need within easy reach.  Wear an apron or tool belt with pockets for carrying objects. This leaves your hands free to help with your balance.  Driving  Do not drive or use heavy machinery while taking prescription pain medicine.  Ask your doctor when it is safe to drive.  General instructions  Wear compression stockings as told by your doctor. These help to prevent blood clots and reduce swelling in your legs.  Keep doing breathing exercises as told by your doctor. This helps prevent lung infection.  If you are taking prescription pain medicine, take actions to prevent or treat constipation. Your doctor may suggest that you:  Drink enough fluid to keep your pee (urine) pale yellow.  Eat foods that are high in fiber. These include fresh fruits and vegetables, whole grains, and beans.  Limit foods that are high in fat and sugar. These include fried or sweet foods.  Take an over-the-counter or prescription medicine for constipation.  Do not use any products that have nicotine or tobacco in them, such as cigarettes and e-cigarettes. These can delay bone healing. If you need help quitting, ask your  doctor.  Keep all follow-up visits as told by your doctor. This is important.  Contact a doctor if:  You have a fever or chills.  You have a cough.  You feel short of breath.  Your medicine is not helping your pain.  You have any of these at or near your cut from surgery:  Redness, swelling, or pain.  Fluid or blood.  An area that feels warm when you touch it.  Pus or a bad smell.  Get help right away if:  You have very bad pain.  You have trouble breathing.  You have chest pain.  You have redness, swelling, pain, and warmth in your calf or leg.  Summary  Follow instructions from your doctor about how to take care of your surgery cut (incision).  Do not take baths, swim, or use a hot tub until your doctor says it is okay.  Use crutches, a walker, or a cane as told by your doctor.  If you were prescribed a medicine to thin your blood (anticoagulant), take it as told by your doctor.  This information is not intended to replace advice given to you by your health care provider. Make sure you discuss any questions you have with your health care provider.  Document Released: 04/03/2019 Document Revised: 01/10/2020 Document Reviewed: 04/03/2019  [employer] Patient Education © 2020 [employer]

## 2023-03-14 NOTE — PROGRESS NOTES
Pt arrived to unit in bed. Ambulated to bathroom and voided. Ambulated 60 ft with walker. Polar ice applied and pt using IS getting to 1800. Will continue to monitor.

## 2023-03-14 NOTE — ANESTHESIA PREPROCEDURE EVALUATION
Case: [id number] Date/Time: 03/14/23 0645    Procedure: LEFT TOTAL HIP ARTHROPLASTY, ANTERIOR APPROACH    Diagnosis: Osteoarthritis of left hip [M16.12]    Pre-op diagnosis: Osteoarthritis of left hip [M16.12]    Location: [facility] 03 / SURGERY [facility]    Surgeons: Arvind Diaz M.D.        78 yo F w/ HTN and h/o PONV    Relevant Problems   NEURO   (positive) Ocular migraine      CARDIAC   (positive) Essential hypertension   (positive) Ocular migraine      GI   (positive) Gastroesophageal reflux disease without esophagitis       Physical Exam    Airway   Mallampati: II  TM distance: >3 FB  Neck ROM: full       Cardiovascular - normal exam  Rhythm: regular  Rate: normal  (-) murmur     Dental - normal exam           Pulmonary - normal exam  Breath sounds clear to auscultation     Abdominal    Neurological - normal exam                 Anesthesia Plan    ASA 3   ASA physical status 3 criteria: hypertension - poorly controlled    Plan - general       Airway plan will be ETT          Induction: intravenous    Postoperative Plan: Postoperative administration of opioids is intended.    Pertinent diagnostic labs and testing reviewed    Informed Consent:    Anesthetic plan and risks discussed with patient.    Use of blood products discussed with: patient whom consented to blood products.

## 2023-03-14 NOTE — LETTER
February 13, 2023    Patient Name: Franchesca Paige  Surgeon Name: Arvind Diaz M.D.  Surgery Facility: [facility] ([address] [facility])  Surgery Date: 3/14/2023    The time of your surgery is not final and may change up to and until the day of your surgery. You will be contacted 24-48 hours prior to your surgery date with your check-in and surgery time.    If you will not be at one of the below numbers please call the surgery scheduler at [phone]  Preferred Phone: [phone]    BEFORE YOUR SURGERY   Pre Registration and/or Lab Work must be done within and no earlier than 28 days prior to your surgery date. Please call [facility] at [phone] for an appointment as soon as possible.    DAY OF YOUR SURGERY  Nothing to eat or drink after midnight     Refrain from smoking any substance after midnight prior to surgery. Smoking may interfere with the anesthetic and frequently produces nausea during the recovery period.    Continue taking all lifesaving medications. Including the morning of your surgery with small sip of water.    Please do NOT take on the day of surgery:  Diuretics: examples- furosemide (Lasix), spironolactone, hydrochlorothiazide  ACE-inhibitors: examples- lisinopril, ramipril, enalapril  “ARBs”: examples- losartan, Olmesartan, valsartan    Please arrive at the hospital/surgery center at the check-in time provided.     An adult will need to bring you and take you home after your surgery.     AFTER YOUR SURGERY  Post op Appointment:   Date: 3/29   Time: 1 PM   With: Arvind Diaz M.D.   Location: [address] [place] [id number]    - No dental work for 3-6 months after your surgery.  - You must have someone provide transportation post surgery and someone to monitor you for at least 24 hours post-surgery. If you don't have either of these your appointment will be canceled.      TIME OFF WORK  FMLA or Disability forms can  be faxed directly to: [phone] or you may drop them off at [address] [address]. Our office charges a $35.00 fee per form. Forms will be completed within 10 business days of drop off and payment received. For the status of your forms you may contact our disability office directly [phone].

## 2023-03-14 NOTE — OR NURSING
0817: To PACU post left total knee arthroplasty. Pt is extubated, breathing is spontaneous and unlabored. Strong DP pulse observed on operative extremity. Pt restless, c/o cold, karo hugger applied.    0840: C/o pain 8-9/10, see MAR. BP elevated IV is occluded w/ bloody dressing, will give orals only at this time.    0900: New IV started via US.    0905: Pain down to 7/10, continue IV pain medication.    0920: Pain not quite tolerable at 6/10, see MAR.    0942: Pt sleeping, awakens easily. States pain is tolerable at 3-4/10, no nausea. Declines juice at this time.    0947: Pt given I.S. w/ goal of 2200. Pt able to inhale 2000 w/ good technique.    1010: Pt sleeping. Awakened and offered juice. Report given to DAYNA Richardson RN.    1030: Pt sleeping.    1138: Pt up to side of bed. Unable to march in place.    1146: PT at bedside. Room air saturation drops to low 80s when dozing. 02 re-applied.    1159: Order rec'd for admit obs.

## 2023-03-14 NOTE — THERAPY
Physical Therapy   Initial Evaluation     Patient Name: Franchesca Paige  Age:  77 y.o., Sex:  female  Medical Record #: [id number]  Today's Date: 3/14/2023     Precautions  Precautions: (P) Anterior Hip Precautions;Weight Bearing As Tolerated Left Lower Extremity    Assessment  Patient is 77 y.o. female with a diagnosis of L THR ant.Pt lives at home with  and is active.Pt is safe with bed mob,transfers and ambulation.She understands HEP and precautions and has no equipment needs     Plan  DC Equipment Recommendations: (P) None  Discharge Recommendations: (P) Recommend outpatient physical therapy services to address higher level deficit     Objective       03/14/23 1520   Charge Group   PT Evaluation PT Evaluation Mod   Total Time Spent   PT Evaluation Time Spent (Mins) 30   Initial Contact Note    Initial Contact Note Order Received and Verified, Physical Therapy Evaluation in Progress with Full Report to Follow.   Precautions   Precautions Anterior Hip Precautions;Weight Bearing As Tolerated Left Lower Extremity   Vitals   Pulse 60   Patient BP Position Supine   Blood Pressure  (!) 142/73   Respiration 17   Pulse Oximetry 92 %   O2 (LPM) 0   O2 Delivery Device None - Room Air   Prior Living Situation   Prior Services None   Housing / Facility 1 Story House   Steps Into Home 0   Steps In Home 0   Equipment Owned Front-Wheel Walker   Lives with - Patient's Self Care Capacity Spouse   Prior Level of Functional Mobility   Bed Mobility Independent   Transfer Status Independent   Ambulation Independent   Ambulation Distance community amb   Assistive Devices Used None   History of Falls   History of Falls No   Cognition    Cognition / Consciousness WDL   Passive ROM Lower Body   Passive ROM Lower Body X   Active ROM Lower Body    Active ROM Lower Body  X   Coordination Lower Body    Coordination Lower Body  WDL   Balance Assessment   Sitting Balance (Static) Good   Sitting Balance (Dynamic) Good   Standing Balance  (Static) Fair +   Standing Balance (Dynamic) Fair +   Weight Shift Sitting Good   Weight Shift Standing Good   Bed Mobility    Supine to Sit Minimal Assist   Sit to Supine Minimal Assist   Scooting Modified Independent   Gait Analysis   Gait Level Of Assist Supervised   Assistive Device Front Wheel Walker   Distance (Feet) 150   # of Times Distance was Traveled 1   Deviation Step To   Weight Bearing Status wbat L   Functional Mobility   Sit to Stand Supervised   Bed, Chair, Wheelchair Transfer Supervised   Transfer Method Stand Step   How much difficulty does the patient currently have...   Turning over in bed (including adjusting bedclothes, sheets and blankets)? 3   Sitting down on and standing up from a chair with arms (e.g., wheelchair, bedside commode, etc.) 4   Moving from lying on back to sitting on the side of the bed? 3   How much help from another person does the patient currently need...   Moving to and from a bed to a chair (including a wheelchair)? 4   Need to walk in a hospital room? 4   Climbing 3-5 steps with a railing? 4   6 clicks Mobility Score 22   Activity Tolerance   Sitting Edge of Bed 10   Standing 10   Patient / Family Goals    Patient / Family Goal #1 Home   Anticipated Discharge Equipment and Recommendations   DC Equipment Recommendations None   Discharge Recommendations Recommend outpatient physical therapy services to address higher level deficits   Interdisciplinary Plan of Care Collaboration   IDT Collaboration with  Nursing   Session Information   Date / Session Number  3/14   Priority 0

## 2023-03-14 NOTE — CARE PLAN
The patient is Stable - Low risk of patient condition declining or worsening         Progress made toward(s) clinical / shift goals:    Ambulated 60ft and no pain.    Patient is not progressing towards the following goals:

## 2023-03-14 NOTE — PROGRESS NOTES
Patient discharged home in stable condition via wheelchair. Stated understanding of meds and instructions

## 2023-03-14 NOTE — OR NURSING
1010 Report received from Sim DILLON. Pt states pain is 4/10 and is comfortable. Denies any pain at this time. Drinking orange juice.     1015 States orange juice is causing her to be nauseous, switched to water. Medicated per MAR.     1030 Report given to Sim DILLON.

## 2023-03-14 NOTE — ANESTHESIA POSTPROCEDURE EVALUATION
Patient: Franchesca Paige    Procedure Summary     Date: 03/14/23 Room / Location:  OR 03 / SURGERY [facility]    Anesthesia Start: 0704 Anesthesia Stop: 0818    Procedure: LEFT TOTAL HIP ARTHROPLASTY, ANTERIOR APPROACH (Left: Hip) Diagnosis:       Osteoarthritis of left hip      (Osteoarthritis of left hip)    Surgeons: Arvind Diaz M.D. Responsible Provider: Jonathon Lynn M.D.    Anesthesia Type: general ASA Status: 2          Final Anesthesia Type: general  Last vitals  BP   Blood Pressure : (!) 157/76    Temp   36.5 °C (97.7 °F)    Pulse   (!) 52   Resp   16    SpO2   95 %      Anesthesia Post Evaluation    Patient location during evaluation: PACU  Patient participation: complete - patient participated  Level of consciousness: awake and alert    Airway patency: patent  Anesthetic complications: no  Cardiovascular status: hemodynamically stable  Respiratory status: acceptable  Hydration status: euvolemic    PONV: none          No notable events documented.     Nurse Pain Score: 7 (NPRS)

## 2023-03-14 NOTE — OR NURSING
0547 Procedure, patient allergies and NPO status verified. Home med rec completed and belongings secured. Patient verbalizes understanding of pain scale, expected course of stay and plan of care. IV access established. SCD placed on bilateral calves. Triple aim completed by Kita ABBASI.    0700 Preop complete.

## 2023-03-19 ENCOUNTER — OFFICE VISIT (OUTPATIENT)
Dept: URGENT CARE | Facility: CLINIC | Age: 78
End: 2023-03-19
Payer: MEDICARE

## 2023-03-19 VITALS
HEART RATE: 66 BPM | BODY MASS INDEX: 33.12 KG/M2 | OXYGEN SATURATION: 99 % | DIASTOLIC BLOOD PRESSURE: 68 MMHG | SYSTOLIC BLOOD PRESSURE: 138 MMHG | WEIGHT: 211 LBS | TEMPERATURE: 98.6 F | RESPIRATION RATE: 16 BRPM | HEIGHT: 67 IN

## 2023-03-19 DIAGNOSIS — L23.89 ALLERGIC CONTACT DERMATITIS DUE TO OTHER AGENTS: ICD-10-CM

## 2023-03-19 DIAGNOSIS — B37.2 YEAST DERMATITIS: ICD-10-CM

## 2023-03-19 PROCEDURE — 99213 OFFICE O/P EST LOW 20 MIN: CPT

## 2023-03-19 RX ORDER — CLOTRIMAZOLE AND BETAMETHASONE DIPROPIONATE 10; .64 MG/G; MG/G
1 CREAM TOPICAL 2 TIMES DAILY
Qty: 15 G | Refills: 0 | Status: ON HOLD
Start: 2023-03-19 | End: 2023-10-07

## 2023-03-19 ASSESSMENT — ENCOUNTER SYMPTOMS
FEVER: 0
CHILLS: 0

## 2023-03-19 ASSESSMENT — FIBROSIS 4 INDEX: FIB4 SCORE: 1.36

## 2023-03-19 NOTE — PROGRESS NOTES
"Subjective:     Franchesca Paige is a 77 y.o. female who presents for Rash (X2 days, \" Around incision, there is a slight rash developing at the top of bandage.\" )    The patient endorses that she underwent a total hip replacement on Tuesday. She woke up yesterday morning and noticed a rash that started below bilateral breasts.  She feels that this is possibly from an antiseptic wipe that she used before this happened when she had her knee surgery.  Additionally, she noticed redness around the perimeter of her surgical bandage.  She has not taken anything OTC for this as she was worried it would interfere with her incision. Additionally she feels that the top of her bandage came undone and she is concerned that her wound is infected. She reports that she took a shower and applied Gold Bond under her left breast, which she feels contributed to her pain. She endorses that she is having increased pain at her incision. She reports that she has had a 100.8 fever from Tuesday - Friday. She has not had a fever since Friday. She has an appointment in 10 days from now with her orthopedic surgeon to have her dressing changed. She has kept the original bandage on from her surgery.      Review of Systems   Constitutional:  Negative for chills and fever.   Skin:  Positive for rash. Negative for itching.   All other systems reviewed and are negative.    PMH:   Past Medical History:   Diagnosis Date    Anesthesia     PONV, difficulty waking up    Arthritis     Bladder cancer (HCC)     bladder cancer 2007 , march    Cancer (HCC) 2003    Bladder Cancer    Caregiver stress 02/11/2022    COVID-19     Childhood and mumps and measles    Delayed emergence from general anesthesia     Dizziness 02/22/2018    Dyslipidemia, goal LDL below 130     Hydronephrosis 02/11/2019    Hypertension 1992    Ocular migraine     Osteopenia     DEXA 9/24/12    Osteopenia of multiple sites     Osteoporosis of femur without pathological fracture 02/01/2023 "    PONV (postoperative nausea and vomiting)     Total knee replacement status      ALLERGIES: No Active Allergies  SURGHX:   Past Surgical History:   Procedure Laterality Date    VA TOTAL HIP ARTHROPLASTY Left 3/14/2023    Procedure: LEFT TOTAL HIP ARTHROPLASTY, ANTERIOR APPROACH;  Surgeon: Arvind Diaz M.D.;  Location: SURGERY [facility];  Service: Orthopedics    OTHER Right 10/07/2018    vein ablation right thigh, Dr. Azevedo    KNEE REPLACEMENT, TOTAL  2012    Dr. Harry, left knee    COLONOSCOPY  2010    diverticulosis only, due in     ORIF, WRIST Left     HARDWARE REMOVAL ORTHO Left 2008    wrist    HYSTERECTOMY, TOTAL ABDOMINAL  1980s    has one half of an ovary and a fallopian tube     SOCHX:   Social History     Socioeconomic History    Marital status:     Highest education level: Some college, no degree   Tobacco Use    Smoking status: Former     Packs/day: 1.00     Types: Cigarettes     Start date: 1964     Quit date: 2000     Years since quittin.8    Smokeless tobacco: Never   Vaping Use    Vaping Use: Never used   Substance and Sexual Activity    Alcohol use: No     Alcohol/week: 0.0 oz    Drug use: No    Sexual activity: Yes     Social Determinants of Health     Financial Resource Strain: Low Risk     Difficulty of Paying Living Expenses: Not hard at all   Food Insecurity: No Food Insecurity    Worried About Running Out of Food in the Last Year: Never true    Ran Out of Food in the Last Year: Never true   Transportation Needs: No Transportation Needs    Lack of Transportation (Medical): No    Lack of Transportation (Non-Medical): No   Physical Activity: Insufficiently Active    Days of Exercise per Week: 1 day    Minutes of Exercise per Session: 20 min   Stress: No Stress Concern Present    Feeling of Stress : Only a little   Social Connections: Moderately Isolated    Frequency of Communication with Friends and Family: Twice a week    Frequency of Social  "Gatherings with Friends and Family: Once a week    Attends Rastafari Services: Never    Active Member of Clubs or Organizations: No    Attends Club or Organization Meetings: Never    Marital Status:    Housing Stability: Unknown    Unable to Pay for Housing in the Last Year: No    Unstable Housing in the Last Year: No     FH:   Family History   Problem Relation Age of Onset    Hypertension Mother     Psychiatric Illness Mother         paranoid schizophrenic    Hypertension Father         heavy smoker    Diabetes Other         niece    Cancer Sister         skin    Cancer Paternal Aunt         breast    Psychiatric Illness Sister         paranoid schizophrenic         Objective:   /68 (BP Location: Left arm, Patient Position: Sitting, BP Cuff Size: Adult)   Pulse 66   Temp 37 °C (98.6 °F) (Temporal)   Resp 16   Ht 1.702 m (5' 7\")   Wt 95.7 kg (211 lb)   SpO2 99%   BMI 33.05 kg/m²     Physical Exam  Vitals reviewed.   Constitutional:       Appearance: Normal appearance.   HENT:      Head: Normocephalic and atraumatic.      Nose: Nose normal.      Mouth/Throat:      Mouth: Mucous membranes are moist.   Eyes:      Extraocular Movements: Extraocular movements intact.      Conjunctiva/sclera: Conjunctivae normal.      Pupils: Pupils are equal, round, and reactive to light.   Cardiovascular:      Rate and Rhythm: Normal rate and regular rhythm.   Pulmonary:      Effort: Pulmonary effort is normal.   Chest:          Comments: Erythematous papular rash, no vesicles, no urticaria, no satellite lesions, no bullae present.   Musculoskeletal:         General: Normal range of motion.      Cervical back: Normal range of motion.   Skin:     General: Skin is warm and dry.      Findings: Bruising, erythema and rash present.             Comments: Surgical bandage in place.  Erythema with mild blistering visible at the perimeter of bandage.   Neurological:      Mental Status: She is alert.   Psychiatric:         " Mood and Affect: Mood normal.         Behavior: Behavior normal.         Thought Content: Thought content normal.       Assessment/Plan:   Assessment      1. Allergic contact dermatitis due to other agents    2. Yeast dermatitis  - clotrimazole-betamethasone (LOTRISONE) 1-0.05 % Cream; Apply 1 Application. topically 2 times a day.  Dispense: 15 g; Refill: 0    Surgical bandage left in place.  Dry gauze placed in between the top of surgical bandage and the patient's skin.  Patient was instructed to follow-up with orthopedic surgeon tomorrow to have bandage changed in their clinic.  Lotrisone cream prescribed for bilateral yeast dermatitis located under the patient's breast bilaterally.  Additionally, she was instructed to place a clean cloth under her breast to minimize moisture.  She was instructed to cleanse areas with mild soap and water. Patient verbalized understanding and is in agreement with this plan of care.  She was educated about signs and symptoms of infection.    Differential diagnosis, natural history, and supportive care discussed. AVS handout given and reviewed with patient. Patient educated on red flags and when to seek treatment back in ED or UC.     I personally reviewed prior external notes and test results pertinent to today's visit.  I have independently reviewed and interpreted all diagnostics ordered during this urgent care visit.     This dictation has been created using voice recognition software. The accuracy of the dictation is limited by the abilities of the software. I expect there may be some errors of grammar and possibly content. I made every attempt to manually correct the errors within my dictation. However, errors related to voice recognition software may still exist and should be interpreted within the appropriate context.    This note was electronically signed by ROD Ceja

## 2023-05-22 ENCOUNTER — OFFICE VISIT (OUTPATIENT)
Dept: MEDICAL GROUP | Facility: MEDICAL CENTER | Age: 78
End: 2023-05-22
Payer: MEDICARE

## 2023-05-22 VITALS
DIASTOLIC BLOOD PRESSURE: 76 MMHG | HEART RATE: 56 BPM | OXYGEN SATURATION: 96 % | WEIGHT: 211.64 LBS | BODY MASS INDEX: 33.22 KG/M2 | SYSTOLIC BLOOD PRESSURE: 130 MMHG | HEIGHT: 67 IN | TEMPERATURE: 96.8 F | RESPIRATION RATE: 14 BRPM

## 2023-05-22 DIAGNOSIS — I10 ESSENTIAL HYPERTENSION: ICD-10-CM

## 2023-05-22 DIAGNOSIS — Z96.642 STATUS POST LEFT HIP REPLACEMENT: ICD-10-CM

## 2023-05-22 DIAGNOSIS — E66.9 OBESITY, CLASS I, BMI 30-34.9: ICD-10-CM

## 2023-05-22 DIAGNOSIS — H91.93 DECREASED HEARING, BILATERAL: ICD-10-CM

## 2023-05-22 PROBLEM — G89.29 CHRONIC PAIN OF RIGHT ANKLE: Status: RESOLVED | Noted: 2018-02-08 | Resolved: 2023-05-22

## 2023-05-22 PROBLEM — M16.12 OSTEOARTHRITIS OF LEFT HIP: Status: RESOLVED | Noted: 2022-11-30 | Resolved: 2023-05-22

## 2023-05-22 PROBLEM — M25.571 CHRONIC PAIN OF RIGHT ANKLE: Status: RESOLVED | Noted: 2018-02-08 | Resolved: 2023-05-22

## 2023-05-22 PROCEDURE — 99214 OFFICE O/P EST MOD 30 MIN: CPT | Performed by: FAMILY MEDICINE

## 2023-05-22 PROCEDURE — 3078F DIAST BP <80 MM HG: CPT | Performed by: FAMILY MEDICINE

## 2023-05-22 PROCEDURE — 3075F SYST BP GE 130 - 139MM HG: CPT | Performed by: FAMILY MEDICINE

## 2023-05-22 ASSESSMENT — FIBROSIS 4 INDEX: FIB4 SCORE: 1.36

## 2023-05-22 NOTE — PROGRESS NOTES
Chief Complaint   Patient presents with    Post-op Problem     Hip replacement    Medication Management    Hearing Problem       Subjective:     HPI:   Franchesca Paige presents today with the followin. Status post left hip replacement  She did very well, going well overall.  Much more mobile.  Uses cane for safety.  She is really enjoying being out of pain.    2. Essential hypertension  HTN - Chronic condition stable. Currently taking all meds as directed.   She is not taking baby aspirin daily.   She is monitoring BP at home. Has been 130/70s now.  Was a little low at first.  Denies symptoms low BP: light-headed, tunnel-vision, unusual fatigue.   Denies symptoms high BP:pounding headache, visual changes, palpitations, flushed face.   Denies medicine side effects: unusual fatigue, slow heartbeat, foot/leg swelling, cough.    3. Decreased hearing, bilateral  Worries it may be wax.  No cerumen in her ears bilaterally.  Hearing is mildly diminished bilaterally, no formal test given.     4. Obesity, Class I, BMI 30-34.9  She is doing better, working at being more active.        Patient Active Problem List    Diagnosis Date Noted    Status post left hip replacement 2023    Decreased hearing, bilateral 2023    Osteoporosis of femur without pathological fracture 2023    Caregiver stress 2022    Situational depression 2022    Bradycardia 2018    Ocular migraine 2018    Osteopenia of multiple sites     Chronic right mastoiditis 2018    Obesity, Class I, BMI 30-34.9 2017    Need for subacute bacterial endocarditis prophylaxis 2016    Essential hypertension 2015    Status post total left knee replacement 2015    Gastroesophageal reflux disease without esophagitis 2015    Allergic rhinitis due to pollen 2015    History of bladder cancer 2015    Dyslipidemia, goal LDL below 130     Primary osteoarthritis involving multiple joints   "      Current medicines (including changes today)  Current Outpatient Medications   Medication Sig Dispense Refill    clotrimazole-betamethasone (LOTRISONE) 1-0.05 % Cream Apply 1 Application. topically 2 times a day. 15 g 0    fosinopril (MONOPRIL) 20 MG Tab Take 1 Tablet by mouth every day. 90 Tablet 3    amLODIPine (NORVASC) 5 MG Tab Take 1 Tablet by mouth every day. 90 Tablet 3    Cholecalciferol (VITAMIN D) 50 MCG (2000 UT) Cap Take 1 Capsule by mouth every day. 30 Capsule 11    traMADol (ULTRAM) 50 MG Tab Take 50 mg by mouth every four hours as needed. 1/4 OF A PILL A DAY      diclofenac sodium (VOLTAREN) 1 % Gel Apply  topically 4 times a day as needed.       No current facility-administered medications for this visit.       No Active Allergies    ROS: As per HPI       Objective:     /76   Pulse (!) 56   Temp 36 °C (96.8 °F) (Temporal)   Resp 14   Ht 1.702 m (5' 7\")   Wt 96 kg (211 lb 10.3 oz)   SpO2 96%  Body mass index is 33.15 kg/m².    Physical Exam:  Constitutional: Well-developed and well-nourished. Not diaphoretic. No distress. Lucid and fluent.  Skin: Skin is warm and dry. No rash noted.  Head: Atraumatic without lesions.  Eyes: Conjunctivae and extraocular motions are normal. Pupils are equal, round, and reactive to light. No scleral icterus.   Ears:  External ears unremarkable. Tympanic membranes clear and intact. Canals normal, no cerumen.  Mouth/Throat: Tongue normal. Oropharynx is clear and moist. Posterior pharynx without erythema or exudates.  Neck: Supple, trachea midline. No thyromegaly present. No cervical or supraclavicular lymphadenopathy. No JVD or carotid bruits appreciated  Cardiovascular: Regular rate and rhythm.  Normal S1, S2 without murmur appreciated.  Chest: Effort normal. Clear to auscultation throughout. No adventitious sounds.   Extremities: No cyanosis, clubbing, erythema, nor edema.   Neurological: Alert and oriented x 3. No tremor appreciated.  Psychiatric:  " Behavior, mood, and affect are appropriate.    March pre-op labs reviewed and discussed.  Good kidney function, no anemia, good blood salts.     Assessment and Plan:     77 y.o. female with the following issues:    1. Status post left hip replacement        2. Essential hypertension        3. Decreased hearing, bilateral  Referral to Audiology      4. Obesity, Class I, BMI 30-34.9  Patient identified as having weight management issue.  Appropriate orders and counseling given.            Followup: Return in about 4 months (around 9/22/2023), or if symptoms worsen or fail to improve.

## 2023-08-16 ENCOUNTER — APPOINTMENT (RX ONLY)
Dept: URBAN - METROPOLITAN AREA CLINIC 35 | Facility: CLINIC | Age: 78
Setting detail: DERMATOLOGY
End: 2023-08-16

## 2023-08-16 DIAGNOSIS — L81.4 OTHER MELANIN HYPERPIGMENTATION: ICD-10-CM

## 2023-08-16 DIAGNOSIS — D18.0 HEMANGIOMA: ICD-10-CM

## 2023-08-16 DIAGNOSIS — I78.8 OTHER DISEASES OF CAPILLARIES: ICD-10-CM

## 2023-08-16 DIAGNOSIS — D22 MELANOCYTIC NEVI: ICD-10-CM

## 2023-08-16 DIAGNOSIS — Z71.89 OTHER SPECIFIED COUNSELING: ICD-10-CM

## 2023-08-16 DIAGNOSIS — L82.1 OTHER SEBORRHEIC KERATOSIS: ICD-10-CM

## 2023-08-16 DIAGNOSIS — L70.2 ACNE VARIOLIFORMIS: ICD-10-CM

## 2023-08-16 PROBLEM — D22.62 MELANOCYTIC NEVI OF LEFT UPPER LIMB, INCLUDING SHOULDER: Status: ACTIVE | Noted: 2023-08-16

## 2023-08-16 PROBLEM — D22.5 MELANOCYTIC NEVI OF TRUNK: Status: ACTIVE | Noted: 2023-08-16

## 2023-08-16 PROBLEM — D18.01 HEMANGIOMA OF SKIN AND SUBCUTANEOUS TISSUE: Status: ACTIVE | Noted: 2023-08-16

## 2023-08-16 PROCEDURE — ? OBSERVATION AND MEASURE

## 2023-08-16 PROCEDURE — ? COUNSELING

## 2023-08-16 PROCEDURE — ? PRESCRIPTION

## 2023-08-16 PROCEDURE — ? SUNSCREEN RECOMMENDATIONS

## 2023-08-16 PROCEDURE — 99213 OFFICE O/P EST LOW 20 MIN: CPT

## 2023-08-16 PROCEDURE — ? TREATMENT REGIMEN

## 2023-08-16 RX ORDER — CLOBETASOL PROPIONATE 0.5 MG/ML
1 SOLUTION TOPICAL QHS
Qty: 50 | Refills: 2 | Status: ERX

## 2023-08-16 ASSESSMENT — LOCATION ZONE DERM
LOCATION ZONE: ARM
LOCATION ZONE: NECK
LOCATION ZONE: NOSE
LOCATION ZONE: FACE
LOCATION ZONE: TRUNK

## 2023-08-16 ASSESSMENT — LOCATION SIMPLE DESCRIPTION DERM
LOCATION SIMPLE: POSTERIOR NECK
LOCATION SIMPLE: NOSE
LOCATION SIMPLE: LEFT UPPER BACK
LOCATION SIMPLE: CHEST
LOCATION SIMPLE: LEFT CHEEK
LOCATION SIMPLE: ABDOMEN
LOCATION SIMPLE: RIGHT BUTTOCK
LOCATION SIMPLE: RIGHT CHEEK
LOCATION SIMPLE: LEFT SHOULDER

## 2023-08-16 ASSESSMENT — LOCATION DETAILED DESCRIPTION DERM
LOCATION DETAILED: EPIGASTRIC SKIN
LOCATION DETAILED: LEFT INFERIOR MEDIAL UPPER BACK
LOCATION DETAILED: LEFT MEDIAL UPPER BACK
LOCATION DETAILED: RIGHT BUTTOCK
LOCATION DETAILED: MID POSTERIOR NECK
LOCATION DETAILED: LEFT CENTRAL MALAR CHEEK
LOCATION DETAILED: RIGHT CENTRAL MALAR CHEEK
LOCATION DETAILED: RIGHT LATERAL SUPERIOR CHEST
LOCATION DETAILED: LEFT LATERAL SHOULDER
LOCATION DETAILED: RIGHT MEDIAL TRAPEZIAL NECK
LOCATION DETAILED: NASAL SUPRATIP

## 2023-08-16 NOTE — PROCEDURE: MIPS QUALITY
Detail Level: Detailed
Quality 226: Preventive Care And Screening: Tobacco Use: Screening And Cessation Intervention: Patient screened for tobacco use and is an ex/non-smoker
Quality 111:Pneumonia Vaccination Status For Older Adults: Patient received any pneumococcal conjugate or polysaccharide vaccine on or after their 60th birthday and before the end of the measurement period
Quality 130: Documentation Of Current Medications In The Medical Record: Current Medications Documented
Quality 402: Tobacco Use And Help With Quitting Among Adolescents: Patient screened for tobacco and is an ex-smoker

## 2023-08-16 NOTE — PROCEDURE: OBSERVATION
Detail Level: Detailed
Size Of Lesion: 1cm pink linear telangiectactic macule
Size Of Lesion: 5mm tan macule

## 2023-08-16 NOTE — PROCEDURE: TREATMENT REGIMEN
Initiate Treatment: - clobetasol 0.05 % scalp solution. Apply once daily to scalp as needed
Detail Level: Zone

## 2023-10-07 ENCOUNTER — HOSPITAL ENCOUNTER (OUTPATIENT)
Facility: MEDICAL CENTER | Age: 78
End: 2023-10-07
Attending: STUDENT IN AN ORGANIZED HEALTH CARE EDUCATION/TRAINING PROGRAM | Admitting: HOSPITALIST
Payer: MEDICARE

## 2023-10-07 ENCOUNTER — APPOINTMENT (OUTPATIENT)
Dept: RADIOLOGY | Facility: MEDICAL CENTER | Age: 78
End: 2023-10-07
Attending: STUDENT IN AN ORGANIZED HEALTH CARE EDUCATION/TRAINING PROGRAM
Payer: MEDICARE

## 2023-10-07 ENCOUNTER — APPOINTMENT (OUTPATIENT)
Dept: CARDIOLOGY | Facility: MEDICAL CENTER | Age: 78
End: 2023-10-07
Attending: HOSPITALIST
Payer: MEDICARE

## 2023-10-07 ENCOUNTER — APPOINTMENT (OUTPATIENT)
Dept: RADIOLOGY | Facility: MEDICAL CENTER | Age: 78
End: 2023-10-07
Attending: HOSPITALIST
Payer: MEDICARE

## 2023-10-07 VITALS
SYSTOLIC BLOOD PRESSURE: 174 MMHG | RESPIRATION RATE: 17 BRPM | DIASTOLIC BLOOD PRESSURE: 73 MMHG | WEIGHT: 216.27 LBS | HEIGHT: 67 IN | OXYGEN SATURATION: 95 % | TEMPERATURE: 97.2 F | HEART RATE: 50 BPM | BODY MASS INDEX: 33.94 KG/M2

## 2023-10-07 DIAGNOSIS — I48.0 PAROXYSMAL ATRIAL FIBRILLATION (HCC): ICD-10-CM

## 2023-10-07 DIAGNOSIS — R68.84 JAW PAIN: ICD-10-CM

## 2023-10-07 PROBLEM — I48.91 A-FIB (HCC): Status: ACTIVE | Noted: 2023-10-07

## 2023-10-07 PROBLEM — Z71.89 ACP (ADVANCE CARE PLANNING): Status: ACTIVE | Noted: 2023-10-07

## 2023-10-07 PROBLEM — R91.8 LEFT UPPER LOBE PULMONARY INFILTRATE: Status: ACTIVE | Noted: 2023-10-07

## 2023-10-07 PROBLEM — I16.0 HYPERTENSIVE URGENCY: Status: ACTIVE | Noted: 2023-10-07

## 2023-10-07 PROBLEM — R07.89 ATYPICAL CHEST PAIN: Status: ACTIVE | Noted: 2023-10-07

## 2023-10-07 LAB
ALBUMIN SERPL BCP-MCNC: 4.4 G/DL (ref 3.2–4.9)
ALBUMIN/GLOB SERPL: 1.6 G/DL
ALP SERPL-CCNC: 141 U/L (ref 30–99)
ALT SERPL-CCNC: 16 U/L (ref 2–50)
ANION GAP SERPL CALC-SCNC: 10 MMOL/L (ref 7–16)
APPEARANCE UR: CLEAR
AST SERPL-CCNC: 16 U/L (ref 12–45)
BASOPHILS # BLD AUTO: 0.3 % (ref 0–1.8)
BASOPHILS # BLD: 0.02 K/UL (ref 0–0.12)
BILIRUB SERPL-MCNC: 0.2 MG/DL (ref 0.1–1.5)
BILIRUB UR QL STRIP.AUTO: NEGATIVE
BUN SERPL-MCNC: 23 MG/DL (ref 8–22)
CALCIUM ALBUM COR SERPL-MCNC: 10.4 MG/DL (ref 8.5–10.5)
CALCIUM SERPL-MCNC: 10.7 MG/DL (ref 8.4–10.2)
CHLORIDE SERPL-SCNC: 105 MMOL/L (ref 96–112)
CO2 SERPL-SCNC: 23 MMOL/L (ref 20–33)
COLOR UR: YELLOW
CREAT SERPL-MCNC: 0.69 MG/DL (ref 0.5–1.4)
D DIMER PPP IA.FEU-MCNC: 0.54 UG/ML (FEU) (ref 0–0.5)
EKG IMPRESSION: NORMAL
EOSINOPHIL # BLD AUTO: 0.15 K/UL (ref 0–0.51)
EOSINOPHIL NFR BLD: 2.1 % (ref 0–6.9)
EPI CELLS #/AREA URNS HPF: NORMAL /HPF
ERYTHROCYTE [DISTWIDTH] IN BLOOD BY AUTOMATED COUNT: 44.6 FL (ref 35.9–50)
FLUAV RNA SPEC QL NAA+PROBE: NEGATIVE
FLUBV RNA SPEC QL NAA+PROBE: NEGATIVE
GFR SERPLBLD CREATININE-BSD FMLA CKD-EPI: 90 ML/MIN/1.73 M 2
GLOBULIN SER CALC-MCNC: 2.8 G/DL (ref 1.9–3.5)
GLUCOSE SERPL-MCNC: 101 MG/DL (ref 65–99)
GLUCOSE UR STRIP.AUTO-MCNC: NEGATIVE MG/DL
HCT VFR BLD AUTO: 41.5 % (ref 37–47)
HGB BLD-MCNC: 13.9 G/DL (ref 12–16)
IMM GRANULOCYTES # BLD AUTO: 0.06 K/UL (ref 0–0.11)
IMM GRANULOCYTES NFR BLD AUTO: 0.8 % (ref 0–0.9)
KETONES UR STRIP.AUTO-MCNC: NEGATIVE MG/DL
LEUKOCYTE ESTERASE UR QL STRIP.AUTO: ABNORMAL
LV EJECT FRACT  99904: 55
LYMPHOCYTES # BLD AUTO: 2.76 K/UL (ref 1–4.8)
LYMPHOCYTES NFR BLD: 37.8 % (ref 22–41)
MAGNESIUM SERPL-MCNC: 2.3 MG/DL (ref 1.5–2.5)
MCH RBC QN AUTO: 29.7 PG (ref 27–33)
MCHC RBC AUTO-ENTMCNC: 33.5 G/DL (ref 32.2–35.5)
MCV RBC AUTO: 88.7 FL (ref 81.4–97.8)
MICRO URNS: ABNORMAL
MONOCYTES # BLD AUTO: 0.51 K/UL (ref 0–0.85)
MONOCYTES NFR BLD AUTO: 7 % (ref 0–13.4)
MUCOUS THREADS #/AREA URNS HPF: NORMAL /HPF
NEUTROPHILS # BLD AUTO: 3.8 K/UL (ref 1.82–7.42)
NEUTROPHILS NFR BLD: 52 % (ref 44–72)
NITRITE UR QL STRIP.AUTO: NEGATIVE
NRBC # BLD AUTO: 0 K/UL
NRBC BLD-RTO: 0 /100 WBC (ref 0–0.2)
NT-PROBNP SERPL IA-MCNC: 199 PG/ML (ref 0–125)
PH UR STRIP.AUTO: 7 [PH] (ref 5–8)
PLATELET # BLD AUTO: 244 K/UL (ref 164–446)
PMV BLD AUTO: 9.3 FL (ref 9–12.9)
POTASSIUM SERPL-SCNC: 3.9 MMOL/L (ref 3.6–5.5)
PROCALCITONIN SERPL-MCNC: 0.02 NG/ML
PROT SERPL-MCNC: 7.2 G/DL (ref 6–8.2)
PROT UR QL STRIP: NEGATIVE MG/DL
RBC # BLD AUTO: 4.68 M/UL (ref 4.2–5.4)
RBC UR QL AUTO: NEGATIVE
RSV RNA SPEC QL NAA+PROBE: NEGATIVE
SARS-COV-2 RNA RESP QL NAA+PROBE: NOTDETECTED
SODIUM SERPL-SCNC: 138 MMOL/L (ref 135–145)
SP GR UR STRIP.AUTO: <=1.005
SPECIMEN SOURCE: NORMAL
T4 FREE SERPL-MCNC: 1.06 NG/DL (ref 0.93–1.7)
TROPONIN T SERPL-MCNC: 10 NG/L (ref 6–19)
TROPONIN T SERPL-MCNC: 12 NG/L (ref 6–19)
TROPONIN T SERPL-MCNC: 13 NG/L (ref 6–19)
TSH SERPL DL<=0.005 MIU/L-ACNC: 5.6 UIU/ML (ref 0.38–5.33)
WBC # BLD AUTO: 7.3 K/UL (ref 4.8–10.8)
WBC #/AREA URNS HPF: NORMAL /HPF

## 2023-10-07 PROCEDURE — 71275 CT ANGIOGRAPHY CHEST: CPT

## 2023-10-07 PROCEDURE — 700117 HCHG RX CONTRAST REV CODE 255: Performed by: STUDENT IN AN ORGANIZED HEALTH CARE EDUCATION/TRAINING PROGRAM

## 2023-10-07 PROCEDURE — 78452 HT MUSCLE IMAGE SPECT MULT: CPT

## 2023-10-07 PROCEDURE — 36415 COLL VENOUS BLD VENIPUNCTURE: CPT

## 2023-10-07 PROCEDURE — 93005 ELECTROCARDIOGRAM TRACING: CPT | Mod: 77,XU | Performed by: STUDENT IN AN ORGANIZED HEALTH CARE EDUCATION/TRAINING PROGRAM

## 2023-10-07 PROCEDURE — 700111 HCHG RX REV CODE 636 W/ 250 OVERRIDE (IP): Performed by: HOSPITALIST

## 2023-10-07 PROCEDURE — 85379 FIBRIN DEGRADATION QUANT: CPT

## 2023-10-07 PROCEDURE — 80053 COMPREHEN METABOLIC PANEL: CPT

## 2023-10-07 PROCEDURE — 93018 CV STRESS TEST I&R ONLY: CPT | Mod: 59 | Performed by: INTERNAL MEDICINE

## 2023-10-07 PROCEDURE — 71045 X-RAY EXAM CHEST 1 VIEW: CPT

## 2023-10-07 PROCEDURE — 700102 HCHG RX REV CODE 250 W/ 637 OVERRIDE(OP): Performed by: STUDENT IN AN ORGANIZED HEALTH CARE EDUCATION/TRAINING PROGRAM

## 2023-10-07 PROCEDURE — 99236 HOSP IP/OBS SAME DATE HI 85: CPT | Performed by: INTERNAL MEDICINE

## 2023-10-07 PROCEDURE — 99497 ADVNCD CARE PLAN 30 MIN: CPT | Performed by: HOSPITALIST

## 2023-10-07 PROCEDURE — 93306 TTE W/DOPPLER COMPLETE: CPT

## 2023-10-07 PROCEDURE — 81001 URINALYSIS AUTO W/SCOPE: CPT

## 2023-10-07 PROCEDURE — 84145 PROCALCITONIN (PCT): CPT

## 2023-10-07 PROCEDURE — 84484 ASSAY OF TROPONIN QUANT: CPT

## 2023-10-07 PROCEDURE — A9270 NON-COVERED ITEM OR SERVICE: HCPCS | Performed by: STUDENT IN AN ORGANIZED HEALTH CARE EDUCATION/TRAINING PROGRAM

## 2023-10-07 PROCEDURE — 99285 EMERGENCY DEPT VISIT HI MDM: CPT

## 2023-10-07 PROCEDURE — 0241U HCHG SARS-COV-2 COVID-19 NFCT DS RESP RNA 4 TRGT MIC: CPT

## 2023-10-07 PROCEDURE — 83735 ASSAY OF MAGNESIUM: CPT

## 2023-10-07 PROCEDURE — 83880 ASSAY OF NATRIURETIC PEPTIDE: CPT

## 2023-10-07 PROCEDURE — 93306 TTE W/DOPPLER COMPLETE: CPT | Mod: 26 | Performed by: INTERNAL MEDICINE

## 2023-10-07 PROCEDURE — 84443 ASSAY THYROID STIM HORMONE: CPT

## 2023-10-07 PROCEDURE — C9803 HOPD COVID-19 SPEC COLLECT: HCPCS | Performed by: HOSPITALIST

## 2023-10-07 PROCEDURE — 93005 ELECTROCARDIOGRAM TRACING: CPT | Mod: XE | Performed by: HOSPITALIST

## 2023-10-07 PROCEDURE — G0378 HOSPITAL OBSERVATION PER HR: HCPCS

## 2023-10-07 PROCEDURE — 99223 1ST HOSP IP/OBS HIGH 75: CPT | Mod: 25 | Performed by: HOSPITALIST

## 2023-10-07 PROCEDURE — 84439 ASSAY OF FREE THYROXINE: CPT

## 2023-10-07 PROCEDURE — 85025 COMPLETE CBC W/AUTO DIFF WBC: CPT

## 2023-10-07 PROCEDURE — 78452 HT MUSCLE IMAGE SPECT MULT: CPT | Mod: 26 | Performed by: INTERNAL MEDICINE

## 2023-10-07 PROCEDURE — 94760 N-INVAS EAR/PLS OXIMETRY 1: CPT

## 2023-10-07 RX ORDER — ASPIRIN 81 MG/1
324 TABLET, CHEWABLE ORAL DAILY
Status: DISCONTINUED | OUTPATIENT
Start: 2023-10-07 | End: 2023-10-07

## 2023-10-07 RX ORDER — BISACODYL 10 MG
10 SUPPOSITORY, RECTAL RECTAL
Status: DISCONTINUED | OUTPATIENT
Start: 2023-10-07 | End: 2023-10-07 | Stop reason: HOSPADM

## 2023-10-07 RX ORDER — HYDRALAZINE HYDROCHLORIDE 20 MG/ML
10 INJECTION INTRAMUSCULAR; INTRAVENOUS EVERY 6 HOURS PRN
Status: DISCONTINUED | OUTPATIENT
Start: 2023-10-07 | End: 2023-10-07 | Stop reason: HOSPADM

## 2023-10-07 RX ORDER — ACETAMINOPHEN 325 MG/1
650 TABLET ORAL EVERY 6 HOURS PRN
Status: DISCONTINUED | OUTPATIENT
Start: 2023-10-07 | End: 2023-10-07 | Stop reason: HOSPADM

## 2023-10-07 RX ORDER — POLYETHYLENE GLYCOL 3350 17 G/17G
1 POWDER, FOR SOLUTION ORAL
Status: DISCONTINUED | OUTPATIENT
Start: 2023-10-07 | End: 2023-10-07 | Stop reason: HOSPADM

## 2023-10-07 RX ORDER — AMOXICILLIN 250 MG
2 CAPSULE ORAL 2 TIMES DAILY
Status: DISCONTINUED | OUTPATIENT
Start: 2023-10-07 | End: 2023-10-07 | Stop reason: HOSPADM

## 2023-10-07 RX ORDER — AMINOPHYLLINE 25 MG/ML
100 INJECTION, SOLUTION INTRAVENOUS
Status: DISCONTINUED | OUTPATIENT
Start: 2023-10-07 | End: 2023-10-07 | Stop reason: HOSPADM

## 2023-10-07 RX ORDER — REGADENOSON 0.08 MG/ML
0.4 INJECTION, SOLUTION INTRAVENOUS ONCE
Status: COMPLETED | OUTPATIENT
Start: 2023-10-07 | End: 2023-10-07

## 2023-10-07 RX ORDER — ASPIRIN 325 MG
325 TABLET ORAL DAILY
Status: DISCONTINUED | OUTPATIENT
Start: 2023-10-07 | End: 2023-10-07

## 2023-10-07 RX ORDER — ONDANSETRON 2 MG/ML
4 INJECTION INTRAMUSCULAR; INTRAVENOUS EVERY 4 HOURS PRN
Status: DISCONTINUED | OUTPATIENT
Start: 2023-10-07 | End: 2023-10-07 | Stop reason: HOSPADM

## 2023-10-07 RX ORDER — ASPIRIN 300 MG/1
300 SUPPOSITORY RECTAL DAILY
Status: DISCONTINUED | OUTPATIENT
Start: 2023-10-07 | End: 2023-10-07

## 2023-10-07 RX ORDER — ONDANSETRON 4 MG/1
4 TABLET, ORALLY DISINTEGRATING ORAL EVERY 4 HOURS PRN
Status: DISCONTINUED | OUTPATIENT
Start: 2023-10-07 | End: 2023-10-07 | Stop reason: HOSPADM

## 2023-10-07 RX ADMIN — APIXABAN 5 MG: 5 TABLET, FILM COATED ORAL at 04:51

## 2023-10-07 RX ADMIN — IOHEXOL 50 ML: 350 INJECTION, SOLUTION INTRAVENOUS at 03:42

## 2023-10-07 RX ADMIN — REGADENOSON 0.4 MG: 0.08 INJECTION, SOLUTION INTRAVENOUS at 11:13

## 2023-10-07 ASSESSMENT — LIFESTYLE VARIABLES
ON A TYPICAL DAY WHEN YOU DRINK ALCOHOL HOW MANY DRINKS DO YOU HAVE: 0
TOTAL SCORE: 0
EVER HAD A DRINK FIRST THING IN THE MORNING TO STEADY YOUR NERVES TO GET RID OF A HANGOVER: NO
HOW MANY TIMES IN THE PAST YEAR HAVE YOU HAD 5 OR MORE DRINKS IN A DAY: 0
TOTAL SCORE: 0
HAVE YOU EVER FELT YOU SHOULD CUT DOWN ON YOUR DRINKING: NO
AVERAGE NUMBER OF DAYS PER WEEK YOU HAVE A DRINK CONTAINING ALCOHOL: 0
HAVE PEOPLE ANNOYED YOU BY CRITICIZING YOUR DRINKING: NO
ALCOHOL_USE: NO
CONSUMPTION TOTAL: NEGATIVE
EVER FELT BAD OR GUILTY ABOUT YOUR DRINKING: NO
TOTAL SCORE: 0

## 2023-10-07 ASSESSMENT — ENCOUNTER SYMPTOMS
VOMITING: 0
MYALGIAS: 0
WEAKNESS: 0
HEADACHES: 0
SORE THROAT: 0
NAUSEA: 0
COUGH: 0
DOUBLE VISION: 0
DIZZINESS: 0
DEPRESSION: 0
BLURRED VISION: 0
PALPITATIONS: 0
BRUISES/BLEEDS EASILY: 0
NECK PAIN: 0
FEVER: 0
SHORTNESS OF BREATH: 1
INSOMNIA: 0

## 2023-10-07 ASSESSMENT — HEART SCORE
TROPONIN: LESS THAN OR EQUAL TO NORMAL LIMIT
HEART SCORE: 4
HISTORY: MODERATELY SUSPICIOUS
RISK FACTORS: 1-2 RISK FACTORS
ECG: SIGNIFICANT ST-DEPRESSION
HEART SCORE: 6
AGE: 65+
TROPONIN: LESS THAN OR EQUAL TO NORMAL LIMIT
RISK FACTORS: 1-2 RISK FACTORS
AGE: 65+
HISTORY: SLIGHTLY SUSPICIOUS
ECG: NON-SPECIFIC REPOLARIZATION DISTURBANCE

## 2023-10-07 ASSESSMENT — CHA2DS2 SCORE
DIABETES: NO
HYPERTENSION: YES
CHA2D2S VASC SCORE: 4
SEX: FEMALE
PRIOR STROKE OR TIA OR THROMBOEMBOLISM: NO
CHF OR LEFT VENTRICULAR DYSFUNCTION: NO
AGE IN YEARS: 75+
VASCULAR DISEASE: NO

## 2023-10-07 ASSESSMENT — COGNITIVE AND FUNCTIONAL STATUS - GENERAL
DAILY ACTIVITIY SCORE: 24
WALKING IN HOSPITAL ROOM: A LITTLE
SUGGESTED CMS G CODE MODIFIER DAILY ACTIVITY: CH
MOBILITY SCORE: 21
MOVING FROM LYING ON BACK TO SITTING ON SIDE OF FLAT BED: A LITTLE
CLIMB 3 TO 5 STEPS WITH RAILING: A LITTLE
SUGGESTED CMS G CODE MODIFIER MOBILITY: CJ

## 2023-10-07 ASSESSMENT — FIBROSIS 4 INDEX
FIB4 SCORE: 1.23
FIB4 SCORE: 1.36

## 2023-10-07 ASSESSMENT — PAIN DESCRIPTION - PAIN TYPE
TYPE: ACUTE PAIN
TYPE: ACUTE PAIN

## 2023-10-07 ASSESSMENT — PATIENT HEALTH QUESTIONNAIRE - PHQ9
SUM OF ALL RESPONSES TO PHQ9 QUESTIONS 1 AND 2: 0
2. FEELING DOWN, DEPRESSED, IRRITABLE, OR HOPELESS: NOT AT ALL
1. LITTLE INTEREST OR PLEASURE IN DOING THINGS: NOT AT ALL

## 2023-10-07 NOTE — ED NOTES
ERP ordered PO challenge, UA and eventually discharge after UA result      Pasta, crackers and water offered.

## 2023-10-07 NOTE — ASSESSMENT & PLAN NOTE
"-Detailed conversation with patient about advance care planning and CODE STATUS.  Conversation was held in the emergency department and her  was present by the bedside.  Patient at some point consider to be DNR-DNI, as she fears how could quality of life being affected after undergoing CPR.  She also reports having \"an awesome and fine quality of life \".  After clarifying questions and concerns  "

## 2023-10-07 NOTE — DISCHARGE SUMMARY
Discharge Summary    CHIEF COMPLAINT ON ADMISSION  Chief Complaint   Patient presents with    Jaw Pain    Palpitations    Fatigue     Pt Aox4, ambulatory, reports waking up with jaw pain (8/10) and palpitation at 12 mn. Denies fever, N/V. She's been feeling fatigue and dizzy since yesterday. Pt reports jaw pain subsided with pain score of 2/10.       Reason for Admission  Jaw Pain, Palpitations     Admission Date  10/7/2023    CODE STATUS  Full Code    HPI & HOSPITAL COURSE  Patient is a 77-year-old female with history of hypertension presented to the emergency room on 10/7 with complaints of chest and jaw pain.  Pain was retrosternal and she had radiation to bilateral jaw.  She has been having increasing fatigue over the last 2 days and recurrence of palpitations.  She has been having significant stress as well.  Initially she came into the emergency room with a blood pressure of 224/114 and a heart rate of 103.  Normally the patient runs between low 40s and 50s for her heart rate.  She was found to be in atrial fibrillation on EKG in the emergency room and was started on Eliquis for anticoagulation.  Given her significant bradycardia at baseline she is not appropriate for beta-blockade at this time.  Shortly after admission her heart rate dropped into the low 50s, high 40s and she her rhythm changed into sinus bradycardia.  She is remained in sinus bradycardia since.  She was admitted and monitored for stress test which came back within normal limits without evidence of acute ischemia or jeopardized myocardium.  2D echocardiogram was also completed which showed ejection fraction of 55% and no significant valvular abnormalities.  At this time she is appropriate to transition home and will continue with Eliquis for stroke risk reduction.    Therefore, she is discharged in good and stable condition to home with close outpatient follow-up.      Discharge Date  10/7/2023    FOLLOW UP ITEMS POST  DISCHARGE  PCP    DISCHARGE DIAGNOSES  Principal Problem:    Atypical chest pain (POA: Unknown)  Active Problems:    Dyslipidemia, goal LDL below 130 (POA: Yes)    Hypertensive urgency (POA: Yes)    Left upper lobe pulmonary infiltrate (POA: Unknown)    A-fib (HCC) (POA: Unknown)    ACP (advance care planning) (POA: Unknown)  Resolved Problems:    * No resolved hospital problems. *      FOLLOW UP  No future appointments.  Tate De La Rosa M.D.  [address] [facility] [address]  [address]  [facility] [id number] [phone]    Follow up        MEDICATIONS ON DISCHARGE     Medication List        START taking these medications        Instructions   apixaban 5mg Tabs  Commonly known as: Eliquis   Take 1 Tablet by mouth 2 times a day.  Dose: 5 mg            CONTINUE taking these medications        Instructions   amLODIPine 5 MG Tabs  Commonly known as: Norvasc   Take 1 Tablet by mouth every day.  Dose: 5 mg     diclofenac sodium 1 % Gel  Commonly known as: Voltaren   Apply 4 g topically 4 times a day as needed. To knee  Dose: 4 g     fosinopril 20 MG Tabs  Commonly known as: Monopril   Doctor's comments: Note dose increase.  20 mg is accurate  Take 1 Tablet by mouth every day.  Dose: 20 mg     Vitamin D 50 MCG (2000 UT) Caps   Take 1 Capsule by mouth every day.  Dose: 1 Capsule              Allergies  No Known Allergies    DIET  Orders Placed This Encounter   Procedures    Diet Order Diet: Cardiac; Miscellaneous modifications: (optional): No Decaf, No Caffeine(for test)     Standing Status:   Standing     Number of Occurrences:   1     Order Specific Question:   Diet:     Answer:   Cardiac [6]     Order Specific Question:   Miscellaneous modifications: (optional)     Answer:   No Decaf, No Caffeine(for test) [11]       ACTIVITY  As tolerated.  Weight bearing as tolerated    CONSULTATIONS  None    PROCEDURES  None    LABORATORY  Lab Results   Component Value Date    SODIUM 138 10/07/2023    POTASSIUM 3.9 10/07/2023    CHLORIDE 105 10/07/2023     CO2 23 10/07/2023    GLUCOSE 101 (H) 10/07/2023    BUN 23 (H) 10/07/2023    CREATININE 0.69 10/07/2023    CREATININE 0.74 10/02/2012    GLOMRATE >59 09/15/2009        Lab Results   Component Value Date    WBC 7.3 10/07/2023    WBC 5.3 09/15/2009    HEMOGLOBIN 13.9 10/07/2023    HEMATOCRIT 41.5 10/07/2023    PLATELETCT 244 10/07/2023        Total time of the discharge process exceeds 36 minutes.

## 2023-10-07 NOTE — ASSESSMENT & PLAN NOTE
-Observation status on telemetry  -Troponin on admission: Negative x2  -EKG findings: Patient initially on A-fib, RVR.  She converted then to sinus rhythm, has some ST depressions on lead V4 and V5 which improved.  Suspect findings related to RVR.  No acute ischemia.  Blood pressure also significantly elevated on arrival, that could be contributing to chest pain.  CTA chest negative for PE, but showing atherosclerosis and coronary artery disease.  -Serial cardiac enzymes every 4 hours ×3  -Patient took 1 baby aspirin at home and she was given Eliquis given new onset A-fib.  -Other pertinent cardiac information: Added stress test and echocardiogram      The 10-year ASCVD risk score (Tyler DK, et al., 2019) is: 27.7%    Values used to calculate the score:      Age: 77 years      Sex: Female      Is Non- : No      Diabetic: No      Tobacco smoker: No      Systolic Blood Pressure: 174 mmHg      Is BP treated: No

## 2023-10-07 NOTE — ASSESSMENT & PLAN NOTE
-Initial EKG showed atrial fibrillation.  Patient heart rate now is bradycardic and converted without any intervention.  -Echocardiogram is ordered and she received Eliquis this morning.

## 2023-10-07 NOTE — ASSESSMENT & PLAN NOTE
-Initial blood pressure 224/114.  Patient reports taking her amlodipine and a fosinopril around 5 AM this morning.  Most recent blood pressure now is 174/73.  -Chest pain in the context of hypertensive urgency and hypertensive emergency is not fully excluded.  -Patient is now bradycardic with heart rate around mid 50s  -As needed hydralazine every 6 hours for systolic blood pressure higher than 160.

## 2023-10-07 NOTE — PROGRESS NOTES
Med Rec UPDATED and COMPLETE per PATIENT  Allergies Reviewed  Antibiotcs in past 30 days:NO  Anticoagulant in past 14 days:NO  Preferred pharmacy:[facility] on [address] [facility]    Pt states she gets an allergy shot every 30 days for hay fever at the allergy clinic. Pt states she is scheduled to get a shot on Monday (10/09/2023)

## 2023-10-07 NOTE — PROGRESS NOTES
Pt's echo resulted, pt okay to discharge home per Dr. Barrett. Discharge instructions provided to pt. Pt educated on following up with pcp. Pt verbalizes understanding. Pt states all questions have been answered. Prescriptions sent to pharmacy. Pt states that all personal belongings are in possession. Pt off unit via wheelchair, escorted by CNA.

## 2023-10-07 NOTE — H&P
Hospital Medicine History & Physical Note    Date of Service  10/7/2023    Primary Care Physician  Tate De La Rosa M.D.    Consultants  None      Code Status  Full Code: I discussed CODE STATUS and advance care planning on admission.  See assessment and plan for more details.    Chief Complaint  Chief Complaint   Patient presents with    Jaw Pain    Palpitations    Fatigue     Pt Aox4, ambulatory, reports waking up with jaw pain (8/10) and palpitation at 12 mn. Denies fever, N/V. She's been feeling fatigue and dizzy since yesterday. Pt reports jaw pain subsided with pain score of 2/10.       History of Presenting Illness  Franchesca Paige is a 77 y.o. female, h/o HTN who presented to the ED on 10/7/2023 with Chest pain. Patient woke up with chest discomfornt and unable to sleep ever since. Pain retrosternal, 8/10 in intensity with radiation to jaw.  She denies having previous symptoms.  She has been feeling progressively fatigued over the last 2 days with exertional dyspnea but no chest pain prior.    I discussed the plan of care with patient and ERP Dr. Diaz .    Review of Systems  Review of Systems   Constitutional:  Positive for malaise/fatigue. Negative for fever.   HENT:  Negative for congestion and sore throat.    Eyes:  Negative for blurred vision and double vision.   Respiratory:  Positive for shortness of breath. Negative for cough.    Cardiovascular:  Positive for chest pain. Negative for palpitations.   Gastrointestinal:  Negative for nausea and vomiting.   Genitourinary:  Negative for dysuria and urgency.   Musculoskeletal:  Negative for myalgias and neck pain.   Skin:  Negative for itching and rash.   Neurological:  Negative for dizziness, weakness and headaches.   Endo/Heme/Allergies:  Does not bruise/bleed easily.   Psychiatric/Behavioral:  Negative for depression. The patient does not have insomnia.        Past Medical History   has a past medical history of Anesthesia, Arthritis, Bladder cancer  (formerly [facility]), Cancer (formerly [facility]) (2003), Caregiver stress (02/11/2022), Chronic pain of right ankle (2/8/2018), COVID-19, Delayed emergence from general anesthesia, Dizziness (02/22/2018), Dyslipidemia, goal LDL below 130, Hydronephrosis (02/11/2019), Hypertension (1992), Ocular migraine, Osteoarthritis of left hip (11/30/2022), Osteopenia, Osteopenia of multiple sites, Osteoporosis of femur without pathological fracture (02/01/2023), PONV (postoperative nausea and vomiting), and Total knee replacement status.    Surgical History   has a past surgical history that includes hysterectomy, total abdominal (1980s); colonoscopy (08/03/2010); knee replacement, total (12/04/2012); other (Right, 10/07/2018); orif, wrist (Left, 2008); hardware removal ortho (Left, 2008); and pr total hip arthroplasty (Left, 3/14/2023).     Family History  family history includes Cancer in her paternal aunt and sister; Diabetes in an other family member; Hypertension in her father and mother; Psychiatric Illness in her mother and sister.   Family history reviewed with patient. There is no family history that is pertinent to the chief complaint.     Social History   reports that she quit smoking about 23 years ago. Her smoking use included cigarettes. She started smoking about 59 years ago. She has a 36.2 pack-year smoking history. She has never used smokeless tobacco. She reports that she does not drink alcohol and does not use drugs.    Allergies  No Active Allergies    Medications  Prior to Admission Medications   Prescriptions Last Dose Informant Patient Reported? Taking?   Cholecalciferol (VITAMIN D) 50 MCG (2000 UT) Cap   Yes No   Sig: Take 1 Capsule by mouth every day.   amLODIPine (NORVASC) 5 MG Tab   No No   Sig: Take 1 Tablet by mouth every day.   clotrimazole-betamethasone (LOTRISONE) 1-0.05 % Cream   No No   Sig: Apply 1 Application. topically 2 times a day.   diclofenac sodium (VOLTAREN) 1 % Gel   Yes No   Sig: Apply  topically 4 times a day  as needed.   fosinopril (MONOPRIL) 20 MG Tab   No No   Sig: Take 1 Tablet by mouth every day.   traMADol (ULTRAM) 50 MG Tab   Yes No   Sig: Take 50 mg by mouth every four hours as needed. 1/4 OF A PILL A DAY      Facility-Administered Medications: None       Physical Exam  Temp:  [36.2 °C (97.2 °F)] 36.2 °C (97.2 °F)  Pulse:  [] 51  Resp:  [15-25] 15  BP: (168-224)/() 194/89  SpO2:  [92 %-99 %] 97 %  Blood Pressure : (!) 194/89   Temperature: 36.2 °C (97.2 °F)   Pulse: (!) 51   Respiration: 15   Pulse Oximetry: 97 %       Physical Exam  Constitutional:       Appearance: Normal appearance.   HENT:      Head: Normocephalic and atraumatic.      Mouth/Throat:      Mouth: Mucous membranes are moist.      Pharynx: Oropharynx is clear.   Eyes:      Extraocular Movements: Extraocular movements intact.      Pupils: Pupils are equal, round, and reactive to light.   Cardiovascular:      Rate and Rhythm: Normal rate and regular rhythm.      Heart sounds: Normal heart sounds.   Pulmonary:      Effort: Pulmonary effort is normal.      Breath sounds: Normal breath sounds.   Abdominal:      General: Abdomen is flat. Bowel sounds are normal.      Palpations: Abdomen is soft.   Musculoskeletal:      Cervical back: Normal range of motion and neck supple.   Skin:     General: Skin is warm and dry.   Neurological:      General: No focal deficit present.      Mental Status: She is alert and oriented to person, place, and time.   Psychiatric:         Mood and Affect: Mood normal.         Behavior: Behavior normal.         Laboratory:  Recent Labs     10/07/23  0136   WBC 7.3   RBC 4.68   HEMOGLOBIN 13.9   HEMATOCRIT 41.5   MCV 88.7   MCH 29.7   MCHC 33.5   RDW 44.6   PLATELETCT 244   MPV 9.3     Recent Labs     10/07/23  0136   SODIUM 138   POTASSIUM 3.9   CHLORIDE 105   CO2 23   GLUCOSE 101*   BUN 23*   CREATININE 0.69   CALCIUM 10.7*     Recent Labs     10/07/23  0136   ALTSGPT 16   ASTSGOT 16   ALKPHOSPHAT 141*   TBILIRUBIN  0.2   GLUCOSE 101*         Recent Labs     10/07/23  0136   NTPROBNP 199*         Recent Labs     10/07/23  0136 10/07/23  0345   TROPONINT 10 13       Imaging:  CT-CTA CHEST PULMONARY ARTERY W/ RECONS   Final Result         1.  No pulmonary embolus appreciated.   2.  Patchy reticular left upper lobe infiltrates.   3.  Hepatomegaly   4.  Irregular hepatic contour favoring changes of cirrhosis   5.  Atherosclerosis and atherosclerotic coronary artery disease.      DX-CHEST-PORTABLE (1 VIEW)   Final Result         1.  No acute cardiopulmonary disease.   2.  Atherosclerosis          X-Ray:  I have personally reviewed the images and compared with prior images. and My impression is: CTA chest negative for PE  EKG:  I have personally reviewed the images and compared with prior images. and My impression is: Sinus tachycardia 98 bpm.  ST depression, minimal in leads V4 and V6.  No acute ST elevation.  Old inferior infarct.    Assessment/Plan:  Justification for Admission Status  I anticipate this patient is appropriate for observation status at this time because 77-year-old female, coming with atypical chest pain and hypertensive urgency.  Also new onset A-fib.        * Atypical chest pain  Assessment & Plan  -Observation status on telemetry  -Troponin on admission: Negative x2  -EKG findings: Patient initially on A-fib, RVR.  She converted then to sinus rhythm, has some ST depressions on lead V4 and V5 which improved.  Suspect findings related to RVR.  No acute ischemia.  Blood pressure also significantly elevated on arrival, that could be contributing to chest pain.  CTA chest negative for PE, but showing atherosclerosis and coronary artery disease.  -Serial cardiac enzymes every 4 hours ×3  -Patient took 1 baby aspirin at home and she was given Eliquis given new onset A-fib.  -Other pertinent cardiac information: Added stress test and echocardiogram      The 10-year ASCVD risk score (Tyler PERKINS, et al., 2019) is: 27.7%     Values used to calculate the score:      Age: 77 years      Sex: Female      Is Non- : No      Diabetic: No      Tobacco smoker: No      Systolic Blood Pressure: 174 mmHg      Is BP treated: No      Left upper lobe pulmonary infiltrate  Assessment & Plan  -Infectious cause is possible.  There is no SIRS, she has no productive cough but reports increased fatigue and shortness of breath for the past 2 days.  -This will not explain chest pain, but I am adding procalcitonin and COVID/viral panel prior to deciding if ordering antibiotics.      Hypertensive urgency- (present on admission)  Assessment & Plan  -Initial blood pressure 224/114.  Patient reports taking her amlodipine and a fosinopril around 5 AM this morning.  Most recent blood pressure now is 174/73.  -Chest pain in the context of hypertensive urgency and hypertensive emergency is not fully excluded.  -Patient is now bradycardic with heart rate around mid 50s  -As needed hydralazine every 6 hours for systolic blood pressure higher than 160.    A-fib (HCC)  Assessment & Plan  -Initial EKG showed atrial fibrillation.  Patient heart rate now is bradycardic and converted without any intervention.  -Echocardiogram is ordered and she received Eliquis this morning.    Dyslipidemia, goal LDL below 130- (present on admission)  Assessment & Plan  -Continue diet control.        VTE prophylaxis: SCDs/TEDs and pharmacologic prophylaxis contraindicated due to she received full dose Eliquis for new onset A-fib

## 2023-10-07 NOTE — ED PROVIDER NOTES
ED Provider Note    CHIEF COMPLAINT  Chief Complaint   Patient presents with    Jaw Pain    Palpitations    Fatigue     Pt Aox4, ambulatory, reports waking up with jaw pain (8/10) and palpitation at 12 mn. Denies fever, N/V. She's been feeling fatigue and dizzy since yesterday. Pt reports jaw pain subsided with pain score of 2/10.       EXTERNAL RECORDS REVIEWED      HPI/ROS  LIMITATION TO HISTORY     OUTSIDE HISTORIAN(S):  Family     Franchesca Paige is a 77 y.o. female who presents with jaw pain palpitations.  Patient says that she woke up with severe pain palpitations and lightheadedness.  Patient says symptoms lasted proximately 30 minutes.  Patient took full dose of aspirin.  Patient denies recent illness including fever, chills, cough, vomiting, diarrhea, bloody stools.  Patient says that after symptoms started she began to urinate frequently.  Patient says she has had similar episodes of pain but not been evaluated in the emergency department.    PAST MEDICAL HISTORY   has a past medical history of Anesthesia, Arthritis, Bladder cancer (HCC), Cancer ([facility]) (2003), Caregiver stress (02/11/2022), Chronic pain of right ankle (2/8/2018), COVID-19, Delayed emergence from general anesthesia, Dizziness (02/22/2018), Dyslipidemia, goal LDL below 130, Hydronephrosis (02/11/2019), Hypertension (1992), Ocular migraine, Osteoarthritis of left hip (11/30/2022), Osteopenia, Osteopenia of multiple sites, Osteoporosis of femur without pathological fracture (02/01/2023), PONV (postoperative nausea and vomiting), and Total knee replacement status.    SURGICAL HISTORY   has a past surgical history that includes hysterectomy, total abdominal (1980s); colonoscopy (08/03/2010); knee replacement, total (12/04/2012); other (Right, 10/07/2018); orif, wrist (Left, 2008); hardware removal ortho (Left, 2008); and total hip arthroplasty (Left, 3/14/2023).    FAMILY HISTORY  Family History   Problem Relation Age of Onset     "Hypertension Mother     Psychiatric Illness Mother         paranoid schizophrenic    Hypertension Father         heavy smoker    Diabetes Other         niece    Cancer Sister         skin    Cancer Paternal Aunt         breast    Psychiatric Illness Sister         paranoid schizophrenic       SOCIAL HISTORY  Social History     Tobacco Use    Smoking status: Former     Current packs/day: 0.00     Average packs/day: 1 pack/day for 36.2 years (36.2 ttl pk-yrs)     Types: Cigarettes     Start date: 1964     Quit date: 2000     Years since quittin.4    Smokeless tobacco: Never   Vaping Use    Vaping Use: Never used   Substance and Sexual Activity    Alcohol use: No     Alcohol/week: 0.0 oz    Drug use: No    Sexual activity: Yes       CURRENT MEDICATIONS  Home Medications       Reviewed by Rodolfo Gongora R.N. (Registered Nurse) on 10/07/23 at 0106  Med List Status: Partial     Medication Last Dose Status   amLODIPine (NORVASC) 5 MG Tab  Active   Cholecalciferol (VITAMIN D) 50 MCG ( UT) Cap  Active   clotrimazole-betamethasone (LOTRISONE) 1-0.05 % Cream  Active   diclofenac sodium (VOLTAREN) 1 % Gel  Active   fosinopril (MONOPRIL) 20 MG Tab  Active   traMADol (ULTRAM) 50 MG Tab  Active                    ALLERGIES  No Active Allergies    PHYSICAL EXAM  VITAL SIGNS: BP (!) 194/89   Pulse (!) 51   Temp 36.2 °C (97.2 °F) (Temporal)   Resp 15   Ht 1.702 m (5' 7\")   Wt 98.1 kg (216 lb 4.3 oz)   SpO2 97%   BMI 33.87 kg/m²    No acute distress, no facial asymmetry, normal speech, pupils equal round reactive, normal ocular movements, visual fields intact, clear bowel breath sounds, irregular irregular rhythm, normal heart sounds, abdomen soft nontender, gross motor function sensation intact    DIAGNOSTIC STUDIES / PROCEDURES  EKG  I have independently interpreted this EKG  Results for orders placed or performed during the hospital encounter of 10/07/23   EKG (NOW)   Result Value Ref Range    " Report       [facility] Emergency Dept.    Test Date:  2023-10-07  Pt Name:    ELIANE VIERA                 Department: EDS  MRN:        [id number]                      Room:       [facility] 10  Gender:     Female                       Technician: j  :        1945                   Requested By:JEFFREY DIAZ  Order #:    [id number]                    Reading MD:    Measurements  Intervals                                Axis  Rate:       97                           P:          0  KY:         0                            QRS:        -25  QRSD:       93                           T:          29  QT:         340  QTc:        432    Interpretive Statements  Atrial fibrillation  LVH with secondary repolarization abnormality  Inferior infarct, old  Compared to ECG 01/10/2023 14:25:49  Left ventricular hypertrophy now present  Early repolarization now present  Myocardial infarct finding now present  Sinus bradycardia no longer present     EKG (NOW)   Result Value Ref Range    Report       [facility] Emergency Dept.    Test Date:  2023-10-07  Pt Name:    ELIANE VIERA                 Department: [facility]  MRN:        [id number]                      Room:       [place] [address]  Gender:     Female                       Technician: [id number]  :        1945                   Requested By:JEFFREY DIAZ  Order #:    [id number]                    Reading MD: Jeffrey Diaz    Measurements  Intervals                                Axis  Rate:       94                           P:          0  KY:         0                            QRS:        21  QRSD:       99                           T:          9  QT:         341  QTc:        427    Interpretive Statements    Interpreted by me: Atrial fibrillation, rate 94, normal intervals, ST segment  depression V4 through V5  Electronically Signed On 10- 05:27:55 PDT by Jeffrey Diaz     EKG (NOW)   Result Value  Ref Range    Report       [facility] Dept[facility]    Test Date:  2023-10-07  Pt Name:    ELIANE VIERA                 Department: [facility]  MRN:        [id number]                      [facility]  Gender:     Female                       Technician: [id number]  :        1945                   Requested By:RACHAEL MERCADO  Order #:    [id number]                    Reading MD:    Measurements  Intervals                                Axis  Rate:       98                           P:          0  NE:         0                            QRS:        -19  QRSD:       99                           T:          22  QT:         351  QTc:        449    Interpretive Statements  Atrial fibrillation  LVH with secondary repolarization abnormality  Inferior infarct, old  Compared to ECG 10/07/2023 02:48:23  Left ventricular hypertrophy now present  Early repolarization now present  Myocardial infarct finding now present  ST (T wave) deviation no longer present           LABS  Labs Reviewed   COMP METABOLIC PANEL - Abnormal; Notable for the following components:       Result Value    Glucose 101 (*)     Bun 23 (*)     Calcium 10.7 (*)     Alkaline Phosphatase 141 (*)     All other components within normal limits    Narrative:     Biotin intake of greater than 5 mg per day may interfere with  troponin levels, causing false low values.   D-DIMER - Abnormal; Notable for the following components:    D-Dimer 0.54 (*)     All other components within normal limits    Narrative:     Biotin intake of greater than 5 mg per day may interfere with  troponin levels, causing false low values.   PROBRAIN NATRIURETIC PEPTIDE, NT - Abnormal; Notable for the following components:    NT-proBNP 199 (*)     All other components within normal limits    Narrative:     Biotin intake of greater than 5 mg per day may interfere with  troponin levels, causing false low values.   TSH WITH REFLEX TO FT4 - Abnormal; Notable  for the following components:    TSH 5.600 (*)     All other components within normal limits    Narrative:     Biotin intake of greater than 5 mg per day may interfere with  troponin levels, causing false low values.   URINALYSIS,CULTURE IF INDICATED - Abnormal; Notable for the following components:    Leukocyte Esterase Trace (*)     All other components within normal limits    Narrative:     Indication for culture:->Patient WITHOUT an indwelling Rodriges  catheter in place with new onset of Dysuria, Frequency,  Urgency, and/or Suprapubic pain  Release to patient->Immediate   CBC WITH DIFFERENTIAL    Narrative:     Biotin intake of greater than 5 mg per day may interfere with  troponin levels, causing false low values.   TROPONIN    Narrative:     Biotin intake of greater than 5 mg per day may interfere with  troponin levels, causing false low values.   MAGNESIUM    Narrative:     Biotin intake of greater than 5 mg per day may interfere with  troponin levels, causing false low values.   ESTIMATED GFR    Narrative:     Biotin intake of greater than 5 mg per day may interfere with  troponin levels, causing false low values.   TROPONIN    Narrative:     Biotin intake of greater than 5 mg per day may interfere with  troponin levels, causing false low values.   FREE THYROXINE    Narrative:     Biotin intake of greater than 5 mg per day may interfere with  troponin levels, causing false low values.   URINE MICROSCOPIC (W/UA)    Narrative:     Indication for culture:->Patient WITHOUT an indwelling Rodriges  catheter in place with new onset of Dysuria, Frequency,  Urgency, and/or Suprapubic pain  Release to patient->Immediate         RADIOLOGY  I have independently interpreted the diagnostic imaging associated with this visit and am waiting the final reading from the radiologist.   My preliminary interpretation is as follows: No pneumothorax  Radiologist interpretation:   CT-CTA CHEST PULMONARY ARTERY W/ RECONS   Final Result          1.  No pulmonary embolus appreciated.   2.  Patchy reticular left upper lobe infiltrates.   3.  Hepatomegaly   4.  Irregular hepatic contour favoring changes of cirrhosis   5.  Atherosclerosis and atherosclerotic coronary artery disease.      DX-CHEST-PORTABLE (1 VIEW)   Final Result         1.  No acute cardiopulmonary disease.   2.  Atherosclerosis            COURSE & MEDICAL DECISION MAKING         INITIAL ASSESSMENT, COURSE AND PLAN  Care Narrative: Differential includes ACS, PE, atrial fibrillation new onset, anemia.  Patient with no history or exam to suggest precipitating cause for atrial fibrillation including heart failure, pneumonia, sepsis or acute bleeding.  Will obtain blood work to assess for hematologic or metabolic derangements.  ECG does have multiple signs of ischemia and precordial leads.  Will obtain D-dimer to risk stratify.    D-dimer mildly elevated obtain CTA which was negative for PE shows hazy opacity in left lung, hepatomegaly and possible signs of cirrhosis.  Patient without signs of cirrhosis  on exam, stable elevation of alkaline phosphatase.  Patient without exam or history suggestive of pneumonia.    Patient spontaneously converted to sinus bradycardia ST segment depressions still present in V4 through V6.  Patient previously received aspirin.  Patient has not had recurrent jaw pain or chest pain.  Originally patient was motivated to return home after discussion with her regarding her risk factors for ischemic heart disease, new onset intrafibrillation and uncontrolled blood pressure patient decided to be admitted.  Spoke with hospitalist regarding mission for further ischemic work-up.        ADDITIONAL PROBLEM LIST    DISPOSITION AND DISCUSSIONS  I have discussed management of the patient with the following physicians and HELADIO's: Hospitalist      Decision tools and prescription drugs considered including, but not limited to: D-dimer regarding new onset A-fib .    FINAL  DIAGNOSIS  1. Paroxysmal atrial fibrillation (HCC)    2. Jaw pain           Electronically signed by: Jeffrey Diaz D.O., 10/7/2023 5:22 AM

## 2023-10-07 NOTE — ED TRIAGE NOTES
"Chief Complaint   Patient presents with    Jaw Pain    Palpitations    Fatigue     Pt Aox4, ambulatory, reports waking up with jaw pain (8/10) and palpitation at 12 mn. Denies fever, N/V. She's been feeling fatigue and dizzy since yesterday. Pt reports jaw pain subsided with pain score of 2/10.     BP (!) 224/114   Pulse 95   Temp 36.2 °C (97.2 °F) (Temporal)   Resp 18   Ht 1.702 m (5' 7\")   Wt 98.1 kg (216 lb 4.3 oz)   SpO2 97%   BMI 33.87 kg/m²     "

## 2023-10-07 NOTE — ASSESSMENT & PLAN NOTE
-Infectious cause is possible.  There is no SIRS, she has no productive cough but reports increased fatigue and shortness of breath for the past 2 days.  -This will not explain chest pain, but I am adding procalcitonin and COVID/viral panel prior to deciding if ordering antibiotics.

## 2023-10-09 ENCOUNTER — PATIENT OUTREACH (OUTPATIENT)
Dept: MEDICAL GROUP | Facility: MEDICAL CENTER | Age: 78
End: 2023-10-09
Payer: MEDICARE

## 2023-10-09 NOTE — PROGRESS NOTES
"Transitional Care Management  TCM Outreach Date and Time: Filed (10/9/2023 10:52 AM)    Discharge Questions  Actual Discharge Date: 10/07/23  Now that you are home, how are you feeling?: Good  Did you receive any new prescriptions?: Yes  Were you able to get them filled?: Yes  Meds to Bed or Pharmacy filled?: Pharmacy  Do you have any questions about your current medications or new medications (Review Med Rec)?: Yes (Please explain)  Do you have a follow up appointment scheduled with your PCP?: No  Was an appointment scheduled for the patient?: Yes (patient will be out of town until after 10/20  and only wants to see Dr. De La Rosa)  Appointment Date: 11/03/23  Appointment Time: 1520  Any issues or paperwork you wish to discuss with your PCP?: Yes (Please specify) (she does not want to take Eliquis due to listed side effects and how it is not recommended to take with NSAID's which she relies on daily)  Does this patient qualify for the CCM program?: No    Transitional Care  Number of attempts made to contact patient: 1  Current or previous attempts competed within two business days of discharge? : Yes  Provided education regarding treatment plan, medications, self-management, ADLs?: Yes  Has patient completed an Advanced Directive?: No  Has the Care Manager's phone number provided?: Yes  Is there anything else I can help you with?: No    Discharge Summary  Chief Complaint: Jaw Pain, Palpitations, fatigue  Admitting Diagnosis: Hypertensive urgency (I16.0)  Discharge Diagnosis: Atypical chest pain      BP daily around 120's, HR 50's, which she says is normal for her. She is leaving for out of town on Wednesday 10/11/23. Discontinuing Eliquis on her own due to risk of side effects. She states that she understands the risk for stroke r/t afib and I explained that NSAID's do not offer the same prevention as Eliquis, they are not \"blood thinners\". Chart forwarded to DR. De La Rosa along with this information. Cannot see Dr. De La Rosa " until after 2 weeks due to the patient going on vacation.

## 2023-10-10 ENCOUNTER — TELEPHONE (OUTPATIENT)
Dept: HEALTH INFORMATION MANAGEMENT | Facility: OTHER | Age: 78
End: 2023-10-10
Payer: MEDICARE

## 2023-10-10 NOTE — TELEPHONE ENCOUNTER
Patient called this morning to say she is doing well. She asked me to let Dr. De La Rosa know that she plans to take one baby ASA 81mg at night and Acetaminophen /Ibuprofen 1-2 tabs daily (she will not be taking the Eliquis). She is going out of town tomorrow and a hospital is around the corner. Her latest BP readings:    128/74, HR 54  125/72, HR 46    She can be reached at [phone] while traveling.

## 2023-10-20 ENCOUNTER — PATIENT OUTREACH (OUTPATIENT)
Dept: HEALTH INFORMATION MANAGEMENT | Facility: OTHER | Age: 78
End: 2023-10-20
Payer: MEDICARE

## 2023-10-20 ENCOUNTER — PATIENT MESSAGE (OUTPATIENT)
Dept: HEALTH INFORMATION MANAGEMENT | Facility: OTHER | Age: 78
End: 2023-10-20

## 2023-10-20 NOTE — PROGRESS NOTES
Community Health Worker Follow-Up    Reason for outreach: CHW received a call from the pt.      CHW Interventions: CHW and the pt discussed the pts up and coming appointment. Pt was trying to get in to see her PCP before the date she has. Her PCP is booked and does not have an earlier time. Pt is okay with this and was just trying. CHW introduced the CCM program to the pt and will meet the pt in office at her provider appointment to discuss more. CHW will also sent a [employer] message    Specific Resources Provided:  Housing/Shelter: n/a  Transportation: n/a  Food: n/a  Financial: n/a  Social Supports: n/a  Other: n/a    Plan: CHW will see the pt on 11/8 in office and sent a [employer] message with the CCM information.

## 2023-10-26 ENCOUNTER — APPOINTMENT (OUTPATIENT)
Dept: RADIOLOGY | Facility: MEDICAL CENTER | Age: 78
End: 2023-10-26
Attending: FAMILY MEDICINE
Payer: MEDICARE

## 2023-11-01 ENCOUNTER — APPOINTMENT (RX ONLY)
Dept: URBAN - METROPOLITAN AREA CLINIC 35 | Facility: CLINIC | Age: 78
Setting detail: DERMATOLOGY
End: 2023-11-01

## 2023-11-01 DIAGNOSIS — Z41.9 ENCOUNTER FOR PROCEDURE FOR PURPOSES OTHER THAN REMEDYING HEALTH STATE, UNSPECIFIED: ICD-10-CM

## 2023-11-01 PROCEDURE — ? KTP LASER

## 2023-11-01 ASSESSMENT — LOCATION SIMPLE DESCRIPTION DERM: LOCATION SIMPLE: LEFT CHEEK

## 2023-11-01 ASSESSMENT — LOCATION DETAILED DESCRIPTION DERM: LOCATION DETAILED: LEFT INFERIOR CENTRAL MALAR CHEEK

## 2023-11-01 ASSESSMENT — LOCATION ZONE DERM: LOCATION ZONE: FACE

## 2023-11-01 NOTE — PROCEDURE: KTP LASER
Treated Area: medium area
Price (Use Numbers Only, No Special Characters Or $): 420
Repetition Rate (Hz): 1
Detail Level: Simple
Spot Size: 1 mm
Immediate Endpoint: immediate vessel disappearance
Power (Grier): 2
Consent: Written consent obtained, risks reviewed including but not limited to crusting, scabbing, blistering, scarring, darker or lighter pigmentary change, incidental hair removal, bruising, and/or incomplete removal.
Post-Care Instructions: I reviewed with the patient in detail post-care instructions. Patient should stay away from the sun and wear sun protection until treated areas are fully healed.
Fluence (J/Cm2): 24
Post-Procedure Instructions: Ice applied. Post care reviewed with patient.
Pulse Duration (Msec): 10
Total Pulses: 762
Laser Type: KTP laser 532nm

## 2023-11-08 ENCOUNTER — OFFICE VISIT (OUTPATIENT)
Dept: MEDICAL GROUP | Facility: MEDICAL CENTER | Age: 78
End: 2023-11-08
Payer: MEDICARE

## 2023-11-08 VITALS
TEMPERATURE: 98 F | HEART RATE: 53 BPM | SYSTOLIC BLOOD PRESSURE: 124 MMHG | DIASTOLIC BLOOD PRESSURE: 68 MMHG | RESPIRATION RATE: 18 BRPM | OXYGEN SATURATION: 97 % | HEIGHT: 68 IN | WEIGHT: 212 LBS | BODY MASS INDEX: 32.13 KG/M2

## 2023-11-08 DIAGNOSIS — I48.0 PAROXYSMAL ATRIAL FIBRILLATION (HCC): ICD-10-CM

## 2023-11-08 DIAGNOSIS — E78.5 DYSLIPIDEMIA, GOAL LDL BELOW 130: ICD-10-CM

## 2023-11-08 DIAGNOSIS — E66.9 OBESITY, CLASS I, BMI 30-34.9: ICD-10-CM

## 2023-11-08 DIAGNOSIS — K74.00 FIBROSIS OF LIVER: ICD-10-CM

## 2023-11-08 DIAGNOSIS — D68.69 SECONDARY HYPERCOAGULABLE STATE (HCC): ICD-10-CM

## 2023-11-08 DIAGNOSIS — I25.84 CORONARY ATHEROSCLEROSIS DUE TO CALCIFIED CORONARY LESION: ICD-10-CM

## 2023-11-08 DIAGNOSIS — I10 ESSENTIAL HYPERTENSION: ICD-10-CM

## 2023-11-08 DIAGNOSIS — Z87.01 HISTORY OF COMMUNITY ACQUIRED PNEUMONIA: ICD-10-CM

## 2023-11-08 DIAGNOSIS — I25.10 CORONARY ATHEROSCLEROSIS DUE TO CALCIFIED CORONARY LESION: ICD-10-CM

## 2023-11-08 DIAGNOSIS — M15.9 PRIMARY OSTEOARTHRITIS INVOLVING MULTIPLE JOINTS: ICD-10-CM

## 2023-11-08 PROBLEM — I48.91 A-FIB (HCC): Status: RESOLVED | Noted: 2023-10-07 | Resolved: 2023-11-08

## 2023-11-08 PROBLEM — R07.89 ATYPICAL CHEST PAIN: Status: RESOLVED | Noted: 2023-10-07 | Resolved: 2023-11-08

## 2023-11-08 PROBLEM — R91.8 LEFT UPPER LOBE PULMONARY INFILTRATE: Status: RESOLVED | Noted: 2023-10-07 | Resolved: 2023-11-08

## 2023-11-08 PROBLEM — I16.0 HYPERTENSIVE URGENCY: Status: RESOLVED | Noted: 2023-10-07 | Resolved: 2023-11-08

## 2023-11-08 PROCEDURE — 99214 OFFICE O/P EST MOD 30 MIN: CPT | Performed by: FAMILY MEDICINE

## 2023-11-08 PROCEDURE — 3078F DIAST BP <80 MM HG: CPT | Performed by: FAMILY MEDICINE

## 2023-11-08 PROCEDURE — 3074F SYST BP LT 130 MM HG: CPT | Performed by: FAMILY MEDICINE

## 2023-11-08 RX ORDER — ASPIRIN 81 MG/1
81 TABLET ORAL DAILY
COMMUNITY
End: 2023-11-08

## 2023-11-08 RX ORDER — ASPIRIN 325 MG
325 TABLET ORAL 2 TIMES DAILY WITH MEALS
Qty: 60 TABLET | Refills: 11 | COMMUNITY
Start: 2023-11-08

## 2023-11-08 ASSESSMENT — FIBROSIS 4 INDEX: FIB4 SCORE: 1.23

## 2023-11-08 NOTE — PROGRESS NOTES
Chief Complaint   Patient presents with    Hospital Follow-up     Afib         Subjective:     HPI:   Franchesca Paige presents today with the followin. Paroxysmal atrial fibrillation (HCC)/Secondary hypercoagulable state (HCC)  Patient had a recent hospitalization at which point she had an episode of atrial fibrillation that resolved spontaneously.  She feels that she does get the atrial fib intermittently as she gets similar symptoms when she is extremely stressed.  I do note that this patient had pneumonia at the time as well which also could have been a factor in the atrial fibrillation.  She was discharged appropriately on Eliquis but after reading the information decided not to take the medication as she falls quite frequently.  She is taking 81 mg aspirin instead.  She had a very thorough cardiac work-up including a cardiac stress test which did not show jeopardized myocardium.  Her CTA showed no emboli.  There was left upper lobe pneumonia.  During her echocardiogram she appears not to have been in atrial fibrillation.  There is no significant valvular disease per the cardiologist reading of the echocardiogram and her right ventricle was normal size.  Discussed pros and cons.  Patient had been taking nonsteroidal anti-inflammatory drugs prior to her hospitalization as well.  Patient does not use alcohol and has not for many decades.  Discussed all these points at length with the patient.  Patient would prefer not to be on a major blood thinner.  She does understand clearly that aspirin does not present the same amount of protection against stroke.  Have advised enteric-coated 325 mg aspirin taken twice daily with food in part to treat her osteoarthritis and allow her to avoid taking ibuprofen and other nonsteroidals which would increase her clot risk.    2. History of community acquired pneumonia  CTA which was done primarily to rule out pulmonary emboli (which it did) showed reticular opacities in  the left apex and lung consistent with a pneumonia.  Patient also has irregular liver with enlargement and likely fibrosis.    3. Essential hypertension  HTN - Chronic condition stable. Currently taking all meds as directed.   She is taking baby aspirin daily.   She is not monitoring BP at home.  Blood pressure here today is excellent  Denies symptoms low BP: light-headed, tunnel-vision, unusual fatigue.   Denies symptoms high BP:pounding headache, visual changes, palpitations, flushed face.   Denies medicine side effects: unusual fatigue, slow heartbeat, foot/leg swelling, cough.    4. Dyslipidemia, goal LDL below 130/Coronary atherosclerosis due to calcified coronary lesion  Patient denies chest pain, chest pressure, palpitations or exertional shortness of breath. Patient is not on lipid-lowering medication.  Generally HDL and triglycerides have been favorable with mild LDL elevation.  I discussed however with the patient that she does have coronary artery calcification visible on CT scan of her chest which is quite significant.. Patient is a former smoker.  She quit 23 years ago.  Patient takes 81 mg aspirin daily. Patient has no history of myocardial infarction, stroke or documented PVD.  However she did go in and out of atrial fibrillation and is at some increased risk.  Patient would prefer not to add a statin.  She is working on diet and exercise.  Lab orders are discussed and placed.      5. Primary osteoarthritis involving multiple joints  Patient does have osteoarthritis of multiple sites.  She had a left total hip replacement which has been successful.  She had a left total knee replacement.  She has some consistent pain particularly in her right knee, shoulders and low back.  She has been using ibuprofen to treat this.  She finds Tylenol does not help her much at all.  I have asked her to avoid the ibuprofen as it is procoagulant and may also have been a partial culprit in her recent heart issues.  I  have asked her to substitute 325 mg aspirin preferably enteric-coated twice daily.  She can take up to 4 in a 24-hour timeframe.    6. Fibrosis of liver  History of alcohol use in her youth.  Patient has not had alcohol for several decades.  She remains quit.  Liver was partially visualized on CT and shows enlargement and nodularity and fibrosis.  There were no enlarged lymph nodes appreciated.    7. Obesity, Class I, BMI 30-34.9  Patient has managed some weight loss, she is trying to increase her activity.  Walking in her neighborhood involves walking on uneven ground and she does fall intermittently.  She is using a walking stick which is helpful.          Patient Active Problem List    Diagnosis Date Noted    Secondary hypercoagulable state (HCC) 11/08/2023    Coronary atherosclerosis due to calcified coronary lesion 11/08/2023    Fibrosis of liver 11/08/2023    History of community acquired pneumonia 11/08/2023    Paroxysmal atrial fibrillation (HCC) 10/07/2023    ACP (advance care planning) 10/07/2023    Status post left hip replacement 05/22/2023    Decreased hearing, bilateral 05/22/2023    Osteoporosis of femur without pathological fracture 02/01/2023    Caregiver stress 02/11/2022    Situational depression 02/11/2022    Bradycardia 11/01/2018    Ocular migraine 04/16/2018    Osteopenia of multiple sites     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/21/2018    Obesity, Class I, BMI 30-34.9 02/24/2017    Need for subacute bacterial endocarditis prophylaxis 01/21/2016    Essential hypertension 06/11/2015    Status post total left knee replacement 06/11/2015    Gastroesophageal reflux disease without esophagitis 06/11/2015    Allergic rhinitis due to pollen 06/11/2015    History of bladder cancer 06/11/2015    Dyslipidemia, goal LDL below 130     Primary osteoarthritis involving multiple joints        Current medicines (including changes today)  Current Outpatient Medications   Medication Sig Dispense Refill    aspirin  "(ASA) 325 MG Tab Take 1 Tablet by mouth 2 times a day with meals. 60 Tablet 11    fosinopril (MONOPRIL) 20 MG Tab Take 1 Tablet by mouth every day. 90 Tablet 3    amLODIPine (NORVASC) 5 MG Tab Take 1 Tablet by mouth every day. 90 Tablet 3    Cholecalciferol (VITAMIN D) 50 MCG (2000 UT) Cap Take 1 Capsule by mouth every day. 30 Capsule 11    diclofenac sodium (VOLTAREN) 1 % Gel Apply 4 g topically 4 times a day as needed. To knee       No current facility-administered medications for this visit.       Allergies   Allergen Reactions    Ibuprofen      Risk of stroke       ROS: As per HPI       Objective:     /68   Pulse (!) 53   Temp 36.7 °C (98 °F)   Resp 18   Ht 1.727 m (5' 8\")   Wt 96.2 kg (212 lb)   SpO2 97%  Body mass index is 32.23 kg/m².    Physical Exam:  Constitutional: Well-developed and well-nourished. Not diaphoretic. No distress. Lucid and fluent.  Skin: Skin is warm and dry. No rash noted.  Head: Atraumatic without lesions.  Eyes: Conjunctivae and extraocular motions are normal. Pupils are equal, round, and reactive to light. No scleral icterus.   Ears:  External ears unremarkable.   Neck: Supple, trachea midline. No thyromegaly present. No cervical or supraclavicular lymphadenopathy. No JVD or carotid bruits appreciated  Cardiovascular: Regular rate and rhythm.  Normal S1, S2 without murmur appreciated.  Chest: Effort normal. Clear to auscultation throughout. No adventitious sounds.   Abdomen: Soft, non tender, and without distention. Active bowel sounds in all four quadrants. No rebound, guarding, masses or hepatosplenomegaly.  Extremities: No cyanosis, clubbing, erythema, nor edema.   Neurological: Alert and oriented x 3. No tremor appreciated.  Psychiatric:  Behavior, mood, and affect are appropriate.    Imaging, lab results and consults regarding her recent hospitalization in October reviewed and discussed in detail.     Assessment and Plan:     77 y.o. female with the following " issues:    1. Paroxysmal atrial fibrillation (HCC)  aspirin (ASA) 325 MG Tab      2. Secondary hypercoagulable state (HCC)        3. History of community acquired pneumonia        4. Essential hypertension  Comp Metabolic Panel    Lipid Profile      5. Dyslipidemia, goal LDL below 130  Comp Metabolic Panel    Lipid Profile      6. Coronary atherosclerosis due to calcified coronary lesion        7. Primary osteoarthritis involving multiple joints  aspirin (ASA) 325 MG Tab      8. Fibrosis of liver        9. Obesity, Class I, BMI 30-34.9              Followup: Return in about 6 days (around 11/14/2023), or if symptoms worsen or fail to improve.

## 2023-11-10 ENCOUNTER — PATIENT OUTREACH (OUTPATIENT)
Dept: HEALTH INFORMATION MANAGEMENT | Facility: OTHER | Age: 78
End: 2023-11-10
Payer: MEDICARE

## 2023-11-10 NOTE — PROGRESS NOTES
CHW tried calling the pt to introduce the CCM program to the pt. Pt and CHW has discussed the program before and this CHW is checking in with the pt. Pt did not answer and this CHW left a VM requesting a return call. 11/10

## 2023-11-11 LAB
ALBUMIN SERPL-MCNC: 4.3 G/DL (ref 3.8–4.8)
ALBUMIN/GLOB SERPL: 1.7 {RATIO} (ref 1.2–2.2)
ALP SERPL-CCNC: 138 IU/L (ref 44–121)
ALT SERPL-CCNC: 18 IU/L (ref 0–32)
AST SERPL-CCNC: 22 IU/L (ref 0–40)
BILIRUB SERPL-MCNC: 0.4 MG/DL (ref 0–1.2)
BUN SERPL-MCNC: 18 MG/DL (ref 8–27)
BUN/CREAT SERPL: 27 (ref 12–28)
CALCIUM SERPL-MCNC: 10.8 MG/DL (ref 8.7–10.3)
CHLORIDE SERPL-SCNC: 108 MMOL/L (ref 96–106)
CHOLEST SERPL-MCNC: 181 MG/DL (ref 100–199)
CO2 SERPL-SCNC: 21 MMOL/L (ref 20–29)
CREAT SERPL-MCNC: 0.66 MG/DL (ref 0.57–1)
EGFRCR SERPLBLD CKD-EPI 2021: 91 ML/MIN/1.73
GLOBULIN SER CALC-MCNC: 2.5 G/DL (ref 1.5–4.5)
GLUCOSE SERPL-MCNC: 97 MG/DL (ref 70–99)
HDLC SERPL-MCNC: 64 MG/DL
LABORATORY COMMENT REPORT: NORMAL
LDLC SERPL CALC-MCNC: 97 MG/DL (ref 0–99)
POTASSIUM SERPL-SCNC: 4.4 MMOL/L (ref 3.5–5.2)
PROT SERPL-MCNC: 6.8 G/DL (ref 6–8.5)
SODIUM SERPL-SCNC: 141 MMOL/L (ref 134–144)
TRIGL SERPL-MCNC: 110 MG/DL (ref 0–149)
VLDLC SERPL CALC-MCNC: 20 MG/DL (ref 5–40)

## 2023-11-13 ENCOUNTER — PATIENT OUTREACH (OUTPATIENT)
Dept: HEALTH INFORMATION MANAGEMENT | Facility: OTHER | Age: 78
End: 2023-11-13
Payer: MEDICARE

## 2023-11-13 NOTE — PROGRESS NOTES
Community Health Worker Follow-Up    Reason for outreach: CHW received a call from the pt.     CHW Interventions: Pt and this CHW discussed the CCM program and the pt stated she did not need it at this time. CHW discussed calling the pt back at a later date and try to introduce the program again. Pt stated that would be fine.    Specific Resources Provided:  Housing/Shelter: n/a  Transportation: n/a  Food: n/a  Financial: n/a  Social Supports: n/a  Other: n/a    Plan: CHW will not follow at this time.

## 2023-11-14 ENCOUNTER — OFFICE VISIT (OUTPATIENT)
Dept: MEDICAL GROUP | Facility: MEDICAL CENTER | Age: 78
End: 2023-11-14
Payer: MEDICARE

## 2023-11-14 VITALS
WEIGHT: 213.4 LBS | SYSTOLIC BLOOD PRESSURE: 138 MMHG | OXYGEN SATURATION: 98 % | HEART RATE: 44 BPM | HEIGHT: 68 IN | RESPIRATION RATE: 18 BRPM | BODY MASS INDEX: 32.34 KG/M2 | DIASTOLIC BLOOD PRESSURE: 80 MMHG | TEMPERATURE: 98 F

## 2023-11-14 DIAGNOSIS — B35.1 ONYCHOMYCOSIS OF TOENAIL: ICD-10-CM

## 2023-11-14 DIAGNOSIS — K74.00 FIBROSIS OF LIVER: ICD-10-CM

## 2023-11-14 DIAGNOSIS — I10 ESSENTIAL HYPERTENSION: ICD-10-CM

## 2023-11-14 PROCEDURE — 99213 OFFICE O/P EST LOW 20 MIN: CPT | Performed by: FAMILY MEDICINE

## 2023-11-14 PROCEDURE — 3075F SYST BP GE 130 - 139MM HG: CPT | Performed by: FAMILY MEDICINE

## 2023-11-14 PROCEDURE — 3079F DIAST BP 80-89 MM HG: CPT | Performed by: FAMILY MEDICINE

## 2023-11-14 ASSESSMENT — FIBROSIS 4 INDEX: FIB4 SCORE: 1.59

## 2023-11-14 NOTE — PROGRESS NOTES
Chief Complaint   Patient presents with    Follow-Up     ER Follow up     Lab Results       Subjective:     HPI:   Franchesca Paige presents today with the followin. Essential hypertension  HTN - Chronic condition stable. Currently taking all meds as directed.   She is taking 325 mg aspirin daily.   She is monitoring BP at home. Has been 130s/80s.  Working on weight loss.  Denies symptoms low BP: light-headed, tunnel-vision, unusual fatigue.   Denies symptoms high BP:pounding headache, visual changes, palpitations, flushed face.   Denies medicine side effects: unusual fatigue, slow heartbeat, foot/leg swelling, cough.    2. Fibrosis of liver  Avoids all alcohol.  Lipids now excellent with good dietary changes.    3. Onychomycosis of toenail  Right foot 2nd toe thickened.  Some toenail dryness.  Will use aquaphor and OTC pain on nail medication.        Patient Active Problem List    Diagnosis Date Noted    Onychomycosis of toenail 2023    Secondary hypercoagulable state (HCC) 2023    Coronary atherosclerosis due to calcified coronary lesion 2023    Fibrosis of liver 2023    History of community acquired pneumonia 2023    Paroxysmal atrial fibrillation (HCC) 10/07/2023    ACP (advance care planning) 10/07/2023    Status post left hip replacement 2023    Decreased hearing, bilateral 2023    Osteoporosis of femur without pathological fracture 2023    Caregiver stress 2022    Situational depression 2022    Bradycardia 2018    Ocular migraine 2018    Osteopenia of multiple sites     Chronic right mastoiditis 2018    Obesity, Class I, BMI 30-34.9 2017    Need for subacute bacterial endocarditis prophylaxis 2016    Essential hypertension 2015    Status post total left knee replacement 2015    Gastroesophageal reflux disease without esophagitis 2015    Allergic rhinitis due to pollen 2015    History of  "bladder cancer 06/11/2015    Primary osteoarthritis involving multiple joints        Current medicines (including changes today)  Current Outpatient Medications   Medication Sig Dispense Refill    aspirin (ASA) 325 MG Tab Take 1 Tablet by mouth 2 times a day with meals. 60 Tablet 11    amLODIPine (NORVASC) 5 MG Tab Take 1 Tablet by mouth every day. 90 Tablet 3    Cholecalciferol (VITAMIN D) 50 MCG (2000 UT) Cap Take 1 Capsule by mouth every day. 30 Capsule 11    diclofenac sodium (VOLTAREN) 1 % Gel Apply 4 g topically 4 times a day as needed. To knee      fosinopril (MONOPRIL) 20 MG Tab Take 1 Tablet by mouth every day. 90 Tablet 3     No current facility-administered medications for this visit.       Allergies   Allergen Reactions    Sulfa Drugs Vomiting    Ibuprofen      Risk of stroke       ROS: As per HPI       Objective:     /80   Pulse (!) 44   Temp 36.7 °C (98 °F)   Resp 18   Ht 1.727 m (5' 8\")   Wt 96.8 kg (213 lb 6.4 oz)   SpO2 98%  Body mass index is 32.45 kg/m².    Physical Exam:  Constitutional: Well-developed and well-nourished. Not diaphoretic. No distress. Lucid and fluent.  Skin: Skin is warm and dry. No rash noted.  Head: Atraumatic without lesions.  Eyes: Conjunctivae and extraocular motions are normal. Pupils are equal, round, and reactive to light. No scleral icterus.   Ears:  External ears unremarkable. Tympanic membranes clear and intact.  Right ear canal slight moisture.(Will try witch hazel)  Neck: Supple, trachea midline. No thyromegaly present. No cervical or supraclavicular lymphadenopathy. No JVD or carotid bruits appreciated  Cardiovascular: Regular rate and rhythm.  Normal S1, S2 without murmur appreciated.  Chest: Effort normal. Clear to auscultation throughout. No adventitious sounds.   Abdomen: Soft, non tender, and without distention. Active bowel sounds in all four quadrants. No rebound, guarding, masses or hepatosplenomegaly.  Extremities: No cyanosis, clubbing, " erythema, nor edema.   Neurological: Alert and oriented x 3. No tremor appreciated.  Psychiatric:  Behavior, mood, and affect are appropriate.    Lab results from 11/9/2023 reviewed and discussed     Assessment and Plan:     77 y.o. female with the following issues:    1. Essential hypertension        2. Fibrosis of liver        3. Onychomycosis of toenail              Followup: Return in about 6 months (around 5/14/2024), or if symptoms worsen or fail to improve.

## 2023-11-30 ENCOUNTER — HOSPITAL ENCOUNTER (OUTPATIENT)
Dept: RADIOLOGY | Facility: MEDICAL CENTER | Age: 78
End: 2023-11-30
Attending: FAMILY MEDICINE
Payer: MEDICARE

## 2023-11-30 DIAGNOSIS — Z12.31 ENCOUNTER FOR MAMMOGRAM TO ESTABLISH BASELINE MAMMOGRAM: ICD-10-CM

## 2023-11-30 PROCEDURE — 77063 BREAST TOMOSYNTHESIS BI: CPT

## 2023-12-12 DIAGNOSIS — I10 ESSENTIAL HYPERTENSION: ICD-10-CM

## 2023-12-13 RX ORDER — AMLODIPINE BESYLATE 5 MG/1
5 TABLET ORAL
Qty: 90 TABLET | Refills: 3 | Status: SHIPPED | OUTPATIENT
Start: 2023-12-13

## 2023-12-13 RX ORDER — FOSINOPRIL SODIUM 20 MG/1
20 TABLET ORAL
Qty: 90 TABLET | Refills: 3 | Status: SHIPPED | OUTPATIENT
Start: 2023-12-13

## 2024-03-22 ENCOUNTER — OFFICE VISIT (OUTPATIENT)
Dept: MEDICAL GROUP | Facility: MEDICAL CENTER | Age: 79
End: 2024-03-22
Payer: MEDICARE

## 2024-03-22 ENCOUNTER — HOSPITAL ENCOUNTER (OUTPATIENT)
Dept: LAB | Facility: MEDICAL CENTER | Age: 79
End: 2024-03-22
Attending: FAMILY MEDICINE
Payer: MEDICARE

## 2024-03-22 VITALS
RESPIRATION RATE: 18 BRPM | WEIGHT: 208 LBS | HEART RATE: 49 BPM | SYSTOLIC BLOOD PRESSURE: 138 MMHG | HEIGHT: 68 IN | OXYGEN SATURATION: 98 % | BODY MASS INDEX: 31.52 KG/M2 | TEMPERATURE: 98 F | DIASTOLIC BLOOD PRESSURE: 60 MMHG

## 2024-03-22 DIAGNOSIS — E66.9 OBESITY, CLASS I, BMI 30-34.9: ICD-10-CM

## 2024-03-22 DIAGNOSIS — E01.0 THYROMEGALY: ICD-10-CM

## 2024-03-22 DIAGNOSIS — D68.69 SECONDARY HYPERCOAGULABLE STATE (HCC): ICD-10-CM

## 2024-03-22 DIAGNOSIS — I70.0 AORTIC ATHEROSCLEROSIS (HCC): ICD-10-CM

## 2024-03-22 DIAGNOSIS — I48.0 PAROXYSMAL ATRIAL FIBRILLATION (HCC): ICD-10-CM

## 2024-03-22 DIAGNOSIS — I20.89 OTHER FORMS OF ANGINA PECTORIS (HCC): ICD-10-CM

## 2024-03-22 LAB
BASOPHILS # BLD AUTO: 0.4 % (ref 0–1.8)
BASOPHILS # BLD: 0.03 K/UL (ref 0–0.12)
EOSINOPHIL # BLD AUTO: 0.06 K/UL (ref 0–0.51)
EOSINOPHIL NFR BLD: 0.8 % (ref 0–6.9)
ERYTHROCYTE [DISTWIDTH] IN BLOOD BY AUTOMATED COUNT: 43.7 FL (ref 35.9–50)
HCT VFR BLD AUTO: 42.7 % (ref 37–47)
HGB BLD-MCNC: 14.2 G/DL (ref 12–16)
IMM GRANULOCYTES # BLD AUTO: 0.03 K/UL (ref 0–0.11)
IMM GRANULOCYTES NFR BLD AUTO: 0.4 % (ref 0–0.9)
LYMPHOCYTES # BLD AUTO: 2.36 K/UL (ref 1–4.8)
LYMPHOCYTES NFR BLD: 30.1 % (ref 22–41)
MCH RBC QN AUTO: 29.7 PG (ref 27–33)
MCHC RBC AUTO-ENTMCNC: 33.3 G/DL (ref 32.2–35.5)
MCV RBC AUTO: 89.3 FL (ref 81.4–97.8)
MONOCYTES # BLD AUTO: 0.56 K/UL (ref 0–0.85)
MONOCYTES NFR BLD AUTO: 7.1 % (ref 0–13.4)
NEUTROPHILS # BLD AUTO: 4.81 K/UL (ref 1.82–7.42)
NEUTROPHILS NFR BLD: 61.2 % (ref 44–72)
NRBC # BLD AUTO: 0 K/UL
NRBC BLD-RTO: 0 /100 WBC (ref 0–0.2)
PLATELET # BLD AUTO: 207 K/UL (ref 164–446)
PMV BLD AUTO: 10.2 FL (ref 9–12.9)
RBC # BLD AUTO: 4.78 M/UL (ref 4.2–5.4)
TSH SERPL DL<=0.005 MIU/L-ACNC: 3.28 UIU/ML (ref 0.38–5.33)
WBC # BLD AUTO: 7.9 K/UL (ref 4.8–10.8)

## 2024-03-22 PROCEDURE — 84443 ASSAY THYROID STIM HORMONE: CPT

## 2024-03-22 PROCEDURE — 99214 OFFICE O/P EST MOD 30 MIN: CPT | Performed by: FAMILY MEDICINE

## 2024-03-22 PROCEDURE — 3075F SYST BP GE 130 - 139MM HG: CPT | Performed by: FAMILY MEDICINE

## 2024-03-22 PROCEDURE — 85025 COMPLETE CBC W/AUTO DIFF WBC: CPT

## 2024-03-22 PROCEDURE — 36415 COLL VENOUS BLD VENIPUNCTURE: CPT

## 2024-03-22 PROCEDURE — 3078F DIAST BP <80 MM HG: CPT | Performed by: FAMILY MEDICINE

## 2024-03-22 RX ORDER — ISOSORBIDE MONONITRATE 30 MG/1
30 TABLET, EXTENDED RELEASE ORAL EVERY MORNING
Qty: 90 TABLET | Refills: 2 | Status: SHIPPED | OUTPATIENT
Start: 2024-03-22

## 2024-03-22 ASSESSMENT — FIBROSIS 4 INDEX: FIB4 SCORE: 1.62

## 2024-03-22 ASSESSMENT — PATIENT HEALTH QUESTIONNAIRE - PHQ9: CLINICAL INTERPRETATION OF PHQ2 SCORE: 0

## 2024-03-22 NOTE — PROGRESS NOTES
Chief Complaint   Patient presents with    Follow-Up     Afib?    Hypertension Follow-up    Fatigue       Subjective:     HPI:   Franchesca Paige presents today with the followin. Paroxysmal atrial fibrillation (HCC)/Secondary hypercoagulable state (HCC)  She is having episodes of atrial fibrillation especially if she gets stressed or overtired.  Discussed, she does get some chest tightness when the atrial fibrillation occurs.  She is continuing her aspirin but does not seem to be on a DOAC for her atrial  fibrillation.  She had read the information and since she falls frequently she felt that was not appropriate.  She was on Eliquis on discharge from the hospital in October but did not see cardiology in follow-up.  She did see cardiology in .  She tends to be bradycardic so metoprolol is not be a good choice for her.  I am asking her to start low-dose isosorbide but would like her to see cardiology as well.  Cardiology referral has been placed.    2. Other forms of angina pectoris  Patient is getting an unusual chest pressure left anterior chest.  I believe she said this was associated with a rapid heartbeat.  I believe that may be an anginal variant and have started low-dose isosorbide dinitrate.    3. Aortic atherosclerosis (HCC)  Patient does have incidentally noted aortic atherosclerosis but without aneurysm.  Patient is not on cholesterol-lowering medication as most recent LDL was 97.    4. Thyromegaly  Patient does have some palpable thyromegaly.  Last TSH in October was inappropriately high.  Follow-up lab order discussed and placed.    5. Obesity, Class I, BMI 30-34.9  Patient has been successfully working on weight loss.  She has lost 5 pounds in the last 4 months.  She is working on being more active as well.        Patient Active Problem List    Diagnosis Date Noted    Aortic atherosclerosis (HCC) 2024    Other forms of angina pectoris 2024    Thyromegaly 2024     Onychomycosis of toenail 11/14/2023    Secondary hypercoagulable state (HCC) 11/08/2023    Coronary atherosclerosis due to calcified coronary lesion 11/08/2023    Fibrosis of liver 11/08/2023    History of community acquired pneumonia 11/08/2023    Paroxysmal atrial fibrillation (HCC) 10/07/2023    ACP (advance care planning) 10/07/2023    Status post left hip replacement 05/22/2023    Decreased hearing, bilateral 05/22/2023    Osteoporosis of femur without pathological fracture 02/01/2023    Caregiver stress 02/11/2022    Situational depression 02/11/2022    Bradycardia 11/01/2018    Ocular migraine 04/16/2018    Osteopenia of multiple sites     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/21/2018    Obesity, Class I, BMI 30-34.9 02/24/2017    Need for subacute bacterial endocarditis prophylaxis 01/21/2016    Essential hypertension 06/11/2015    Status post total left knee replacement 06/11/2015    Gastroesophageal reflux disease without esophagitis 06/11/2015    Allergic rhinitis due to pollen 06/11/2015    History of bladder cancer 06/11/2015    Primary osteoarthritis involving multiple joints        Current medicines (including changes today)  Current Outpatient Medications   Medication Sig Dispense Refill    isosorbide mononitrate SR (IMDUR) 30 MG TABLET SR 24 HR Take 1 Tablet by mouth every morning. 90 Tablet 2    amLODIPine (NORVASC) 5 MG Tab TAKE ONE TABLET BY MOUTH EVERY DAY 90 Tablet 3    fosinopril (MONOPRIL) 20 MG Tab TAKE ONE TABLET BY MOUTH EVERY DAY 90 Tablet 3    aspirin (ASA) 325 MG Tab Take 1 Tablet by mouth 2 times a day with meals. 60 Tablet 11    Cholecalciferol (VITAMIN D) 50 MCG (2000 UT) Cap Take 1 Capsule by mouth every day. 30 Capsule 11    diclofenac sodium (VOLTAREN) 1 % Gel Apply 4 g topically 4 times a day as needed. To knee       No current facility-administered medications for this visit.       Allergies   Allergen Reactions    Sulfa Drugs Vomiting    Ibuprofen      Risk of stroke       ROS: As per  "HPI       Objective:     /60   Pulse (!) 49   Temp 36.7 °C (98 °F)   Resp 18   Ht 1.727 m (5' 8\")   Wt 94.3 kg (208 lb)   SpO2 98%  Body mass index is 31.63 kg/m².    Physical Exam:  Constitutional: Well-developed and well-nourished. Not diaphoretic. No distress. Lucid and fluent.  Skin: Skin is warm and dry. No rash noted.  Head: Atraumatic without lesions.  Eyes: Conjunctivae and extraocular motions are normal. Pupils are equal, round, and reactive to light. No scleral icterus.   Ears:  External ears unremarkable. Tympanic membranes clear and intact.  Small amount of cerumen.  Neck: Supple, trachea midline.  Mild smooth thyromegaly present. No cervical or supraclavicular lymphadenopathy. No JVD or carotid bruits appreciated  Cardiovascular: Regular rate and rhythm to auscultation today.  Normal S1, S2 without murmur appreciated.  Chest: Effort normal. Clear to auscultation throughout. No adventitious sounds.   Abdomen: Soft, non tender, and without distention. Active bowel sounds in all four quadrants. No rebound, guarding, masses or hepatosplenomegaly.  Extremities: No cyanosis, clubbing, erythema, nor edema.   Neurological: Alert and oriented x 3.  No tremor appreciated.  Psychiatric:  Behavior, mood, and affect are appropriate.       Assessment and Plan:     78 y.o. female with the following issues:    1. Paroxysmal atrial fibrillation (HCC)  isosorbide mononitrate SR (IMDUR) 30 MG TABLET SR 24 HR    TSH    CBC WITH DIFFERENTIAL    REFERRAL TO CARDIOLOGY      2. Secondary hypercoagulable state (HCC)        3. Other forms of angina pectoris  isosorbide mononitrate SR (IMDUR) 30 MG TABLET SR 24 HR    REFERRAL TO CARDIOLOGY      4. Aortic atherosclerosis (HCC)  REFERRAL TO CARDIOLOGY      5. Thyromegaly  TSH      6. Obesity, Class I, BMI 30-34.9  Patient identified as having weight management issue.  Appropriate orders and counseling given.            Followup: Return in about 4 months (around " 7/22/2024), or if symptoms worsen or fail to improve.

## 2024-05-30 ENCOUNTER — OFFICE VISIT (OUTPATIENT)
Dept: CARDIOLOGY | Facility: MEDICAL CENTER | Age: 79
End: 2024-05-30
Attending: FAMILY MEDICINE
Payer: MEDICARE

## 2024-05-30 VITALS
SYSTOLIC BLOOD PRESSURE: 122 MMHG | OXYGEN SATURATION: 96 % | WEIGHT: 209 LBS | RESPIRATION RATE: 14 BRPM | BODY MASS INDEX: 31.67 KG/M2 | HEART RATE: 46 BPM | DIASTOLIC BLOOD PRESSURE: 80 MMHG | HEIGHT: 68 IN

## 2024-05-30 DIAGNOSIS — I48.0 PAROXYSMAL ATRIAL FIBRILLATION (HCC): ICD-10-CM

## 2024-05-30 DIAGNOSIS — I70.0 AORTIC ATHEROSCLEROSIS (HCC): ICD-10-CM

## 2024-05-30 DIAGNOSIS — I20.89 OTHER FORMS OF ANGINA PECTORIS (HCC): ICD-10-CM

## 2024-05-30 DIAGNOSIS — I10 ESSENTIAL HYPERTENSION: ICD-10-CM

## 2024-05-30 RX ORDER — MULTIVIT WITH MINERALS/LUTEIN
500 TABLET ORAL DAILY
COMMUNITY

## 2024-05-30 ASSESSMENT — ENCOUNTER SYMPTOMS
INSOMNIA: 1
HEARTBURN: 1
FALLS: 1
MEMORY LOSS: 1
CONSTIPATION: 1
SHORTNESS OF BREATH: 1
PALPITATIONS: 1
NERVOUS/ANXIOUS: 1
BRUISES/BLEEDS EASILY: 1
WEAKNESS: 1
EYE PAIN: 1
DEPRESSION: 1

## 2024-05-30 ASSESSMENT — FIBROSIS 4 INDEX: FIB4 SCORE: 1.9

## 2024-05-30 NOTE — PROGRESS NOTES
Chief Complaint   Patient presents with    Atrial Fibrillation     F/V Dx:  Paroxysmal atrial fibrillation (HCC)       Subjective     Franchesca Paige is a 78 y.o. female who presents today  in consultation from Tate De La Rosa M.D. for evaluation of recent finding of atrial fibrillation she had atrial fibrillation with self reverted testing including echocardiogram and stress test with normal she has been having some chest pain so was appropriately started on isosorbide which seemed to help her blood pressure as well as her chest pains she feels like she is doing great, she last saw cardiology 3 over 3 years ago with patch monitor testing then    Past Medical History:   Diagnosis Date    Anesthesia     PONV, difficulty waking up    Aortic atherosclerosis (HCC) 03/22/2024    Arthritis     Bladder cancer (HCC)     bladder cancer 2007 , march    Cancer (HCC) 2003    Bladder Cancer    Caregiver stress 02/11/2022    Chronic pain of right ankle 02/08/2018    COVID-19     Childhood and mumps and measles    Delayed emergence from general anesthesia     Dizziness 02/22/2018    Dyslipidemia, goal LDL below 130     Hydronephrosis 02/11/2019    Hypertension 1992    Ocular migraine     Osteoarthritis of left hip 11/30/2022    Added automatically from request for surgery [id number]    Osteopenia     DEXA 9/24/12    Osteopenia of multiple sites     Osteoporosis of femur without pathological fracture 02/01/2023    PONV (postoperative nausea and vomiting)     Total knee replacement status      Past Surgical History:   Procedure Laterality Date    LA TOTAL HIP ARTHROPLASTY Left 3/14/2023    Procedure: LEFT TOTAL HIP ARTHROPLASTY, ANTERIOR APPROACH;  Surgeon: Arvind Diaz M.D.;  Location: SURGERY [facility];  Service: Orthopedics    OTHER Right 10/07/2018    vein ablation right thigh, Dr. Azevedo    KNEE REPLACEMENT, TOTAL  12/04/2012    Dr. Harry, left knee    COLONOSCOPY  08/03/2010    diverticulosis only, due in 2020     ORIF, WRIST Left 2008    HARDWARE REMOVAL ORTHO Left 2008    wrist    HYSTERECTOMY, TOTAL ABDOMINAL  1980s    has one half of an ovary and a fallopian tube     Family History   Problem Relation Age of Onset    Hypertension Mother     Psychiatric Illness Mother         paranoid schizophrenic    Hypertension Father         heavy smoker    Diabetes Other         niece    Cancer Sister         skin    Cancer Paternal Aunt         breast    Psychiatric Illness Sister         paranoid schizophrenic     Social History     Socioeconomic History    Marital status:      Spouse name: Not on file    Number of children: Not on file    Years of education: Not on file    Highest education level: Some college, no degree   Occupational History    Not on file   Tobacco Use    Smoking status: Former     Current packs/day: 0.00     Average packs/day: 1 pack/day for 36.2 years (36.2 ttl pk-yrs)     Types: Cigarettes     Start date: 1964     Quit date: 2000     Years since quittin.0    Smokeless tobacco: Never   Vaping Use    Vaping status: Never Used   Substance and Sexual Activity    Alcohol use: No     Alcohol/week: 0.0 oz    Drug use: No    Sexual activity: Yes   Other Topics Concern    Not on file   Social History Narrative    Not on file     Social Determinants of Health     Financial Resource Strain: Low Risk  (2022)    Overall Financial Resource Strain (CARDIA)     Difficulty of Paying Living Expenses: Not hard at all   Food Insecurity: No Food Insecurity (2022)    Hunger Vital Sign     Worried About Running Out of Food in the Last Year: Never true     Ran Out of Food in the Last Year: Never true   Transportation Needs: No Transportation Needs (2022)    PRAPARE - Transportation     Lack of Transportation (Medical): No     Lack of Transportation (Non-Medical): No   Physical Activity: Insufficiently Active (2022)    Exercise Vital Sign     Days of Exercise per Week: 1 day     Minutes of  Exercise per Session: 20 min   Stress: No Stress Concern Present (11/29/2022)    [place] [employer] - Occupational Stress Questionnaire     Feeling of Stress : Only a little   Social Connections: Moderately Isolated (11/29/2022)    Social Connection and Isolation Panel [NHANES]     Frequency of Communication with Friends and Family: Twice a week     Frequency of Social Gatherings with Friends and Family: Once a week     Attends [employer] Services: Never     Active Member of Clubs or Organizations: No     Attends Club or Organization Meetings: Never     Marital Status:    Intimate Partner Violence: Not on file   Housing Stability: Unknown (11/29/2022)    Housing Stability Vital Sign     Unable to Pay for Housing in the Last Year: No     Number of Places Lived in the Last Year: Not on file     Unstable Housing in the Last Year: No     Allergies   Allergen Reactions    Sulfa Drugs Vomiting    Ibuprofen      Risk of stroke     Outpatient Encounter Medications as of 5/30/2024   Medication Sig Dispense Refill    Ascorbic Acid (VITAMIN C) 1000 MG Tab Take 500 mg by mouth every day.      isosorbide mononitrate SR (IMDUR) 30 MG TABLET SR 24 HR Take 1 Tablet by mouth every morning. 90 Tablet 2    amLODIPine (NORVASC) 5 MG Tab TAKE ONE TABLET BY MOUTH EVERY DAY 90 Tablet 3    fosinopril (MONOPRIL) 20 MG Tab TAKE ONE TABLET BY MOUTH EVERY DAY 90 Tablet 3    aspirin (ASA) 325 MG Tab Take 1 Tablet by mouth 2 times a day with meals. (Patient taking differently: Take 81 mg by mouth 2 times a day with meals. 3 tablets in the AM and 2 tablets in the PM) 60 Tablet 11    Cholecalciferol (VITAMIN D) 50 MCG (2000 UT) Cap Take 1 Capsule by mouth every day. 30 Capsule 11    diclofenac sodium (VOLTAREN) 1 % Gel Apply 4 g topically 2 times a day. To knee       No facility-administered encounter medications on file as of 5/30/2024.     Review of Systems   Constitutional:  Positive for malaise/fatigue.   HENT:   "Positive for congestion.    Eyes:  Positive for pain.   Respiratory:  Positive for shortness of breath.    Cardiovascular:  Positive for palpitations.   Gastrointestinal:  Positive for constipation and heartburn.   Genitourinary:  Positive for urgency.   Musculoskeletal:  Positive for falls and joint pain.   Neurological:  Positive for weakness.   Endo/Heme/Allergies:  Positive for environmental allergies. Bruises/bleeds easily.   Psychiatric/Behavioral:  Positive for depression and memory loss. The patient is nervous/anxious and has insomnia.               Objective     /80 (BP Location: Left arm, Patient Position: Sitting, BP Cuff Size: Adult)   Pulse (!) 46   Resp 14   Ht 1.727 m (5' 8\")   Wt 94.8 kg (209 lb)   SpO2 96%   BMI 31.78 kg/m²     Physical Exam  Constitutional:       General: She is not in acute distress.     Appearance: She is not diaphoretic.   Eyes:      General: No scleral icterus.  Neck:      Vascular: No JVD.   Cardiovascular:      Rate and Rhythm: Normal rate.      Heart sounds: Normal heart sounds. No murmur heard.     No friction rub. No gallop.   Pulmonary:      Effort: No respiratory distress.      Breath sounds: No wheezing or rales.   Abdominal:      General: Bowel sounds are normal.      Palpations: Abdomen is soft.   Musculoskeletal:      Right lower leg: No edema.      Left lower leg: No edema.   Skin:     Findings: No rash.   Neurological:      Mental Status: She is alert. Mental status is at baseline.   Psychiatric:         Mood and Affect: Mood normal.          We reviewed in person the most recent labs  Recent Results (from the past 5040 hour(s))   COMP METABOLIC PANEL    Collection Time: 11/10/23  5:46 AM   Result Value Ref Range    Glucose 97 70 - 99 mg/dL    Bun 18 8 - 27 mg/dL    Creatinine 0.66 0.57 - 1.00 mg/dL    eGFR 91 >59 mL/min/1.73    Bun-Creatinine Ratio 27 12 - 28    Sodium 141 134 - 144 mmol/L    Potassium 4.4 3.5 - 5.2 mmol/L    Chloride 108 (H) 96 - 106 " mmol/L    Co2 21 20 - 29 mmol/L    Calcium 10.8 (H) 8.7 - 10.3 mg/dL    Total Protein 6.8 6.0 - 8.5 g/dL    Albumin 4.3 3.8 - 4.8 g/dL    Globulin 2.5 1.5 - 4.5 g/dL    A-G Ratio 1.7 1.2 - 2.2    Total Bilirubin 0.4 0.0 - 1.2 mg/dL    Alkaline Phosphatase 138 (H) 44 - 121 IU/L    AST(SGOT) 22 0 - 40 IU/L    ALT(SGPT) 18 0 - 32 IU/L   LIPID PANEL    Collection Time: 11/10/23  5:46 AM   Result Value Ref Range    Cholesterol,Tot 181 100 - 199 mg/dL    Triglycerides 110 0 - 149 mg/dL    HDL 64 >39 mg/dL    VLDL Cholesterol Calc 20 5 - 40 mg/dL    LDL Chol Calc (NIH) 97 0 - 99 mg/dL    Comment: CANCELED    CBC WITH DIFFERENTIAL    Collection Time: 03/22/24  3:44 PM   Result Value Ref Range    WBC 7.9 4.8 - 10.8 K/uL    RBC 4.78 4.20 - 5.40 M/uL    Hemoglobin 14.2 12.0 - 16.0 g/dL    Hematocrit 42.7 37.0 - 47.0 %    MCV 89.3 81.4 - 97.8 fL    MCH 29.7 27.0 - 33.0 pg    MCHC 33.3 32.2 - 35.5 g/dL    RDW 43.7 35.9 - 50.0 fL    Platelet Count 207 164 - 446 K/uL    MPV 10.2 9.0 - 12.9 fL    Neutrophils-Polys 61.20 44.00 - 72.00 %    Lymphocytes 30.10 22.00 - 41.00 %    Monocytes 7.10 0.00 - 13.40 %    Eosinophils 0.80 0.00 - 6.90 %    Basophils 0.40 0.00 - 1.80 %    Immature Granulocytes 0.40 0.00 - 0.90 %    Nucleated RBC 0.00 0.00 - 0.20 /100 WBC    Neutrophils (Absolute) 4.81 1.82 - 7.42 K/uL    Lymphs (Absolute) 2.36 1.00 - 4.80 K/uL    Monos (Absolute) 0.56 0.00 - 0.85 K/uL    Eos (Absolute) 0.06 0.00 - 0.51 K/uL    Baso (Absolute) 0.03 0.00 - 0.12 K/uL    Immature Granulocytes (abs) 0.03 0.00 - 0.11 K/uL    NRBC (Absolute) 0.00 K/uL   TSH    Collection Time: 03/22/24  3:44 PM   Result Value Ref Range    TSH 3.280 0.380 - 5.330 uIU/mL         We reviewed the images of her echocardiogram stress test which are normal  Assessment & Plan     1. Aortic atherosclerosis (HCC)        2. Other forms of angina pectoris (HCC)        3. Paroxysmal atrial fibrillation (HCC)  Cardiac Event Monitor      4. Essential hypertension             Medical Decision Making: Today's Assessment/Status/Plan:        It was my pleasure to meet with Ms. Paige.    We addressed the management of hypertension at today's visit. Blood pressure is well controlled.  We specifically assessed the labs on hypertension treatment    We addressed the management of dyslipidemia and atherosclerosis at today's visit. She is on appropriate statin.    We addressed the management of atrial fibrillation.  She is on proper anticoagulation cholesterol management and rate or rhythm control as appropriate.  We addressed the potential side effects and laboratory follow-up for these medications.    We addressed the management of chronic anticoagulation at today's visit. She understands the risks and benefits of chronic anticoagulation.  We reviewed and verified the results of annual testing for anemia and kidney function.  ADVISED ELIQUIS SHE DECLINES    IF NEEDS SURGERY CAN SAFELY PROCEED  She can proceed with the proposed procedure or surgery from a cardiac standpoint, no modifiable cardiovascular risk, no further cardiac testing required, hold antiplatelet as necessary, resume as soon as possible typically when patient is able to take oral medications.    It is my pleasure to participate in the care of Ms. Paige.  Please do not hesitate to contact me with questions or concerns. [facility] is available 24/7 for consultative services at [phone] in the perioperative period.    Ms. Paige does not require regular cardiology follow up, I have advised her to call our office or e-mail using my Pay4latert if needed.    It is my pleasure to participate in the care of Ms. Paige.  Please do not hesitate to contact me with questions or concerns.    Lele Adams MD PhD FACC  Cardiologist [facility]    Please note that this dictation was created using voice recognition software. There may be errors I did not discover before finalizing the note.

## 2024-05-30 NOTE — PATIENT INSTRUCTIONS
POSSIBLE ANTIARRYTHMIC IS  MULTAQ (dronederone)    CONSIDER [employer]    NATURAL SUPPLEMENTS  A total of 884 randomized controlled intervention trials evaluating 27 types of micronutrients among [id number],627 participants (4,895,544 person-years) were identified. Supplementation with n-3 fatty acid, n-6 fatty acid, l-arginine, l-citrulline, folic acid, vitamin D, magnesium, zinc, ?-lipoic acid, coenzyme Q10, melatonin, catechin, curcumin, flavanol, genistein, and quercetin showed moderate- to high-quality evidence for reducing CVD risk factors. Specifically, n-3 fatty acid supplementation decreased CVD mortality (relative risk [RR]: 0.93; 95% CI: 0.88-0.97), myocardial infarction (RR: 0.85; 95% CI: 0.78-0.92), and coronary heart disease events (RR: 0.86; 95% CI: 0.80-0.93). Folic acid supplementation decreased stroke risk (RR: 0.84; 95% CI: 0.72-0.97), and coenzyme Q10 supplementation decreased all-cause mortality events (RR: 0.68; 95% CI: 0.49-0.94). Vitamin C, vitamin D, vitamin E, and selenium showed no effect on CVD or type 2 diabetes risk. ?-carotene supplementation increased all-cause mortality (RR: 1.10; 95% CI: 1.05-1.15), CVD mortality events (RR: 1.12; 95% CI: 1.06-1.18), and stroke risk (RR: 1.09; 95% CI: 1.01-1.17).           https://www.jacc.org/doi/10.1016/j.jacc.2022.09.048      Consider [employer] (https://[email or web address]/kardiamobile/) $ DO NOT SUBSCRIBE WE WILL ALWAYS REVIEW YOUR TRACINGS ON FlatStackHART or Smartwatch such as Apple Watch $$$$ for monitoring of the heart palpitations.  This is a small device that syncs to your phone with Bluetooth that can tell you your heart rhythm.  You can send us a screencapture of the tracings with Edtripst.        Typical Patch Monitor looks like this Zio or BioTel      Alternatively consider a pulse oximeter and let us know if Oxygen is <90 or pulse is <50 or > 110 while resting

## 2024-06-03 ENCOUNTER — NON-PROVIDER VISIT (OUTPATIENT)
Dept: CARDIOLOGY | Facility: MEDICAL CENTER | Age: 79
End: 2024-06-03
Attending: INTERNAL MEDICINE
Payer: MEDICARE

## 2024-06-03 DIAGNOSIS — I48.0 PAROXYSMAL ATRIAL FIBRILLATION (HCC): ICD-10-CM

## 2024-06-28 ENCOUNTER — TELEPHONE (OUTPATIENT)
Dept: CARDIOLOGY | Facility: MEDICAL CENTER | Age: 79
End: 2024-06-28
Payer: MEDICARE

## 2024-06-28 NOTE — TELEPHONE ENCOUNTER
Chelsea EOS to 's nurse, Milla, on 6/28/2024    Monitor analysis time: 11 days 21 hours    Preliminary findings:    <1% AF 75-94 with an avg rate of 87 bpm    6 episodes of SVT  with an avg rate of 118 bpm    Sinus Rhythm 35-92 with an avg rate of 52 bpm    Supraventricular ectopy: rare    Ventricular ectopy: rare isolated VE(s), no couplets or triplets noted    1 patient event:  SR 54  SVE(s)

## 2024-07-01 ENCOUNTER — APPOINTMENT (OUTPATIENT)
Dept: MEDICAL GROUP | Facility: MEDICAL CENTER | Age: 79
End: 2024-07-01
Payer: MEDICARE

## 2024-07-01 VITALS
OXYGEN SATURATION: 96 % | HEIGHT: 68 IN | TEMPERATURE: 98 F | DIASTOLIC BLOOD PRESSURE: 60 MMHG | SYSTOLIC BLOOD PRESSURE: 142 MMHG | BODY MASS INDEX: 31.37 KG/M2 | HEART RATE: 52 BPM | RESPIRATION RATE: 18 BRPM | WEIGHT: 207 LBS

## 2024-07-01 DIAGNOSIS — Z63.6 CAREGIVER STRESS: ICD-10-CM

## 2024-07-01 DIAGNOSIS — I47.10 PAROXYSMAL SVT (SUPRAVENTRICULAR TACHYCARDIA) (HCC): ICD-10-CM

## 2024-07-01 DIAGNOSIS — I48.0 PAROXYSMAL ATRIAL FIBRILLATION (HCC): ICD-10-CM

## 2024-07-01 DIAGNOSIS — I48.0 HYPERCOAGULABLE STATE DUE TO PAROXYSMAL ATRIAL FIBRILLATION (HCC): ICD-10-CM

## 2024-07-01 DIAGNOSIS — D68.69 HYPERCOAGULABLE STATE DUE TO PAROXYSMAL ATRIAL FIBRILLATION (HCC): ICD-10-CM

## 2024-07-01 PROCEDURE — 3078F DIAST BP <80 MM HG: CPT | Performed by: FAMILY MEDICINE

## 2024-07-01 PROCEDURE — 3077F SYST BP >= 140 MM HG: CPT | Performed by: FAMILY MEDICINE

## 2024-07-01 PROCEDURE — 99213 OFFICE O/P EST LOW 20 MIN: CPT | Performed by: FAMILY MEDICINE

## 2024-07-01 SDOH — SOCIAL STABILITY - SOCIAL INSECURITY: DEPENDENT RELATIVE NEEDING CARE AT HOME: Z63.6

## 2024-07-01 ASSESSMENT — FIBROSIS 4 INDEX: FIB4 SCORE: 1.9

## 2024-08-14 ENCOUNTER — APPOINTMENT (RX ONLY)
Dept: URBAN - METROPOLITAN AREA CLINIC 35 | Facility: CLINIC | Age: 79
Setting detail: DERMATOLOGY
End: 2024-08-14

## 2024-08-14 DIAGNOSIS — D22 MELANOCYTIC NEVI: ICD-10-CM

## 2024-08-14 DIAGNOSIS — L82.1 OTHER SEBORRHEIC KERATOSIS: ICD-10-CM

## 2024-08-14 DIAGNOSIS — L57.0 ACTINIC KERATOSIS: ICD-10-CM

## 2024-08-14 DIAGNOSIS — Z71.89 OTHER SPECIFIED COUNSELING: ICD-10-CM

## 2024-08-14 DIAGNOSIS — L81.4 OTHER MELANIN HYPERPIGMENTATION: ICD-10-CM

## 2024-08-14 DIAGNOSIS — D18.0 HEMANGIOMA: ICD-10-CM

## 2024-08-14 DIAGNOSIS — D485 NEOPLASM OF UNCERTAIN BEHAVIOR OF SKIN: ICD-10-CM | Status: INADEQUATELY CONTROLLED

## 2024-08-14 DIAGNOSIS — I78.8 OTHER DISEASES OF CAPILLARIES: ICD-10-CM

## 2024-08-14 PROBLEM — D22.5 MELANOCYTIC NEVI OF TRUNK: Status: ACTIVE | Noted: 2024-08-14

## 2024-08-14 PROBLEM — D18.01 HEMANGIOMA OF SKIN AND SUBCUTANEOUS TISSUE: Status: ACTIVE | Noted: 2024-08-14

## 2024-08-14 PROBLEM — D48.5 NEOPLASM OF UNCERTAIN BEHAVIOR OF SKIN: Status: ACTIVE | Noted: 2024-08-14

## 2024-08-14 PROBLEM — D22.62 MELANOCYTIC NEVI OF LEFT UPPER LIMB, INCLUDING SHOULDER: Status: ACTIVE | Noted: 2024-08-14

## 2024-08-14 PROCEDURE — ? LIQUID NITROGEN

## 2024-08-14 PROCEDURE — ? COUNSELING

## 2024-08-14 PROCEDURE — ? SURGICAL DECISION MAKING

## 2024-08-14 PROCEDURE — ? SUNSCREEN RECOMMENDATIONS

## 2024-08-14 PROCEDURE — 17000 DESTRUCT PREMALG LESION: CPT | Mod: 59

## 2024-08-14 PROCEDURE — 99214 OFFICE O/P EST MOD 30 MIN: CPT | Mod: 25

## 2024-08-14 PROCEDURE — ? OBSERVATION AND MEASURE

## 2024-08-14 PROCEDURE — ? BIOPSY BY SHAVE METHOD

## 2024-08-14 PROCEDURE — ? SEPARATE AND IDENTIFIABLE DOCUMENTATION

## 2024-08-14 PROCEDURE — 11102 TANGNTL BX SKIN SINGLE LES: CPT

## 2024-08-14 PROCEDURE — 17003 DESTRUCT PREMALG LES 2-14: CPT

## 2024-08-14 ASSESSMENT — LOCATION DETAILED DESCRIPTION DERM
LOCATION DETAILED: LEFT LATERAL SHOULDER
LOCATION DETAILED: NASAL SUPRATIP
LOCATION DETAILED: RIGHT CENTRAL MALAR CHEEK
LOCATION DETAILED: LEFT INFERIOR MEDIAL UPPER BACK
LOCATION DETAILED: RIGHT BUTTOCK
LOCATION DETAILED: NASAL ROOT
LOCATION DETAILED: LEFT MEDIAL UPPER BACK
LOCATION DETAILED: RIGHT CENTRAL TEMPLE
LOCATION DETAILED: LEFT CENTRAL MALAR CHEEK
LOCATION DETAILED: NASAL DORSUM
LOCATION DETAILED: EPIGASTRIC SKIN
LOCATION DETAILED: RIGHT LATERAL SUPERIOR CHEST
LOCATION DETAILED: RIGHT EYEBROW

## 2024-08-14 ASSESSMENT — LOCATION SIMPLE DESCRIPTION DERM
LOCATION SIMPLE: RIGHT TEMPLE
LOCATION SIMPLE: RIGHT EYEBROW
LOCATION SIMPLE: NOSE
LOCATION SIMPLE: LEFT UPPER BACK
LOCATION SIMPLE: RIGHT BUTTOCK
LOCATION SIMPLE: LEFT SHOULDER
LOCATION SIMPLE: ABDOMEN
LOCATION SIMPLE: RIGHT CHEEK
LOCATION SIMPLE: CHEST
LOCATION SIMPLE: LEFT CHEEK

## 2024-08-14 ASSESSMENT — LOCATION ZONE DERM
LOCATION ZONE: NOSE
LOCATION ZONE: TRUNK
LOCATION ZONE: ARM
LOCATION ZONE: FACE

## 2024-08-14 NOTE — PROCEDURE: LIQUID NITROGEN
Duration Of Freeze Thaw-Cycle (Seconds): 2
Render Note In Bullet Format When Appropriate: No
Post-Care Instructions: I reviewed with the patient in detail post-care instructions. Patient is to wear sunprotection, and avoid picking at any of the treated lesions. Pt may apply Vaseline to crusted or scabbing areas.
Number Of Freeze-Thaw Cycles: 2 freeze-thaw cycles
Detail Level: Detailed
Show Applicator Variable?: Yes
Consent: The patient's consent was obtained including but not limited to risks of crusting, scabbing, blistering, scarring, darker or lighter pigmentary change, recurrence, incomplete removal and infection.
Application Tool (Optional): Cry-AC

## 2024-08-14 NOTE — PROCEDURE: SURGICAL DECISION MAKING
Date Of Surgery - Today Or Tomorrow?: today
Discussion: We discussed not only the risks of the procedure but also the likely silva that further treatment will be required to treat lesion. This could involve another visit here to destroy or excise  lesion, a visit to a Mohs or general or plastic surgeon, scarring, limited  activity, cosmetic imperfections.
Identified Risk Factors Documented?: yes
Complexity (Necessary For Coding; Major - 90 Day Global With Some Exceptions; Minor - 10 Day Global): minor
Risk Assessment Explanation (Does Not Render In The Note): Clinical determination of the probability and/or consequences of an event, such as surgery. Clinical assessment of the level of risk is affected by the nature of the event under consideration for the patient. Modifier 57 is used to indicate an Evaluation and Management (E/M) service resulted in the initial decision to perform surgery either the day before a major surgery (90 day global) or the day of a major surgery.

## 2024-08-14 NOTE — PROCEDURE: BIOPSY BY SHAVE METHOD
Detail Level: Detailed
Depth Of Biopsy: dermis
Was A Bandage Applied: Yes
Size Of Lesion In Cm: 0.7
X Size Of Lesion In Cm: 0
Biopsy Type: H and E
Biopsy Method: Dermablade
Anesthesia Type: 1% lidocaine without epinephrine
Anesthesia Volume In Cc: 1.3
Hemostasis: Aluminum Chloride
Wound Care: Petrolatum
Dressing: bandage
Destruction After The Procedure: No
Type Of Destruction Used: Curettage
Curettage Text: The wound bed was treated with curettage after the biopsy was performed.
Electrodesiccation And Curettage Text: The wound bed was treated with electrodesiccation and curettage after the biopsy was
Lab: [id number]
Lab Facility: 
Consent: Verbal consent was obtained and risks were reviewed including but not limited to scarring, infection, bleeding, scabbing, incomplete removal, nerve damage and allergy to anesthesia.
Post-Care Instructions: I reviewed with the patient in detail post-care instructions. Patient is to keep the biopsy site dry overnight, and then apply white petrolatum twice daily until healed. Patient may apply hydrogen peroxide soaks to remove any crusting.
Notification Instructions: Patient will be notified of biopsy results. However, patient instructed to call the office if not contacted within 2 weeks.
Billing Type: Third-Party Bill
Information: Selecting Yes will display possible errors in your note based on the variables you have selected. This validation is only offered as a suggestion for you. PLEASE NOTE THAT THE VALIDATION TEXT WILL BE REMOVED WHEN YOU FINALIZE YOUR NOTE. IF YOU WANT TO FAX A PRELIMINARY NOTE YOU WILL NEED TO TOGGLE THIS TO 'NO' IF YOU DO NOT WANT IT IN YOUR FAXED NOTE.

## 2024-11-22 DIAGNOSIS — I10 ESSENTIAL HYPERTENSION: ICD-10-CM

## 2024-11-22 RX ORDER — AMLODIPINE BESYLATE 5 MG/1
5 TABLET ORAL
Qty: 90 TABLET | Refills: 3 | Status: SHIPPED | OUTPATIENT
Start: 2024-11-22

## 2024-11-22 RX ORDER — FOSINOPRIL SODIUM 20 MG/1
20 TABLET ORAL
Qty: 90 TABLET | Refills: 3 | Status: SHIPPED | OUTPATIENT
Start: 2024-11-22

## 2024-12-07 ENCOUNTER — ANESTHESIA (OUTPATIENT)
Dept: SURGERY | Facility: MEDICAL CENTER | Age: 79
DRG: 480 | End: 2024-12-07
Payer: MEDICARE

## 2024-12-07 ENCOUNTER — HOSPITAL ENCOUNTER (INPATIENT)
Facility: MEDICAL CENTER | Age: 79
LOS: 3 days | DRG: 480 | End: 2024-12-11
Attending: EMERGENCY MEDICINE | Admitting: HOSPITALIST
Payer: MEDICARE

## 2024-12-07 ENCOUNTER — APPOINTMENT (OUTPATIENT)
Dept: RADIOLOGY | Facility: MEDICAL CENTER | Age: 79
DRG: 480 | End: 2024-12-07
Attending: STUDENT IN AN ORGANIZED HEALTH CARE EDUCATION/TRAINING PROGRAM
Payer: MEDICARE

## 2024-12-07 ENCOUNTER — APPOINTMENT (OUTPATIENT)
Dept: RADIOLOGY | Facility: MEDICAL CENTER | Age: 79
DRG: 480 | End: 2024-12-07
Attending: EMERGENCY MEDICINE
Payer: MEDICARE

## 2024-12-07 ENCOUNTER — ANESTHESIA EVENT (OUTPATIENT)
Dept: SURGERY | Facility: MEDICAL CENTER | Age: 79
DRG: 480 | End: 2024-12-07
Payer: MEDICARE

## 2024-12-07 DIAGNOSIS — K59.03 DRUG-INDUCED CONSTIPATION: ICD-10-CM

## 2024-12-07 DIAGNOSIS — H91.93 DECREASED HEARING, BILATERAL: ICD-10-CM

## 2024-12-07 DIAGNOSIS — I11.9 CARDIOMYOPATHY DUE TO HYPERTENSION, WITHOUT HEART FAILURE (HCC): ICD-10-CM

## 2024-12-07 DIAGNOSIS — J30.1 SEASONAL ALLERGIC RHINITIS DUE TO POLLEN: ICD-10-CM

## 2024-12-07 DIAGNOSIS — I48.0 HYPERCOAGULABLE STATE DUE TO PAROXYSMAL ATRIAL FIBRILLATION (HCC): ICD-10-CM

## 2024-12-07 DIAGNOSIS — Z29.89 NEED FOR SUBACUTE BACTERIAL ENDOCARDITIS PROPHYLAXIS: ICD-10-CM

## 2024-12-07 DIAGNOSIS — I43 CARDIOMYOPATHY DUE TO HYPERTENSION, WITHOUT HEART FAILURE (HCC): ICD-10-CM

## 2024-12-07 DIAGNOSIS — B35.1 ONYCHOMYCOSIS OF TOENAIL: ICD-10-CM

## 2024-12-07 DIAGNOSIS — M81.0 OSTEOPOROSIS OF FEMUR WITHOUT PATHOLOGICAL FRACTURE: ICD-10-CM

## 2024-12-07 DIAGNOSIS — I25.84 CORONARY ATHEROSCLEROSIS DUE TO CALCIFIED CORONARY LESION: ICD-10-CM

## 2024-12-07 DIAGNOSIS — F43.21 SITUATIONAL DEPRESSION: ICD-10-CM

## 2024-12-07 DIAGNOSIS — I10 ESSENTIAL HYPERTENSION: ICD-10-CM

## 2024-12-07 DIAGNOSIS — D72.829 LEUKOCYTOSIS, UNSPECIFIED TYPE: ICD-10-CM

## 2024-12-07 DIAGNOSIS — S72.8X1A OTHER FRACTURE OF RIGHT FEMUR, INITIAL ENCOUNTER FOR CLOSED FRACTURE (HCC): ICD-10-CM

## 2024-12-07 DIAGNOSIS — I20.89 OTHER FORMS OF ANGINA PECTORIS (HCC): ICD-10-CM

## 2024-12-07 DIAGNOSIS — Z63.6 CAREGIVER STRESS: ICD-10-CM

## 2024-12-07 DIAGNOSIS — H70.11 CHRONIC RIGHT MASTOIDITIS: ICD-10-CM

## 2024-12-07 DIAGNOSIS — Z96.642 STATUS POST LEFT HIP REPLACEMENT: ICD-10-CM

## 2024-12-07 DIAGNOSIS — G43.109 OCULAR MIGRAINE: ICD-10-CM

## 2024-12-07 DIAGNOSIS — I48.0 PAROXYSMAL ATRIAL FIBRILLATION (HCC): ICD-10-CM

## 2024-12-07 DIAGNOSIS — I25.10 CORONARY ATHEROSCLEROSIS DUE TO CALCIFIED CORONARY LESION: ICD-10-CM

## 2024-12-07 DIAGNOSIS — D64.9 ANEMIA, UNSPECIFIED TYPE: ICD-10-CM

## 2024-12-07 DIAGNOSIS — R09.02 HYPOXIA: ICD-10-CM

## 2024-12-07 DIAGNOSIS — R00.1 BRADYCARDIA: ICD-10-CM

## 2024-12-07 DIAGNOSIS — Z87.01 HISTORY OF COMMUNITY ACQUIRED PNEUMONIA: ICD-10-CM

## 2024-12-07 DIAGNOSIS — S72.431A CLOSED BICONDYLAR FRACTURE OF RIGHT FEMUR, INITIAL ENCOUNTER (HCC): ICD-10-CM

## 2024-12-07 DIAGNOSIS — E83.52 HYPERCALCEMIA: ICD-10-CM

## 2024-12-07 DIAGNOSIS — Z85.51 HISTORY OF BLADDER CANCER: ICD-10-CM

## 2024-12-07 DIAGNOSIS — K74.00 FIBROSIS OF LIVER: ICD-10-CM

## 2024-12-07 DIAGNOSIS — D68.69 HYPERCOAGULABLE STATE DUE TO PAROXYSMAL ATRIAL FIBRILLATION (HCC): ICD-10-CM

## 2024-12-07 DIAGNOSIS — E01.0 THYROMEGALY: ICD-10-CM

## 2024-12-07 DIAGNOSIS — K21.9 GASTROESOPHAGEAL REFLUX DISEASE WITHOUT ESOPHAGITIS: ICD-10-CM

## 2024-12-07 DIAGNOSIS — Z96.652 STATUS POST TOTAL LEFT KNEE REPLACEMENT: ICD-10-CM

## 2024-12-07 DIAGNOSIS — I70.0 AORTIC ATHEROSCLEROSIS (HCC): ICD-10-CM

## 2024-12-07 DIAGNOSIS — Z01.818 PREOPERATIVE EXAMINATION: ICD-10-CM

## 2024-12-07 DIAGNOSIS — Z71.89 ACP (ADVANCE CARE PLANNING): ICD-10-CM

## 2024-12-07 DIAGNOSIS — T14.8XXA FRACTURE: ICD-10-CM

## 2024-12-07 DIAGNOSIS — M15.0 PRIMARY OSTEOARTHRITIS INVOLVING MULTIPLE JOINTS: ICD-10-CM

## 2024-12-07 DIAGNOSIS — Z71.89 ADVANCE CARE PLANNING: ICD-10-CM

## 2024-12-07 DIAGNOSIS — I47.10 PAROXYSMAL SVT (SUPRAVENTRICULAR TACHYCARDIA) (HCC): ICD-10-CM

## 2024-12-07 DIAGNOSIS — S72.421A CLOSED BICONDYLAR FRACTURE OF RIGHT FEMUR, INITIAL ENCOUNTER (HCC): ICD-10-CM

## 2024-12-07 DIAGNOSIS — R73.9 HYPERGLYCEMIA: ICD-10-CM

## 2024-12-07 DIAGNOSIS — I16.0 HYPERTENSIVE URGENCY: ICD-10-CM

## 2024-12-07 DIAGNOSIS — E66.811 OBESITY, CLASS I, BMI 30-34.9: ICD-10-CM

## 2024-12-07 DIAGNOSIS — M85.89 OSTEOPENIA OF MULTIPLE SITES: ICD-10-CM

## 2024-12-07 LAB
ANION GAP SERPL CALC-SCNC: 8 MMOL/L (ref 7–16)
APTT PPP: 26.4 SEC (ref 24.7–36)
BASOPHILS # BLD AUTO: 0.1 % (ref 0–1.8)
BASOPHILS # BLD: 0.01 K/UL (ref 0–0.12)
BUN SERPL-MCNC: 22 MG/DL (ref 8–22)
CALCIUM SERPL-MCNC: 10.3 MG/DL (ref 8.4–10.2)
CHLORIDE SERPL-SCNC: 106 MMOL/L (ref 96–112)
CO2 SERPL-SCNC: 24 MMOL/L (ref 20–33)
CREAT SERPL-MCNC: 0.63 MG/DL (ref 0.5–1.4)
EKG IMPRESSION: NORMAL
EOSINOPHIL # BLD AUTO: 0 K/UL (ref 0–0.51)
EOSINOPHIL NFR BLD: 0 % (ref 0–6.9)
ERYTHROCYTE [DISTWIDTH] IN BLOOD BY AUTOMATED COUNT: 44.3 FL (ref 35.9–50)
GFR SERPLBLD CREATININE-BSD FMLA CKD-EPI: 91 ML/MIN/1.73 M 2
GLUCOSE SERPL-MCNC: 142 MG/DL (ref 65–99)
HCT VFR BLD AUTO: 35.7 % (ref 37–47)
HGB BLD-MCNC: 11.8 G/DL (ref 12–16)
IMM GRANULOCYTES # BLD AUTO: 0.08 K/UL (ref 0–0.11)
IMM GRANULOCYTES NFR BLD AUTO: 0.5 % (ref 0–0.9)
INR PPP: 1.05 (ref 0.87–1.13)
LYMPHOCYTES # BLD AUTO: 1.15 K/UL (ref 1–4.8)
LYMPHOCYTES NFR BLD: 7.2 % (ref 22–41)
MCH RBC QN AUTO: 29.9 PG (ref 27–33)
MCHC RBC AUTO-ENTMCNC: 33.1 G/DL (ref 32.2–35.5)
MCV RBC AUTO: 90.6 FL (ref 81.4–97.8)
MONOCYTES # BLD AUTO: 0.88 K/UL (ref 0–0.85)
MONOCYTES NFR BLD AUTO: 5.5 % (ref 0–13.4)
NEUTROPHILS # BLD AUTO: 13.93 K/UL (ref 1.82–7.42)
NEUTROPHILS NFR BLD: 86.7 % (ref 44–72)
NRBC # BLD AUTO: 0 K/UL
NRBC BLD-RTO: 0 /100 WBC (ref 0–0.2)
PLATELET # BLD AUTO: 243 K/UL (ref 164–446)
PMV BLD AUTO: 9.7 FL (ref 9–12.9)
POTASSIUM SERPL-SCNC: 4.7 MMOL/L (ref 3.6–5.5)
PROTHROMBIN TIME: 14.1 SEC (ref 12–14.6)
RBC # BLD AUTO: 3.94 M/UL (ref 4.2–5.4)
SODIUM SERPL-SCNC: 138 MMOL/L (ref 135–145)
WBC # BLD AUTO: 16.1 K/UL (ref 4.8–10.8)

## 2024-12-07 PROCEDURE — A9270 NON-COVERED ITEM OR SERVICE: HCPCS | Performed by: ANESTHESIOLOGY

## 2024-12-07 PROCEDURE — G0378 HOSPITAL OBSERVATION PER HR: HCPCS

## 2024-12-07 PROCEDURE — 700105 HCHG RX REV CODE 258: Performed by: STUDENT IN AN ORGANIZED HEALTH CARE EDUCATION/TRAINING PROGRAM

## 2024-12-07 PROCEDURE — 96374 THER/PROPH/DIAG INJ IV PUSH: CPT

## 2024-12-07 PROCEDURE — 85610 PROTHROMBIN TIME: CPT

## 2024-12-07 PROCEDURE — 160039 HCHG SURGERY MINUTES - EA ADDL 1 MIN LEVEL 3: Performed by: STUDENT IN AN ORGANIZED HEALTH CARE EDUCATION/TRAINING PROGRAM

## 2024-12-07 PROCEDURE — C1713 ANCHOR/SCREW BN/BN,TIS/BN: HCPCS | Performed by: STUDENT IN AN ORGANIZED HEALTH CARE EDUCATION/TRAINING PROGRAM

## 2024-12-07 PROCEDURE — 85025 COMPLETE CBC W/AUTO DIFF WBC: CPT

## 2024-12-07 PROCEDURE — 160036 HCHG PACU - EA ADDL 30 MINS PHASE I: Performed by: STUDENT IN AN ORGANIZED HEALTH CARE EDUCATION/TRAINING PROGRAM

## 2024-12-07 PROCEDURE — 93005 ELECTROCARDIOGRAM TRACING: CPT | Mod: TC | Performed by: EMERGENCY MEDICINE

## 2024-12-07 PROCEDURE — 73700 CT LOWER EXTREMITY W/O DYE: CPT | Mod: RT

## 2024-12-07 PROCEDURE — 700111 HCHG RX REV CODE 636 W/ 250 OVERRIDE (IP): Performed by: ANESTHESIOLOGY

## 2024-12-07 PROCEDURE — 99497 ADVNCD CARE PLAN 30 MIN: CPT | Performed by: HOSPITALIST

## 2024-12-07 PROCEDURE — 700111 HCHG RX REV CODE 636 W/ 250 OVERRIDE (IP): Mod: JZ | Performed by: EMERGENCY MEDICINE

## 2024-12-07 PROCEDURE — 502240 HCHG MISC OR SUPPLY RC 0272: Performed by: STUDENT IN AN ORGANIZED HEALTH CARE EDUCATION/TRAINING PROGRAM

## 2024-12-07 PROCEDURE — 80048 BASIC METABOLIC PNL TOTAL CA: CPT

## 2024-12-07 PROCEDURE — 99285 EMERGENCY DEPT VISIT HI MDM: CPT

## 2024-12-07 PROCEDURE — 85730 THROMBOPLASTIN TIME PARTIAL: CPT

## 2024-12-07 PROCEDURE — 71045 X-RAY EXAM CHEST 1 VIEW: CPT

## 2024-12-07 PROCEDURE — 700105 HCHG RX REV CODE 258: Performed by: ANESTHESIOLOGY

## 2024-12-07 PROCEDURE — 160009 HCHG ANES TIME/MIN: Performed by: STUDENT IN AN ORGANIZED HEALTH CARE EDUCATION/TRAINING PROGRAM

## 2024-12-07 PROCEDURE — 160035 HCHG PACU - 1ST 60 MINS PHASE I: Performed by: STUDENT IN AN ORGANIZED HEALTH CARE EDUCATION/TRAINING PROGRAM

## 2024-12-07 PROCEDURE — 73562 X-RAY EXAM OF KNEE 3: CPT | Mod: RT

## 2024-12-07 PROCEDURE — 700101 HCHG RX REV CODE 250: Performed by: STUDENT IN AN ORGANIZED HEALTH CARE EDUCATION/TRAINING PROGRAM

## 2024-12-07 PROCEDURE — 94760 N-INVAS EAR/PLS OXIMETRY 1: CPT

## 2024-12-07 PROCEDURE — 700111 HCHG RX REV CODE 636 W/ 250 OVERRIDE (IP): Performed by: HOSPITALIST

## 2024-12-07 PROCEDURE — 700102 HCHG RX REV CODE 250 W/ 637 OVERRIDE(OP): Performed by: ANESTHESIOLOGY

## 2024-12-07 PROCEDURE — 700101 HCHG RX REV CODE 250: Performed by: ANESTHESIOLOGY

## 2024-12-07 PROCEDURE — 160002 HCHG RECOVERY MINUTES (STAT): Performed by: STUDENT IN AN ORGANIZED HEALTH CARE EDUCATION/TRAINING PROGRAM

## 2024-12-07 PROCEDURE — 73552 X-RAY EXAM OF FEMUR 2/>: CPT | Mod: RT

## 2024-12-07 PROCEDURE — 36415 COLL VENOUS BLD VENIPUNCTURE: CPT

## 2024-12-07 PROCEDURE — 700111 HCHG RX REV CODE 636 W/ 250 OVERRIDE (IP): Mod: JZ | Performed by: STUDENT IN AN ORGANIZED HEALTH CARE EDUCATION/TRAINING PROGRAM

## 2024-12-07 PROCEDURE — 160028 HCHG SURGERY MINUTES - 1ST 30 MINS LEVEL 3: Performed by: STUDENT IN AN ORGANIZED HEALTH CARE EDUCATION/TRAINING PROGRAM

## 2024-12-07 PROCEDURE — 160048 HCHG OR STATISTICAL LEVEL 1-5: Performed by: STUDENT IN AN ORGANIZED HEALTH CARE EDUCATION/TRAINING PROGRAM

## 2024-12-07 PROCEDURE — 99223 1ST HOSP IP/OBS HIGH 75: CPT | Mod: 25,AI | Performed by: HOSPITALIST

## 2024-12-07 PROCEDURE — 502000 HCHG MISC OR IMPLANTS RC 0278: Performed by: STUDENT IN AN ORGANIZED HEALTH CARE EDUCATION/TRAINING PROGRAM

## 2024-12-07 PROCEDURE — 0QSB04Z REPOSITION RIGHT LOWER FEMUR WITH INTERNAL FIXATION DEVICE, OPEN APPROACH: ICD-10-PCS | Performed by: STUDENT IN AN ORGANIZED HEALTH CARE EDUCATION/TRAINING PROGRAM

## 2024-12-07 PROCEDURE — 27513 TREATMENT OF THIGH FRACTURE: CPT | Mod: RT | Performed by: STUDENT IN AN ORGANIZED HEALTH CARE EDUCATION/TRAINING PROGRAM

## 2024-12-07 PROCEDURE — 99221 1ST HOSP IP/OBS SF/LOW 40: CPT | Mod: 57 | Performed by: STUDENT IN AN ORGANIZED HEALTH CARE EDUCATION/TRAINING PROGRAM

## 2024-12-07 PROCEDURE — 110371 HCHG SHELL REV 272: Performed by: STUDENT IN AN ORGANIZED HEALTH CARE EDUCATION/TRAINING PROGRAM

## 2024-12-07 DEVICE — 4.5MM CORTEX TI SCREW 4.5MM L38MM: Type: IMPLANTABLE DEVICE | Site: FEMUR | Status: FUNCTIONAL

## 2024-12-07 DEVICE — K-WIRE 3MM X 285MM (1EA): Type: IMPLANTABLE DEVICE | Site: FEMUR | Status: FUNCTIONAL

## 2024-12-07 DEVICE — IMPLANTABLE DEVICE: Type: IMPLANTABLE DEVICE | Site: FEMUR | Status: FUNCTIONAL

## 2024-12-07 DEVICE — SCREW BONE T10 FULL THREAD L34 MM OD3.5 MM LOCK STARDRIVE NONSTERILE VARIAX FOOT PLATE SYSTEM: Type: IMPLANTABLE DEVICE | Site: FEMUR | Status: FUNCTIONAL

## 2024-12-07 DEVICE — K-WIRE WITH DRILL TIP 2.0 X 234MM: Type: IMPLANTABLE DEVICE | Site: FEMUR | Status: FUNCTIONAL

## 2024-12-07 RX ORDER — ROCURONIUM BROMIDE 10 MG/ML
INJECTION, SOLUTION INTRAVENOUS PRN
Status: DISCONTINUED | OUTPATIENT
Start: 2024-12-07 | End: 2024-12-07 | Stop reason: SURG

## 2024-12-07 RX ORDER — DIPHENHYDRAMINE HYDROCHLORIDE 50 MG/ML
12.5 INJECTION INTRAMUSCULAR; INTRAVENOUS
Status: DISCONTINUED | OUTPATIENT
Start: 2024-12-07 | End: 2024-12-07 | Stop reason: HOSPADM

## 2024-12-07 RX ORDER — LIDOCAINE HYDROCHLORIDE 20 MG/ML
INJECTION, SOLUTION EPIDURAL; INFILTRATION; INTRACAUDAL; PERINEURAL PRN
Status: DISCONTINUED | OUTPATIENT
Start: 2024-12-07 | End: 2024-12-07 | Stop reason: SURG

## 2024-12-07 RX ORDER — HYDRALAZINE HYDROCHLORIDE 20 MG/ML
5 INJECTION INTRAMUSCULAR; INTRAVENOUS
Status: DISCONTINUED | OUTPATIENT
Start: 2024-12-07 | End: 2024-12-07 | Stop reason: HOSPADM

## 2024-12-07 RX ORDER — MORPHINE SULFATE 4 MG/ML
4 INJECTION INTRAVENOUS ONCE
Status: COMPLETED | OUTPATIENT
Start: 2024-12-07 | End: 2024-12-07

## 2024-12-07 RX ORDER — LABETALOL HYDROCHLORIDE 5 MG/ML
5 INJECTION, SOLUTION INTRAVENOUS
Status: DISCONTINUED | OUTPATIENT
Start: 2024-12-07 | End: 2024-12-07 | Stop reason: HOSPADM

## 2024-12-07 RX ORDER — AMLODIPINE BESYLATE 5 MG/1
5 TABLET ORAL DAILY
Status: DISCONTINUED | OUTPATIENT
Start: 2024-12-08 | End: 2024-12-11 | Stop reason: HOSPADM

## 2024-12-07 RX ORDER — HYDROMORPHONE HYDROCHLORIDE 1 MG/ML
0.4 INJECTION, SOLUTION INTRAMUSCULAR; INTRAVENOUS; SUBCUTANEOUS
Status: DISCONTINUED | OUTPATIENT
Start: 2024-12-07 | End: 2024-12-07 | Stop reason: HOSPADM

## 2024-12-07 RX ORDER — LISINOPRIL 20 MG/1
20 TABLET ORAL DAILY
Status: DISCONTINUED | OUTPATIENT
Start: 2024-12-08 | End: 2024-12-11 | Stop reason: HOSPADM

## 2024-12-07 RX ORDER — ALBUTEROL SULFATE 5 MG/ML
2.5 SOLUTION RESPIRATORY (INHALATION)
Status: DISCONTINUED | OUTPATIENT
Start: 2024-12-07 | End: 2024-12-07 | Stop reason: HOSPADM

## 2024-12-07 RX ORDER — ASPIRIN 81 MG/1
162-243 TABLET ORAL 2 TIMES DAILY
Status: ON HOLD | COMMUNITY
End: 2024-12-23

## 2024-12-07 RX ORDER — LIDOCAINE HYDROCHLORIDE 40 MG/ML
SOLUTION TOPICAL PRN
Status: DISCONTINUED | OUTPATIENT
Start: 2024-12-07 | End: 2024-12-07 | Stop reason: SURG

## 2024-12-07 RX ORDER — AMOXICILLIN 250 MG
2 CAPSULE ORAL EVERY EVENING
Status: DISCONTINUED | OUTPATIENT
Start: 2024-12-07 | End: 2024-12-11 | Stop reason: HOSPADM

## 2024-12-07 RX ORDER — EPHEDRINE SULFATE 50 MG/ML
INJECTION, SOLUTION INTRAVENOUS PRN
Status: DISCONTINUED | OUTPATIENT
Start: 2024-12-07 | End: 2024-12-07 | Stop reason: HOSPADM

## 2024-12-07 RX ORDER — SODIUM CHLORIDE, SODIUM LACTATE, POTASSIUM CHLORIDE, CALCIUM CHLORIDE 600; 310; 30; 20 MG/100ML; MG/100ML; MG/100ML; MG/100ML
INJECTION, SOLUTION INTRAVENOUS CONTINUOUS
Status: CANCELLED | OUTPATIENT
Start: 2024-12-07 | End: 2024-12-07

## 2024-12-07 RX ORDER — SODIUM CHLORIDE, SODIUM LACTATE, POTASSIUM CHLORIDE, CALCIUM CHLORIDE 600; 310; 30; 20 MG/100ML; MG/100ML; MG/100ML; MG/100ML
INJECTION, SOLUTION INTRAVENOUS
Status: DISCONTINUED | OUTPATIENT
Start: 2024-12-07 | End: 2024-12-07 | Stop reason: SURG

## 2024-12-07 RX ORDER — ACETAMINOPHEN 325 MG/1
650 TABLET ORAL EVERY 6 HOURS PRN
Status: DISCONTINUED | OUTPATIENT
Start: 2024-12-07 | End: 2024-12-11 | Stop reason: HOSPADM

## 2024-12-07 RX ORDER — PHENYLEPHRINE HCL IN 0.9% NACL 1 MG/10 ML
SYRINGE (ML) INTRAVENOUS PRN
Status: DISCONTINUED | OUTPATIENT
Start: 2024-12-07 | End: 2024-12-07 | Stop reason: SURG

## 2024-12-07 RX ORDER — HYDRALAZINE HYDROCHLORIDE 20 MG/ML
10 INJECTION INTRAMUSCULAR; INTRAVENOUS EVERY 4 HOURS PRN
Status: DISCONTINUED | OUTPATIENT
Start: 2024-12-07 | End: 2024-12-11 | Stop reason: HOSPADM

## 2024-12-07 RX ORDER — OXYCODONE HCL 5 MG/5 ML
10 SOLUTION, ORAL ORAL
Status: COMPLETED | OUTPATIENT
Start: 2024-12-07 | End: 2024-12-07

## 2024-12-07 RX ORDER — OXYCODONE HYDROCHLORIDE 10 MG/1
10 TABLET ORAL
Status: DISCONTINUED | OUTPATIENT
Start: 2024-12-07 | End: 2024-12-11 | Stop reason: HOSPADM

## 2024-12-07 RX ORDER — POLYETHYLENE GLYCOL 3350 17 G/17G
1 POWDER, FOR SOLUTION ORAL
Status: DISCONTINUED | OUTPATIENT
Start: 2024-12-07 | End: 2024-12-11 | Stop reason: HOSPADM

## 2024-12-07 RX ORDER — HYDROMORPHONE HYDROCHLORIDE 1 MG/ML
0.2 INJECTION, SOLUTION INTRAMUSCULAR; INTRAVENOUS; SUBCUTANEOUS
Status: DISCONTINUED | OUTPATIENT
Start: 2024-12-07 | End: 2024-12-07 | Stop reason: HOSPADM

## 2024-12-07 RX ORDER — HYDROMORPHONE HYDROCHLORIDE 1 MG/ML
0.1 INJECTION, SOLUTION INTRAMUSCULAR; INTRAVENOUS; SUBCUTANEOUS
Status: DISCONTINUED | OUTPATIENT
Start: 2024-12-07 | End: 2024-12-07 | Stop reason: HOSPADM

## 2024-12-07 RX ORDER — CEFAZOLIN SODIUM 1 G/3ML
INJECTION, POWDER, FOR SOLUTION INTRAMUSCULAR; INTRAVENOUS PRN
Status: DISCONTINUED | OUTPATIENT
Start: 2024-12-07 | End: 2024-12-07 | Stop reason: SURG

## 2024-12-07 RX ORDER — CALCIUM CARBONATE 500 MG/1
500 TABLET, CHEWABLE ORAL 3 TIMES DAILY PRN
Status: DISCONTINUED | OUTPATIENT
Start: 2024-12-07 | End: 2024-12-08

## 2024-12-07 RX ORDER — SODIUM CHLORIDE, SODIUM LACTATE, POTASSIUM CHLORIDE, CALCIUM CHLORIDE 600; 310; 30; 20 MG/100ML; MG/100ML; MG/100ML; MG/100ML
INJECTION, SOLUTION INTRAVENOUS CONTINUOUS
Status: DISCONTINUED | OUTPATIENT
Start: 2024-12-07 | End: 2024-12-07 | Stop reason: HOSPADM

## 2024-12-07 RX ORDER — SODIUM CHLORIDE, SODIUM LACTATE, POTASSIUM CHLORIDE, CALCIUM CHLORIDE 600; 310; 30; 20 MG/100ML; MG/100ML; MG/100ML; MG/100ML
INJECTION, SOLUTION INTRAVENOUS CONTINUOUS
Status: DISCONTINUED | OUTPATIENT
Start: 2024-12-07 | End: 2024-12-07

## 2024-12-07 RX ORDER — EPHEDRINE SULFATE 50 MG/ML
5 INJECTION, SOLUTION INTRAVENOUS
Status: DISCONTINUED | OUTPATIENT
Start: 2024-12-07 | End: 2024-12-07 | Stop reason: HOSPADM

## 2024-12-07 RX ORDER — VANCOMYCIN HYDROCHLORIDE 1 G/20ML
INJECTION, POWDER, LYOPHILIZED, FOR SOLUTION INTRAVENOUS
Status: COMPLETED | OUTPATIENT
Start: 2024-12-07 | End: 2024-12-07

## 2024-12-07 RX ORDER — DEXAMETHASONE SODIUM PHOSPHATE 4 MG/ML
INJECTION, SOLUTION INTRA-ARTICULAR; INTRALESIONAL; INTRAMUSCULAR; INTRAVENOUS; SOFT TISSUE PRN
Status: DISCONTINUED | OUTPATIENT
Start: 2024-12-07 | End: 2024-12-07 | Stop reason: SURG

## 2024-12-07 RX ORDER — ONDANSETRON 4 MG/1
4 TABLET, ORALLY DISINTEGRATING ORAL EVERY 4 HOURS PRN
Status: DISCONTINUED | OUTPATIENT
Start: 2024-12-07 | End: 2024-12-11 | Stop reason: HOSPADM

## 2024-12-07 RX ORDER — ONDANSETRON 2 MG/ML
4 INJECTION INTRAMUSCULAR; INTRAVENOUS
Status: DISCONTINUED | OUTPATIENT
Start: 2024-12-07 | End: 2024-12-07 | Stop reason: HOSPADM

## 2024-12-07 RX ORDER — ECHINACEA PURPUREA EXTRACT 125 MG
1 TABLET ORAL
Status: ON HOLD | COMMUNITY
End: 2024-12-23

## 2024-12-07 RX ORDER — HALOPERIDOL 5 MG/ML
1 INJECTION INTRAMUSCULAR
Status: DISCONTINUED | OUTPATIENT
Start: 2024-12-07 | End: 2024-12-07 | Stop reason: HOSPADM

## 2024-12-07 RX ORDER — TOBRAMYCIN 1.2 G/30ML
INJECTION, POWDER, LYOPHILIZED, FOR SOLUTION INTRAVENOUS
Status: DISCONTINUED | OUTPATIENT
Start: 2024-12-07 | End: 2024-12-07 | Stop reason: HOSPADM

## 2024-12-07 RX ORDER — OXYCODONE HCL 5 MG/5 ML
5 SOLUTION, ORAL ORAL
Status: COMPLETED | OUTPATIENT
Start: 2024-12-07 | End: 2024-12-07

## 2024-12-07 RX ORDER — HYDROMORPHONE HYDROCHLORIDE 2 MG/ML
INJECTION, SOLUTION INTRAMUSCULAR; INTRAVENOUS; SUBCUTANEOUS PRN
Status: DISCONTINUED | OUTPATIENT
Start: 2024-12-07 | End: 2024-12-07 | Stop reason: SURG

## 2024-12-07 RX ORDER — TRANEXAMIC ACID 100 MG/ML
INJECTION, SOLUTION INTRAVENOUS PRN
Status: DISCONTINUED | OUTPATIENT
Start: 2024-12-07 | End: 2024-12-07 | Stop reason: SURG

## 2024-12-07 RX ORDER — HYDROMORPHONE HYDROCHLORIDE 1 MG/ML
0.5 INJECTION, SOLUTION INTRAMUSCULAR; INTRAVENOUS; SUBCUTANEOUS
Status: DISCONTINUED | OUTPATIENT
Start: 2024-12-07 | End: 2024-12-11 | Stop reason: HOSPADM

## 2024-12-07 RX ORDER — OXYCODONE HYDROCHLORIDE 5 MG/1
5 TABLET ORAL
Status: DISCONTINUED | OUTPATIENT
Start: 2024-12-07 | End: 2024-12-11 | Stop reason: HOSPADM

## 2024-12-07 RX ORDER — ONDANSETRON 2 MG/ML
INJECTION INTRAMUSCULAR; INTRAVENOUS PRN
Status: DISCONTINUED | OUTPATIENT
Start: 2024-12-07 | End: 2024-12-07 | Stop reason: SURG

## 2024-12-07 RX ORDER — ONDANSETRON 2 MG/ML
4 INJECTION INTRAMUSCULAR; INTRAVENOUS EVERY 4 HOURS PRN
Status: DISCONTINUED | OUTPATIENT
Start: 2024-12-07 | End: 2024-12-11 | Stop reason: HOSPADM

## 2024-12-07 RX ADMIN — LIDOCAINE HYDROCHLORIDE 60 MG: 20 INJECTION, SOLUTION EPIDURAL; INFILTRATION; INTRACAUDAL; PERINEURAL at 18:44

## 2024-12-07 RX ADMIN — FENTANYL CITRATE 50 MCG: 50 INJECTION, SOLUTION INTRAMUSCULAR; INTRAVENOUS at 19:17

## 2024-12-07 RX ADMIN — ONDANSETRON 4 MG: 2 INJECTION INTRAMUSCULAR; INTRAVENOUS at 21:12

## 2024-12-07 RX ADMIN — PROPOFOL 150 MG: 10 INJECTION, EMULSION INTRAVENOUS at 18:59

## 2024-12-07 RX ADMIN — PROPOFOL 125 MCG/KG/MIN: 10 INJECTION, EMULSION INTRAVENOUS at 19:05

## 2024-12-07 RX ADMIN — EPHEDRINE SULFATE 10 MG: 50 INJECTION, SOLUTION INTRAVENOUS at 19:20

## 2024-12-07 RX ADMIN — HYDROMORPHONE HYDROCHLORIDE 0.2 MG: 1 INJECTION, SOLUTION INTRAMUSCULAR; INTRAVENOUS; SUBCUTANEOUS at 22:08

## 2024-12-07 RX ADMIN — DEXAMETHASONE SODIUM PHOSPHATE 8 MG: 4 INJECTION INTRA-ARTICULAR; INTRALESIONAL; INTRAMUSCULAR; INTRAVENOUS; SOFT TISSUE at 19:14

## 2024-12-07 RX ADMIN — FENTANYL CITRATE 50 MCG: 50 INJECTION, SOLUTION INTRAMUSCULAR; INTRAVENOUS at 18:38

## 2024-12-07 RX ADMIN — SODIUM CHLORIDE, POTASSIUM CHLORIDE, SODIUM LACTATE AND CALCIUM CHLORIDE: 600; 310; 30; 20 INJECTION, SOLUTION INTRAVENOUS at 18:35

## 2024-12-07 RX ADMIN — MORPHINE SULFATE 4 MG: 4 INJECTION, SOLUTION INTRAMUSCULAR; INTRAVENOUS at 12:01

## 2024-12-07 RX ADMIN — TRANEXAMIC ACID 1000 MG: 100 INJECTION, SOLUTION INTRAVENOUS at 20:20

## 2024-12-07 RX ADMIN — HYDROMORPHONE HYDROCHLORIDE 0.4 MG: 2 INJECTION INTRAMUSCULAR; INTRAVENOUS; SUBCUTANEOUS at 19:55

## 2024-12-07 RX ADMIN — PROPOFOL 150 MG: 10 INJECTION, EMULSION INTRAVENOUS at 18:44

## 2024-12-07 RX ADMIN — ROCURONIUM BROMIDE 50 MG: 50 INJECTION, SOLUTION INTRAVENOUS at 18:59

## 2024-12-07 RX ADMIN — OXYCODONE HYDROCHLORIDE 5 MG: 5 SOLUTION ORAL at 22:08

## 2024-12-07 RX ADMIN — LIDOCAINE HYDROCHLORIDE 4 ML: 40 SOLUTION TOPICAL at 19:00

## 2024-12-07 RX ADMIN — ONDANSETRON 4 MG: 2 INJECTION INTRAMUSCULAR; INTRAVENOUS at 18:40

## 2024-12-07 RX ADMIN — SUGAMMADEX 200 MG: 100 INJECTION, SOLUTION INTRAVENOUS at 21:12

## 2024-12-07 RX ADMIN — HYDROMORPHONE HYDROCHLORIDE 0.5 MG: 1 INJECTION, SOLUTION INTRAMUSCULAR; INTRAVENOUS; SUBCUTANEOUS at 16:04

## 2024-12-07 RX ADMIN — HYDROMORPHONE HYDROCHLORIDE 0.4 MG: 2 INJECTION INTRAMUSCULAR; INTRAVENOUS; SUBCUTANEOUS at 20:36

## 2024-12-07 RX ADMIN — SODIUM CHLORIDE, POTASSIUM CHLORIDE, SODIUM LACTATE AND CALCIUM CHLORIDE: 600; 310; 30; 20 INJECTION, SOLUTION INTRAVENOUS at 17:38

## 2024-12-07 RX ADMIN — CEFAZOLIN 2 G: 1 INJECTION, POWDER, FOR SOLUTION INTRAMUSCULAR; INTRAVENOUS at 19:07

## 2024-12-07 RX ADMIN — Medication 100 MCG: at 19:08

## 2024-12-07 SDOH — SOCIAL STABILITY - SOCIAL INSECURITY: DEPENDENT RELATIVE NEEDING CARE AT HOME: Z63.6

## 2024-12-07 ASSESSMENT — ENCOUNTER SYMPTOMS
NERVOUS/ANXIOUS: 0
BRUISES/BLEEDS EASILY: 0
STRIDOR: 0
MYALGIAS: 0
CHILLS: 0
FEVER: 0
COUGH: 0
EYE DISCHARGE: 0
FLANK PAIN: 0
VOMITING: 0
EYE REDNESS: 0
ABDOMINAL PAIN: 0
FOCAL WEAKNESS: 0
FALLS: 1
SHORTNESS OF BREATH: 0

## 2024-12-07 ASSESSMENT — COGNITIVE AND FUNCTIONAL STATUS - GENERAL
SUGGESTED CMS G CODE MODIFIER DAILY ACTIVITY: CK
HELP NEEDED FOR BATHING: A LOT
DRESSING REGULAR LOWER BODY CLOTHING: A LOT
EATING MEALS: A LITTLE
DAILY ACTIVITIY SCORE: 14
TURNING FROM BACK TO SIDE WHILE IN FLAT BAD: A LOT
MOVING FROM LYING ON BACK TO SITTING ON SIDE OF FLAT BED: A LOT
MOBILITY SCORE: 12
SUGGESTED CMS G CODE MODIFIER MOBILITY: CL
CLIMB 3 TO 5 STEPS WITH RAILING: A LOT
STANDING UP FROM CHAIR USING ARMS: A LOT
TOILETING: A LOT
WALKING IN HOSPITAL ROOM: A LOT
PERSONAL GROOMING: A LITTLE
DRESSING REGULAR UPPER BODY CLOTHING: A LOT
MOVING TO AND FROM BED TO CHAIR: A LOT

## 2024-12-07 ASSESSMENT — PATIENT HEALTH QUESTIONNAIRE - PHQ9
2. FEELING DOWN, DEPRESSED, IRRITABLE, OR HOPELESS: NOT AT ALL
SUM OF ALL RESPONSES TO PHQ9 QUESTIONS 1 AND 2: 0
1. LITTLE INTEREST OR PLEASURE IN DOING THINGS: NOT AT ALL

## 2024-12-07 ASSESSMENT — FIBROSIS 4 INDEX
FIB4 SCORE: 1.93
FIB4 SCORE: 1.64

## 2024-12-07 ASSESSMENT — SOCIAL DETERMINANTS OF HEALTH (SDOH)
WITHIN THE LAST YEAR, HAVE YOU BEEN HUMILIATED OR EMOTIONALLY ABUSED IN OTHER WAYS BY YOUR PARTNER OR EX-PARTNER?: NO
WITHIN THE LAST YEAR, HAVE YOU BEEN KICKED, HIT, SLAPPED, OR OTHERWISE PHYSICALLY HURT BY YOUR PARTNER OR EX-PARTNER?: NO
WITHIN THE LAST YEAR, HAVE YOU BEEN AFRAID OF YOUR PARTNER OR EX-PARTNER?: NO
WITHIN THE LAST YEAR, HAVE TO BEEN RAPED OR FORCED TO HAVE ANY KIND OF SEXUAL ACTIVITY BY YOUR PARTNER OR EX-PARTNER?: NO

## 2024-12-07 ASSESSMENT — PAIN DESCRIPTION - PAIN TYPE
TYPE: ACUTE PAIN
TYPE: ACUTE PAIN;SURGICAL PAIN
TYPE: ACUTE PAIN
TYPE: ACUTE PAIN

## 2024-12-07 ASSESSMENT — PAIN SCALES - GENERAL: PAIN_LEVEL: 2

## 2024-12-07 NOTE — ASSESSMENT & PLAN NOTE
I had a discussion with the patient and family [ present at bedside in the emergency room] regarding goals of care, diagnoses, prognosis, and CODE STATUS. We discussed her prognosis and comorbidities.  The patient has advanced age of 79 years.  She has a number of chronic medical problems including atrial fibrillation, primary hypertension, hypertensive cardiomyopathy and is presenting with a femoral fracture.  At this point the patient wants to maintain a full code.  She is open to all forms of invasive or noninvasive diagnostic and therapeutic interventions.

## 2024-12-07 NOTE — H&P
Hospital Medicine History & Physical Note    Date of Service  12/7/2024    Primary Care Physician  Tate De La Rosa M.D.    Consultants  Orthopedics Dr Garzon       Code Status  Full Code    Chief Complaint  Chief Complaint   Patient presents with    Knee Pain     Patient BIB EMS for GLF, stated she landed on right knee. Patient AA&Ox4, taking aspirin 81 mg, denies head strike. Hx of fatty tumor above the right knee.     GLF     History of Presenting Illness  Franchesca Paige is a 79 y.o. female with a past medical history of primary hypertension and atrial fibrillation who presented 12/7/2024 with knee pain after a ground-level fall.  Patient had a mechanical fall hitting her right knee, since then has been having severe pain.  The pain is rated 10/10.  The pain is worse with attempting to move her joint.  She denies hitting her head or losing consciousness.      I discussed the plan of care with emergency physician, the patient and patient family present at bedside in the emergency room    Review of Systems  Review of Systems   Constitutional:  Negative for chills and fever.   Eyes:  Negative for discharge and redness.   Respiratory:  Negative for cough, shortness of breath and stridor.    Cardiovascular:  Negative for chest pain and leg swelling.   Gastrointestinal:  Negative for abdominal pain and vomiting.   Genitourinary:  Negative for flank pain.   Musculoskeletal:  Positive for falls and joint pain. Negative for myalgias.   Skin: Negative.    Neurological:  Negative for focal weakness.   Endo/Heme/Allergies:  Does not bruise/bleed easily.   Psychiatric/Behavioral:  The patient is not nervous/anxious.      Past Medical History   has a past medical history of Anesthesia, Aortic atherosclerosis (HCC) (03/22/2024), Arthritis, Bladder cancer (HCC), Cancer (HCC) (2003), Caregiver stress (02/11/2022), Chronic pain of right ankle (02/08/2018), COVID-19, Delayed emergence from general anesthesia, Dizziness  (02/22/2018), Dyslipidemia, goal LDL below 130, Hydronephrosis (02/11/2019), Hypertension (1992), Ocular migraine, Osteoarthritis of left hip (11/30/2022), Osteopenia, Osteopenia of multiple sites, Osteoporosis of femur without pathological fracture (02/01/2023), PONV (postoperative nausea and vomiting), and Total knee replacement status.    Surgical History   has a past surgical history that includes hysterectomy, total abdominal (1980s); colonoscopy (08/03/2010); knee replacement, total (12/04/2012); other (Right, 10/07/2018); orif, wrist (Left, 2008); hardware removal ortho (Left, 2008); and pr total hip arthroplasty (Left, 3/14/2023).     Family History  family history includes Cancer in her paternal aunt and sister; Diabetes in an other family member; Hypertension in her father and mother; Psychiatric Illness in her mother and sister.      Social History   reports that she quit smoking about 24 years ago. Her smoking use included cigarettes. She started smoking about 60 years ago. She has a 36.2 pack-year smoking history. She has never used smokeless tobacco. She reports that she does not drink alcohol and does not use drugs.    Allergies  Allergies   Allergen Reactions    Ibuprofen Unspecified     Increased blood pressure & risk of stroke    Sulfa Drugs Vomiting and Nausea     Medications  Prior to Admission Medications   Prescriptions Last Dose Informant Patient Reported? Taking?   Ascorbic Acid (VITAMIN C) 1000 MG Tab   Yes No   Sig: Take 500 mg by mouth every day.   Cholecalciferol (VITAMIN D) 50 MCG (2000 UT) Cap  Patient Yes No   Sig: Take 1 Capsule by mouth every day.   amLODIPine (NORVASC) 5 MG Tab   No No   Sig: Take 1 Tablet by mouth every day.   aspirin (ASA) 325 MG Tab   No No   Sig: Take 1 Tablet by mouth 2 times a day with meals.   Patient taking differently: Take 81 mg by mouth 2 times a day with meals. 3 tablets in the AM and 2 tablets in the PM   diclofenac sodium (VOLTAREN) 1 % Gel  Patient  Yes No   Sig: Apply 4 g topically 2 times a day. To knee   fosinopril (MONOPRIL) 20 MG Tab   No No   Sig: Take 1 Tablet by mouth every day.      Facility-Administered Medications: None     Physical Exam  Temp:  [36.3 °C (97.3 °F)-36.6 °C (97.9 °F)] 36.6 °C (97.8 °F)  Pulse:  [55-65] 60  Resp:  [18] 18  BP: (126-189)/(69-90) 139/69  SpO2:  [94 %-100 %] 97 %  Blood Pressure : (!) 189/90   Temperature: 36.6 °C (97.9 °F)   Pulse: 65   Respiration: 18   Pulse Oximetry: 99 %     Physical Exam  Constitutional:       General: She is not in acute distress.  HENT:      Head: Normocephalic and atraumatic.      Right Ear: External ear normal.      Left Ear: External ear normal.      Nose: No congestion or rhinorrhea.      Mouth/Throat:      Mouth: Mucous membranes are dry.      Pharynx: No oropharyngeal exudate or posterior oropharyngeal erythema.   Eyes:      General: No scleral icterus.        Right eye: No discharge.         Left eye: No discharge.      Conjunctiva/sclera: Conjunctivae normal.      Pupils: Pupils are equal, round, and reactive to light.   Cardiovascular:      Rate and Rhythm: Normal rate and regular rhythm.      Heart sounds:      No friction rub. No gallop.   Pulmonary:      Effort: Pulmonary effort is normal.   Abdominal:      General: Abdomen is flat. There is no distension.      Tenderness: There is no guarding.   Musculoskeletal:         General: Swelling and deformity present.      Cervical back: Neck supple. No rigidity. No muscular tenderness.      Comments: Edema, erythema and deformity of the right knee.  Reduced range of motion of the right knee secondary to pain   Skin:     Capillary Refill: Capillary refill takes 2 to 3 seconds.      Coloration: Skin is not jaundiced or pale.      Findings: No bruising or erythema.   Neurological:      Mental Status: She is alert and oriented to person, place, and time.   Psychiatric:         Mood and Affect: Mood normal.         Judgment: Judgment normal.  "      Laboratory:  Recent Labs     12/07/24  1545   WBC 16.1*   RBC 3.94*   HEMOGLOBIN 11.8*   HEMATOCRIT 35.7*   MCV 90.6   MCH 29.9   MCHC 33.1   RDW 44.3   PLATELETCT 243   MPV 9.7     Recent Labs     12/07/24  1545   SODIUM 138   POTASSIUM 4.7   CHLORIDE 106   CO2 24   GLUCOSE 142*   BUN 22   CREATININE 0.63   CALCIUM 10.3*     Recent Labs     12/07/24  1545   GLUCOSE 142*     Recent Labs     12/07/24  1545   APTT 26.4   INR 1.05     No results for input(s): \"NTPROBNP\" in the last 72 hours.      No results for input(s): \"TROPONINT\" in the last 72 hours.    Imaging:  CT-KNEE W/O PLUS RECONS RIGHT   Final Result      1.  Comminuted, displaced intra-articular distal femur fracture.   2.  No dislocation.   3.  Small hemarthrosis and surrounding soft tissue swelling/hemorrhage.      DX-KNEE 3 VIEWS RIGHT   Final Result      Significantly comminuted, intra-articular and displaced distal femur fracture      DX-CHEST-PORTABLE (1 VIEW)   Final Result      No acute cardiopulmonary abnormality.      DX-FEMUR-2+ RIGHT    (Results Pending)   DX-PORTABLE FLUORO > 1 HOUR    (Results Pending)     I personally reviewed patient EKG which shows sinus bradycardia with a rate of 55.  There is high voltage and ST depressions in lead I, aVL and leads V4-V6 these findings are consistent with left ventricular hypertrophy.  QTc is 403    Assessment/Plan:  Justification for Admission Status  I anticipate this patient will require at least two midnights for appropriate medical management, necessitating inpatient admission because the patient has a femoral fracture will require orthopedic consultation and expedited surgical repair    Patient will need a Med/Surg bed on medical service     * Fracture- (present on admission)  Assessment & Plan  Imaging show evidence for a comminuted, displaced intra-articular distal femur fracture   Orthopedics Dr Garzon consulted, plan for operative management  Multimodal pain control  Consider " physical and Occupational Therapy, pharmacologic prophylaxis when okay with orthopedics      Cardiomyopathy due to hypertension, without heart failure (HCC)- (present on admission)  Assessment & Plan  EKG which shows sinus bradycardia with a rate of 55.  There is high voltage and ST depressions in lead I, aVL and leads V4-V6 these findings are consistent with left ventricular hypertrophy.  QTc is 403  I will start hydralazine intravenously as needed for extreme hypertension.  I will start home lisinopril and amlodipine with hold parameters.  I will place on continuous cardiac monitoring    Preoperative examination- (present on admission)  Assessment & Plan  Medical Assessment Risk:  High    Patient age above 65.  She has a number of medical problems including atrial fibrillation, primary hypertension, hypertensive cardiomyopathy and she will require expedited surgical intervention    Surgical Risk:   Intermediate    Cardiovascular:   The patient has no known history of ischemic heart disease or heart failure.  She has hypertensive cardiomyopathy and atrial fibrillation  I will order a Pre-op EKG    Pulmonary:  At higher risk for atelectasis/hypoxia.  I will order continuous pulse oximetry  Emphasized incentive spirometry       Renal:   I will start intravenous fluids  I will order follow-up BUN and creatinine     Neurologic:   Avoid fentanyl (short-acting)  Acetaminophen PO TID PRN  Try to minimize using opioid Pain medications.    If patient develops delirium or agitation consider quetiapine 12.5 mg PO x1 PRN agitation (can repeat x1 in 2 hours PRN agitation).      Hematologic:  Plan on pharmacologic DVT prophylaxis post operative day #1. Hold for decreasing hemoglobin. Notify provider for hemoglobin less than 8.    Hypertensive urgency- (present on admission)  Assessment & Plan  Likely secondary to severe pain.  I will start hydralazine as needed for extreme hypertension  Multimodal pain control    Leukocytosis-  (present on admission)  Assessment & Plan  Likely reactive secondary to fracture and severe pain  No focal sign of infection at this point   Continue to montior off antibiotics for now      Paroxysmal atrial fibrillation (HCC)- (present on admission)  Assessment & Plan  Not on blocking agents.  I will monitor on telemetry.     Obesity, Class I, BMI 30-34.9- (present on admission)  Assessment & Plan  At higher risk for atelectasis/hypoxia.  I will order continuous pulse oximetry  Emphasized incentive spirometry       Essential hypertension- (present on admission)  Assessment & Plan  I will start amlodipine and lisinopril with hold parameters    Anemia- (present on admission)  Assessment & Plan  No evidence of gross bleeding, other than likely hematoma secondary to fracture.  Monitor hemoglobin. I will order a follow-up CBC.  Transfuse for a hemoglobin of less than or equal to 7.      ACP (advance care planning)- (present on admission)  Assessment & Plan  I had a discussion with the patient and family [ present at bedside in the emergency room] regarding goals of care, diagnoses, prognosis, and CODE STATUS. We discussed her prognosis and comorbidities.  The patient has advanced age of 79 years.  She has a number of chronic medical problems including atrial fibrillation, primary hypertension, hypertensive cardiomyopathy and is presenting with a femoral fracture.  At this point the patient wants to maintain a full code.  She is open to all forms of invasive or noninvasive diagnostic and therapeutic interventions.      Gastroesophageal reflux disease without esophagitis- (present on admission)  Assessment & Plan  I will start Tums as needed       VTE prophylaxis: SCDs/TEDs    I had a discussion with the patient and family [ present at bedside in the emergency room] regarding goals of care, diagnoses, prognosis, and CODE STATUS. We discussed her prognosis and comorbidities.  The patient has advanced age of  79 years.  She has a number of chronic medical problems including atrial fibrillation, primary hypertension, hypertensive cardiomyopathy and is presenting with a femoral fracture.  At this point the patient wants to maintain a full code.  She is open to all forms of invasive or noninvasive diagnostic and therapeutic interventions.  I spent 17 minutes on advanced care planning.

## 2024-12-07 NOTE — ED PROVIDER NOTES
ED Provider Note    CHIEF COMPLAINT  Chief Complaint   Patient presents with    Knee Pain     Patient BIB EMS for GLF, stated she landed on right knee. Patient AA&Ox4, taking aspirin 81 mg, denies head strike. Hx of fatty tumor above the right knee.     GLF       EXTERNAL RECORDS REVIEWED  Outpatient Notes recent cardiology note reviewed.  Patient has history of paroxysmal atrial fibrillation not on DOAC but does take daily    HPI/ROS  LIMITATION TO HISTORY     OUTSIDE HISTORIAN(S):  EMS at time of arrival    Franchesca Paige is a 79 y.o. female who presents to the emergency department with chief complaint of right knee pain.  Patient tripped over her dog's water bowl fell onto her right knee and has had severe pain and deformity in that area since that time.  She did not hit her head lose conscious no chest or abdominal pain no pain in other extremities.  History of paroxysmal A-fib but does not take DOAC she takes only twice daily aspirin.    PAST MEDICAL HISTORY   has a past medical history of Anesthesia, Aortic atherosclerosis (HCC) (03/22/2024), Arthritis, Bladder cancer (HCC), Cancer (HCC) (2003), Caregiver stress (02/11/2022), Chronic pain of right ankle (02/08/2018), COVID-19, Delayed emergence from general anesthesia, Dizziness (02/22/2018), Dyslipidemia, goal LDL below 130, Hydronephrosis (02/11/2019), Hypertension (1992), Ocular migraine, Osteoarthritis of left hip (11/30/2022), Osteopenia, Osteopenia of multiple sites, Osteoporosis of femur without pathological fracture (02/01/2023), PONV (postoperative nausea and vomiting), and Total knee replacement status.    SURGICAL HISTORY   has a past surgical history that includes hysterectomy, total abdominal (1980s); colonoscopy (08/03/2010); knee replacement, total (12/04/2012); other (Right, 10/07/2018); orif, wrist (Left, 2008); hardware removal ortho (Left, 2008); and total hip arthroplasty (Left, 3/14/2023).    FAMILY HISTORY  Family History   Problem  "Relation Age of Onset    Hypertension Mother     Psychiatric Illness Mother         paranoid schizophrenic    Hypertension Father         heavy smoker    Diabetes Other         niece    Cancer Sister         skin    Cancer Paternal Aunt         breast    Psychiatric Illness Sister         paranoid schizophrenic       SOCIAL HISTORY  Social History     Tobacco Use    Smoking status: Former     Current packs/day: 0.00     Average packs/day: 1 pack/day for 36.2 years (36.2 ttl pk-yrs)     Types: Cigarettes     Start date: 1964     Quit date: 2000     Years since quittin.6    Smokeless tobacco: Never   Vaping Use    Vaping status: Never Used   Substance and Sexual Activity    Alcohol use: No     Alcohol/week: 0.0 oz    Drug use: No    Sexual activity: Yes       CURRENT MEDICATIONS  Home Medications    **Home medications have not yet been reviewed for this encounter**         ALLERGIES  Allergies   Allergen Reactions    Sulfa Drugs Vomiting    Ibuprofen      Risk of stroke       PHYSICAL EXAM  VITAL SIGNS: /69   Pulse 60   Temp 36.6 °C (97.8 °F) (Temporal)   Resp 18   Ht 1.702 m (5' 7.01\")   Wt 93.4 kg (206 lb)   SpO2 97%   BMI 32.26 kg/m²      Pulse ox interpretation: I interpret this pulse ox as normal.  Constitutional: Alert and oriented x 3, Distress  HEENT: Atraumatic normocephalic, pupils are equal round reactive to light extraocular movements are intact. The nares is clear, external ears are normal, mouth shows moist mucous membranes normal dentition for age  Neck: Supple, no JVD no tracheal deviation  Cardiovascular: Regular rate and rhythm no murmur rub or gallop 2+ pulses peripherally x4  Thorax & Lungs: No respiratory distress, no wheezes rales or rhonchi, No chest tenderness.   GI: Soft nontender nondistended positive bowel sounds, no peritoneal signs  Skin: Warm dry no acute rash or lesion  Musculoskeletal: Left lower bilateral upper extremities unremarkable the right lower is held " slightly flexed the hip is nontender to palpation patient has extreme tenderness palpation about the knee with lateral deformity of the patella she has rotation of the foot laterally.  No pain in the ankle or foot.  Neurologic: Cranial nerves III through XII are grossly intact no sensory deficit no cerebellar dysfunction   Psychiatric: Appropriate affect for situation at this time      EKG/LABS  Results for orders placed or performed during the hospital encounter of 24   EKG (NOW)    Collection Time: 24 12:15 PM   Result Value Ref Range    Report       [facility] Emergency Dept.    Test Date:  2024  Pt Name:    ELIANE VIERA                 Department: [facility]  MRN:        [id number]                      Room:       [facility]  Gender:     Female                       Technician: [id number]  :        [id number]                   Requested By:MEENU CARDENAS  Order #:    [id number]                    Reading MD: MEENU CARDENAS MD    Measurements  Intervals                                Axis  Rate:       55                           P:          -45  NJ:         140                          QRS:        -29  QRSD:       105                          T:          36  QT:         421  QTc:        403    Interpretive Statements  Sinus or ectopic atrial rhythm  Left ventricular hypertrophy  Inferior infarct, old  Compared to ECG 10/07/2023 03:56:48  Ectopic atrial rhythm now present  Myocardial infarct finding now present  Sinus bradycardia no longer present  Electronically Signed On 2024 17:59:36 PST by  MEENU CARDENAS MD     CBC WITH DIFFERENTIAL    Collection Time: 24  3:45 PM   Result Value Ref Range    WBC 16.1 (H) 4.8 - 10.8 K/uL    RBC 3.94 (L) 4.20 - 5.40 M/uL    Hemoglobin 11.8 (L) 12.0 - 16.0 g/dL    Hematocrit 35.7 (L) 37.0 - 47.0 %    MCV 90.6 81.4 - 97.8 fL    MCH 29.9 27.0 - 33.0 pg    MCHC 33.1 32.2 - 35.5 g/dL    RDW 44.3 35.9 - 50.0 fL    Platelet  Count 243 164 - 446 K/uL    MPV 9.7 9.0 - 12.9 fL    Neutrophils-Polys 86.70 (H) 44.00 - 72.00 %    Lymphocytes 7.20 (L) 22.00 - 41.00 %    Monocytes 5.50 0.00 - 13.40 %    Eosinophils 0.00 0.00 - 6.90 %    Basophils 0.10 0.00 - 1.80 %    Immature Granulocytes 0.50 0.00 - 0.90 %    Nucleated RBC 0.00 0.00 - 0.20 /100 WBC    Neutrophils (Absolute) 13.93 (H) 1.82 - 7.42 K/uL    Lymphs (Absolute) 1.15 1.00 - 4.80 K/uL    Monos (Absolute) 0.88 (H) 0.00 - 0.85 K/uL    Eos (Absolute) 0.00 0.00 - 0.51 K/uL    Baso (Absolute) 0.01 0.00 - 0.12 K/uL    Immature Granulocytes (abs) 0.08 0.00 - 0.11 K/uL    NRBC (Absolute) 0.00 K/uL   BASIC METABOLIC PANEL    Collection Time: 12/07/24  3:45 PM   Result Value Ref Range    Sodium 138 135 - 145 mmol/L    Potassium 4.7 3.6 - 5.5 mmol/L    Chloride 106 96 - 112 mmol/L    Co2 24 20 - 33 mmol/L    Glucose 142 (H) 65 - 99 mg/dL    Bun 22 8 - 22 mg/dL    Creatinine 0.63 0.50 - 1.40 mg/dL    Calcium 10.3 (H) 8.4 - 10.2 mg/dL    Anion Gap 8.0 7.0 - 16.0   APTT    Collection Time: 12/07/24  3:45 PM   Result Value Ref Range    APTT 26.4 24.7 - 36.0 sec   PROTHROMBIN TIME    Collection Time: 12/07/24  3:45 PM   Result Value Ref Range    PT 14.1 12.0 - 14.6 sec    INR 1.05 0.87 - 1.13   ESTIMATED GFR    Collection Time: 12/07/24  3:45 PM   Result Value Ref Range    GFR (CKD-EPI) 91 >60 mL/min/1.73 m 2      I have independently interpreted this EKG    RADIOLOGY/PROCEDURES   I have independently interpreted the diagnostic imaging associated with this visit and am waiting the final reading from the radiologist.   My preliminary interpretation is as follows: Fairly comminuted distal femur fracture with intra-articular involvement    Radiologist interpretation:  CT-KNEE W/O PLUS RECONS RIGHT   Final Result      1.  Comminuted, displaced intra-articular distal femur fracture.   2.  No dislocation.   3.  Small hemarthrosis and surrounding soft tissue swelling/hemorrhage.      DX-KNEE 3 VIEWS RIGHT    Final Result      Significantly comminuted, intra-articular and displaced distal femur fracture      DX-CHEST-PORTABLE (1 VIEW)   Final Result      No acute cardiopulmonary abnormality.      DX-FEMUR-2+ RIGHT    (Results Pending)   DX-PORTABLE FLUORO > 1 HOUR    (Results Pending)       COURSE & MEDICAL DECISION MAKING    ASSESSMENT, COURSE AND PLAN  Care Narrative: Level fall with severely comminuted intra-articular right distal femur fracture.  Discussed with Dr. Garzon from orthopedics.  Patient to the OR this evening.  I have discussed this with Dr. Mc and the patient is admitted in guarded condition for ongoing management.         FINAL DIAGNOSIS  1. Closed bicondylar fracture of right femur, initial encounter ([facility]) Active   2.  Ground-level fall     Electronically signed by: Shaan Chappell M.D.

## 2024-12-07 NOTE — ED TRIAGE NOTES
Chief Complaint   Patient presents with    Knee Pain     Patient BIB EMS for GLF, stated she landed on right knee. Patient AA&Ox4, taking aspirin 81 mg, denies head strike. Hx of fatty tumor above the right knee.     GLF      Patient given fentanyl 100mg,  ketamine 60mg, and zofran 4mg by EMS prior to arrival to facility.

## 2024-12-07 NOTE — PROGRESS NOTES
Treatment plan    r intrarticular distal femur fracture    Plan for ORIF/IMN this evening  Please keep NPO    Formal consult note to follow

## 2024-12-07 NOTE — PROGRESS NOTES
Received report from Loren NOLASCO RN. Patient /78, resp 17, currently using purewick d/t pain from fracture of right femur. Strict NPO and bedbound.

## 2024-12-07 NOTE — ASSESSMENT & PLAN NOTE
"12/8: Patient underwent: \"Right distal femur with articular extension open reduction with internal fixation\" yesterday by orthopedic surgery.  We appreciate the recommendations.    12/9: Pending placement.  "

## 2024-12-07 NOTE — ASSESSMENT & PLAN NOTE
Medical Assessment Risk:  High    Patient age above 67.  She has a number of medical problems including atrial fibrillation, primary hypertension, hypertensive cardiomyopathy and she will require expedited surgical intervention    Surgical Risk:   Intermediate    Cardiovascular:   The patient has no known history of ischemic heart disease or heart failure.  She has hypertensive cardiomyopathy and atrial fibrillation  I will order a Pre-op EKG    Pulmonary:  At higher risk for atelectasis/hypoxia.  I will order continuous pulse oximetry  Emphasized incentive spirometry       Renal:   I will start intravenous fluids  I will order follow-up BUN and creatinine     Neurologic:   Avoid fentanyl (short-acting)  Acetaminophen PO TID PRN  Try to minimize using opioid Pain medications.    If patient develops delirium or agitation consider quetiapine 12.5 mg PO x1 PRN agitation (can repeat x1 in 2 hours PRN agitation).      Hematologic:  Plan on pharmacologic DVT prophylaxis post operative day #1. Hold for decreasing hemoglobin. Notify provider for hemoglobin less than 8.

## 2024-12-07 NOTE — ASSESSMENT & PLAN NOTE
EKG which shows sinus bradycardia with a rate of 55.  There is high voltage and ST depressions in lead I, aVL and leads V4-V6 these findings are consistent with left ventricular hypertrophy.  QTc is 403  I will start hydralazine intravenously as needed for extreme hypertension.  I will start home lisinopril and amlodipine with hold parameters.  I will place on continuous cardiac monitoring

## 2024-12-07 NOTE — ED NOTES
Pharmacy Medication Reconciliation      ~Medication reconciliation updated and complete per patient at bedside   ~Allergies have been verified and updated   ~No oral ABX within the last 30 days  ~Patient home pharmacy :  [facility]      ~Anticoagulants (rivaroxaban, apixaban, edoxaban, dabigatran, warfarin, enoxaparin) taken in the last 14 days? No

## 2024-12-08 ENCOUNTER — APPOINTMENT (OUTPATIENT)
Dept: RADIOLOGY | Facility: MEDICAL CENTER | Age: 79
DRG: 480 | End: 2024-12-08
Attending: INTERNAL MEDICINE
Payer: MEDICARE

## 2024-12-08 PROBLEM — Z71.89 ADVANCE CARE PLANNING: Status: ACTIVE | Noted: 2024-12-08

## 2024-12-08 PROBLEM — R73.9 HYPERGLYCEMIA: Status: ACTIVE | Noted: 2024-12-08

## 2024-12-08 PROBLEM — R09.02 HYPOXIA: Status: ACTIVE | Noted: 2024-12-08

## 2024-12-08 LAB
25(OH)D3 SERPL-MCNC: 44 NG/ML (ref 30–100)
ALBUMIN SERPL BCP-MCNC: 3.7 G/DL (ref 3.2–4.9)
ALBUMIN/GLOB SERPL: 1.3 G/DL
ALP SERPL-CCNC: 103 U/L (ref 30–99)
ALT SERPL-CCNC: 14 U/L (ref 2–50)
ANION GAP SERPL CALC-SCNC: 11 MMOL/L (ref 7–16)
AST SERPL-CCNC: 17 U/L (ref 12–45)
BILIRUB SERPL-MCNC: 0.3 MG/DL (ref 0.1–1.5)
BUN SERPL-MCNC: 24 MG/DL (ref 8–22)
CA-I SERPL-SCNC: 1.18 MMOL/L (ref 1.1–1.3)
CALCIUM ALBUM COR SERPL-MCNC: 10.2 MG/DL (ref 8.5–10.5)
CALCIUM SERPL-MCNC: 10 MG/DL (ref 8.4–10.2)
CHLORIDE SERPL-SCNC: 103 MMOL/L (ref 96–112)
CO2 SERPL-SCNC: 22 MMOL/L (ref 20–33)
CREAT SERPL-MCNC: 0.78 MG/DL (ref 0.5–1.4)
ERYTHROCYTE [DISTWIDTH] IN BLOOD BY AUTOMATED COUNT: 45.3 FL (ref 35.9–50)
GFR SERPLBLD CREATININE-BSD FMLA CKD-EPI: 78 ML/MIN/1.73 M 2
GLOBULIN SER CALC-MCNC: 2.9 G/DL (ref 1.9–3.5)
GLUCOSE SERPL-MCNC: 198 MG/DL (ref 65–99)
HCT VFR BLD AUTO: 34.1 % (ref 37–47)
HGB BLD-MCNC: 11.1 G/DL (ref 12–16)
MAGNESIUM SERPL-MCNC: 2.1 MG/DL (ref 1.5–2.5)
MCH RBC QN AUTO: 30 PG (ref 27–33)
MCHC RBC AUTO-ENTMCNC: 32.6 G/DL (ref 32.2–35.5)
MCV RBC AUTO: 92.2 FL (ref 81.4–97.8)
PLATELET # BLD AUTO: 255 K/UL (ref 164–446)
PMV BLD AUTO: 9.6 FL (ref 9–12.9)
POTASSIUM SERPL-SCNC: 4.5 MMOL/L (ref 3.6–5.5)
PROT SERPL-MCNC: 6.6 G/DL (ref 6–8.2)
PTH-INTACT SERPL-MCNC: 438 PG/ML (ref 14–72)
RBC # BLD AUTO: 3.7 M/UL (ref 4.2–5.4)
SODIUM SERPL-SCNC: 136 MMOL/L (ref 135–145)
WBC # BLD AUTO: 15.7 K/UL (ref 4.8–10.8)

## 2024-12-08 PROCEDURE — 82306 VITAMIN D 25 HYDROXY: CPT

## 2024-12-08 PROCEDURE — 82330 ASSAY OF CALCIUM: CPT

## 2024-12-08 PROCEDURE — A9270 NON-COVERED ITEM OR SERVICE: HCPCS | Performed by: HOSPITALIST

## 2024-12-08 PROCEDURE — 97163 PT EVAL HIGH COMPLEX 45 MIN: CPT

## 2024-12-08 PROCEDURE — 770020 HCHG ROOM/CARE - TELE (206)

## 2024-12-08 PROCEDURE — 700102 HCHG RX REV CODE 250 W/ 637 OVERRIDE(OP): Performed by: HOSPITALIST

## 2024-12-08 PROCEDURE — 700102 HCHG RX REV CODE 250 W/ 637 OVERRIDE(OP): Performed by: INTERNAL MEDICINE

## 2024-12-08 PROCEDURE — 96376 TX/PRO/DX INJ SAME DRUG ADON: CPT

## 2024-12-08 PROCEDURE — 97535 SELF CARE MNGMENT TRAINING: CPT

## 2024-12-08 PROCEDURE — 83036 HEMOGLOBIN GLYCOSYLATED A1C: CPT

## 2024-12-08 PROCEDURE — 700111 HCHG RX REV CODE 636 W/ 250 OVERRIDE (IP): Performed by: HOSPITALIST

## 2024-12-08 PROCEDURE — 51798 US URINE CAPACITY MEASURE: CPT

## 2024-12-08 PROCEDURE — 97166 OT EVAL MOD COMPLEX 45 MIN: CPT

## 2024-12-08 PROCEDURE — 83970 ASSAY OF PARATHORMONE: CPT

## 2024-12-08 PROCEDURE — 80053 COMPREHEN METABOLIC PANEL: CPT

## 2024-12-08 PROCEDURE — A9270 NON-COVERED ITEM OR SERVICE: HCPCS | Performed by: INTERNAL MEDICINE

## 2024-12-08 PROCEDURE — 99497 ADVNCD CARE PLAN 30 MIN: CPT | Performed by: INTERNAL MEDICINE

## 2024-12-08 PROCEDURE — 36415 COLL VENOUS BLD VENIPUNCTURE: CPT

## 2024-12-08 PROCEDURE — 83735 ASSAY OF MAGNESIUM: CPT

## 2024-12-08 PROCEDURE — 71045 X-RAY EXAM CHEST 1 VIEW: CPT

## 2024-12-08 PROCEDURE — 85027 COMPLETE CBC AUTOMATED: CPT

## 2024-12-08 PROCEDURE — 700111 HCHG RX REV CODE 636 W/ 250 OVERRIDE (IP): Mod: JZ | Performed by: INTERNAL MEDICINE

## 2024-12-08 PROCEDURE — 99233 SBSQ HOSP IP/OBS HIGH 50: CPT | Mod: 25 | Performed by: INTERNAL MEDICINE

## 2024-12-08 RX ORDER — ENOXAPARIN SODIUM 100 MG/ML
40 INJECTION SUBCUTANEOUS DAILY
Status: DISCONTINUED | OUTPATIENT
Start: 2024-12-08 | End: 2024-12-11 | Stop reason: HOSPADM

## 2024-12-08 RX ADMIN — OXYCODONE HYDROCHLORIDE 10 MG: 10 TABLET ORAL at 17:11

## 2024-12-08 RX ADMIN — ENOXAPARIN SODIUM 40 MG: 100 INJECTION SUBCUTANEOUS at 17:11

## 2024-12-08 RX ADMIN — HYDROMORPHONE HYDROCHLORIDE 0.5 MG: 1 INJECTION, SOLUTION INTRAMUSCULAR; INTRAVENOUS; SUBCUTANEOUS at 01:45

## 2024-12-08 RX ADMIN — SENNOSIDES AND DOCUSATE SODIUM 2 TABLET: 50; 8.6 TABLET ORAL at 17:11

## 2024-12-08 RX ADMIN — OXYCODONE HYDROCHLORIDE 10 MG: 10 TABLET ORAL at 07:56

## 2024-12-08 RX ADMIN — OMEPRAZOLE 20 MG: 20 CAPSULE, DELAYED RELEASE ORAL at 11:43

## 2024-12-08 ASSESSMENT — COGNITIVE AND FUNCTIONAL STATUS - GENERAL
STANDING UP FROM CHAIR USING ARMS: A LITTLE
SUGGESTED CMS G CODE MODIFIER MOBILITY: CL
TOILETING: A LOT
MOBILITY SCORE: 13
DRESSING REGULAR UPPER BODY CLOTHING: A LITTLE
MOVING TO AND FROM BED TO CHAIR: A LOT
DRESSING REGULAR LOWER BODY CLOTHING: A LOT
CLIMB 3 TO 5 STEPS WITH RAILING: A LOT
WALKING IN HOSPITAL ROOM: A LOT
HELP NEEDED FOR BATHING: A LOT
DAILY ACTIVITIY SCORE: 17
SUGGESTED CMS G CODE MODIFIER DAILY ACTIVITY: CK
TURNING FROM BACK TO SIDE WHILE IN FLAT BAD: A LOT
MOVING FROM LYING ON BACK TO SITTING ON SIDE OF FLAT BED: A LOT

## 2024-12-08 ASSESSMENT — GAIT ASSESSMENTS
GAIT LEVEL OF ASSIST: UNABLE TO PARTICIPATE
ASSISTIVE DEVICE: FRONT WHEEL WALKER
DISTANCE (FEET): 0

## 2024-12-08 ASSESSMENT — LIFESTYLE VARIABLES
HOW MANY TIMES IN THE PAST YEAR HAVE YOU HAD 5 OR MORE DRINKS IN A DAY: 0
EVER HAD A DRINK FIRST THING IN THE MORNING TO STEADY YOUR NERVES TO GET RID OF A HANGOVER: NO
TOTAL SCORE: 0
DOES PATIENT WANT TO STOP DRINKING: NO
CONSUMPTION TOTAL: NEGATIVE
HAVE PEOPLE ANNOYED YOU BY CRITICIZING YOUR DRINKING: NO
TOTAL SCORE: 0
AVERAGE NUMBER OF DAYS PER WEEK YOU HAVE A DRINK CONTAINING ALCOHOL: 0
ON A TYPICAL DAY WHEN YOU DRINK ALCOHOL HOW MANY DRINKS DO YOU HAVE: 0
ALCOHOL_USE: NO
TOTAL SCORE: 0
SUBSTANCE_ABUSE: 0
EVER FELT BAD OR GUILTY ABOUT YOUR DRINKING: NO
HAVE YOU EVER FELT YOU SHOULD CUT DOWN ON YOUR DRINKING: NO

## 2024-12-08 ASSESSMENT — ENCOUNTER SYMPTOMS
COUGH: 0
HEARTBURN: 0
PALPITATIONS: 0
VOMITING: 0
BACK PAIN: 0
HEADACHES: 0
NERVOUS/ANXIOUS: 0
DIZZINESS: 0
ABDOMINAL PAIN: 0
CHILLS: 0
SHORTNESS OF BREATH: 0
DOUBLE VISION: 0
FEVER: 0
BLURRED VISION: 0

## 2024-12-08 ASSESSMENT — ACTIVITIES OF DAILY LIVING (ADL): TOILETING: INDEPENDENT

## 2024-12-08 ASSESSMENT — PAIN DESCRIPTION - PAIN TYPE
TYPE: ACUTE PAIN
TYPE: SURGICAL PAIN
TYPE: SURGICAL PAIN
TYPE: ACUTE PAIN;SURGICAL PAIN
TYPE: ACUTE PAIN

## 2024-12-08 ASSESSMENT — FIBROSIS 4 INDEX: FIB4 SCORE: 1.37

## 2024-12-08 ASSESSMENT — SOCIAL DETERMINANTS OF HEALTH (SDOH)
WITHIN THE PAST 12 MONTHS, YOU WORRIED THAT YOUR FOOD WOULD RUN OUT BEFORE YOU GOT THE MONEY TO BUY MORE: NEVER TRUE
IN THE PAST 12 MONTHS, HAS THE ELECTRIC, GAS, OIL, OR WATER COMPANY THREATENED TO SHUT OFF SERVICE IN YOUR HOME?: NO
WITHIN THE PAST 12 MONTHS, THE FOOD YOU BOUGHT JUST DIDN'T LAST AND YOU DIDN'T HAVE MONEY TO GET MORE: NEVER TRUE

## 2024-12-08 NOTE — ANESTHESIA PROCEDURE NOTES
Airway    Date/Time: 12/7/2024 7:00 PM    Performed by: John Lopez M.D.  Authorized by: John Lopez M.D.    Location:  OR  Urgency:  Elective  Indications for Airway Management:  Anesthesia      Spontaneous Ventilation: absent    Sedation Level:  Deep  Preoxygenated: Yes    Patient Position:  Sniffing  Mask Difficulty Assessment:  1 - vent by mask  Final Airway Type:  Endotracheal airway  Final Endotracheal Airway:  ETT  Cuffed: Yes    Technique Used for Successful ETT Placement:  Direct laryngoscopy    Insertion Site:  Oral  Blade Type:  Johnathon  Laryngoscope Blade/Videolaryngoscope Blade Size:  3  ETT Size (mm):  7.0  Measured from:  Lips  ETT to Lips (cm):  22  Placement Verified by: auscultation and capnometry    Cormack-Lehane Classification:  Grade I - full view of glottis  Number of Attempts at Approach:  1  Number of Other Approaches Attempted:  0

## 2024-12-08 NOTE — CONSULTS
Orthopaedic Surgery Consult    Date: 12/7/2024     History Present Illness    Franchesca Paige 79 y.o. female presenting with s/p GLF with right knee pain. Diagnosed with R distal femur fracture. Unable to walk after fall. Uses no assist at baseline.    Of note, patient reports longstanding history of nonpainful distal lateral thigh mass. 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 No rapid growth. No fevers, chills, weight loss.  Past Medical History:  Active Ambulatory Problems     Diagnosis Date Noted    Primary osteoarthritis involving multiple joints     Essential hypertension 06/11/2015    Status post total left knee replacement 06/11/2015    Gastroesophageal reflux disease without esophagitis 06/11/2015    Allergic rhinitis due to pollen 06/11/2015    History of bladder cancer 06/11/2015    Need for subacute bacterial endocarditis prophylaxis 01/21/2016    Obesity, Class I, BMI 30-34.9 02/24/2017    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/21/2018    Osteopenia of multiple sites     Ocular migraine 04/16/2018    Bradycardia 11/01/2018    Caregiver stress 02/11/2022    Situational depression 02/11/2022    Osteoporosis of femur without pathological fracture 02/01/2023    Status post left hip replacement 05/22/2023    Decreased hearing, bilateral 05/22/2023    Hypertensive urgency 10/07/2023    Paroxysmal atrial fibrillation (HCC) 10/07/2023    ACP (advance care planning) 10/07/2023    Hypercoagulable state due to paroxysmal atrial fibrillation (HCC) 11/08/2023    Coronary atherosclerosis due to calcified coronary lesion 11/08/2023    Fibrosis of liver 11/08/2023    History of community acquired pneumonia 11/08/2023    Onychomycosis of toenail 11/14/2023    Aortic atherosclerosis (HCC) 03/22/2024    Other forms of angina pectoris (HCC) 03/22/2024    Thyromegaly 03/22/2024    Paroxysmal SVT (supraventricular tachycardia) (HCC) 07/01/2024    Class 1 obesity without serious comorbidity with  body mass index (BMI) of 31.0 to 31.9 in adult 12/07/2024     Resolved Ambulatory Problems     Diagnosis Date Noted    Hypertension     Dyslipidemia, goal LDL below 130     GERD (gastroesophageal reflux disease) 07/16/2014    Total knee replacement status     Chronic pain of right ankle 02/08/2018    Dizziness 02/22/2018    Hypokalemia 02/10/2019    Complicated UTI (urinary tract infection) 02/10/2019    Hydronephrosis 02/11/2019    Anemia 05/03/2019    Osteoarthritis of left hip 11/30/2022    Left upper lobe pulmonary infiltrate 10/07/2023    Atypical chest pain 10/07/2023     Past Medical History:   Diagnosis Date    Anesthesia     Arthritis     Bladder cancer (HCC)     Cancer (HCC) 2003    COVID-19     Delayed emergence from general anesthesia     Osteopenia     PONV (postoperative nausea and vomiting)        Past Surgical History:  Past Surgical History:   Procedure Laterality Date    IA TOTAL HIP ARTHROPLASTY Left 3/14/2023    Procedure: LEFT TOTAL HIP ARTHROPLASTY, ANTERIOR APPROACH;  Surgeon: Arvind Diaz M.D.;  Location: SURGERY [facility];  Service: Orthopedics    OTHER Right 10/07/2018    vein ablation right thigh, Dr. Azevedo    KNEE REPLACEMENT, TOTAL  12/04/2012    Dr. Harry, left knee    COLONOSCOPY  08/03/2010    diverticulosis only, due in 2020    ORIF, WRIST Left 2008    HARDWARE REMOVAL ORTHO Left 2008    wrist    HYSTERECTOMY, TOTAL ABDOMINAL  1980s    has one half of an ovary and a fallopian tube       Medications:  Medications Prior to Admission   Medication Sig Dispense Refill Last Dose/Taking    aspirin 81 MG EC tablet Take 162-243 mg by mouth 2 times a day. 243 mg (3 X 81 mg tablets) in the morning & 162 mg (2 X 81 mg tablets) in the evening   12/7/2024 at  9:00 AM    amLODIPine (NORVASC) 5 MG Tab Take 1 Tablet by mouth every day. 90 Tablet 3 12/7/2024 at  6:00 AM    fosinopril (MONOPRIL) 20 MG Tab Take 1 Tablet by mouth every day. 90 Tablet 3 12/7/2024 at  6:00 AM    ascorbic acid  (ASCORBIC ACID) 500 MG Tab Take 500 mg by mouth every day.   12/6/2024 Noon    VITAMIN D PO Take 1 Tablet by mouth every day. 30 Capsule 11 12/6/2024 Noon    diclofenac sodium (VOLTAREN) 1 % Gel Apply 4 g topically 4 times a day. To knee   12/7/2024 at  9:00 AM    sodium chloride (OCEAN) 0.65 % Solution Administer 1 Spray into affected nostril(S) 1 time a day as needed for Congestion.   Unknown     Current Facility-Administered Medications   Medication Dose Route Frequency Provider Last Rate Last Admin    [MAR Hold] acetaminophen (Tylenol) tablet 650 mg  650 mg Oral Q6HRS PRN Scar Mc M.D.        [MAR Hold] senna-docusate (Pericolace Or Senokot S) 8.6-50 MG per tablet 2 Tablet  2 Tablet Oral Q EVENING Scar Mc M.D.        And    [MAR Hold] polyethylene glycol/lytes (Miralax) Packet 1 Packet  1 Packet Oral QDAY PRN Scar Mc M.D.        [MAR Hold] Pharmacy Consult Request ...Pain Management Review 1 Each  1 Each Other PHARMACY TO DOSE Scar Mc M.D.        [MAR Hold] oxyCODONE immediate-release (Roxicodone) tablet 5 mg  5 mg Oral Q3HRS PRN Scar Mc M.D.        Or    [MAR Hold] oxyCODONE immediate release (Roxicodone) tablet 10 mg  10 mg Oral Q3HRS PRN Scar Mc M.D.        Or    [MAR Hold] HYDROmorphone (Dilaudid) injection 0.5 mg  0.5 mg Intravenous Q3HRS PRN Scar Mc M.D.   0.5 mg at 12/07/24 1604    [MAR Hold] ondansetron (Zofran) syringe/vial injection 4 mg  4 mg Intravenous Q4HRS PRN Scar Mc M.D.        [MAR Hold] ondansetron (Zofran ODT) dispertab 4 mg  4 mg Oral Q4HRS PRN Scar Mc M.D.        [MAR Hold] amLODIPine (Norvasc) tablet 5 mg  5 mg Oral DAILY Scar Mc M.D.        [MAR Hold] lisinopril (Prinivil) tablet 20 mg  20 mg Oral DAILY Scar Mc M.D.        [MAR Hold] hydrALAZINE (Apresoline) injection 10 mg  10 mg Intravenous Q4HRS PRN Scar Mc M.D.        [MAR Hold] calcium carbonate (Tums) chewable tab 500 mg  500  mg Oral TID PRN Scar Mc M.D.        lactated ringers infusion   Intravenous Continuous Hi Garzon M.D. 10 mL/hr at 24 1738 New Bag at 24 1738       Allergies:  Ibuprofen and Sulfa drugs    Family History:  Family History   Problem Relation Age of Onset    Hypertension Mother     Psychiatric Illness Mother         paranoid schizophrenic    Hypertension Father         heavy smoker    Diabetes Other         niece    Cancer Sister         skin    Cancer Paternal Aunt         breast    Psychiatric Illness Sister         paranoid schizophrenic       Social History:  Social History     Socioeconomic History    Marital status:     Highest education level: Some college, no degree   Tobacco Use    Smoking status: Former     Current packs/day: 0.00     Average packs/day: 1 pack/day for 36.2 years (36.2 ttl pk-yrs)     Types: Cigarettes     Start date: 1964     Quit date: 2000     Years since quittin.6    Smokeless tobacco: Never   Vaping Use    Vaping status: Never Used   Substance and Sexual Activity    Alcohol use: No     Alcohol/week: 0.0 oz    Drug use: No    Sexual activity: Yes     Social Drivers of Health     Financial Resource Strain: Low Risk  (2022)    Overall Financial Resource Strain (CARDIA)     Difficulty of Paying Living Expenses: Not hard at all   Food Insecurity: No Food Insecurity (2022)    Hunger Vital Sign     Worried About Running Out of Food in the Last Year: Never true     Ran Out of Food in the Last Year: Never true   Transportation Needs: No Transportation Needs (2022)    PRAPARE - Transportation     Lack of Transportation (Medical): No     Lack of Transportation (Non-Medical): No   Physical Activity: Insufficiently Active (2022)    Exercise Vital Sign     Days of Exercise per Week: 1 day     Minutes of Exercise per Session: 20 min   Stress: No Stress Concern Present (2022)    [employer] -  Occupational Stress Questionnaire     Feeling of Stress : Only a little   Social Connections: Moderately Isolated (11/29/2022)    Social Connection and Isolation Panel [NHANES]     Frequency of Communication with Friends and Family: Twice a week     Frequency of Social Gatherings with Friends and Family: Once a week     Attends Evangelical Services: Never     Active Member of Clubs or Organizations: No     Attends Club or Organization Meetings: Never     Marital Status:    Intimate Partner Violence: Not At Risk (12/7/2024)    Humiliation, Afraid, Rape, and Kick questionnaire     Fear of Current or Ex-Partner: No     Emotionally Abused: No     Physically Abused: No     Sexually Abused: No   Housing Stability: Unknown (11/29/2022)    Housing Stability Vital Sign     Unable to Pay for Housing in the Last Year: No     Unstable Housing in the Last Year: No       ROS:  Complete ROS performed. ROS otherwise negative except for pertinent positives, negatives noted above in HPI.    Physical Exam     Vitals:    12/07/24 1717   BP: 139/69   Pulse: 60   Resp: 18   Temp: 36.6 °C (97.8 °F)   SpO2: 97%       Physical Exam  General: NAD  Lungs: No increased work of breathing  Heart: Regular rate by palpation of peripheral pulse  Abdomen: Nondistended    RLE  Distal thigh ecchymosis and swelling  Unable to discern location of lipoma mass considering traumatic soft tissue envelope  Limb rotated  Painful to manipulation  SILT SP/DP/T/S/S  Motor intact EHL/FHL/TA/GS  2+ DP pulse, foot WWP  Compartments soft/compressible      Labs, Imaging   Imaging:   CT-KNEE W/O PLUS RECONS RIGHT   Final Result      1.  Comminuted, displaced intra-articular distal femur fracture.   2.  No dislocation.   3.  Small hemarthrosis and surrounding soft tissue swelling/hemorrhage.      DX-KNEE 3 VIEWS RIGHT   Final Result      Significantly comminuted, intra-articular and displaced distal femur fracture      DX-CHEST-PORTABLE (1 VIEW)   Final Result       No acute cardiopulmonary abnormality.      DX-FEMUR-2+ RIGHT    (Results Pending)   DX-PORTABLE FLUORO > 1 HOUR    (Results Pending)       Laboratory Values  Recent Labs     12/07/24  1545   WBC 16.1*   RBC 3.94*   HEMOGLOBIN 11.8*   HEMATOCRIT 35.7*   MCV 90.6   MCH 29.9   MCHC 33.1   RDW 44.3   PLATELETCT 243   MPV 9.7     Recent Labs     12/07/24  1545   SODIUM 138   POTASSIUM 4.7   CHLORIDE 106   CO2 24   GLUCOSE 142*   BUN 22     Recent Labs     12/07/24  1545   APTT 26.4   INR 1.05       Assessment   Franchesca Paige 79 y.o. female presenting with R distal femur fracture, intra-articular.     Plan       Discussed clinical, physical, and imaging findings with patient and/or POA. Considering the significance of the injury, expected prognosis, and anticipated functional limitations with nonoperative management, the patient was indicated for operative intervention. Surgery consists of Open reduction with internal fixation R distal femur, possible IMN. Patient reports history of benign sounding distal lateral lipoma, but difficult to evaluate cnsidering the bruising and swelling associated with trauma. If this lipoma is incidentally encountered in the surgical field, will consider removal. I have discussed the risks of doing so with the patient, including spread of a potentially malignant cancer, and she was eager to proceed. I discussed the clinical, physical, and imaging findings with patient and/or POA and I answered any questions.    Other risks, benefits, and alternatives discussed with patient. Risks for surgery may include bleeding, infection, pain, nonunion, malunion, fracture, hardware failure, persistent dysfunction, damage to surrounding neurovascular structures, anesthesia complications, operative failure, stroke, DVT, or death. Patient agrees to proceed with surgery. Informed consent obtained.      Signed:  Hi Garzon M.D., MD  12/7/2024 5:52 PM

## 2024-12-08 NOTE — PROGRESS NOTES
Patient diet order is NPO, Dr Lozano notified at 12:46,  at 12;47am he replied to convey message to day RN so as to call .    Patient is retaining urine about 573mls, Dr lozano notified at 05:28am , replied to straight catheter patient. Patient refuses wants to try with the purewick and put more effort.

## 2024-12-08 NOTE — PROGRESS NOTES
Bedside report received from day shift nurse at 1925, Patient is still In surgery at this time.     At 2243, report received from Hernandez DILLON. At 2245, Patient is wheeled back to the unit in a gurney. Patient is assessed AxOx4, orders and medication reviewed. Patient is on  6L of oxygen via oxymask at spo2 of 96. Vital signs normal and surgical site assessed, clean dry and intact.  at the bedside. Patient denies pain or any discomfort at this time. Sleeping , calm with unlabored breathing

## 2024-12-08 NOTE — OR NURSING
2126:  Arrived to PACU. Report received from anesthesia/OR circulator. Patient care assumed.   Sleeping, respirations spontaneous and non-labored via OAW.  Dressings: Surgical site CDI. No redness, warmth or swelling noted.      2157: Awake. OAW removed. Good air exchange. Lungs clear.         2158:  Report given to Hernandez DILLON

## 2024-12-08 NOTE — CARE PLAN
The patient is Stable - Low risk of patient condition declining or worsening    Shift Goals  Clinical Goals: Monitor Vital signs, pain mangement, sleep and rest comfortbaly  Patient Goals: sleep and rest comfortably, pain control    Progress made toward(s) clinical / shift goals:  ***    Patient is not progressing towards the following goals:

## 2024-12-08 NOTE — ASSESSMENT & PLAN NOTE
12/9: Hb this morning was 8.7, however repeat hemoglobin is 8.5.  I suspect acute blood loss anemia due to recent surgery.  Continue to monitor closely.    12/10: Globin 7.9 this morning, no sign of active bleeding and is still likely related to recent surgery.  Given her cardiac history will transfuse to keep hemoglobin greater than 8.0.  Anticipate she should be able to transfer to rehab tomorrow

## 2024-12-08 NOTE — ANESTHESIA POSTPROCEDURE EVALUATION
Patient: Franchesca Paige    Procedure Summary       Date: 12/07/24 Room / Location:  OR  / SURGERY [facility]    Anesthesia Start: 1835 Anesthesia Stop: 2130    Procedure: RIGHT DISTAL FEMUR OPEN REDUCTION INTERNAL FIXATION, INTRAMEDULLARY NAIL, RIGHT THIGH LIPOMA REMOVAL (Right: Leg Upper) Diagnosis: (RIGHT DISTAL FEMUR FRACTURE, INTRA-ARTICULAR)    Surgeons: Hi Garzon M.D. Responsible Provider: Jonathon Lynn MD, PhD    Anesthesia Type: general ASA Status: 2            Final Anesthesia Type: general  Last vitals  BP   Blood Pressure : 139/69    Temp   36.6 °C (97.8 °F)    Pulse   60   Resp   18    SpO2   97 %      Anesthesia Post Evaluation    Patient location during evaluation: PACU  Patient participation: complete - patient participated  Level of consciousness: awake  Pain score: 2    Airway patency: patent  Anesthetic complications: no  Cardiovascular status: hemodynamically stable  Respiratory status: acceptable  Hydration status: acceptable    PONV: none          There were no known notable events for this encounter.     Nurse Pain Score: 8 (NPRS)

## 2024-12-08 NOTE — THERAPY
Physical Therapy   Initial Evaluation     Patient Name: Franchesca Paige  Age:  79 y.o., Sex:  female  Medical Record #: [id number]  Today's Date: 12/8/2024     Precautions  Precautions: Weight Bearing As Tolerated Right Lower Extremity;Fall Risk    Assessment  Pt is a 80 yo F with a past medical history of primary hypertension and atrial fibrillation who presented 12/7/2024 with knee pain after a ground-level fall. Pt is s/p Right distal femur with articular extension open reduction with internal fixation on 12/7. Precautions are WBAT. Pt required assistance with LE management when transferring supine <> sit at EOB, and required modA x2p to stand at EOB for first stand, then required Laura for last two stands. Pt is far from her PLOF as she was previously IND with functional mobility and ADLs.  Recommend post-acute placement for additional physical therapy services prior to discharge home unless patient makes significant progress with functional mobility during acute care stay. PT will continue to follow while at [facility].    Plan    Physical Therapy Initial Treatment Plan   Treatment Plan : Bed Mobility, Gait Training, Neuro Re-Education / Balance, Therapeutic Exercise, Therapeutic Activities, Stair Training  Treatment Frequency: 5 Times per Week  Duration: Until Therapy Goals Met    DC Equipment Recommendations: None (owns necessary equipment)  Discharge Recommendations: Recommend post-acute placement for additional physical therapy services prior to discharge home       Subjective    Became nauseous with mobilizing from supine to EOB     Objective       12/08/24 1208   Initial Contact Note    Initial Contact Note Order Received and Verified, Physical Therapy Evaluation in Progress with Full Report to Follow.   Precautions   Precautions Weight Bearing As Tolerated Right Lower Extremity;Fall Risk   Prior Living Situation   Housing / Facility 1 Story House   Steps Into Home 3   Rail Both Rail (Steps into Home)   Equipment  Owned Front-Wheel Walker;Single Point Cane   Lives with - Patient's Self Care Capacity Spouse   Prior Level of Functional Mobility   Bed Mobility Independent   Transfer Status Independent   Ambulation Independent   Ambulation Distance community   Assistive Devices Used None   Stairs Independent   History of Falls   History of Falls Yes   Cognition    Cognition / Consciousness WDL   Level of Consciousness Alert   Active ROM Lower Body    Active ROM Lower Body  X   Comments limited knee ROM due to pain and dressing   Strength Lower Body   Comments decreased functional strength due to pain   Balance Assessment   Sitting Balance (Static) Fair   Sitting Balance (Dynamic) Fair -   Standing Balance (Static) Fair -   Standing Balance (Dynamic) Poor +   Weight Shift Sitting Good   Weight Shift Standing Fair   Bed Mobility    Supine to Sit Moderate Assist  (LE management)   Sit to Supine Moderate Assist  (LE management)   Scooting Minimal Assist   Gait Analysis   Gait Level Of Assist Unable to Participate   Assistive Device Front Wheel Walker   Distance (Feet) 0   Weight Bearing Status WBAT RLE   Functional Mobility   Sit to Stand Moderate Assist  (x2p)   Transfer Method Stand Step   Mobility bed mobility, sit <> stand x 3, sit to supine   6 Clicks Assessment - How much HELP from from another person do you currently need... (If the patient hasn't done an activity recently, how much help from another person do you think he/she would need if he/she tried?)   Turning from your back to your side while in a flat bed without using bedrails? 2   Moving from lying on your back to sitting on the side of a flat bed without using bedrails? 2   Moving to and from a bed to a chair (including a wheelchair)? 2   Standing up from a chair using your arms (e.g., wheelchair, or bedside chair)? 3   Walking in hospital room? 2   Climbing 3-5 steps with a railing? 2   6 clicks Mobility Score 13   Activity Tolerance   Sitting Edge of Bed 10 min    Standing 30 sec x 3   Short Term Goals    Short Term Goal # 1 pt will demonstrated bed mobility without use of bed features and SPV to progress independence within 6 visits   Short Term Goal # 2 pt will demonstrated sit to stand with SBA and FWW to progress LE strength within 6 visits   Short Term Goal # 3 pt will transfer from EOB to chair with SBA and FWW within 6 visits   Education Group   Education Provided Role of Physical Therapist;Weight Bearing Status;Use of Assistive Device   Role of Physical Therapist Patient Response Patient;Acceptance;Explanation;Verbal Demonstration   Use of Assistive Device Patient Response Patient;Acceptance;Explanation;Verbal Demonstration;Action Demonstration   Weight Bearing Status Patient Response Patient;Acceptance;Explanation;Verbal Demonstration;Action Demonstration   Physical Therapy Initial Treatment Plan    Treatment Plan  Bed Mobility;Gait Training;Neuro Re-Education / Balance;Therapeutic Exercise;Therapeutic Activities;Stair Training   Treatment Frequency 5 Times per Week   Duration Until Therapy Goals Met   Problem List    Problems Impaired Bed Mobility;Impaired Transfers;Impaired Ambulation;Functional Strength Deficit;Decreased Activity Tolerance;Safety Awareness Deficits / Cognition   Anticipated Discharge Equipment and Recommendations   DC Equipment Recommendations None  (owns necessary equipment)   Discharge Recommendations Recommend post-acute placement for additional physical therapy services prior to discharge home   Interdisciplinary Plan of Care Collaboration   IDT Collaboration with  Nursing;Occupational Therapist   Patient Position at End of Therapy In Bed;Call Light within Reach;Tray Table within Reach;Phone within Reach   Session Information   Date / Session Number  12/8, 1(1/5, 12/14)

## 2024-12-08 NOTE — OR NURSING
2200: Report received from Tate DILLON. Pt resting in [facility]. Dressing CDI. RLE: brisk cap refill, warm, able to move toes. VSS on 6L.    2208: c/o 4/10 right knee pain. See MAR, PO analgesia given    2218: Family updated    2245: pt meets criteria for transfer to room. Report called to receiving RN

## 2024-12-08 NOTE — PROGRESS NOTES
"Hospital Medicine Daily Progress Note    Date of Service  12/8/2024    Chief Complaint  Franchesca Paige is a 79 y.o. female admitted 12/7/2024 with knee pain.    Hospital Course  As per chart review:  \"79 y.o. female with a past medical history of primary hypertension and atrial fibrillation who presented 12/7/2024 with knee pain after a ground-level fall.  Patient had a mechanical fall hitting her right knee, since then has been having severe pain.  The pain is rated 10/10.  The pain is worse with attempting to move her joint.  She denies hitting her head or losing consciousness.\"    Interval Problem Update  12/8: Patient seen at bedside this morning.  Patient lying in bed, she feels better still with some pain.  Monitor closely.  The patient requiring oxygen, she does not seem to be in distress at this time.  We are pending chest x-ray.  Continue to monitor closely.  Pending PT/OT evaluation.    I have discussed this patient's plan of care and discharge plan at IDT rounds today with Case Management, Nursing, Nursing leadership, and other members of the IDT team.    Consultants/Specialty  orthopedics    Code Status  Full Code    Disposition  The patient is not medically cleared for discharge to home or a post-acute facility.        Review of Systems  Review of Systems   Constitutional:  Positive for malaise/fatigue. Negative for chills and fever.   HENT:  Negative for hearing loss and nosebleeds.    Eyes:  Negative for blurred vision and double vision.   Respiratory:  Negative for cough and shortness of breath.    Cardiovascular:  Negative for chest pain and palpitations.   Gastrointestinal:  Negative for abdominal pain, heartburn and vomiting.   Genitourinary:  Negative for dysuria and urgency.   Musculoskeletal:  Positive for joint pain. Negative for back pain.   Skin:  Negative for itching and rash.   Neurological:  Negative for dizziness and headaches.   Psychiatric/Behavioral:  Negative for substance abuse. " The patient is not nervous/anxious.    All other systems reviewed and are negative.       Physical Exam  Temp:  [36.1 °C (97 °F)-36.6 °C (97.9 °F)] 36.1 °C (97 °F)  Pulse:  [55-81] 74  Resp:  [12-18] 18  BP: (108-189)/(63-90) 119/72  SpO2:  [92 %-100 %] 95 %    Physical Exam  Vitals and nursing note reviewed.   Constitutional:       General: She is not in acute distress.     Appearance: She is ill-appearing.   HENT:      Head: Normocephalic and atraumatic.      Right Ear: External ear normal.      Left Ear: External ear normal.      Mouth/Throat:      Pharynx: No oropharyngeal exudate or posterior oropharyngeal erythema.   Eyes:      General:         Right eye: No discharge.         Left eye: No discharge.   Cardiovascular:      Rate and Rhythm: Normal rate and regular rhythm.      Pulses: Normal pulses.      Heart sounds: No murmur heard.     No gallop.   Pulmonary:      Effort: No respiratory distress.      Comments: On supplemental oxygen  Abdominal:      General: Bowel sounds are normal. There is no distension.      Palpations: Abdomen is soft.      Tenderness: There is no abdominal tenderness. There is no guarding.   Musculoskeletal:         General: Tenderness present.      Cervical back: Normal range of motion and neck supple. No tenderness.      Comments: Dressing in place   Skin:     General: Skin is warm and dry.   Neurological:      General: No focal deficit present.      Mental Status: She is alert and oriented to person, place, and time. Mental status is at baseline.      Motor: No weakness.   Psychiatric:         Mood and Affect: Mood normal.         Behavior: Behavior normal.         Fluids    Intake/Output Summary (Last 24 hours) at 12/8/2024 1010  Last data filed at 12/7/2024 2130  Gross per 24 hour   Intake 500 ml   Output --   Net 500 ml        Laboratory  Recent Labs     12/07/24  1545 12/08/24  0221   WBC 16.1* 15.7*   RBC 3.94* 3.70*   HEMOGLOBIN 11.8* 11.1*   HEMATOCRIT 35.7* 34.1*   MCV 90.6  "92.2   MCH 29.9 30.0   MCHC 33.1 32.6   RDW 44.3 45.3   PLATELETCT 243 255   MPV 9.7 9.6     Recent Labs     12/07/24  1545 12/08/24  0221   SODIUM 138 136   POTASSIUM 4.7 4.5   CHLORIDE 106 103   CO2 24 22   GLUCOSE 142* 198*   BUN 22 24*   CREATININE 0.63 0.78   CALCIUM 10.3* 10.0     Recent Labs     12/07/24  1545   APTT 26.4   INR 1.05               Imaging  CT-KNEE W/O PLUS RECONS RIGHT   Final Result      1.  Comminuted, displaced intra-articular distal femur fracture.   2.  No dislocation.   3.  Small hemarthrosis and surrounding soft tissue swelling/hemorrhage.      DX-KNEE 3 VIEWS RIGHT   Final Result      Significantly comminuted, intra-articular and displaced distal femur fracture      DX-CHEST-PORTABLE (1 VIEW)   Final Result      No acute cardiopulmonary abnormality.      DX-FEMUR-2+ RIGHT    (Results Pending)   DX-PORTABLE FLUORO > 1 HOUR    (Results Pending)   DX-CHEST-PORTABLE (1 VIEW)    (Results Pending)        Assessment/Plan  * Fracture- (present on admission)  Assessment & Plan  12/8: Patient underwent: \"Right distal femur with articular extension open reduction with internal fixation\" yesterday by orthopedic surgery.  We appreciate the recommendations.    Hypoxia  Assessment & Plan  Unclear etiology, but we will order an Xray  Monitor    Hyperglycemia  Assessment & Plan  Could be reactive, pending A1c    Hypercalcemia- (present on admission)  Assessment & Plan  12/8: Seems to have resolved    Leukocytosis- (present on admission)  Assessment & Plan  Likely reactive  Continue to montior off antibiotics for now      Cardiomyopathy due to hypertension, without heart failure (HCC)- (present on admission)  Assessment & Plan  EKG which shows sinus bradycardia with a rate of 55.  There is high voltage and ST depressions in lead I, aVL and leads V4-V6 these findings are consistent with left ventricular hypertrophy.  QTc is 403  I will start hydralazine intravenously as needed for extreme " hypertension.  I will start home lisinopril and amlodipine with hold parameters.  I will place on continuous cardiac monitoring    Paroxysmal atrial fibrillation (HCC)- (present on admission)  Assessment & Plan  Not on blocking agents.  I will monitor on telemetry.     Hypertensive urgency- (present on admission)  Assessment & Plan  12/8: BP better, continue amlodipine and lisinopril for now.    Anemia- (present on admission)  Assessment & Plan  12/8: Hb seems to be stable, monitor    Obesity, Class I, BMI 30-34.9- (present on admission)  Assessment & Plan  At higher risk for atelectasis/hypoxia.  I will order continuous pulse oximetry  Emphasized incentive spirometry       Gastroesophageal reflux disease without esophagitis- (present on admission)  Assessment & Plan  12/8: Continue PPI    Essential hypertension- (present on admission)  Assessment & Plan  12/8: Continue amlodipine and lisinopril with hold parameters    Advance care planning  Assessment & Plan  12/8: I discussed with patient for at least 16 minutes further goals of care.  This included CODE STATUS which includes full code and DNR/DNI.  The patient would like to be full code.  We also discussed further treatment goals.  For now the patient would like to have full treatment options.  This includes invasive or noninvasive procedures as needed.  In the event that the patient cannot make decisions for herself she would like her  Jean Claude to make decisions for her.         VTE prophylaxis: Lovenox    I have performed a physical exam and reviewed and updated ROS and Plan today (12/8/2024). In review of yesterday's note (12/7/2024), there are no changes except as documented above.      I spend at least 51 minutes providing care for this patient.  This included face-to-face interview, physical examination.  Review of lab work including CBC, CMP, magnesium.  Discussing with multidisciplinary team including case management, nursing staff and pharmacy.   Creating plan of care, reviewing orders.    Moreover, I discussed with patient for at least 16 minutes further goals of care.  This included CODE STATUS which includes full code and DNR/DNI.  The patient would like to be full code.  We also discussed further treatment goals.  For now the patient would like to have full treatment options.  This includes invasive or noninvasive procedures as needed.  In the event that the patient cannot make decisions for herself she would like her  Jean Claude to make decisions for her.

## 2024-12-08 NOTE — ANESTHESIA TIME REPORT
Anesthesia Start and Stop Event Times       Date Time Event    12/7/2024 1823 Ready for Procedure     1835 Anesthesia Start     2130 Anesthesia Stop          Responsible Staff  12/07/24      Name Role Begin End    John Lopez M.D. Anesth 1835 2057    Jonathon Lynn MD, PhD Anesth 2057 2130          Overtime Reason:  no overtime (within assigned shift)    Comments:

## 2024-12-08 NOTE — PROGRESS NOTES
4 Eyes Skin Assessment Completed by Tiana RN and Adam RN.    Head WDL  Ears WDL  Nose WDL  Mouth WDL  Neck WDL  Breast/Chest Redness under right breast  Shoulder Blades WDL  Spine WDL  (R) Arm/Elbow/Hand WDL  (L) Arm/Elbow/Hand Bruising  Abdomen WDL  Groin WDL  Scrotum/Coccyx/Buttocks Blanching  (R) Leg swelling, surgical dressing   (L) Leg old scar from surgery  (R) Heel/Foot/Toe WDL  (L) Heel/Foot/Toe Swelling          Devices In Places Blood Pressure Cuff, Pulse Ox, SCD's, and Oxy Mask, Purewick      Interventions In Place Pillows    Possible Skin Injury No    Pictures Uploaded Into Epic N/A  Wound Consult Placed N/A  RN Wound Prevention Protocol Ordered No

## 2024-12-08 NOTE — PROGRESS NOTES
Report received from Tiana DILLON, assumed care of pt at 0715.   POC and medications reviewed with pt. Pt verbalized understanding.   AOx4  C/o nothing at this time.  Denies pain, SOB, or dizziness at this time.   Safety measures in place.  Hourly rounding in place.

## 2024-12-08 NOTE — OP REPORT
Operative Report    Date of Surgery: 12/7/2024    Operating Surgeon:   Primary: Hi Garzon M.D.      Preoperative diagnosis:  Right distal femur fracture, intra-articular    Postoperative diagnosis:   Right distal femur fracture, intra-articular    Procedure:  Right distal femur with articular extension open reduction with internal fixation    Implants: [place]    Indications:  Francehsca Paige sustained the above injury and underwent bedside temporizing management. Discussed clinical, physical, and imaging findings with patient and/or POA. Considering the significance of the injury, expected prognosis, and anticipated functional limitations with nonoperative management, the patient was indicated for operative intervention. Surgery consists of Open reduction with internal fixation R distal femur, possible IMN. Patient reports history of benign sounding distal lateral lipoma, but difficult to evaluate cnsidering the bruising and swelling associated with trauma. If this lipoma is incidentally encountered in the surgical field, will consider removal. I have discussed the risks of doing so with the patient, including spread of a potentially malignant cancer, and she was eager to proceed. I discussed the clinical, physical, and imaging findings with patient and/or POA and I answered any questions.     Other risks, benefits, and alternatives discussed with patient. Risks for surgery may include bleeding, infection, pain, nonunion, malunion, fracture, hardware failure, persistent dysfunction, damage to surrounding neurovascular structures, anesthesia complications, operative failure, stroke, DVT, or death. Patient agrees to proceed with surgery. Informed consent obtained.    Description:    Patient was taken to the operating room and placed supine on the operating room table. Prepped and draped in usual sterile fashion. A surgical timeout was performed to verify the patient, laterality of surgery, planned  procedure, and receipt of perioperative antibiotics (ancef).    A 10cm longitudinal incision was made lateral to the distal femur in standard fashion. On the operating room table, the lipoma could be palpated and was anterior to the lateral incision. It was not encountered for the duration of the case. IT band was incised. Deep dissection was performed along the posterior border of the vastus lateralis. The muscle was elevated until the anterior border of the knee joint surface was encountered. Care was taken proximally in the wound to ligate any deep perforating vessels along the lateral femur.    Considering the nondisplaced nature of the articular component and the patient's pre-existing arthritis, no direct reduction of the joint was performed.    We planned for indirect reduction of the supracondylar fracture component. Gross traction was applied and alignment achieved. Multiple K wires were inserted in a retrograde fashion to maintain provisional reduction. An appropriate length carolyn plate was selected and positioned anterolaterally on the femur. Plate position was optimized on multiple views. A K wire was inserted through the guide and parallel to the joint line under fluoroscopy. The plate was brought to bone distally with a nonlocking screw. The distal cluster of the plate was then filled with locking screws. Care was taken to ensure no screw was excessively long medially. The plate was then secured to bone proximally with locking and nonlocking screws through the guide jig and percutaneous incisions.    A 4cm mini direct medial approach was made to the distal femur for a sub-vastus approach medially. Dissection stayed anterior to the adductor ramakrishna tendon. A 3.5mm  plate was contoured to span the fracture medially. It was slid into the medial wound from distal to proximal. A 3cm counter incision was made proximally to retrieve the proximal aspect of the plate and position it on bone. Three proximal  3.5mm screws were placed. Through the distal wound, two distal screws were placed through the plate.    The wound was irrigated copiously. One gram of vancomycin powder and 1.2 grams tobramycin powder were dispersed throughout the wound. A layered closure was performed. A soft compressive dressing was applied.    The patient was awoken from anesthesia and transferred off the operating table without complication.     Specimens: None    Anesthesia type: general endotracheal anesthesia    Blood loss: 150 cc    Drains: none    Wound Classification: I, Clean.    Blood Products Administered: none    Postoperative condition: Stable    Postoperative Plan:  - Weightbearing Status: Weightbearing as tolerated   - ROM: As Tolerated  - Antibiotics: Ancef 24 horus  - Pain Control: Per protocol  - DVT Prophylaxis: Per protocol until discharge, home ASA at discharge  - PT/OT  - Disposition: to floor. Followup 3 weeks with [facility] with XR R femur

## 2024-12-08 NOTE — PROGRESS NOTES
Telemetry Shift Summary    Rhythm SR  HR Range 63  Ectopy none  Measurements 0.16/0.08/0.46        Normal Values  Rhythm SR  HR Range    Measurements 0.12-0.20 / 0.06-0.10  / 0.30-0.52

## 2024-12-08 NOTE — ASSESSMENT & PLAN NOTE
12/9: I discussed with patient for at least 16 minutes further goals of care.  The patient was interested in filling out a POLST form.  I filled out a POLST form together at bedside with the patient.  The patient would like to have CPR attempted, the patient would like to have full treatment options including intubation, mechanical ventilation and transferred to the ICU as needed.  The patient would like to have artificial nutrition/feeding tube trial no longer than 1 week.  The patient has decisional capacity.  The patient signed the POLST form.  I have given the POLST form to nursing to upload to the system.

## 2024-12-08 NOTE — OR NURSING
1810: Patient allergies and NPO status verified, home medication reconciled, belongings secured.     Patient verbalizes understanding of pain scale, expected course of stay, and plan of care.     Surgical site verified with patient. IV access confirmed.     Patient also screens positive for Sleep Apnea per protocol. Sequentials placed on left leg. Triple aim complete.

## 2024-12-09 LAB
ANION GAP SERPL CALC-SCNC: 9 MMOL/L (ref 7–16)
BUN SERPL-MCNC: 35 MG/DL (ref 8–22)
CALCIUM SERPL-MCNC: 10.3 MG/DL (ref 8.4–10.2)
CHLORIDE SERPL-SCNC: 99 MMOL/L (ref 96–112)
CO2 SERPL-SCNC: 23 MMOL/L (ref 20–33)
CREAT SERPL-MCNC: 0.98 MG/DL (ref 0.5–1.4)
ERYTHROCYTE [DISTWIDTH] IN BLOOD BY AUTOMATED COUNT: 45 FL (ref 35.9–50)
EST. AVERAGE GLUCOSE BLD GHB EST-MCNC: 120 MG/DL
GFR SERPLBLD CREATININE-BSD FMLA CKD-EPI: 59 ML/MIN/1.73 M 2
GLUCOSE SERPL-MCNC: 142 MG/DL (ref 65–99)
HBA1C MFR BLD: 5.8 % (ref 4–5.6)
HCT VFR BLD AUTO: 25.6 % (ref 37–47)
HCT VFR BLD AUTO: 25.9 % (ref 37–47)
HGB BLD-MCNC: 8.5 G/DL (ref 12–16)
HGB BLD-MCNC: 8.7 G/DL (ref 12–16)
MCH RBC QN AUTO: 30.4 PG (ref 27–33)
MCHC RBC AUTO-ENTMCNC: 33.6 G/DL (ref 32.2–35.5)
MCV RBC AUTO: 90.6 FL (ref 81.4–97.8)
PLATELET # BLD AUTO: 205 K/UL (ref 164–446)
PMV BLD AUTO: 10.2 FL (ref 9–12.9)
POTASSIUM SERPL-SCNC: 4.9 MMOL/L (ref 3.6–5.5)
RBC # BLD AUTO: 2.86 M/UL (ref 4.2–5.4)
SODIUM SERPL-SCNC: 131 MMOL/L (ref 135–145)
WBC # BLD AUTO: 14.6 K/UL (ref 4.8–10.8)

## 2024-12-09 PROCEDURE — 94760 N-INVAS EAR/PLS OXIMETRY 1: CPT

## 2024-12-09 PROCEDURE — 700102 HCHG RX REV CODE 250 W/ 637 OVERRIDE(OP): Performed by: HOSPITALIST

## 2024-12-09 PROCEDURE — 700111 HCHG RX REV CODE 636 W/ 250 OVERRIDE (IP): Performed by: HOSPITALIST

## 2024-12-09 PROCEDURE — 36415 COLL VENOUS BLD VENIPUNCTURE: CPT

## 2024-12-09 PROCEDURE — A9270 NON-COVERED ITEM OR SERVICE: HCPCS | Performed by: HOSPITALIST

## 2024-12-09 PROCEDURE — 770020 HCHG ROOM/CARE - TELE (206)

## 2024-12-09 PROCEDURE — 99233 SBSQ HOSP IP/OBS HIGH 50: CPT | Mod: 25 | Performed by: INTERNAL MEDICINE

## 2024-12-09 PROCEDURE — 80048 BASIC METABOLIC PNL TOTAL CA: CPT

## 2024-12-09 PROCEDURE — 700111 HCHG RX REV CODE 636 W/ 250 OVERRIDE (IP): Mod: JZ | Performed by: INTERNAL MEDICINE

## 2024-12-09 PROCEDURE — 97535 SELF CARE MNGMENT TRAINING: CPT

## 2024-12-09 PROCEDURE — 700102 HCHG RX REV CODE 250 W/ 637 OVERRIDE(OP): Performed by: INTERNAL MEDICINE

## 2024-12-09 PROCEDURE — 97530 THERAPEUTIC ACTIVITIES: CPT

## 2024-12-09 PROCEDURE — 85018 HEMOGLOBIN: CPT

## 2024-12-09 PROCEDURE — 85014 HEMATOCRIT: CPT

## 2024-12-09 PROCEDURE — A9270 NON-COVERED ITEM OR SERVICE: HCPCS | Performed by: INTERNAL MEDICINE

## 2024-12-09 PROCEDURE — 85027 COMPLETE CBC AUTOMATED: CPT

## 2024-12-09 PROCEDURE — 97112 NEUROMUSCULAR REEDUCATION: CPT

## 2024-12-09 PROCEDURE — 99497 ADVNCD CARE PLAN 30 MIN: CPT | Performed by: INTERNAL MEDICINE

## 2024-12-09 RX ORDER — AZITHROMYCIN 250 MG/1
500 TABLET, FILM COATED ORAL DAILY
Status: COMPLETED | OUTPATIENT
Start: 2024-12-09 | End: 2024-12-11

## 2024-12-09 RX ADMIN — AZITHROMYCIN DIHYDRATE 500 MG: 250 TABLET ORAL at 06:24

## 2024-12-09 RX ADMIN — AMOXICILLIN AND CLAVULANATE POTASSIUM 1 TABLET: 875; 125 TABLET, FILM COATED ORAL at 06:24

## 2024-12-09 RX ADMIN — ENOXAPARIN SODIUM 40 MG: 100 INJECTION SUBCUTANEOUS at 16:55

## 2024-12-09 RX ADMIN — ACETAMINOPHEN 650 MG: 325 TABLET ORAL at 16:54

## 2024-12-09 RX ADMIN — ONDANSETRON 4 MG: 4 TABLET, ORALLY DISINTEGRATING ORAL at 07:39

## 2024-12-09 RX ADMIN — AMLODIPINE BESYLATE 5 MG: 5 TABLET ORAL at 06:24

## 2024-12-09 RX ADMIN — AMOXICILLIN AND CLAVULANATE POTASSIUM 1 TABLET: 875; 125 TABLET, FILM COATED ORAL at 16:57

## 2024-12-09 RX ADMIN — LISINOPRIL 20 MG: 20 TABLET ORAL at 06:24

## 2024-12-09 RX ADMIN — SENNOSIDES AND DOCUSATE SODIUM 2 TABLET: 50; 8.6 TABLET ORAL at 16:54

## 2024-12-09 RX ADMIN — OXYCODONE HYDROCHLORIDE 10 MG: 10 TABLET ORAL at 08:27

## 2024-12-09 RX ADMIN — OXYCODONE 5 MG: 5 TABLET ORAL at 17:05

## 2024-12-09 RX ADMIN — OMEPRAZOLE 20 MG: 20 CAPSULE, DELAYED RELEASE ORAL at 06:24

## 2024-12-09 RX ADMIN — ACETAMINOPHEN 650 MG: 325 TABLET ORAL at 10:13

## 2024-12-09 ASSESSMENT — ENCOUNTER SYMPTOMS
SHORTNESS OF BREATH: 0
COUGH: 0
BLURRED VISION: 0
VOMITING: 0
CHILLS: 0
FEVER: 0
HEARTBURN: 0
PALPITATIONS: 0
NERVOUS/ANXIOUS: 0
HEADACHES: 0
BACK PAIN: 0
DIZZINESS: 0
DOUBLE VISION: 0
ABDOMINAL PAIN: 0

## 2024-12-09 ASSESSMENT — COGNITIVE AND FUNCTIONAL STATUS - GENERAL
MOVING TO AND FROM BED TO CHAIR: A LOT
TOILETING: TOTAL
MOVING FROM LYING ON BACK TO SITTING ON SIDE OF FLAT BED: A LOT
SUGGESTED CMS G CODE MODIFIER DAILY ACTIVITY: CK
WALKING IN HOSPITAL ROOM: TOTAL
HELP NEEDED FOR BATHING: A LOT
STANDING UP FROM CHAIR USING ARMS: A LOT
MOBILITY SCORE: 10
CLIMB 3 TO 5 STEPS WITH RAILING: TOTAL
DAILY ACTIVITIY SCORE: 17
DRESSING REGULAR LOWER BODY CLOTHING: A LOT
SUGGESTED CMS G CODE MODIFIER MOBILITY: CL
TURNING FROM BACK TO SIDE WHILE IN FLAT BAD: A LOT

## 2024-12-09 ASSESSMENT — GAIT ASSESSMENTS: GAIT LEVEL OF ASSIST: UNABLE TO PARTICIPATE

## 2024-12-09 ASSESSMENT — PAIN DESCRIPTION - PAIN TYPE
TYPE: SURGICAL PAIN

## 2024-12-09 ASSESSMENT — FIBROSIS 4 INDEX: FIB4 SCORE: 1.71

## 2024-12-09 ASSESSMENT — LIFESTYLE VARIABLES: SUBSTANCE_ABUSE: 0

## 2024-12-09 NOTE — DOCUMENTATION QUERY
"                                                                         [facility]                                                                       Query Response Note      PATIENT:               ELIANE VIERA  ACCT #:                  [id number]  MRN:                     [id number]  :                      1945  ADMIT DATE:       2024 11:44 AM  DISCH DATE:          RESPONDING  PROVIDER #:        [id number]           QUERY TEXT:    Anemia due to unspecified blood loss is documented in the medical record.  Please specify the acuity of the blood loss.    The patient's Clinical Indicators include:  78yo with dx of HTN, fall, right femur fx     CT knee Findings \"Likely intramuscular hematoma within the surrounding musculature   H&P \"No evidence of gross bleeding, other than the likely hematoma secondary to fracture\"   Op report estimated blood loss 150mL    Risk factors: advanced age, right femur fracture, hematoma, s/p ORIF right distal femur  Treatments: labwork, monitoring    If you agree with the above dx, please document in the medical record.     Contact me with questions.     Thank you,  Ava Kim, PARTHP, CDI  [email or web address]  Options provided:   -- Acute blood loss anemia   -- Other explanation, (please specify other explanation)   -- Unable to determine      Query created by: Ava Kim on 2024 10:30 AM    RESPONSE TEXT:    Acute blood loss anemia          Electronically signed by:  XANDER GOOD MD 2024 11:08 AM              "

## 2024-12-09 NOTE — CARE PLAN
The patient is Stable - Low risk of patient condition declining or worsening    Shift Goals  Clinical Goals: control pain  Patient Goals: rest  Family Goals: Good rest    Progress made toward(s) clinical / shift goals:  Pain controlled with PRN pain meds Pt rested well. No acute events.    Patient is not progressing towards the following goals:      Problem: Pain - Standard  Goal: Alleviation of pain or a reduction in pain to the patient’s comfort goal  Outcome: Progressing     Problem: Skin Integrity  Goal: Skin integrity is maintained or improved  Outcome: Progressing     Problem: Fall Risk  Goal: Patient will remain free from falls  Outcome: Progressing     Problem: Neuro Status  Goal: Neuro status will remain stable or improve  Outcome: Progressing

## 2024-12-09 NOTE — DISCHARGE PLANNING
PM&R referral from Dr. Jimmy Redman. Following for post acute services. Spoke with spouse Jean Claude about goals and expectation for IRF level of care. Discussed therapy plan for 3 hours per day 5 days a week. Discussed therapy schedules 30/45 or 60 minute sessions typically 1.5 hours between breakfast and lunch and another 1.5 hours between lunch and dinner. Discussed PT/OT and SLP roles. Discussed potential length of stay. Discussed comprehensive medical surveillance by physiatry as well as hospitalist team. Discussed patient to nursing ratio. Discussed insurance Medicare A&B coverage for the program. Discussed visitation. Jean Claude will be primary support for Franchesca  to return to the community. Discussed participation with therapy program when visiting.   Home is a 1 story with 3 step(s) to enter.  Discussed discharge planner role and team conference.  In the event of additional support need discussed HH and OP programs. Per Jean Claude would like for [facility] for outpatient services.  Physiatry to consult per protocol.

## 2024-12-09 NOTE — DISCHARGE PLANNING
Case Management Discharge Planning    Admission Date: 12/7/2024  GMLOS: 4.4  ALOS: 1    6-Clicks ADL Score: 17  6-Clicks Mobility Score: 10  PT and/or OT Eval ordered: Yes  Post-acute Referrals Ordered: Yes  Post-acute Choice Obtained: Yes  Has referral(s) been sent to post-acute provider:  Yes      Anticipated Discharge Dispo: Discharge Disposition: D/T to SNF with Medicare cert in anticipation of skilled care (03)    DME Needed: No    Action(s) Taken: Updated Provider/Nurse on Discharge Plan, Choice obtained, and Referral(s) sent    Chart reviewed, PT/OT recommending placement. Requested for DPA to send blanket SNF referrals to [facility].     1011-  Received message from rehab coordinator requesting consideration for PMR consult.   PMR consult placed per protocol for rehab referral.     1120-  Met with pt and pt's  at bedside to discuss discharge planning and placement. Provided SNF choice for the following accepting:   [facility], [facility], [facility], [facility], [facility], [facility].     Pt and pt's 's preference is [facility] as first choice. LSW acknowledged. Pt will look over SNF choice for a back up if needed.     1425-  Chart reviewed, per rehab coordinator note, pt has been accepted to rehab pending medical clearance.     Escalations Completed: None    Medically Clear: No    Next Steps: LSW to follow    Barriers to Discharge: Medical clearance    Is the patient up for discharge tomorrow: No      Care Transition Team Assessment    Pt lives in a one story house with her . Pt was independent prior to admission, has FWW and cane at home.   Preferred pharmacy is [facility]    Information Source  Orientation Level: Oriented X4  Information Given By: Patient  Who is responsible for making decisions for patient? : Patient    Readmission Evaluation  Is this a readmission?: No    Elopement Risk  Legal Hold: No  Ambulatory or Self Mobile in Wheelchair: Yes  Disoriented: No  Psychiatric  Symptoms: None  History of Wandering: No  Elopement this Admit: No  Vocalizing Wanting to Leave: No  Displays Behaviors, Body Language Wanting to Leave: No-Not at Risk for Elopement  Elopement Risk: Not at Risk for Elopement    Interdisciplinary Discharge Planning  Lives with - Patient's Self Care Capacity: Spouse  Patient or legal guardian wants to designate a caregiver: No  Support Systems: Spouse / Significant Other  Housing / Facility: [address] [facility]  Prior Services: None    Discharge Preparedness  What is your plan after discharge?:  (Rehab)  What are your discharge supports?: Spouse  Prior Functional Level: Ambulatory, Independent with Activities of Daily Living, Uses Cane, Uses Walker  Difficulity with ADLs: None  Difficulity with IADLs: None    Functional Assesment  Prior Functional Level: Ambulatory, Independent with Activities of Daily Living, Uses Cane, Uses Walker    Finances  Financial Barriers to Discharge: No  Prescription Coverage: Yes    Vision / Hearing Impairment  Vision Impairment : Yes  Right Eye Vision: Impaired, Wears Glasses  Left Eye Vision: Impaired, Wears Glasses  Hearing Impairment : No    Advance Directive  Advance Directive?: POLST    Domestic Abuse  Have you ever been the victim of abuse or violence?: No  Possible Abuse/Neglect Reported to:: Not Applicable    Psychological Assessment  History of Substance Abuse: None  History of Psychiatric Problems: No  Non-compliant with Treatment: No  Newly Diagnosed Illness: No    Discharge Risks or Barriers  Discharge risks or barriers?: No    Anticipated Discharge Information  Discharge Disposition: Disch to  rehab facility or distinct part unit (62)  [facility]

## 2024-12-09 NOTE — DISCHARGE PLANNING
Distal femur fracture WBAT. Ongoing medical management as well as therapy need. Medicare provider. Potential spouse support. Please consider a PM&R referral.

## 2024-12-09 NOTE — PROGRESS NOTES
1901 Received report from day shift RN. Patient is awake and alert, resting in bed. A&O X4, on 2 L oxygen via nasal cannula. Unlabored respiration observed. Surgical dressing to right hip and knee clean, dry and intact. Care plan discussed including ambulation. Pain level 0/10. Call light within reach. Personal belongings within reach. No needs required at this time. Safety precautions in place.

## 2024-12-09 NOTE — PROGRESS NOTES
Telemetry Shift Summary     Rhythm SR  HR Range 67-75  Ectopy r-oPAC/PVC  Measurements 0.14/0.10/0.36           Normal Values  Rhythm SR  HR Range    Measurements 0.12-0.20 / 0.06-0.10  / 0.30-0.52

## 2024-12-09 NOTE — CARE PLAN
The patient is Stable - Low risk of patient condition declining or worsening    Shift Goals  Clinical Goals: Pt will ambulate to Bathroom, Pain will be controlled to 4/10 or less. Monitor Tele monitor. Zero fall.  Patient Goals: Rest and sleep  Family Goals: Good rest    Progress made toward(s) clinical / shift goals:  Pt sits at the edge of bed, no further ambulation due to pain. Pt denied pain without movement of right leg. No PRN pain medication given. Tele monitor SR  , zero fall to pt.     Patient is not progressing towards the following goals:

## 2024-12-09 NOTE — THERAPY
Occupational Therapy   Initial Evaluation     Patient Name: Franchesca Paige  Age:  79 y.o., Sex:  female  Medical Record #: [id number]  Today's Date: 12/8/2024     Precautions  Precautions: Weight Bearing As Tolerated Right Lower Extremity, Fall Risk    Assessment  Patient is 79 y.o. female admitted following GLF suffering a R comminuted, displaced intra-articular distal femur fracture. S/p ORIF R distal femur, POD 1. A&Ox4, pleasant and cooperative. Lives with spouse who is visiting; very supportive. Pt is normally indep and active. Currently limited by RLE pain, weakness, impaired balance, decreased functional mobility and activity tolerance which is impacting her ADL performance. See below for CLOF. OT will follow while in house.           Plan  Occupational Therapy Initial Treatment Plan   Treatment Interventions: (P) Self Care / Activities of Daily Living, Neuro Re-Education / Balance, Therapeutic Activity  Treatment Frequency: (P) 4 Times per Week  Duration: (P) Until Therapy Goals Met    DC Equipment Recommendations: (P) Unable to determine at this time  Discharge Recommendations: (P) Recommend post-acute placement for additional occupational therapy services prior to discharge home     Subjective  Agreeable     Objective     12/08/24 1207   Prior Living Situation   Prior Services None   Housing / Facility [address]   Steps Into Home 3   Bathroom Set up Walk In Shower;Shower Chair   Equipment Owned Front-Wheel Walker;Single Point Cane;Tub / Shower Seat   Lives with - Patient's Self Care Capacity Spouse   Prior Level of ADL Function   Self Feeding Independent   Grooming / Hygiene Independent   Bathing Independent   Dressing Independent   Toileting Independent   Prior Level of IADL Function   Medication Management Independent   Laundry Independent   Kitchen Mobility Independent   Finances Unable To Determine At This Time   Home Management Independent   Shopping Unable To Determine At This Time   Prior Level  Of Mobility Independent With Device in Home   Driving / Transportation Relatives / Others Provide Transportation   Occupation (Pre-Hospital Vocational) Not Employed   Leisure Interests Pets;Hobbies   History of Falls   History of Falls Yes   Precautions   Precautions Weight Bearing As Tolerated Right Lower Extremity;Fall Risk   Vitals   O2 Delivery Device None - Room Air   Pain 0 - 10 Group   Location Leg   Location Orientation Right   Therapist Pain Assessment Post Activity Pain Same as Prior to Activity;Nurse Notified   Cognition    Cognition / Consciousness WDL   Level of Consciousness Alert   Passive ROM Upper Body   Passive ROM Upper Body WDL   Active ROM Upper Body   Active ROM Upper Body  WDL   Strength Upper Body   Upper Body Strength  WDL   Sensation Upper Body   Upper Extremity Sensation  WDL   Upper Body Muscle Tone   Upper Body Muscle Tone  WDL   Coordination Upper Body   Coordination WDL   Balance Assessment   Sitting Balance (Static) Fair   Sitting Balance (Dynamic) Fair -   Standing Balance (Static) Fair -   Standing Balance (Dynamic) Poor +   Weight Shift Sitting Fair   Weight Shift Standing Fair   Bed Mobility    Supine to Sit Moderate Assist   Sit to Supine Moderate Assist   Scooting Minimal Assist   ADL Assessment   Grooming Standby Assist;Seated   Lower Body Dressing Maximal Assist   Toileting Total Assist   How much help from another person does the patient currently need...   Putting on and taking off regular lower body clothing? 2   Bathing (including washing, rinsing, and drying)? 2   Toileting, which includes using a toilet, bedpan, or urinal? 2   Putting on and taking off regular upper body clothing? 3   Taking care of personal grooming such as brushing teeth? 4   Eating meals? 4   6 Clicks Daily Activity Score 17   Functional Mobility   Sit to Stand Moderate Assist  (x2)   Bed, Chair, Wheelchair Transfer Unable to Participate   Transfer Method Stand Step   Activity Tolerance   Sitting Edge  of Bed 10   Standing 3x30 seconds   Short Term Goals   Short Term Goal # 1 ADL transfers with Kenyatta within 5 days   Short Term Goal # 2 LB dressing with CGA, AE as needed, within 5 days   Short Term Goal # 3 Grooming at sink with CGA within 5 days   Short Term Goal # 4 Toileting in br with CGA within 5 days   Education Group   Education Provided Transfers;Home Safety;Activities of Daily Living   Role of Occupational Therapist Patient Response Patient;Acceptance;Explanation;Verbal Demonstration   Home Safety Patient Response Patient;Acceptance;Explanation;Verbal Demonstration   Transfers Patient Response Patient;Acceptance;Explanation;Verbal Demonstration   ADL Patient Response Patient;Acceptance;Explanation;Action Demonstration   Occupational Therapy Initial Treatment Plan    Treatment Interventions Self Care / Activities of Daily Living;Neuro Re-Education / Balance;Therapeutic Activity   Treatment Frequency 4 Times per Week   Duration Until Therapy Goals Met   Problem List   Problem List Decreased Active Daily Living Skills;Decreased Homemaking Skills;Decreased Functional Mobility;Decreased Activity Tolerance;Impaired Postural Control / Balance   Anticipated Discharge Equipment and Recommendations   DC Equipment Recommendations Unable to determine at this time   Discharge Recommendations Recommend post-acute placement for additional occupational therapy services prior to discharge home   Interdisciplinary Plan of Care Collaboration   IDT Collaboration with  Family / Caregiver;Nursing;Certified Nursing Assistant;Physical Therapist   Patient Position at End of Therapy In Bed;Bed Alarm On;Call Light within Reach;Tray Table within Reach;Phone within Reach;Family / Friend in Room   Collaboration Comments OT Frankie. ARVIND planning.

## 2024-12-09 NOTE — PROGRESS NOTES
4 Eyes Skin Assessment Completed by Isaac RN and SANTANA Holt.    Head WDL  Ears WDL  Nose WDL  Mouth WDL  Neck WDL  Breast/Chest Redness and Blanching  Shoulder Blades WDL  Spine WDL  (R) Arm/Elbow/Hand WDL  (L) Arm/Elbow/Hand Bruising  Abdomen WDL  Groin WDL  Scrotum/Coccyx/Buttocks Redness and Blanching  (R) Leg Swelling and Incision  (L) Leg Bruising and Swelling  (R) Heel/Foot/Toe Swelling  (L) Heel/Foot/Toe Swelling          Devices In Places Tele Box, Blood Pressure Cuff, Pulse Ox, SCD's, and Nasal Cannula, puriwick      Interventions In Place Gray Ear Foams, Pillows, Q2 Turns, and Low Air Loss Mattress    Possible Skin Injury No    Pictures Uploaded Into Epic N/A  Wound Consult Placed N/A  RN Wound Prevention Protocol Ordered No

## 2024-12-09 NOTE — THERAPY
Physical Therapy   Daily Treatment     Patient Name: Franchesca Paige  Age:  79 y.o., Sex:  female  Medical Record #: [id number]  Today's Date: 12/9/2024     Precautions  Precautions: Weight Bearing As Tolerated Right Lower Extremity;Fall Risk    Assessment    Pt is progressing slower than expected and demonstrates with poor ability to mobilized EOB without significant assist from therapist. Pt was able to tolerate multiple sit<>stands with use of FWW, however, only able to manage standing for about 45 seconds due to ongoing dizziness. Pt was able to stand for about 1 min in order to take standing BP and demonstrated with BP of 77/38. RN made aware of concerns with low BP. Pt was provided with frequent VC, anterior weight shifts, lateral weight shifts, and postural faciliation throughout session to maintain upright posture and assist in standing mechanics. Pt initially required Mod A for sit<>stands and Min A for sit<>stands with bed elevated. Pt was provided with frequent neuro muscular faciliation and weight shifts in order to promote bed mobility and sit at EOB with appropriate sitting posture. Patient seen for team treatment with Occupational Therapist for the following reason(s):  Patient required 2 person assistance for safety and to provide effective interventions. Each discipline assisted patient with appropriate and separate goals.. Physical Therapist facilitated neuromusclar re-education and therapeutic activities in standing while Occupational Therapist simultaneously treated pt according to POC.    Plan    Treatment Plan Status: (P) Continue Current Treatment Plan  Type of Treatment: Bed Mobility, Gait Training, Neuro Re-Education / Balance, Therapeutic Exercise, Therapeutic Activities, Stair Training  Treatment Frequency: 5 Times per Week  Treatment Duration: Until Therapy Goals Met    DC Equipment Recommendations: (P) None  Discharge Recommendations: (P) Recommend post-acute placement for additional  physical therapy services prior to discharge home    Objective       12/09/24 1001   Precautions   Precautions Weight Bearing As Tolerated Right Lower Extremity;Fall Risk   Vitals   Blood Pressure  (!) 77/38   O2 (LPM) 2   O2 Delivery Device Silicone Nasal Cannula   Vitals Comments after standing for about 45 seconds to 1 mins   Pain 0 - 10 Group   Location Leg   Location Orientation Right   Description Aching;Nagging   Therapist Pain Assessment Prior to Activity;During Activity;Post Activity;Nurse Notified  (c/o moderate to severe pain, not rated formally)   Cognition    Cognition / Consciousness WDL   Level of Consciousness Alert   Comments pleasant/cooperative   Neuro-Muscular Treatments   Neuro-Muscular Treatments Anterior weight shift;Postural Facilitation;Weight Shift Right;Weight Shift Left;Facilitation   Comments pt provided with anterior weight shift for standing mechanics and lateral weight shifts for attempted side steps and maintaing upright posture. Pt provided with frequent postural faciliation to maintain hip extension and upright posture in standing   Balance   Sitting Balance (Static) Fair   Sitting Balance (Dynamic) Fair -   Standing Balance (Static) Fair -   Standing Balance (Dynamic) Poor +   Weight Shift Sitting Poor   Weight Shift Standing Poor   Skilled Intervention Verbal Cuing;Postural Facilitation;Compensatory Strategies   Comments w/fww use, demonstrates with posterior lean upon standing   Bed Mobility    Supine to Sit Moderate Assist   Sit to Supine Maximal Assist   Scooting Moderate Assist   Skilled Intervention Verbal Cuing;Sequencing;Facilitation;Compensatory Strategies   Comments w/HOB elevated and rails up   Gait Analysis   Gait Level Of Assist Unable to Participate   Weight Bearing Status WBAT RLE   Functional Mobility   Sit to Stand Minimal Assist  (with bed elevatd, Mod A with bed lowered)   Bed, Chair, Wheelchair Transfer Unable to Participate   Mobility EOB, sit<>stand x 4,  weight shifts, attempted side steps, back to bed supine   Skilled Intervention Verbal Cuing;Sequencing;Facilitation;Compensatory Strategies   Comments provided with frequent VC and sequencing for hand placement and anterior weight shifts for sit<>stand mechanics   6 Clicks Assessment - How much HELP from from another person do you currently need... (If the patient hasn't done an activity recently, how much help from another person do you think he/she would need if he/she tried?)   Turning from your back to your side while in a flat bed without using bedrails? 2   Moving from lying on your back to sitting on the side of a flat bed without using bedrails? 2   Moving to and from a bed to a chair (including a wheelchair)? 2   Standing up from a chair using your arms (e.g., wheelchair, or bedside chair)? 2   Walking in hospital room? 1   Climbing 3-5 steps with a railing? 1   6 clicks Mobility Score 10   Activity Tolerance   Sitting in Chair NT   Sitting Edge of Bed 15 mins   Standing 45-1 mins seconds x 4   Comments limited by dizziness and low BP concerns   Short Term Goals    Short Term Goal # 1 pt will demonstrated bed mobility without use of bed features and SPV to progress independence within 6 visits   Goal Outcome # 1 Progressing slower than expected   Short Term Goal # 2 pt will demonstrated sit to stand with SBA and FWW to progress LE strength within 6 visits   Goal Outcome # 2 Progressing slower than expected   Short Term Goal # 3 pt will transfer from EOB to chair with SBA and FWW within 6 visits   Goal Outcome # 3 Progressing slower than expected   Education Group   Role of Physical Therapist Patient Response Patient;Acceptance;Explanation;Verbal Demonstration   Use of Assistive Device Patient Response Patient;Acceptance;Explanation;Verbal Demonstration;Action Demonstration   Weight Bearing Status Patient Response Patient;Acceptance;Explanation;Verbal Demonstration;Action Demonstration   Physical Therapy  Treatment Plan   Physical Therapy Treatment Plan Continue Current Treatment Plan   Anticipated Discharge Equipment and Recommendations   DC Equipment Recommendations None   Discharge Recommendations Recommend post-acute placement for additional physical therapy services prior to discharge home   Interdisciplinary Plan of Care Collaboration   IDT Collaboration with  Nursing   Patient Position at End of Therapy In Bed;Tray Table within Reach;Call Light within Reach;Phone within Reach;Family / Friend in Room;Bed Alarm On   Collaboration Comments aware of visit and recs   Session Information   Date / Session Number  12/9-2 (2/5, 12/14)

## 2024-12-09 NOTE — CARE PLAN
The patient is Stable - Low risk of patient condition declining or worsening    Shift Goals  Clinical Goals: Pt will void, work with PT, wean off O2, pain management  Patient Goals: rest comfortably    Progress made toward(s) clinical / shift goals:  Pt voided, worked with PT/OT, was weaned off )2 with SAO2 92-95%, pain managed to 4/10 post prn meds per MAR.    Patient is not progressing towards the following goals:

## 2024-12-09 NOTE — DISCHARGE PLANNING
Dr. Souza recommending IRF when medically cleared. Per Dr. Jimmy Redman not cleared today will follow for potential am clearance.

## 2024-12-09 NOTE — PROGRESS NOTES
"Hospital Medicine Daily Progress Note    Date of Service  12/9/2024    Chief Complaint  Franchesca Paige is a 79 y.o. female admitted 12/7/2024 with knee pain.    Hospital Course  As per chart review:  \"79 y.o. female with a past medical history of primary hypertension and atrial fibrillation who presented 12/7/2024 with knee pain after a ground-level fall.  Patient had a mechanical fall hitting her right knee, since then has been having severe pain.  The pain is rated 10/10.  The pain is worse with attempting to move her joint.  She denies hitting her head or losing consciousness.\"    Interval Problem Update  12/8: Patient seen at bedside this morning.  Patient lying in bed, she feels better still with some pain.  Monitor closely.  The patient requiring oxygen, she does not seem to be in distress at this time.  We are pending chest x-ray.  Continue to monitor closely.  Pending PT/OT evaluation.    12/9: Patient seen at bedside this morning.  Hemoglobin had dropped this morning, however repeat hemoglobin seems to be stable.  This could be due to recent surgery.  Continue to monitor closely.  Continue antibiotics for concern for pneumonia.  The patient will require placement upon discharge.    I have discussed this patient's plan of care and discharge plan at IDT rounds today with Case Management, Nursing, Nursing leadership, and other members of the IDT team.    Consultants/Specialty  orthopedics    Code Status  Full Code    Disposition  The patient is not medically cleared for discharge to home or a post-acute facility.        Review of Systems  Review of Systems   Constitutional:  Positive for malaise/fatigue. Negative for chills and fever.   HENT:  Negative for hearing loss and nosebleeds.    Eyes:  Negative for blurred vision and double vision.   Respiratory:  Negative for cough and shortness of breath.    Cardiovascular:  Negative for chest pain and palpitations.   Gastrointestinal:  Negative for abdominal " pain, heartburn and vomiting.   Genitourinary:  Negative for dysuria and urgency.   Musculoskeletal:  Positive for joint pain. Negative for back pain.   Skin:  Negative for itching and rash.   Neurological:  Negative for dizziness and headaches.   Psychiatric/Behavioral:  Negative for substance abuse. The patient is not nervous/anxious.    All other systems reviewed and are negative.       Physical Exam  Temp:  [36.2 °C (97.2 °F)-37.1 °C (98.8 °F)] 37 °C (98.6 °F)  Pulse:  [64-85] 64  Resp:  [16-18] 17  BP: ()/(38-70) 77/38  SpO2:  [90 %-96 %] 96 %    Physical Exam  Vitals and nursing note reviewed.   Constitutional:       General: She is not in acute distress.     Appearance: She is ill-appearing.   HENT:      Head: Normocephalic and atraumatic.      Right Ear: External ear normal.      Left Ear: External ear normal.      Mouth/Throat:      Pharynx: No oropharyngeal exudate or posterior oropharyngeal erythema.   Eyes:      General:         Right eye: No discharge.         Left eye: No discharge.   Cardiovascular:      Rate and Rhythm: Normal rate and regular rhythm.      Pulses: Normal pulses.      Heart sounds: No murmur heard.     No gallop.   Pulmonary:      Effort: No respiratory distress.      Comments: On supplemental oxygen  Abdominal:      General: Bowel sounds are normal. There is no distension.      Palpations: Abdomen is soft.      Tenderness: There is no abdominal tenderness. There is no guarding.   Musculoskeletal:         General: Tenderness present.      Cervical back: Normal range of motion and neck supple. No tenderness.      Comments: Dressing in place   Skin:     General: Skin is warm and dry.   Neurological:      General: No focal deficit present.      Mental Status: She is alert and oriented to person, place, and time. Mental status is at baseline.      Motor: No weakness.   Psychiatric:         Mood and Affect: Mood normal.         Behavior: Behavior normal.          Fluids    Intake/Output Summary (Last 24 hours) at 12/9/2024 1132  Last data filed at 12/9/2024 0622  Gross per 24 hour   Intake --   Output 400 ml   Net -400 ml        Laboratory  Recent Labs     12/07/24  1545 12/08/24  0221 12/09/24  0158 12/09/24  0957   WBC 16.1* 15.7* 14.6*  --    RBC 3.94* 3.70* 2.86*  --    HEMOGLOBIN 11.8* 11.1* 8.7* 8.5*   HEMATOCRIT 35.7* 34.1* 25.9* 25.6*   MCV 90.6 92.2 90.6  --    MCH 29.9 30.0 30.4  --    MCHC 33.1 32.6 33.6  --    RDW 44.3 45.3 45.0  --    PLATELETCT 243 255 205  --    MPV 9.7 9.6 10.2  --      Recent Labs     12/07/24  1545 12/08/24  0221 12/09/24  0158   SODIUM 138 136 131*   POTASSIUM 4.7 4.5 4.9   CHLORIDE 106 103 99   CO2 24 22 23   GLUCOSE 142* 198* 142*   BUN 22 24* 35*   CREATININE 0.63 0.78 0.98   CALCIUM 10.3* 10.0 10.3*     Recent Labs     12/07/24  1545   APTT 26.4   INR 1.05               Imaging  DX-CHEST-PORTABLE (1 VIEW)   Final Result      New left basilar atelectasis versus infiltrate.      DX-FEMUR-2+ RIGHT   Preliminary Result      Digitized intraoperative radiograph is submitted for review. This examination is not for diagnostic purpose but for guidance during a surgical procedure. Please see the patient's chart for full procedural details.         INTERPRETING LOCATION: [address] [place] [address], [id number]      DX-PORTABLE FLUORO > 1 HOUR   Preliminary Result      Portable fluoroscopy utilized for 2 minutes 44 seconds.         INTERPRETING LOCATION: [address] [place] [address], [address]      CT-KNEE W/O PLUS RECONS RIGHT   Final Result      1.  Comminuted, displaced intra-articular distal femur fracture.   2.  No dislocation.   3.  Small hemarthrosis and surrounding soft tissue swelling/hemorrhage.      DX-KNEE 3 VIEWS RIGHT   Final Result      Significantly comminuted, intra-articular and displaced distal femur fracture      DX-CHEST-PORTABLE (1 VIEW)   Final Result      No acute cardiopulmonary abnormality.           Assessment/Plan  * Fracture-  "(present on admission)  Assessment & Plan  12/8: Patient underwent: \"Right distal femur with articular extension open reduction with internal fixation\" yesterday by orthopedic surgery.  We appreciate the recommendations.    12/9: Pending placement.    Hypoxia  Assessment & Plan  Concern for pneumonia, we have started antibiotics  monitor    Hyperglycemia  Assessment & Plan  --A1c is 5.8,  on lifestyle modifications    Hypercalcemia- (present on admission)  Assessment & Plan  12/8: Seems to have resolved    Leukocytosis- (present on admission)  Assessment & Plan  Concern for pneumonia  We have started antibiotics  monitor    Cardiomyopathy due to hypertension, without heart failure (HCC)- (present on admission)  Assessment & Plan  EKG which shows sinus bradycardia with a rate of 55.  There is high voltage and ST depressions in lead I, aVL and leads V4-V6 these findings are consistent with left ventricular hypertrophy.  QTc is 403  I will start hydralazine intravenously as needed for extreme hypertension.  I will start home lisinopril and amlodipine with hold parameters.  I will place on continuous cardiac monitoring    Paroxysmal atrial fibrillation (HCC)- (present on admission)  Assessment & Plan  Not on blocking agents.  Hx of  Not on anticoagulation  I will monitor on telemetry.     Hypertensive urgency- (present on admission)  Assessment & Plan  12/9: continue amlodipine and lisinopril for now.    Anemia- (present on admission)  Assessment & Plan  12/9: Hb this morning was 8.7, however repeat hemoglobin is 8.5.  I suspect acute blood loss anemia due to recent surgery.  Continue to monitor closely.    Obesity, Class I, BMI 30-34.9- (present on admission)  Assessment & Plan  At higher risk for atelectasis/hypoxia.  I will order continuous pulse oximetry  Emphasized incentive spirometry       Gastroesophageal reflux disease without esophagitis- (present on admission)  Assessment & Plan  12/9: Continue " PPI    Essential hypertension- (present on admission)  Assessment & Plan  12/9: Continue amlodipine and lisinopril with hold parameters    Advance care planning  Assessment & Plan  12/9: I discussed with patient for at least 16 minutes further goals of care.  The patient was interested in filling out a POLST form.  I filled out a POLST form together at bedside with the patient.  The patient would like to have CPR attempted, the patient would like to have full treatment options including intubation, mechanical ventilation and transferred to the ICU as needed.  The patient would like to have artificial nutrition/feeding tube trial no longer than 1 week.  The patient has decisional capacity.  The patient signed the POLST form.  I have given the POLST form to nursing to upload to the system.         VTE prophylaxis: Lovenox    I have performed a physical exam and reviewed and updated ROS and Plan today (12/9/2024). In review of yesterday's note (12/8/2024), there are no changes except as documented above.      I spend at least 52 minutes providing care for this patient.  This included face-to-face interview, physical examination.  Review of lab work including CBC, Hb, BMP.  Discussing with multidisciplinary team including case management, nursing staff and pharmacy.  Creating plan of care, reviewing orders.    Moreover, I discussed with patient for at least 16 minutes further goals of care.  The patient was interested in filling out a POLST form.  I filled out a POLST form together at bedside with the patient.  The patient would like to have CPR attempted, the patient would like to have full treatment options including intubation, mechanical ventilation and transferred to the ICU as needed.  The patient would like to have artificial nutrition/feeding tube trial no longer than 1 week.  The patient has decisional capacity.  The patient signed the POLST form.  I have given the POLST form to nursing to upload to the  system.

## 2024-12-09 NOTE — THERAPY
Occupational Therapy  Daily Treatment     Patient Name: Franchesca Paige  Age:  79 y.o., Sex:  female  Medical Record #: [id number]  Today's Date: 12/9/2024     Precautions  Precautions: (P) Fall Risk, Weight Bearing As Tolerated Right Lower Extremity    Assessment    Pt demonstrated Mod assist supine to EOB sitting, Min assist sit/stand (x 4), Max assist LBCM, Total assist toileting, Mod I UBCM, Max assist x 2 EOB sitting to supine.  Barriers include RLE pain/discomfort, orthostatic w/ standing, limited activity tolerance, impaired balance, generalized weakness.  Therapist reviewed environmental/safety awareness, fall precautions, AE/DME, ADL's and transfers    Plan    Treatment Plan Status: (P) Continue Current Treatment Plan  Type of Treatment: Self Care / Activities of Daily Living, Neuro Re-Education / Balance, Therapeutic Activity  Treatment Frequency: 4 Times per Week  Treatment Duration: Until Therapy Goals Met    DC Equipment Recommendations: (P) Unable to determine at this time  Discharge Recommendations: (P) Recommend post-acute placement for additional occupational therapy services prior to discharge home    Subjective    Pt was alert and cooperative w/ tx.     Objective       12/09/24 1005    Services   Is patient using  services for this encounter? No   Precautions   Precautions Fall Risk;Weight Bearing As Tolerated Right Lower Extremity   Vitals   Patient BP Position Standing 1 minute   Blood Pressure  (!) 77/38   O2 (LPM) 2   O2 Delivery Device Silicone Nasal Cannula   Vitals Comments standing via FWW and reported dizziness after sveral atempts of standing.  Above standing BP - pt returned to bed and BP stabilized.   Pain   Intervention Rest;Repositioned   Pain 0 - 10 Group   Location Leg   Location Orientation Right   Description Aching   Comfort Goal Comfort with Movement;Perform Activity   Non Verbal Descriptors   Non Verbal Scale  Calm   Cognition    Cognition / Consciousness  WDL   Level of Consciousness Alert   Balance   Sitting Balance (Static) Fair -   Sitting Balance (Dynamic) Fair -   Standing Balance (Static) Fair -   Standing Balance (Dynamic) Poor +   Weight Shift Sitting Poor   Weight Shift Standing Poor   Comments Standing via FWW x 4   Bed Mobility    Supine to Sit Moderate Assist   Sit to Supine Maximal Assist   Activities of Daily Living   Grooming Modified Independent;Seated   Upper Body Dressing Modified Independent   Lower Body Dressing Maximal Assist   Toileting Total Assist   How much help from another person does the patient currently need...   Putting on and taking off regular lower body clothing? 2   Bathing (including washing, rinsing, and drying)? 2   Toileting, which includes using a toilet, bedpan, or urinal? 1   Putting on and taking off regular upper body clothing? 4   Taking care of personal grooming such as brushing teeth? 4   Eating meals? 4   6 Clicks Daily Activity Score 17   Functional Mobility   Sit to Stand Minimal Assist   Bed, Chair, Wheelchair Transfer Unable to Participate   Toilet Transfers Unable to Participate   Short Term Goals   Short Term Goal # 1 ADL transfers with Kenyatta within 5 days   Goal Outcome # 1 Progressing slower than expected   Short Term Goal # 2 LB dressing with CGA, AE as needed, within 5 days   Goal Outcome # 2 Progressing slower than expected   Short Term Goal # 4 Toileting in br with CGA within 5 days   Goal Outcome # 4 Progressing slower than expected   Occupational Therapy Treatment Plan    O.T. Treatment Plan Continue Current Treatment Plan   Anticipated Discharge Equipment and Recommendations   DC Equipment Recommendations Unable to determine at this time   Discharge Recommendations Recommend post-acute placement for additional occupational therapy services prior to discharge home

## 2024-12-10 PROBLEM — S72.8X1A OTHER FRACTURE OF RIGHT FEMUR, INITIAL ENCOUNTER FOR CLOSED FRACTURE (HCC): Status: ACTIVE | Noted: 2024-12-10

## 2024-12-10 LAB
ABO GROUP BLD: NORMAL
BARCODED ABORH UBTYP: 5100
BARCODED PRD CODE UBPRD: NORMAL
BARCODED UNIT NUM UBUNT: NORMAL
BLD GP AB SCN SERPL QL: NORMAL
COMPONENT R 8504R: NORMAL
ERYTHROCYTE [DISTWIDTH] IN BLOOD BY AUTOMATED COUNT: 45.3 FL (ref 35.9–50)
HCT VFR BLD AUTO: 24.8 % (ref 37–47)
HGB BLD-MCNC: 7.9 G/DL (ref 12–16)
MCH RBC QN AUTO: 29.8 PG (ref 27–33)
MCHC RBC AUTO-ENTMCNC: 31.9 G/DL (ref 32.2–35.5)
MCV RBC AUTO: 93.6 FL (ref 81.4–97.8)
PLATELET # BLD AUTO: 206 K/UL (ref 164–446)
PMV BLD AUTO: 10.1 FL (ref 9–12.9)
PRODUCT TYPE UPROD: NORMAL
RBC # BLD AUTO: 2.65 M/UL (ref 4.2–5.4)
RH BLD: NORMAL
UNIT STATUS USTAT: NORMAL
WBC # BLD AUTO: 11.1 K/UL (ref 4.8–10.8)

## 2024-12-10 PROCEDURE — 700102 HCHG RX REV CODE 250 W/ 637 OVERRIDE(OP): Performed by: INTERNAL MEDICINE

## 2024-12-10 PROCEDURE — 36430 TRANSFUSION BLD/BLD COMPNT: CPT

## 2024-12-10 PROCEDURE — 86923 COMPATIBILITY TEST ELECTRIC: CPT

## 2024-12-10 PROCEDURE — 97535 SELF CARE MNGMENT TRAINING: CPT

## 2024-12-10 PROCEDURE — 94760 N-INVAS EAR/PLS OXIMETRY 1: CPT

## 2024-12-10 PROCEDURE — 86901 BLOOD TYPING SEROLOGIC RH(D): CPT

## 2024-12-10 PROCEDURE — 99232 SBSQ HOSP IP/OBS MODERATE 35: CPT | Performed by: INTERNAL MEDICINE

## 2024-12-10 PROCEDURE — 770020 HCHG ROOM/CARE - TELE (206)

## 2024-12-10 PROCEDURE — 86900 BLOOD TYPING SEROLOGIC ABO: CPT

## 2024-12-10 PROCEDURE — 30233N1 TRANSFUSION OF NONAUTOLOGOUS RED BLOOD CELLS INTO PERIPHERAL VEIN, PERCUTANEOUS APPROACH: ICD-10-PCS | Performed by: INTERNAL MEDICINE

## 2024-12-10 PROCEDURE — A9270 NON-COVERED ITEM OR SERVICE: HCPCS | Performed by: INTERNAL MEDICINE

## 2024-12-10 PROCEDURE — A9270 NON-COVERED ITEM OR SERVICE: HCPCS | Performed by: HOSPITALIST

## 2024-12-10 PROCEDURE — P9016 RBC LEUKOCYTES REDUCED: HCPCS

## 2024-12-10 PROCEDURE — 36415 COLL VENOUS BLD VENIPUNCTURE: CPT

## 2024-12-10 PROCEDURE — 86850 RBC ANTIBODY SCREEN: CPT

## 2024-12-10 PROCEDURE — 85027 COMPLETE CBC AUTOMATED: CPT

## 2024-12-10 PROCEDURE — 700102 HCHG RX REV CODE 250 W/ 637 OVERRIDE(OP): Performed by: HOSPITALIST

## 2024-12-10 PROCEDURE — 700111 HCHG RX REV CODE 636 W/ 250 OVERRIDE (IP): Mod: JZ | Performed by: INTERNAL MEDICINE

## 2024-12-10 RX ORDER — ENOXAPARIN SODIUM 100 MG/ML
40 INJECTION SUBCUTANEOUS DAILY
Status: CANCELLED | OUTPATIENT
Start: 2024-12-10

## 2024-12-10 RX ORDER — OXYCODONE HYDROCHLORIDE 5 MG/1
5 TABLET ORAL EVERY 6 HOURS PRN
Status: ON HOLD
Start: 2024-12-10 | End: 2024-12-23

## 2024-12-10 RX ORDER — AMLODIPINE BESYLATE 5 MG/1
5 TABLET ORAL DAILY
Status: CANCELLED | OUTPATIENT
Start: 2024-12-11

## 2024-12-10 RX ORDER — AMOXICILLIN 250 MG
2 CAPSULE ORAL EVERY EVENING
Qty: 30 TABLET | Refills: 0 | Status: ON HOLD | OUTPATIENT
Start: 2024-12-10 | End: 2024-12-23

## 2024-12-10 RX ORDER — LACTULOSE 10 G/15ML
30 SOLUTION ORAL 3 TIMES DAILY
Status: DISCONTINUED | OUTPATIENT
Start: 2024-12-10 | End: 2024-12-11 | Stop reason: HOSPADM

## 2024-12-10 RX ORDER — LISINOPRIL 20 MG/1
20 TABLET ORAL DAILY
Status: CANCELLED | OUTPATIENT
Start: 2024-12-11

## 2024-12-10 RX ORDER — AZITHROMYCIN 250 MG/1
500 TABLET, FILM COATED ORAL DAILY
Status: CANCELLED | OUTPATIENT
Start: 2024-12-11 | End: 2024-12-12

## 2024-12-10 RX ORDER — POLYETHYLENE GLYCOL 3350 17 G/17G
17 POWDER, FOR SOLUTION ORAL DAILY
Status: ON HOLD
Start: 2024-12-10 | End: 2024-12-23

## 2024-12-10 RX ORDER — LACTULOSE 10 G/15ML
30 SOLUTION ORAL 3 TIMES DAILY
Status: CANCELLED | OUTPATIENT
Start: 2024-12-10

## 2024-12-10 RX ADMIN — AMLODIPINE BESYLATE 5 MG: 5 TABLET ORAL at 05:25

## 2024-12-10 RX ADMIN — AMOXICILLIN AND CLAVULANATE POTASSIUM 1 TABLET: 875; 125 TABLET, FILM COATED ORAL at 16:59

## 2024-12-10 RX ADMIN — AMOXICILLIN AND CLAVULANATE POTASSIUM 1 TABLET: 875; 125 TABLET, FILM COATED ORAL at 05:24

## 2024-12-10 RX ADMIN — POLYETHYLENE GLYCOL 3350 1 PACKET: 17 POWDER, FOR SOLUTION ORAL at 05:25

## 2024-12-10 RX ADMIN — OXYCODONE 5 MG: 5 TABLET ORAL at 05:23

## 2024-12-10 RX ADMIN — ENOXAPARIN SODIUM 40 MG: 100 INJECTION SUBCUTANEOUS at 16:59

## 2024-12-10 RX ADMIN — LACTULOSE 30 ML: 20 SOLUTION ORAL at 12:20

## 2024-12-10 RX ADMIN — AZITHROMYCIN DIHYDRATE 500 MG: 250 TABLET ORAL at 05:25

## 2024-12-10 RX ADMIN — LACTULOSE 30 ML: 20 SOLUTION ORAL at 16:58

## 2024-12-10 RX ADMIN — ACETAMINOPHEN 650 MG: 325 TABLET ORAL at 14:46

## 2024-12-10 RX ADMIN — OXYCODONE 5 MG: 5 TABLET ORAL at 14:46

## 2024-12-10 RX ADMIN — SENNOSIDES AND DOCUSATE SODIUM 2 TABLET: 50; 8.6 TABLET ORAL at 16:59

## 2024-12-10 RX ADMIN — LISINOPRIL 20 MG: 20 TABLET ORAL at 05:25

## 2024-12-10 RX ADMIN — OMEPRAZOLE 20 MG: 20 CAPSULE, DELAYED RELEASE ORAL at 05:25

## 2024-12-10 ASSESSMENT — PAIN DESCRIPTION - PAIN TYPE
TYPE: SURGICAL PAIN

## 2024-12-10 ASSESSMENT — COGNITIVE AND FUNCTIONAL STATUS - GENERAL
HELP NEEDED FOR BATHING: A LOT
DAILY ACTIVITIY SCORE: 17
SUGGESTED CMS G CODE MODIFIER DAILY ACTIVITY: CK
DRESSING REGULAR LOWER BODY CLOTHING: A LOT
TOILETING: TOTAL

## 2024-12-10 NOTE — DISCHARGE PLANNING
Follow up for post acute services Spoke with spouse Jean Claude Discussed visitation POC and potential LOS 10-14 days Plan for OP follow up after rehab. Discussed ongoing medical management with hospitalist team at [facility] as well as physiatry following. Jean Claude is agreeable to transfer if approved.

## 2024-12-10 NOTE — CARE PLAN
Problem: Pain - Standard  Goal: Alleviation of pain or a reduction in pain to the patient’s comfort goal  Outcome: Progressing     Problem: Knowledge Deficit - Standard  Goal: Patient and family/care givers will demonstrate understanding of plan of care, disease process/condition, diagnostic tests and medications  Outcome: Progressing     Problem: Fall Risk  Goal: Patient will remain free from falls  Outcome: Progressing   The patient is Stable - Low risk of patient condition declining or worsening    Shift Goals  Clinical Goals: pain control,remain free from falls or injuires,IV abx,bowel movement  Patient Goals: rest  Family Goals: Good rest    Progress made toward(s) clinical / shift goals:  Pain level tolerable not requiring medication as per pt.No falls sustained.  Patient is not progressing towards the following goals:

## 2024-12-10 NOTE — PROGRESS NOTES
MD notified HR 59bpm, touched down to 49bpm per monitor tech.Vitals signs taken and recorded.Order to continue patient on telemonitor made and carried out.

## 2024-12-10 NOTE — DISCHARGE SUMMARY
Discharge Summary    CHIEF COMPLAINT ON ADMISSION  Chief Complaint   Patient presents with    Knee Pain     Patient BIB EMS for GLF, stated she landed on right knee. Patient AA&Ox4, taking aspirin 81 mg, denies head strike. Hx of fatty tumor above the right knee.     GLF       Reason for Admission  EMS    Admission Date  12/7/2024     CODE STATUS  Full Code    HPI & HOSPITAL COURSE  79 y.o. female with a past medical history of primary hypertension and atrial fibrillation who presented 12/7/2024 with knee pain after a ground-level fall. Patient had a mechanical fall hitting her right knee, since then has been having severe pain. The pain is rated 10/10. The pain is worse with attempting to move her joint. She denies hitting her head or losing consciousness .    Patient was seen by orthopedic surgery and underwent right distal femur with articular extension open reduction and internal fixation on 12/7.  She continues to do well but will require aggressive physical therapy to get back to her normal self.  She is medically cleared for transition to acute inpatient rehab.  She did have a hemoglobin dropped to 7.9 postoperatively is not showing evidence of bleeding or excessive bruising but is likely related to blood loss anemia or in the surgery itself.  Prior to transfer she had transfusion of 1 unit of blood.  She will also need to continue on aggressive bowel protocol to assist with constipation however she did have a bowel movement prior to transfer.  Hgb 8.0 today, still no signs of bleeding and I anticipate blood that she already received to help her hemoglobin continue to climb in the next 24-48 hours.    Therefore, she is discharged in good and stable condition to an inpatient rehabilitation hospital.    The patient met 2-midnight criteria for an inpatient stay at the time of discharge.      FOLLOW UP ITEMS POST DISCHARGE  Orthopedic surgery, PCP    DISCHARGE DIAGNOSES  Principal Problem:    Fracture (POA:  Yes)  Active Problems:    Essential hypertension (POA: Yes)    Gastroesophageal reflux disease without esophagitis (POA: Yes)    Obesity, Class I, BMI 30-34.9 (POA: Yes)    Anemia (POA: Yes)    Hypertensive urgency (POA: Yes)    Paroxysmal atrial fibrillation (HCC) (POA: Yes)    Cardiomyopathy due to hypertension, without heart failure (HCC) (POA: Yes)    Leukocytosis (POA: Yes)    Hypercalcemia (POA: Yes)    Hyperglycemia (POA: Unknown)    Hypoxia (POA: Unknown)    Advance care planning (POA: Unknown)  Resolved Problems:    * No resolved hospital problems. *      FOLLOW UP  No future appointments.  [facility]  [address] [facility] [facility] [id number] [phone]        Hi Garzon M.D.  [address] [facility] [id number] [phone]    Follow up in 2 week(s)        MEDICATIONS ON DISCHARGE     Medication List        START taking these medications        Instructions   amoxicillin-clavulanate 875-125 MG Tabs  Commonly known as: Augmentin   Take 1 Tablet by mouth every 12 hours for 3 days.  Dose: 1 Tablet     omeprazole 20 MG delayed-release capsule  Start taking on: December 11, 2024  Commonly known as: PriLOSEC   Take 1 Capsule by mouth every day.  Dose: 20 mg     oxyCODONE immediate-release 5 MG Tabs  Commonly known as: Roxicodone   Take 1 Tablet by mouth every 6 hours as needed for Severe Pain for up to 5 days.  Dose: 5 mg     polyethylene glycol/lytes Pack  Commonly known as: Miralax   Take 1 Packet by mouth every day.  Dose: 17 g     senna-docusate 8.6-50 MG Tabs  Commonly known as: Pericolace Or Senokot S   Take 2 Tablets by mouth every evening.  Dose: 2 Tablet            CONTINUE taking these medications        Instructions   amLODIPine 5 MG Tabs  Commonly known as: Norvasc   Take 1 Tablet by mouth every day.  Dose: 5 mg     ascorbic acid 500 MG Tabs  Commonly known as: Ascorbic Acid   Take 500 mg by mouth every day.  Dose: 500 mg     aspirin 81 MG EC tablet   Take 162-243  mg by mouth 2 times a day. 243 mg (3 X 81 mg tablets) in the morning & 162 mg (2 X 81 mg tablets) in the evening  Dose: 162-243 mg     diclofenac sodium 1 % Gel  Commonly known as: Voltaren   Apply 4 g topically 4 times a day. To knee  Dose: 4 g     fosinopril 20 MG Tabs  Commonly known as: Monopril   Take 1 Tablet by mouth every day.  Dose: 20 mg     sodium chloride 0.65 % Soln  Commonly known as: Ocean   Administer 1 Spray into affected nostril(S) 1 time a day as needed for Congestion.  Dose: 1 Spray     VITAMIN D PO   Take 1 Tablet by mouth every day.  Dose: 1 Tablet              Allergies  Allergies   Allergen Reactions    Ibuprofen Unspecified     Increased blood pressure & risk of stroke    Sulfa Drugs Vomiting and Nausea       DIET  Orders Placed This Encounter   Procedures    Diet Order Diet: Regular     Standing Status:   Standing     Number of Occurrences:   1     Order Specific Question:   Diet:     Answer:   Regular [1]       ACTIVITY  As tolerated.  Weight bearing as tolerated    LINES, DRAINS, AND WOUNDS  This is an automated list. Peripheral IVs will be removed prior to discharge.  Peripheral IV 12/07/24 20 G Left Antecubital (Active)   Site Assessment Clean;Dry;Intact 12/10/24 0800   Dressing Type Occlusive;Transparent 12/10/24 0800   Line Status Flushed;Scrubbed the hub prior to access;Saline locked 12/10/24 0800   Dressing Status Clean;Dry;Intact 12/10/24 0800   Dressing Intervention N/A 12/09/24 2000   Infiltration Grading ([facility]) 0 12/10/24 0800   Phlebitis Scale ([facility] Only) 0 12/10/24 0800       Wound 03/14/23 Incision Hip Left dermabond, zip ties, 4x4, tegaderm (Active)       Wound 12/07/24 Incision Leg Right AQUACEL, MEPILEX, ACE WRAP (Active)   Site Assessment ILIANA 12/10/24 0755   Periwound Assessment ILIANA 12/09/24 2000   Margins ILINAA 12/10/24 0755   Closure ILIANA 12/09/24 2000   Drainage Amount None 12/09/24 2000   Treatments Ice applied 12/08/24 2001   Dressing Status Clean;Dry;Intact  12/09/24 2000   Dressing Changed Observed 12/09/24 2000   Dressing Options Other (Comments) 12/09/24 2000       Peripheral IV 12/07/24 20 G Left Antecubital (Active)   Site Assessment Clean;Dry;Intact 12/10/24 0800   Dressing Type Occlusive;Transparent 12/10/24 0800   Line Status Flushed;Scrubbed the hub prior to access;Saline locked 12/10/24 0800   Dressing Status Clean;Dry;Intact 12/10/24 0800   Dressing Intervention N/A 12/09/24 2000   Infiltration Grading ([facility]) 0 12/10/24 0800   Phlebitis Scale ([facility] Only) 0 12/10/24 0800               MENTAL STATUS ON TRANSFER             CONSULTATIONS  Hi Garzon-orthopedic surgery    PROCEDURES  12/7/24 -Duran-right distal femur with articular extension open reduction and internal fixation    LABORATORY  Lab Results   Component Value Date    SODIUM 131 (L) 12/09/2024    POTASSIUM 4.9 12/09/2024    CHLORIDE 99 12/09/2024    CO2 23 12/09/2024    GLUCOSE 142 (H) 12/09/2024    BUN 35 (H) 12/09/2024    CREATININE 0.98 12/09/2024    CREATININE 0.74 10/02/2012    GLOMRATE >59 09/15/2009        Lab Results   Component Value Date    WBC 11.1 (H) 12/10/2024    WBC 5.3 09/15/2009    HEMOGLOBIN 7.9 (L) 12/10/2024    HEMATOCRIT 24.8 (L) 12/10/2024    PLATELETCT 206 12/10/2024        Total time of the discharge process exceeds 38 minutes.

## 2024-12-10 NOTE — H&P
Physical Medicine & Rehabilitation  History and Physical (H&P)  &     Post Admission Physician Evaluation (GINNY)       Date of Admission: 12/11/2024  Date of Service: 12/11/2024   Franchesca Paige    [facility] Code to Support Admission: 0008.2 - Orthopaedic Disorders: Status Post Femur (Shaft) Fracture  Etiologic Diagnosis: Other fracture of right femur, initial encounter for closed fracture (HCC)    _______________________________________________    Chief Complaint: Decreased mobility, weakness    History of Present Illness:  Patient is a 79 y.o. female with a PMH of bladder cancer, HTN, A fib, HLD, and osteopenia who presented on 12/7/24 with GLF. Patient reportedly had a mechanical fall onto right knee with severe 10/10 pain.  In ED she was found to have distal femur fracture. Orthopedic surgery was consulted and recommended ORIF. She underwent ORIF on 12/7/24 with Dr. Garzon.  Patient is WBAT.  Hospital course complicated by SOB concerning for pneumonia, leukocytosis, and HTN.  She has been started on antibiotics for the elevated WBC.      Patient tolerated transfer to [facility]. She reports anxiety. She reports she had a transfusion yesterday. Denies fever or chills. Denies SOB. Bruising to right eye, she denies cognitive changes.    Review of Systems:     Comprehensive 14 point ROS was reviewed and all were negative except as noted elsewhere in this document.     Past Medical History:  Past Medical History:   Diagnosis Date    Anesthesia     PONV, difficulty waking up    Aortic atherosclerosis (HCC) 03/22/2024    Arthritis     Bladder cancer (HCC)     bladder cancer 2007 , march    Cancer (HCC) 2003    Bladder Cancer    Caregiver stress 02/11/2022    Chronic pain of right ankle 02/08/2018    COVID-19     Childhood and mumps and measles    Delayed emergence from general anesthesia     Dizziness 02/22/2018    Dyslipidemia, goal LDL below 130     Hydronephrosis 02/11/2019    Hypertension 1992    Ocular migraine      Osteoarthritis of left hip 11/30/2022    Added automatically from request for surgery [id number]    Osteopenia     DEXA 9/24/12    Osteopenia of multiple sites     Osteoporosis of femur without pathological fracture 02/01/2023    PONV (postoperative nausea and vomiting)     Total knee replacement status        Past Surgical History:  Past Surgical History:   Procedure Laterality Date    ORIF, FRACTURE, FEMUR Right 12/7/2024    Procedure: RIGHT DISTAL FEMUR OPEN REDUCTION INTERNAL FIXATION, INTRAMEDULLARY NAIL, RIGHT THIGH LIPOMA REMOVAL;  Surgeon: Hi Garzon M.D.;  Location: SURGERY [facility];  Service: Orthopedics    [place] TOTAL HIP ARTHROPLASTY Left 3/14/2023    Procedure: LEFT TOTAL HIP ARTHROPLASTY, ANTERIOR APPROACH;  Surgeon: Arvind Diaz M.D.;  Location: SURGERY [facility];  Service: Orthopedics    OTHER Right 10/07/2018    vein ablation right thigh, Dr. Azevedo    KNEE REPLACEMENT, TOTAL  12/04/2012    Dr. Harry, left knee    COLONOSCOPY  08/03/2010    diverticulosis only, due in 2020    ORIF, WRIST Left 2008    HARDWARE REMOVAL ORTHO Left 2008    wrist    HYSTERECTOMY, TOTAL ABDOMINAL  1980s    has one half of an ovary and a fallopian tube       Family History:  Family History   Problem Relation Age of Onset    Hypertension Mother     Psychiatric Illness Mother         paranoid schizophrenic    Hypertension Father         heavy smoker    Diabetes Other         niece    Cancer Sister         skin    Cancer Paternal Aunt         breast    Psychiatric Illness Sister         paranoid schizophrenic       Medications:  Current Facility-Administered Medications   Medication Dose    hydrALAZINE (Apresoline) tablet 25 mg  25 mg    acetaminophen (Tylenol) tablet 650 mg  650 mg    senna-docusate (Pericolace Or Senokot S) 8.6-50 MG per tablet 2 Tablet  2 Tablet    And    polyethylene glycol/lytes (Miralax) Packet 1 Packet  1 Packet    docusate sodium (Enemeez) enema 283 mg  283 mg    magnesium  hydroxide (Milk Of Magnesia) suspension 30 mL  30 mL    [START ON 12/12/2024] omeprazole (PriLOSEC) capsule 20 mg  20 mg    carboxymethylcellulose (Refresh Tears) 0.5 % ophthalmic drops 1 Drop  1 Drop    benzocaine-menthol (Cepacol) lozenge 1 Lozenge  1 Lozenge    mag hydrox-al hydrox-simeth (Maalox Plus Es Or Mylanta Ds) suspension 20 mL  20 mL    ondansetron (Zofran ODT) dispertab 4 mg  4 mg    Or    ondansetron (Zofran) syringe/vial injection 4 mg  4 mg    traZODone (Desyrel) tablet 50 mg  50 mg    sodium chloride (Ocean) 0.65 % nasal spray 2 Spray  2 Spray    oxyCODONE immediate-release (Roxicodone) tablet 5 mg  5 mg    Or    oxyCODONE immediate release (Roxicodone) tablet 10 mg  10 mg    [START ON 12/12/2024] amLODIPine (Norvasc) tablet 5 mg  5 mg    amoxicillin-clavulanate (Augmentin) 875-125 MG per tablet 1 Tablet  1 Tablet    enoxaparin (Lovenox) inj 40 mg  40 mg    lactulose 20 GM/30ML solution 30 mL  30 mL    [START ON 12/12/2024] lisinopril (Prinivil) tablet 20 mg  20 mg       Allergies:  Ibuprofen and Sulfa drugs    Psychosocial History:  Housing / Facility: (Patient-Rptd) (P) 1 Story House  Steps Into Home: 3  Lives with - Patient's Self Care Capacity: (Patient-Rptd) (P) Spouse  Equipment Owned: Front-Wheel Walker, Single Point Cane     Prior Level of Function / Living Situation:   Physical Therapy: Prior Services: None  Housing / Facility: (Patient-Rptd) (P) 1 Story House  Steps Into Home: 3  Rail: Both Rail (Steps into Home)  Bathroom Set up: Walk In Shower, Shower Chair  Equipment Owned: Front-Wheel Walker, Single Point Cane  Lives with - Patient's Self Care Capacity: (Patient-Rptd) (P) Spouse  Bed Mobility: Independent  Transfer Status: Independent  Ambulation: Independent  Assistive Devices Used: None  Stairs: Independent  Current Level of Function:   Gait Level Of Assist: Unable to Participate  Assistive Device: Front Wheel Walker  Distance (Feet): 0  Weight Bearing Status: WBAT RLE  Supine to  "Sit: Moderate Assist  Sit to Supine: Maximal Assist  Scooting: Moderate Assist  Skilled Intervention: Verbal Cuing, Sequencing, Facilitation, Compensatory Strategies  Comments: w/HOB elevated and rails up  Sit to Stand: Minimal Assist  Bed, Chair, Wheelchair Transfer: Unable to Participate  Toilet Transfers: Unable to Participate  Transfer Method: Stand Step  Skilled Intervention: Verbal Cuing, Sequencing, Facilitation, Compensatory Strategies  Sitting in Chair: NT  Sitting Edge of Bed: 15 mins  Standin-1 mins seconds x 4  Occupational Therapy:   Self Feeding: Independent  Grooming / Hygiene: Independent  Bathing: Independent  Dressing: Independent  Toileting: Independent  Medication Management: Independent  Laundry: Independent  Kitchen Mobility: Independent  Finances: Unable To Determine At This Time  Home Management: Independent  Shopping: Unable To Determine At This Time  Prior Level Of Mobility: Independent With Device in Home  Driving / Transportation: Relatives / Others Provide Transportation  Prior Services: None  Housing / Facility: (Patient-Rptd) (P) 1 Story House  Occupation (Pre-Hospital Vocational): Not Employed  Leisure Interests: Pets, Hobbies  Current Level of Function:   Upper Body Dressing: Modified Independent  Lower Body Dressing: Maximal Assist  Toileting: Total Assist    CURRENT LEVEL OF FUNCTION:   Same as level of function prior to admission to [facility]    Physical Examination:     VITAL SIGNS:   height is 1.702 m (5' 7\") and weight is 97.1 kg (214 lb). Her oral temperature is 36.5 °C (97.7 °F). Her blood pressure is 116/52 and her pulse is 65. Her respiration is 20 and oxygen saturation is 94%.   GENERAL: No apparent distress  HEENT: Normocephalic/atraumatic, EOMI, and PERRL  CARDIAC: Regular rate and rhythm, normal S1, S2   LUNGS: Clear to auscultation   ABDOMINAL: bowel sounds present, soft, and nontender    EXTREMITIES: no contractures, spasticity, or " edema.  NEURO:  Mental status:  A&Ox4 (person, place, date, situation) answers questions appropriately  Speech: fluent, no aphasia or dysarthria  CRANIAL NERVES: Face symmetric  Motor:    5/5 BUE  3/5 R HF/KE otherwise 5/5 BLE (pain limited)  Sensory: intact to light touch through out      Radiology:                           CT Knee    IMPRESSION:     1.  Comminuted, displaced intra-articular distal femur fracture.  2.  No dislocation.  3.  Small hemarthrosis and surrounding soft tissue swelling/hemorrhage.                       Laboratory Values:  Recent Labs     12/09/24  0158 12/11/24  0251   SODIUM 131* 130*   POTASSIUM 4.9 4.2   CHLORIDE 99 96   CO2 23 24   GLUCOSE 142* 122*   BUN 35* 31*   CREATININE 0.98 0.71   CALCIUM 10.3* 10.4*     Recent Labs     12/09/24 0158 12/09/24  0957 12/10/24  0729 12/11/24  0251   WBC 14.6*  --  11.1* 12.1*   RBC 2.86*  --  2.65* 2.65*   HEMOGLOBIN 8.7* 8.5* 7.9* 8.0*   HEMATOCRIT 25.9* 25.6* 24.8* 24.2*   MCV 90.6  --  93.6 91.3   MCH 30.4  --  29.8 30.2   MCHC 33.6  --  31.9* 33.1   RDW 45.0  --  45.3 43.9   PLATELETCT 205  --  206 197   MPV 10.2  --  10.1 9.9             Primary Rehabilitation Diagnosis:    This patient is a 79 y.o. female admitted for acute inpatient rehabilitation with Other fracture of right femur, initial encounter for closed fracture (HCC).    Impairments:   ADLs/IADLs  Mobility    Secondary Diagnosis/Medical Co-morbidities Affecting Function  HTN  Pneumonia  Leukocytosis  Anemia  Hyponatremia  Azotemia  Obesity due to excess calories    Relevant Changes Since Preadmission Evaluation:    Status unchanged    The patient's rehabilitation potential is Good  The patient's medical prognosis is good    Rehabilitation Plan:   Discussion and Recommendations:   1. The patient requires an acute inpatient rehabilitation program with a coordinated program of care at an intensity and frequency not available at a lower level of care. This recommendation is  substantiated by the patient's medical physicians who recommend that the patient's intervention and assessment of medical issues needs to be done at an acute level of care for patient's safety and maximum outcome.   2. A coordinated program of care will be supplied by an interdisciplinary team of physical therapy, occupational therapy, rehab physician, rehab nursing, and, if needed, speech therapy and rehab psychology. Rehab team presents a patient-specific rehabilitation and education program concentrating on prevention of future problems related to accessibility, mobility, skin, bowel, bladder, sexuality, and psychosocial and medical/surgical problems.   3. Need for Rehabilitation Physician: The rehab physician will be evaluating the patient on a multi-weekly basis to help coordinate the program of care. The rehab physician communicates between medical physicians, therapists, and nurses to maximize the patient's potential outcome. Specific areas in which the rehab physician will be providing daily assessment include the following:   A. Assessing the patient's heart rate and blood pressure response (vitals monitoring) to activity and making adjustments in medications or conservative measures as needed.   B. The rehab physician will be assessing the frequency at which the program can be increased to allow the patient to reach optimal functional outcome.   C. The rehab physician will also provide assessments in daily skin care, especially in light of patient's impairments in mobility.   D. The rehab physician will provide special expertise in understanding how to work with functional impairment and recommend appropriate interventions, compensatory techniques, and education that will facilitate the patient's outcome.   4. Rehab R.N.   The rehab RN will be working with patient to carry over in room mobility and activities of daily living when the patient is not in 3 hours of skilled therapy. Rehab nursing will be  working in conjunction with rehab physician to address all the medical issues above and continue to assess laboratory work and discuss abnormalities with the treating physicians, assess vitals, and response to activity, and discuss and report abnormalities with the rehab physician. Rehab RN will also continue daily skin care, supervise bladder/bowel program, instruct in medication administration, and ensure patient safety.   5. Rehab Therapy: Therapies to treat at intensity and frequency of (may change after completion of evaluation by all therapeutic disciplines):       PT:  Physical therapy to address mobility, transfer, gait training and evaluation for adaptive equipment needs 1 and 1/2 hour/day at least 5 days/week for the duration of the ELOS (see below)       OT:  Occupational therapy to address ADLs, self-care, home management training, functional mobility/transfers and assistive device evaluation, and community re-integration 1 and 1/2 hour/day at least 5 days/week for the duration of the ELOS (see below).     Medical management / Rehabilitation Issues/ Adverse Potential as part of rehabilitation plan     Rehabilitation Issues/Adverse Potential  1.  R distal femur fracture - Patient with mechanical fall onto right knee on 12/7 with distal femur fracture s/p ORIF on 12/7 with Dr. Garzon. Patient demonstrates functional deficits in strength, balance, coordination, and ADL's. Patient is admitted to [facility] for comprehensive rehabilitation therapy as described below.   Rehabilitation nursing monitors bowel and bladder control, educates on medication administration, co-morbidities and monitors patient safety.    2.  Neurostimulants: None at this time but continue to assess daily for need to initiate should status change.    3.  DVT prophylaxis:  Patient is on Lovenox for anticoagulation upon transfer. Encourage OOB. Monitor daily for signs and symptoms of DVT including but not limited  to swelling and pain to prevent the development of DVT that may interfere with therapies.    4.  GI prophylaxis:  On prilosec to prevent gastritis/dyspepsia which may interfere with therapies.    5.  Pain: No issues with pain currently / Controlled with APAP/Oxycodone    6.  Nutrition/Dysphagia: Dietician monitors nutrient intake, recommend supplements prn and provide nutrition education to pt/family to promote optimal nutrition for wound healing/recovery.     7.  Bladder/bowel:  Start bowel and bladder program as described below, to prevent constipation, urinary retention (which may lead to UTI), and urinary incontinence (which will impact upon pt's functional independence).   - Post void bladder scans, I&O cath for PVRs >400  - up to commode after meal     8.  Skin/dermal ulcer prophylaxis: Monitor for new skin conditions with q.2 h. turns as required to prevent the development of skin breakdown.     9.  Cognition/Behavior: As needed psychologist provides adjustment counseling to illness and psychosocial barriers that may be potential barriers to rehabilitation.     10. Respiratory therapy: RT performs O2 management prn, breathing retraining, pulmonary hygiene and bronchospasm management prn to optimize participation in therapies.     Medical Co-Morbidities/Adverse Potential Affecting Function:   R distal femur fracture - Patient with mechanical fall onto right knee on 12/7 with distal femur fracture s/p ORIF on 12/7 with Dr. Garzon  -PT and OT for mobility and ADLs. Per guidelines, 15 hours per week between PT, OT and/or SLP.  -Follow-up Dr. Garzon. WBAT    HTN - Patient on Amlodipine 5 mg daily and Lisinopril 20 mg daily     Pneumonia - Patient on Augmentin and Zithromax. On transfer    Leukocytosis - Check AM CBC, on antibiotics    Anemia - Check AM CBC     Hyponatremia - Check AM CMP    Azotemia - Check AM CMP    Obesity due to excess calories - BMI of 32.1 on admission, meets medical criteria.  Dietitian to consult    Pain - Patient on PRN Oxycodone and Tylenol     Skin - Patient at risk for skin breakdown due to debility in areas including sacrum, achilles, elbows and head in addition to other sites. Nursing to assess skin daily.     GI Ppx - Patient on Prilosec for GERD prophylaxis. Patient on Senna-docusate for constipation prophylaxis.   Last BM: 12/11/24 (per report)    DVT Ppx - Patient Lovenox on transfer.    I personally performed a complete drug regimen review and no potential clinically significant medication issues were identified.     Goals/Expected Level of Function Based on Current Medical and Functional Status:  (may change based on patient's medical status and rate of impairment recovery)  Transfers:   Modified Independent  Mobility/Gait:   Modified Independent  ADL's:   Modified Independent    DISPOSITION: Discharge to pre-morbid independent living setting with the supportive care of patient's family.    ELOS: 7-14 days  ____________________________________    T. Brian Souza MD/PhD  [facility] - Physical Medicine & Rehabilitation   [facility] - Brain Injury Medicine   ____________________________________    Pt was seen today for 76 min. Time spent included pre-admission screening, pre-admission review of vitals and laboratory values/tests, unit/floor time, face-to-face time with the patient including physical examination, care coordination, counseling of patient and/or family, ordering medications/procedures/tests, discussion with other healthcare providers, and/or documentation in the electronic medical record.

## 2024-12-10 NOTE — PROGRESS NOTES
Telemetry summary    Rhythm: SB  Rate: 51-59  Ectopy: rPAC  Measurements: 0.18/0.10/0.40    Normal Values  Rhythm: SR  HR Range:   Measurement: 0.12-0.2/0.06-0.10/0.30-0.52

## 2024-12-10 NOTE — PROGRESS NOTES
Hospital Medicine Daily Progress Note    Date of Service  12/10/2024    Chief Complaint  Franchesca Paige is a 79 y.o. female admitted 12/7/2024 with hip pain after a fall.    Hospital Course  79 y.o. female with a past medical history of primary hypertension and atrial fibrillation who presented 12/7/2024 with knee pain after a ground-level fall. Patient had a mechanical fall hitting her right knee, since then has been having severe pain. The pain is rated 10/10. The pain is worse with attempting to move her joint. She denies hitting her head or losing consciousness .     Patient was seen by orthopedic surgery and underwent right distal femur with articular extension open reduction and internal fixation on 12/7.  She continues to do well but will require aggressive physical therapy to get back to her normal self.  She is medically cleared for transition to acute inpatient rehab.  She did have a hemoglobin dropped to 7.9 postoperatively is not showing evidence of bleeding or excessive bruising but is likely related to blood loss anemia or in the surgery itself.  Prior to transfer she had transfusion of 1 unit of blood.  She will also need to continue on aggressive bowel protocol to assist with constipation.    Interval Problem Update  12/10 patient receiving blood transfusion today given her significant weakness and fatigue.  I do believe that she needs to have a hemoglobin greater than 8 because of her cardiac history and will transfuse her today.  Anticipate if she feels better tomorrow she should be medically cleared for transfer to acute inpatient rehab.    I have discussed this patient's plan of care and discharge plan at IDT rounds today with Case Management, Nursing, Nursing leadership, and other members of the IDT team.    Consultants/Specialty  orthopedics    Code Status  Full Code    Disposition  <MEDICALLYCLEARED>  I have placed the appropriate orders for post-discharge needs.    Review of Systems  ROS      Physical Exam  Temp:  [36.1 °C (97 °F)-37.3 °C (99.2 °F)] 36.7 °C (98 °F)  Pulse:  [59-73] 63  Resp:  [16-18] 16  BP: ()/(51-67) 120/56  SpO2:  [63 %-99 %] 96 %    Physical Exam    Fluids    Intake/Output Summary (Last 24 hours) at 12/10/2024 1556  Last data filed at 12/10/2024 0500  Gross per 24 hour   Intake 120 ml   Output 400 ml   Net -280 ml        Laboratory  Recent Labs     12/08/24 0221 12/09/24 0158 12/09/24  0957 12/10/24  0729   WBC 15.7* 14.6*  --  11.1*   RBC 3.70* 2.86*  --  2.65*   HEMOGLOBIN 11.1* 8.7* 8.5* 7.9*   HEMATOCRIT 34.1* 25.9* 25.6* 24.8*   MCV 92.2 90.6  --  93.6   MCH 30.0 30.4  --  29.8   MCHC 32.6 33.6  --  31.9*   RDW 45.3 45.0  --  45.3   PLATELETCT 255 205  --  206   MPV 9.6 10.2  --  10.1     Recent Labs     12/08/24 0221 12/09/24 0158   SODIUM 136 131*   POTASSIUM 4.5 4.9   CHLORIDE 103 99   CO2 22 23   GLUCOSE 198* 142*   BUN 24* 35*   CREATININE 0.78 0.98   CALCIUM 10.0 10.3*                   Imaging  DX-CHEST-PORTABLE (1 VIEW)   Final Result      New left basilar atelectasis versus infiltrate.      DX-FEMUR-2+ RIGHT   Final Result      Digitized intraoperative radiograph is submitted for review. This examination is not for diagnostic purpose but for guidance during a surgical procedure. Please see the patient's chart for full procedural details.         INTERPRETING LOCATION: [address] [place] [address], [id number]      DX-PORTABLE FLUORO > 1 HOUR   Final Result      Portable fluoroscopy utilized for 2 minutes 44 seconds.         INTERPRETING LOCATION: [facility], [address]      CT-KNEE W/O PLUS RECONS RIGHT   Final Result      1.  Comminuted, displaced intra-articular distal femur fracture.   2.  No dislocation.   3.  Small hemarthrosis and surrounding soft tissue swelling/hemorrhage.      DX-KNEE 3 VIEWS RIGHT   Final Result      Significantly comminuted, intra-articular and displaced distal femur fracture      DX-CHEST-PORTABLE (1 VIEW)   Final Result      No acute  "cardiopulmonary abnormality.           Assessment/Plan  * Fracture- (present on admission)  Assessment & Plan  12/8: Patient underwent: \"Right distal femur with articular extension open reduction with internal fixation\" yesterday by orthopedic surgery.  We appreciate the recommendations.    12/9: Pending placement.    Advance care planning  Assessment & Plan  12/9: I discussed with patient for at least 16 minutes further goals of care.  The patient was interested in filling out a POLST form.  I filled out a POLST form together at bedside with the patient.  The patient would like to have CPR attempted, the patient would like to have full treatment options including intubation, mechanical ventilation and transferred to the ICU as needed.  The patient would like to have artificial nutrition/feeding tube trial no longer than 1 week.  The patient has decisional capacity.  The patient signed the POLST form.  I have given the POLST form to nursing to upload to the system.    Hypoxia  Assessment & Plan  Concern for pneumonia, we have started antibiotics  monitor    Hyperglycemia  Assessment & Plan  --A1c is 5.8,  on lifestyle modifications    Hypercalcemia- (present on admission)  Assessment & Plan  12/8: Seems to have resolved    Leukocytosis- (present on admission)  Assessment & Plan  Concern for pneumonia  We have started antibiotics  monitor    Cardiomyopathy due to hypertension, without heart failure (HCC)- (present on admission)  Assessment & Plan  EKG which shows sinus bradycardia with a rate of 55.  There is high voltage and ST depressions in lead I, aVL and leads V4-V6 these findings are consistent with left ventricular hypertrophy.  QTc is 403  I will start hydralazine intravenously as needed for extreme hypertension.  I will start home lisinopril and amlodipine with hold parameters.  I will place on continuous cardiac monitoring    Paroxysmal atrial fibrillation (HCC)- (present on admission)  Assessment & " Plan  Not on blocking agents.  Hx of  Not on anticoagulation  I will monitor on telemetry.     Hypertensive urgency- (present on admission)  Assessment & Plan  12/9: continue amlodipine and lisinopril for now.    Anemia- (present on admission)  Assessment & Plan  12/9: Hb this morning was 8.7, however repeat hemoglobin is 8.5.  I suspect acute blood loss anemia due to recent surgery.  Continue to monitor closely.    12/10: Globin 7.9 this morning, no sign of active bleeding and is still likely related to recent surgery.  Given her cardiac history will transfuse to keep hemoglobin greater than 8.0.  Anticipate she should be able to transfer to rehab tomorrow    Obesity, Class I, BMI 30-34.9- (present on admission)  Assessment & Plan  At higher risk for atelectasis/hypoxia.  I will order continuous pulse oximetry  Emphasized incentive spirometry       Gastroesophageal reflux disease without esophagitis- (present on admission)  Assessment & Plan  12/9: Continue PPI    Essential hypertension- (present on admission)  Assessment & Plan  12/9: Continue amlodipine and lisinopril with hold parameters    12/10: Normotensive         VTE prophylaxis: VTE Selection    I have performed a physical exam and reviewed and updated ROS and Plan today (12/10/2024). In review of yesterday's note (12/9/2024), there are no changes except as documented above.

## 2024-12-10 NOTE — THERAPY
Occupational Therapy  Daily Treatment     Patient Name: Franchesca Paige  Age:  79 y.o., Sex:  female  Medical Record #: [id number]  Today's Date: 12/10/2024     Precautions  Precautions: (P) Fall Risk, Weight Bearing As Tolerated Right Lower Extremity  Comments: (P) Orthostatic    Assessment    Pt demonstrated Max assist supine to EOB sitting, Mod assist sit/stand, Max assist squat Pivot transfer to bedside chair, Total assist toileting, max assist LBCM, MI UBCM.  Therapist reviewed environmental/safety awareness, fall precautions, AE/DME, ADL's and transfers.      Plan    Treatment Plan Status: (P) Continue Current Treatment Plan  Type of Treatment: Self Care / Activities of Daily Living, Neuro Re-Education / Balance, Therapeutic Activity  Treatment Frequency: 4 Times per Week  Treatment Duration: Until Therapy Goals Met    DC Equipment Recommendations: Unable to determine at this time  Discharge Recommendations: (P) Recommend post-acute placement for additional occupational therapy services prior to discharge home    Subjective    Pt was alert and cooperative w/ tx.     Objective       12/10/24 1055    Services   Is patient using  services for this encounter? No   Precautions   Precautions Fall Risk;Weight Bearing As Tolerated Right Lower Extremity   Comments Orthostatic   Vitals   Pulse 68   Patient BP Position Sitting   Blood Pressure  108/67   Respiration 17   Pulse Oximetry 97 %   O2 (LPM) 1   O2 Delivery Device Silicone Nasal Cannula   Vitals Comments Pt seated at EOB for above reading.  After transfer to bedside chair, vitals:  BP @ 97/43,, HR 65 BPM, O2 @ 96% while on 1.0L supplmental O2.   Pain   Intervention Rest;Repositioned   Pain 0 - 10 Group   Location Hip   Location Orientation Right   Description Aching   Comfort Goal Comfort with Movement;Perform Activity   Non Verbal Descriptors   Non Verbal Scale  Calm   Cognition    Level of Consciousness Alert   Balance   Sitting Balance  (Static) Fair   Sitting Balance (Dynamic) Fair -   Standing Balance (Static) Fair -   Standing Balance (Dynamic) Poor +   Weight Shift Sitting Fair   Weight Shift Standing Poor   Skilled Intervention Verbal Cuing;Tactile Cuing;Postural Facilitation;Facilitation   Bed Mobility    Supine to Sit Maximal Assist   Comments Pt transferred to bedside chair at end of session.   Activities of Daily Living   Upper Body Dressing Modified Independent   Lower Body Dressing Maximal Assist   Toileting Total Assist   How much help from another person does the patient currently need...   Putting on and taking off regular lower body clothing? 2   Bathing (including washing, rinsing, and drying)? 2   Toileting, which includes using a toilet, bedpan, or urinal? 1   Putting on and taking off regular upper body clothing? 4   Taking care of personal grooming such as brushing teeth? 4   Eating meals? 4   6 Clicks Daily Activity Score 17   Functional Mobility   Sit to Stand Moderate Assist   Bed, Chair, Wheelchair Transfer Maximal Assist   Transfer Method Squat Pivot   Skilled Intervention Verbal Cuing;Tactile Cuing;Postural Facilitation;Facilitation   Short Term Goals   Short Term Goal # 1 ADL transfers with [place] within 5 days   Goal Outcome # 1 Progressing slower than expected   Short Term Goal # 2 LB dressing with CGA, AE as needed, within 5 days   Goal Outcome # 2 Progressing slower than expected   Short Term Goal # 3 Grooming at sink with CGA within 5 days   Goal Outcome # 3 Progressing slower than expected   Short Term Goal # 4 Toileting in br with CGA within 5 days   Goal Outcome # 4 Progressing slower than expected   Occupational Therapy Treatment Plan    O.T. Treatment Plan Continue Current Treatment Plan   Anticipated Discharge Equipment and Recommendations   Discharge Recommendations Recommend post-acute placement for additional occupational therapy services prior to discharge home

## 2024-12-10 NOTE — HOSPITAL COURSE
79 y.o. female with a past medical history of primary hypertension and atrial fibrillation who presented 12/7/2024 with knee pain after a ground-level fall. Patient had a mechanical fall hitting her right knee, since then has been having severe pain. The pain is rated 10/10. The pain is worse with attempting to move her joint. She denies hitting her head or losing consciousness .     Patient was seen by orthopedic surgery and underwent right distal femur with articular extension open reduction and internal fixation on 12/7.  She continues to do well but will require aggressive physical therapy to get back to her normal self.  She is medically cleared for transition to acute inpatient rehab.  She did have a hemoglobin dropped to 7.9 postoperatively is not showing evidence of bleeding or excessive bruising but is likely related to blood loss anemia or in the surgery itself.  Prior to transfer she had transfusion of 1 unit of blood.  She will also need to continue on aggressive bowel protocol to assist with constipation.

## 2024-12-10 NOTE — PREADMISSION SCREENING NOTE
Pre-Admission Screening Form    Patient Information:   Name: Franchesca Paige     MRN: [id number]       : 1945      Age: 79 y.o.   Gender: female      Race: White [7]       Marital Status:  [2]  Family Contact: Jean Claude Paige        Relationship: Spouse [17]  Home Phone: [phone]           Cell Phone: [phone]  Advanced Directives: Copy in Chart  Code Status:  FULL  Current Attending Provider: Baylee Barrett D.O.  Referring Physician: Dr. Jimmy Redman      Physiatrist Consult: Dr. Souza        Referral Date: 2024  Primary Payor Source:  MEDICARE  Secondary Payor Source:  MISCELLANEOUS    Medical Information:   Date of Admission to Acute Care [place]2024  Room Number: 2201/01  Rehabilitation Diagnosis: 0008.2 - Orthopaedic Disorders: Status Post Femur (Shaft) Fracture  Immunization History   Administered Date(s) Administered    Covid-19 Mrna (Spikevax) Moderna 12+ Years 10/26/2023    INFLUENZA TIV (IM) 2012, 10/05/2014    Influenza Seasonal Injectable - Historical Data 2011, 2012, 10/05/2014    Influenza Vaccine Adult HD 2015, 2016, 10/11/2017, 10/29/2018, 10/30/2019, 10/19/2020, 2021, 10/18/2022, 10/26/2023    Influenza split virus trivalent (PF) 10/02/2012, 10/30/2013    MODERNA BIVALENT BOOSTER SARS-COV-2 VACCINE (6+) 10/31/2022    MODERNA SARS-COV-2 VACCINE (12+) 2021, 2021    PFIZER PURPLE CAP SARS-COV-2 VACCINATION (12+) 2021    Pneumococcal Conjugate Vaccine (Prevnar/PCV-13) 2016    Pneumococcal Conjugate, unspecified formulation 2012    Pneumococcal polysaccharide vaccine (PPSV-23) 2012, 06/15/2020    Tdap Vaccine 2009, 2020    Tetanus Vaccine - HISTORICAL DATA 2012    Zoster Vaccine Live (ZVL) (Zostavax) - HISTORICAL DATA 2012    Zoster Vaccine Recombinant (RZV) (SHINGRIX) 2021     Allergies   Allergen Reactions    Ibuprofen Unspecified     Increased blood pressure  & risk of stroke    Sulfa Drugs Vomiting and Nausea     Past Medical History:   Diagnosis Date    Anesthesia     PONV, difficulty waking up    Aortic atherosclerosis (HCC) 03/22/2024    Arthritis     Bladder cancer (HCC)     bladder cancer 2007 , march    Cancer (HCC) 2003    Bladder Cancer    Caregiver stress 02/11/2022    Chronic pain of right ankle 02/08/2018    COVID-19     Childhood and mumps and measles    Delayed emergence from general anesthesia     Dizziness 02/22/2018    Dyslipidemia, goal LDL below 130     Hydronephrosis 02/11/2019    Hypertension 1992    Ocular migraine     Osteoarthritis of left hip 11/30/2022    Added automatically from request for surgery [id number]    Osteopenia     DEXA 9/24/12    Osteopenia of multiple sites     Osteoporosis of femur without pathological fracture 02/01/2023    PONV (postoperative nausea and vomiting)     Total knee replacement status      Past Surgical History:   Procedure Laterality Date    ORIF, FRACTURE, FEMUR Right 12/7/2024    Procedure: RIGHT DISTAL FEMUR OPEN REDUCTION INTERNAL FIXATION, INTRAMEDULLARY NAIL, RIGHT THIGH LIPOMA REMOVAL;  Surgeon: Hi Garzon M.D.;  Location: SURGERY [facility];  Service: Orthopedics    CO TOTAL HIP ARTHROPLASTY Left 3/14/2023    Procedure: LEFT TOTAL HIP ARTHROPLASTY, ANTERIOR APPROACH;  Surgeon: Arvind Diaz M.D.;  Location: SURGERY [facility];  Service: Orthopedics    OTHER Right 10/07/2018    vein ablation right thigh, Dr. Azevedo    KNEE REPLACEMENT, TOTAL  12/04/2012    Dr. Harry, left knee    COLONOSCOPY  08/03/2010    diverticulosis only, due in 2020    ORIF, WRIST Left 2008    HARDWARE REMOVAL ORTHO Left 2008    wrist    HYSTERECTOMY, TOTAL ABDOMINAL  1980s    has one half of an ovary and a fallopian tube       History Leading to Admission, Conditions that Caused the Need for Rehab (CMS):   Scar Mc M.D.  Physician  [facility]     H&P     Signed     Date of Service: 12/7/2024 12:31  PM    Expand All Collapse All    Hospital Medicine History & Physical Note     Date of Service  12/7/2024     Primary Care Physician  Tate De La Rosa M.D.     Consultants  Orthopedics Dr Garzon        Code Status  Full Code     Chief Complaint    Chief Complaint  Patient presents with   Knee Pain      Patient BIB EMS for GLF, stated she landed on right knee. Patient AA&Ox4, taking aspirin 81 mg, denies head strike. Hx of fatty tumor above the right knee.    GLF     History of Presenting Illness  Franchesca Paige is a 79 y.o. female with a past medical history of primary hypertension and atrial fibrillation who presented 12/7/2024 with knee pain after a ground-level fall.  Patient had a mechanical fall hitting her right knee, since then has been having severe pain.  The pain is rated 10/10.  The pain is worse with attempting to move her joint.  She denies hitting her head or losing consciousness.      I discussed the plan of care with emergency physician, the patient and patient family present at bedside in the emergency room     Assessment/Plan:  Justification for Admission Status  I anticipate this patient will require at least two midnights for appropriate medical management, necessitating inpatient admission because the patient has a femoral fracture will require orthopedic consultation and expedited surgical repair     Patient will need a Med/Surg bed on medical service      * Fracture- (present on admission)  Assessment & Plan  Imaging show evidence for a comminuted, displaced intra-articular distal femur fracture   Orthopedics Dr Garzon consulted, plan for operative management  Multimodal pain control  Consider physical and Occupational Therapy, pharmacologic prophylaxis when okay with orthopedics       Cardiomyopathy due to hypertension, without heart failure (HCC)- (present on admission)  Assessment & Plan  EKG which shows sinus bradycardia with a rate of 55.  There is high voltage and ST  depressions in lead I, aVL and leads V4-V6 these findings are consistent with left ventricular hypertrophy.  QTc is 403  I will start hydralazine intravenously as needed for extreme hypertension.  I will start home lisinopril and amlodipine with hold parameters.  I will place on continuous cardiac monitoring     Preoperative examination- (present on admission)  Assessment & Plan  Medical Assessment Risk:  High     Patient age above 65.  She has a number of medical problems including atrial fibrillation, primary hypertension, hypertensive cardiomyopathy and she will require expedited surgical intervention     Surgical Risk:   Intermediate     Cardiovascular:   The patient has no known history of ischemic heart disease or heart failure.  She has hypertensive cardiomyopathy and atrial fibrillation  I will order a Pre-op EKG     Pulmonary:  At higher risk for atelectasis/hypoxia.  I will order continuous pulse oximetry  Emphasized incentive spirometry        Renal:   I will start intravenous fluids  I will order follow-up BUN and creatinine      Neurologic:   Avoid fentanyl (short-acting)  Acetaminophen PO TID PRN  Try to minimize using opioid Pain medications.    If patient develops delirium or agitation consider quetiapine 12.5 mg PO x1 PRN agitation (can repeat x1 in 2 hours PRN agitation).       Hematologic:  Plan on pharmacologic DVT prophylaxis post operative day #1. Hold for decreasing hemoglobin. Notify provider for hemoglobin less than 8.     Hypertensive urgency- (present on admission)  Assessment & Plan  Likely secondary to severe pain.  I will start hydralazine as needed for extreme hypertension  Multimodal pain control     Leukocytosis- (present on admission)  Assessment & Plan  Likely reactive secondary to fracture and severe pain  No focal sign of infection at this point   Continue to montior off antibiotics for now       Paroxysmal atrial fibrillation (HCC)- (present on admission)  Assessment & Plan  Not  on blocking agents.  I will monitor on telemetry.      Obesity, Class I, BMI 30-34.9- (present on admission)  Assessment & Plan  At higher risk for atelectasis/hypoxia.  I will order continuous pulse oximetry  Emphasized incentive spirometry        Essential hypertension- (present on admission)  Assessment & Plan  I will start amlodipine and lisinopril with hold parameters     Anemia- (present on admission)  Assessment & Plan  No evidence of gross bleeding, other than likely hematoma secondary to fracture.  Monitor hemoglobin. I will order a follow-up CBC.  Transfuse for a hemoglobin of less than or equal to 7.       ACP (advance care planning)- (present on admission)  Assessment & Plan  I had a discussion with the patient and family [ present at bedside in the emergency room] regarding goals of care, diagnoses, prognosis, and CODE STATUS. We discussed her prognosis and comorbidities.  The patient has advanced age of 79 years.  She has a number of chronic medical problems including atrial fibrillation, primary hypertension, hypertensive cardiomyopathy and is presenting with a femoral fracture.  At this point the patient wants to maintain a full code.  She is open to all forms of invasive or noninvasive diagnostic and therapeutic interventions.       Gastroesophageal reflux disease without esophagitis- (present on admission)  Assessment & Plan  I will start Tums as needed         VTE prophylaxis: SCDs/TEDs     I had a discussion with the patient and family [ present at bedside in the emergency room] regarding goals of care, diagnoses, prognosis, and CODE STATUS. We discussed her prognosis and comorbidities.  The patient has advanced age of 79 years.  She has a number of chronic medical problems including atrial fibrillation, primary hypertension, hypertensive cardiomyopathy and is presenting with a femoral fracture.  At this point the patient wants to maintain a full code.  She is open to all forms of  invasive or noninvasive diagnostic and therapeutic interventions.  I spent 17 minutes on advanced care planning.    Hi Garzon M.D.  Physician  Surgery Orthopedic     OP Report     Signed     Date of Service: 12/7/2024  6:35 PM      Operative Report     Date of Surgery: 12/7/2024     Operating Surgeon:   Primary: Hi Garzon M.D.       Preoperative diagnosis:  Right distal femur fracture, intra-articular     Postoperative diagnosis:   Right distal femur fracture, intra-articular     Procedure:  Right distal femur with articular extension open reduction with internal fixation     Implants: Ramon   Postoperative Plan:  - Weightbearing Status: Weightbearing as tolerated   - ROM: As Tolerated  - Antibiotics: Ancef 24 horus  - Pain Control: Per protocol  - DVT Prophylaxis: Per protocol until discharge, home ASA at discharge  - PT/OT  - Disposition: to floor. Followup 3 weeks with me in [facility] ortho trauma clinic with XR R femur     Co-morbidities:  as listed above and below   Potential Risk - Complications: Contractures, Deep Vein Thrombosis, Incontinence, Malnutrition, Pain, Perceptual Impairment, Pneumonia, Pressure Ulcer, Urinary Tract Infection, and fall risk infection risk   Level of Risk: High    Ongoing Medical Management Needed (Medical/Nursing Needs):   Patient Active Problem List    Diagnosis Date Noted    Hyperglycemia 12/08/2024    Hypoxia 12/08/2024    Advance care planning 12/08/2024    Fracture 12/07/2024    Preoperative examination 12/07/2024    Cardiomyopathy due to hypertension, without heart failure (HCC) 12/07/2024    Class 1 obesity without serious comorbidity with body mass index (BMI) of 31.0 to 31.9 in adult 12/07/2024    Leukocytosis 12/07/2024    Hypercalcemia 12/07/2024    Paroxysmal SVT (supraventricular tachycardia) (HCC) 07/01/2024    Aortic atherosclerosis (HCC) 03/22/2024    Other forms of angina pectoris (HCC) 03/22/2024    Thyromegaly 03/22/2024    Onychomycosis of  "toenail 11/14/2023    Hypercoagulable state due to paroxysmal atrial fibrillation (HCC) 11/08/2023    Coronary atherosclerosis due to calcified coronary lesion 11/08/2023    Fibrosis of liver 11/08/2023    History of community acquired pneumonia 11/08/2023    Hypertensive urgency 10/07/2023    Paroxysmal atrial fibrillation (HCC) 10/07/2023    ACP (advance care planning) 10/07/2023    Status post left hip replacement 05/22/2023    Decreased hearing, bilateral 05/22/2023    Osteoporosis of femur without pathological fracture 02/01/2023    Caregiver stress 02/11/2022    Situational depression 02/11/2022    Anemia 05/03/2019    Bradycardia 11/01/2018    Ocular migraine 04/16/2018    Osteopenia of multiple sites     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/21/2018    Obesity, Class I, BMI 30-34.9 02/24/2017    Need for subacute bacterial endocarditis prophylaxis 01/21/2016    Essential hypertension 06/11/2015    Status post total left knee replacement 06/11/2015    Gastroesophageal reflux disease without esophagitis 06/11/2015    Allergic rhinitis due to pollen 06/11/2015    History of bladder cancer 06/11/2015    Primary osteoarthritis involving multiple joints        Current Vital Signs:   Temperature: 36.8 °C (98.3 °F) Pulse: 67 Respiration: 17 Blood Pressure : 138/63  Weight: 93 kg (205 lb 0.4 oz) Height: 170.2 cm (5' 7.01\")  Pulse Oximetry: 99 % O2 (LPM): 1      Completed Laboratory Reports:  Recent Labs     12/07/24  1545 12/08/24  0221 12/09/24  0158 12/09/24  0957 12/10/24  0729   WBC 16.1* 15.7* 14.6*  --  11.1*   HEMOGLOBIN 11.8* 11.1* 8.7* 8.5* 7.9*   HEMATOCRIT 35.7* 34.1* 25.9* 25.6* 24.8*   PLATELETCT 243 255 205  --  206   SODIUM 138 136 131*  --   --    POTASSIUM 4.7 4.5 4.9  --   --    BUN 22 24* 35*  --   --    CREATININE 0.63 0.78 0.98  --   --    ALBUMIN  --  3.7  --   --   --    GLUCOSE 142* 198* 142*  --   --    INR 1.05  --   --   --   --      Additional Labs: Not Applicable    Prior Living Situation: "   Housing / Facility: (Patient-Rptd) (P) 1 Story House  Steps Into Home: 3  Lives with - Patient's Self Care Capacity: (Patient-Rptd) (P) Spouse  Equipment Owned: Front-Wheel Walker, Single Point Cane    Prior Level of Function / Living Situation:   Physical Therapy: Prior Services: None  Housing / Facility: (Patient-Rptd) (P) 1 Story House  Steps Into Home: 3  Rail: Both Rail (Steps into Home)  Bathroom Set up: Walk In Shower, Shower Chair  Equipment Owned: Front-Wheel Walker, Single Point Cane  Lives with - Patient's Self Care Capacity: (Patient-Rptd) (P) Spouse  Bed Mobility: Independent  Transfer Status: Independent  Ambulation: Independent  Assistive Devices Used: None  Stairs: Independent  Current Level of Function:   Gait Level Of Assist: Unable to Participate  Assistive Device: Front Wheel Walker  Distance (Feet): 0  Weight Bearing Status: WBAT RLE  Supine to Sit: Moderate Assist  Sit to Supine: Maximal Assist  Scooting: Moderate Assist  Skilled Intervention: Verbal Cuing, Sequencing, Facilitation, Compensatory Strategies  Comments: w/HOB elevated and rails up  Sit to Stand: Minimal Assist  Bed, Chair, Wheelchair Transfer: Unable to Participate  Toilet Transfers: Unable to Participate  Transfer Method: Stand Step  Skilled Intervention: Verbal Cuing, Sequencing, Facilitation, Compensatory Strategies  Sitting in Chair: NT  Sitting Edge of Bed: 15 mins  Standin-1 mins seconds x 4  Occupational Therapy:   Self Feeding: Independent  Grooming / Hygiene: Independent  Bathing: Independent  Dressing: Independent  Toileting: Independent  Medication Management: Independent  Laundry: Independent  Kitchen Mobility: Independent  Finances: Unable To Determine At This Time  Home Management: Independent  Shopping: Unable To Determine At This Time  Prior Level Of Mobility: Independent With Device in Home  Driving / Transportation: Relatives / Others Provide Transportation  Prior Services: None  Housing / Facility:  (Patient-Rptd) (P) 1 Story House  Occupation (Pre-Hospital Vocational): Not Employed  Leisure Interests: Pets, Hobbies  Current Level of Function:   Upper Body Dressing: Modified Independent  Lower Body Dressing: Maximal Assist  Toileting: Total Assist  Speech Language Pathology:      Rehabilitation Prognosis/Potential: Good  Estimated Length of Stay: 10-14 days    Nursing:      Continent    Scope/Intensity of Services Recommended:  Physical Therapy: 1.5 hr / day  5 days / week. Therapeutic Interventions Required: Maximize Endurance, Mobility, Strength, and Safety  Occupational Therapy: 1.5 hr / day 5 days / week. Therapeutic Interventions Required: Maximize Self Care, ADLs, IADLs, and Energy Conservation  Rehabilitation Nursin/7. Therapeutic Interventions Required: Monitor Pain, Skin, Wound(s), Vital Signs, Intake and Output, Labs, Safety, and Family Training  Rehabilitation Physician: 3 - 5 days / week. Therapeutic Interventions Required: Medical Management  Respiratory Care: evaluate . Therapeutic Interventions Required: Pulmonary Toileting and O2 Weaning  Dietician: consult . Therapeutic Interventions Required: nutritional needs    She requires 24-hour rehabilitation nursing to manage bowel and bladder function, skin care, surgical incision, nutrition and fluid intake, pulmonary hygiene, pain control, safety, medication management, and patient/family goals. In addition, rehabilitation nursing will reiterate and reinforce therapy skills and equipment use, including ADLs, as well as provide education to the patient and family. Franchesca Paige is willing to participate in and is able to tolerate the proposed plan of care.    Rehabilitation Goals and Plan (Expected frequency & duration of treatment in the IRF):   Return to the Community, Modified Independent Level of Care, Minimal Assist Level of Care, Outpatient Support, and Family Able to Provide 24/7 Assistance  Anticipated Date of Rehabilitation  Admission: 12/10/2024  Patient/Family oriented IRF level of care/facility/plan: Yes  Patient/Family willing to participate in IRF care/facility/plan: Yes  Patient able to tolerate IRF level of care proposed: Yes  Patient has potential to benefit IRF level of care proposed: Yes  Comments: Not Applicable    Special Needs or Precautions - Medical Necessity:  Safety Concerns/Precautions:  Fall Risk / High Risk for Falls and Balance  Pain Management  Cardiac Precautions  Current Medications:    Current Facility-Administered Medications Ordered in Epic   Medication Dose Route Frequency Provider Last Rate Last Admin    amoxicillin-clavulanate (Augmentin) 875-125 MG per tablet 1 Tablet  1 Tablet Oral Q12HRS Jonathon Remdan M.D.   1 Tablet at 12/10/24 0524    azithromycin (Zithromax) tablet 500 mg  500 mg Oral DAILY Jonathon Redman M.D.   500 mg at 12/10/24 0525    omeprazole (PriLOSEC) capsule 20 mg  20 mg Oral DAILY Jonathon Redman M.D.   20 mg at 12/10/24 0525    enoxaparin (Lovenox) inj 40 mg  40 mg Subcutaneous DAILY AT 1800 Jonahton Redman M.D.   40 mg at 12/09/24 1655    acetaminophen (Tylenol) tablet 650 mg  650 mg Oral Q6HRS PRN Scar Mc M.D.   650 mg at 12/09/24 1654    senna-docusate (Pericolace Or Senokot S) 8.6-50 MG per tablet 2 Tablet  2 Tablet Oral Q EVENING Scar Mc M.D.   2 Tablet at 12/09/24 1654    And    polyethylene glycol/lytes (Miralax) Packet 1 Packet  1 Packet Oral QDAY PRN Scar Mc M.D.   1 Packet at 12/10/24 0525    Pharmacy Consult Request ...Pain Management Review 1 Each  1 Each Other PHARMACY TO DOSE Scar Mc M.D.        oxyCODONE immediate-release (Roxicodone) tablet 5 mg  5 mg Oral Q3HRS PRN Scar Mc M.D.   5 mg at 12/10/24 0523    Or    oxyCODONE immediate release (Roxicodone) tablet 10 mg  10 mg Oral Q3HRS PRN Scar Mc M.D.   10 mg at 12/09/24 0827    Or    HYDROmorphone (Dilaudid) injection 0.5 mg   0.5 mg Intravenous Q3HRS PRN Scar Mc M.D.   0.5 mg at 12/08/24 0145    ondansetron (Zofran) syringe/vial injection 4 mg  4 mg Intravenous Q4HRS PRN Asecoy Mc M.D.        ondansetron (Zofran ODT) dispertab 4 mg  4 mg Oral Q4HRS PRN Asecoy Mc M.D.   4 mg at 12/09/24 0739    amLODIPine (Norvasc) tablet 5 mg  5 mg Oral DAILY Asecoy Mc M.D.   5 mg at 12/10/24 0525    lisinopril (Prinivil) tablet 20 mg  20 mg Oral DAILY Asecoy Mc M.D.   20 mg at 12/10/24 0525    hydrALAZINE (Apresoline) injection 10 mg  10 mg Intravenous Q4HRS PRN Scar Mc M.D.         No current Epic-ordered outpatient medications on file.     Diet:   DIET ORDERS (From admission to next 24h)       Start     Ordered    12/08/24 0743  Diet Order Diet: Regular  ALL MEALS        Question:  Diet:  Answer:  Regular    12/08/24 0742                    Anticipated Discharge Destination / Patient/Family Goal:  Destination: Home with Assistance Support System: Spouse  Anticipated home health services: OT, PT, Nursing, and Aide  Previously used HH service/ provider: Not Applicable  Anticipated DME Needs: Walker  Outpatient Services: OT and PT  Alternative resources to address additional identified needs:   No future appointments.     Baldomero Foster  Clinical Admissions Coordinator     Discharge Planning     Signed     Date of Service: 12/9/2024 11:28 AM      PM&R referral from Dr. Jimmy Redman. Following for post acute services. Spoke with spouse Jean Claude about goals and expectation for IRF level of care. Discussed therapy plan for 3 hours per day 5 days a week. Discussed therapy schedules 30/45 or 60 minute sessions typically 1.5 hours between breakfast and lunch and another 1.5 hours between lunch and dinner. Discussed PT/OT and SLP roles. Discussed potential length of stay. Discussed comprehensive medical surveillance by physiatry as well as hospitalist team. Discussed patient to nursing ratio. Discussed  insurance Medicare A&B coverage for the program. Discussed visitation. Jean Claude will be primary support for Franchesca  to return to the community. Discussed participation with therapy program when visiting.   Home is a 1 story with 3 step(s) to enter.  Discussed discharge planner role and team conference.  In the event of additional support need discussed HH and OP programs. Per Jean Claude would like for [facility] for outpatient services.  Physiatry to consult per protocol.       Baldomero Foster  Clinical Admissions Coordinator     Discharge Planning     Signed     Date of Service: 12/10/2024  9:48 AM      Follow up for post acute services Spoke with spouse Jean Claude Discussed visitation POC and potential LOS 10-14 days Plan for OP follow up after rehab. Discussed ongoing medical management with hospitalist team at [facility] as well as physiatry following. Jean Claude is agreeable to transfer if approved.               Pre-Screen Completed: 12/10/2024 10:04 AM Baldomero Foster

## 2024-12-10 NOTE — DISCHARGE PLANNING
Patient is receiving a blood transfusion do not anticipate this to be completed Transportation CX will follow up in the AM.

## 2024-12-11 ENCOUNTER — HOSPITAL ENCOUNTER (INPATIENT)
Facility: REHABILITATION | Age: 79
LOS: 13 days | DRG: 559 | End: 2024-12-24
Attending: PHYSICAL MEDICINE & REHABILITATION | Admitting: PHYSICAL MEDICINE & REHABILITATION
Payer: MEDICARE

## 2024-12-11 VITALS
HEART RATE: 71 BPM | HEIGHT: 67 IN | RESPIRATION RATE: 17 BRPM | WEIGHT: 205.03 LBS | OXYGEN SATURATION: 94 % | BODY MASS INDEX: 32.18 KG/M2 | SYSTOLIC BLOOD PRESSURE: 131 MMHG | TEMPERATURE: 99.3 F | DIASTOLIC BLOOD PRESSURE: 61 MMHG

## 2024-12-11 DIAGNOSIS — K21.9 GASTROESOPHAGEAL REFLUX DISEASE WITHOUT ESOPHAGITIS: ICD-10-CM

## 2024-12-11 DIAGNOSIS — S72.431A CLOSED BICONDYLAR FRACTURE OF RIGHT FEMUR, INITIAL ENCOUNTER (HCC): ICD-10-CM

## 2024-12-11 DIAGNOSIS — S72.421A CLOSED BICONDYLAR FRACTURE OF RIGHT FEMUR, INITIAL ENCOUNTER (HCC): ICD-10-CM

## 2024-12-11 DIAGNOSIS — I10 ESSENTIAL HYPERTENSION: ICD-10-CM

## 2024-12-11 PROBLEM — S72.441D: Status: ACTIVE | Noted: 2024-12-11

## 2024-12-11 LAB
ANION GAP SERPL CALC-SCNC: 10 MMOL/L (ref 7–16)
BUN SERPL-MCNC: 31 MG/DL (ref 8–22)
CALCIUM SERPL-MCNC: 10.4 MG/DL (ref 8.4–10.2)
CHLORIDE SERPL-SCNC: 96 MMOL/L (ref 96–112)
CO2 SERPL-SCNC: 24 MMOL/L (ref 20–33)
CREAT SERPL-MCNC: 0.71 MG/DL (ref 0.5–1.4)
ERYTHROCYTE [DISTWIDTH] IN BLOOD BY AUTOMATED COUNT: 43.9 FL (ref 35.9–50)
GFR SERPLBLD CREATININE-BSD FMLA CKD-EPI: 87 ML/MIN/1.73 M 2
GLUCOSE SERPL-MCNC: 122 MG/DL (ref 65–99)
HCT VFR BLD AUTO: 24.2 % (ref 37–47)
HGB BLD-MCNC: 8 G/DL (ref 12–16)
MCH RBC QN AUTO: 30.2 PG (ref 27–33)
MCHC RBC AUTO-ENTMCNC: 33.1 G/DL (ref 32.2–35.5)
MCV RBC AUTO: 91.3 FL (ref 81.4–97.8)
PLATELET # BLD AUTO: 197 K/UL (ref 164–446)
PMV BLD AUTO: 9.9 FL (ref 9–12.9)
POTASSIUM SERPL-SCNC: 4.2 MMOL/L (ref 3.6–5.5)
RBC # BLD AUTO: 2.65 M/UL (ref 4.2–5.4)
SODIUM SERPL-SCNC: 130 MMOL/L (ref 135–145)
WBC # BLD AUTO: 12.1 K/UL (ref 4.8–10.8)

## 2024-12-11 PROCEDURE — 700102 HCHG RX REV CODE 250 W/ 637 OVERRIDE(OP): Performed by: HOSPITALIST

## 2024-12-11 PROCEDURE — 99223 1ST HOSP IP/OBS HIGH 75: CPT | Performed by: PHYSICAL MEDICINE & REHABILITATION

## 2024-12-11 PROCEDURE — 770010 HCHG ROOM/CARE - REHAB SEMI PRIVAT*

## 2024-12-11 PROCEDURE — 80048 BASIC METABOLIC PNL TOTAL CA: CPT

## 2024-12-11 PROCEDURE — 94760 N-INVAS EAR/PLS OXIMETRY 1: CPT

## 2024-12-11 PROCEDURE — 700102 HCHG RX REV CODE 250 W/ 637 OVERRIDE(OP): Performed by: INTERNAL MEDICINE

## 2024-12-11 PROCEDURE — A9270 NON-COVERED ITEM OR SERVICE: HCPCS | Performed by: INTERNAL MEDICINE

## 2024-12-11 PROCEDURE — 85027 COMPLETE CBC AUTOMATED: CPT

## 2024-12-11 PROCEDURE — 700111 HCHG RX REV CODE 636 W/ 250 OVERRIDE (IP): Mod: JZ | Performed by: PHYSICAL MEDICINE & REHABILITATION

## 2024-12-11 PROCEDURE — A9270 NON-COVERED ITEM OR SERVICE: HCPCS | Performed by: PHYSICAL MEDICINE & REHABILITATION

## 2024-12-11 PROCEDURE — 99239 HOSP IP/OBS DSCHRG MGMT >30: CPT | Performed by: INTERNAL MEDICINE

## 2024-12-11 PROCEDURE — A9270 NON-COVERED ITEM OR SERVICE: HCPCS | Performed by: HOSPITALIST

## 2024-12-11 PROCEDURE — 36415 COLL VENOUS BLD VENIPUNCTURE: CPT

## 2024-12-11 PROCEDURE — 700102 HCHG RX REV CODE 250 W/ 637 OVERRIDE(OP): Performed by: PHYSICAL MEDICINE & REHABILITATION

## 2024-12-11 RX ORDER — ALUMINA, MAGNESIA, AND SIMETHICONE 2400; 2400; 240 MG/30ML; MG/30ML; MG/30ML
20 SUSPENSION ORAL
Status: DISCONTINUED | OUTPATIENT
Start: 2024-12-11 | End: 2024-12-24 | Stop reason: HOSPADM

## 2024-12-11 RX ORDER — ENOXAPARIN SODIUM 100 MG/ML
40 INJECTION SUBCUTANEOUS DAILY
Status: DISCONTINUED | OUTPATIENT
Start: 2024-12-11 | End: 2024-12-24 | Stop reason: HOSPADM

## 2024-12-11 RX ORDER — ACETAMINOPHEN 325 MG/1
650 TABLET ORAL EVERY 4 HOURS PRN
Status: DISCONTINUED | OUTPATIENT
Start: 2024-12-11 | End: 2024-12-24 | Stop reason: HOSPADM

## 2024-12-11 RX ORDER — OXYCODONE HYDROCHLORIDE 10 MG/1
10 TABLET ORAL
Status: DISCONTINUED | OUTPATIENT
Start: 2024-12-11 | End: 2024-12-24 | Stop reason: HOSPADM

## 2024-12-11 RX ORDER — AMOXICILLIN 250 MG
2 CAPSULE ORAL EVERY EVENING
Status: DISCONTINUED | OUTPATIENT
Start: 2024-12-11 | End: 2024-12-24 | Stop reason: HOSPADM

## 2024-12-11 RX ORDER — ECHINACEA PURPUREA EXTRACT 125 MG
2 TABLET ORAL PRN
Status: DISCONTINUED | OUTPATIENT
Start: 2024-12-11 | End: 2024-12-24 | Stop reason: HOSPADM

## 2024-12-11 RX ORDER — AMLODIPINE BESYLATE 5 MG/1
5 TABLET ORAL DAILY
Status: DISCONTINUED | OUTPATIENT
Start: 2024-12-12 | End: 2024-12-24 | Stop reason: HOSPADM

## 2024-12-11 RX ORDER — LACTULOSE 10 G/15ML
30 SOLUTION ORAL 3 TIMES DAILY
Status: DISCONTINUED | OUTPATIENT
Start: 2024-12-11 | End: 2024-12-16

## 2024-12-11 RX ORDER — ONDANSETRON 4 MG/1
4 TABLET, ORALLY DISINTEGRATING ORAL 4 TIMES DAILY PRN
Status: DISCONTINUED | OUTPATIENT
Start: 2024-12-11 | End: 2024-12-24 | Stop reason: HOSPADM

## 2024-12-11 RX ORDER — ONDANSETRON 2 MG/ML
4 INJECTION INTRAMUSCULAR; INTRAVENOUS 4 TIMES DAILY PRN
Status: DISCONTINUED | OUTPATIENT
Start: 2024-12-11 | End: 2024-12-24 | Stop reason: HOSPADM

## 2024-12-11 RX ORDER — CARBOXYMETHYLCELLULOSE SODIUM 5 MG/ML
1 SOLUTION/ DROPS OPHTHALMIC PRN
Status: DISCONTINUED | OUTPATIENT
Start: 2024-12-11 | End: 2024-12-24 | Stop reason: HOSPADM

## 2024-12-11 RX ORDER — HYDRALAZINE HYDROCHLORIDE 25 MG/1
25 TABLET, FILM COATED ORAL EVERY 8 HOURS PRN
Status: DISCONTINUED | OUTPATIENT
Start: 2024-12-11 | End: 2024-12-24 | Stop reason: HOSPADM

## 2024-12-11 RX ORDER — TRAZODONE HYDROCHLORIDE 50 MG/1
50 TABLET, FILM COATED ORAL
Status: DISCONTINUED | OUTPATIENT
Start: 2024-12-11 | End: 2024-12-24 | Stop reason: HOSPADM

## 2024-12-11 RX ORDER — OXYCODONE HYDROCHLORIDE 5 MG/1
5 TABLET ORAL
Status: DISCONTINUED | OUTPATIENT
Start: 2024-12-11 | End: 2024-12-24 | Stop reason: HOSPADM

## 2024-12-11 RX ORDER — LISINOPRIL 20 MG/1
20 TABLET ORAL DAILY
Status: DISCONTINUED | OUTPATIENT
Start: 2024-12-12 | End: 2024-12-13

## 2024-12-11 RX ORDER — POLYETHYLENE GLYCOL 3350 17 G/17G
1 POWDER, FOR SOLUTION ORAL
Status: DISCONTINUED | OUTPATIENT
Start: 2024-12-11 | End: 2024-12-24 | Stop reason: HOSPADM

## 2024-12-11 RX ADMIN — AZITHROMYCIN DIHYDRATE 500 MG: 250 TABLET ORAL at 05:20

## 2024-12-11 RX ADMIN — OMEPRAZOLE 20 MG: 20 CAPSULE, DELAYED RELEASE ORAL at 05:21

## 2024-12-11 RX ADMIN — OXYCODONE 5 MG: 5 TABLET ORAL at 06:08

## 2024-12-11 RX ADMIN — SENNOSIDES AND DOCUSATE SODIUM 2 TABLET: 50; 8.6 TABLET ORAL at 20:40

## 2024-12-11 RX ADMIN — OXYCODONE HYDROCHLORIDE 10 MG: 10 TABLET ORAL at 20:45

## 2024-12-11 RX ADMIN — AMOXICILLIN AND CLAVULANATE POTASSIUM 1 TABLET: 875; 125 TABLET, FILM COATED ORAL at 20:40

## 2024-12-11 RX ADMIN — OXYCODONE HYDROCHLORIDE 10 MG: 10 TABLET ORAL at 13:14

## 2024-12-11 RX ADMIN — AMOXICILLIN AND CLAVULANATE POTASSIUM 1 TABLET: 875; 125 TABLET, FILM COATED ORAL at 05:20

## 2024-12-11 RX ADMIN — ENOXAPARIN SODIUM 40 MG: 100 INJECTION SUBCUTANEOUS at 17:48

## 2024-12-11 ASSESSMENT — PATIENT HEALTH QUESTIONNAIRE - PHQ9
SUM OF ALL RESPONSES TO PHQ9 QUESTIONS 1 AND 2: 0
1. LITTLE INTEREST OR PLEASURE IN DOING THINGS: NOT AT ALL
1. LITTLE INTEREST OR PLEASURE IN DOING THINGS: NOT AT ALL
2. FEELING DOWN, DEPRESSED, IRRITABLE, OR HOPELESS: NOT AT ALL
2. FEELING DOWN, DEPRESSED, IRRITABLE, OR HOPELESS: NOT AT ALL
SUM OF ALL RESPONSES TO PHQ9 QUESTIONS 1 AND 2: 0

## 2024-12-11 ASSESSMENT — PAIN DESCRIPTION - PAIN TYPE
TYPE: ACUTE PAIN;SURGICAL PAIN
TYPE: ACUTE PAIN

## 2024-12-11 ASSESSMENT — FIBROSIS 4 INDEX
FIB4 SCORE: 1.78
FIB4 SCORE: 1.78

## 2024-12-11 ASSESSMENT — LIFESTYLE VARIABLES: EVER_SMOKED: YES

## 2024-12-11 NOTE — PROGRESS NOTES
Patient admitted to facility at 1145 am via [facility] accompanied by hospital transport.  Patient assisted to room and positioned in bed for comfort and safety; call light within reach.  Patient assisted with stowing belongings and oriented to room and facility.  Admission assessment performed and documented in computer.  Admission paperwork completed; signed copies placed in chart.  Will continue to monitor.

## 2024-12-11 NOTE — WOUND TEAM
4 Eyes Skin Assessment Completed by SANTANA Holly and Rani CAIN RN.    Head Bruising  Ears WDL  Nose WDL  Mouth WDL  Neck WDL  Breast/Chest Redness-under right breast  Shoulder Blades WDL  Spine WDL  (R) Arm/Elbow/Hand Bruising  (L) Arm/Elbow/Hand WDL  Abdomen Bruising  Groin WDL  Scrotum/Coccyx/Buttocks WDL  (R) Leg -linear blistered thigh and Blanchable redness, Non-Blanching on right lateral ankle, non blanching behind the right knee, Bruising throughout, Edema throughout, and Incisions  (L) Leg WDL  (R) Heel/Foot/Toe WDL  (L) Heel/Foot/Toe WDL          Devices In Places N/A      Interventions In Place Waffle Overlay, Pillows, and Dri-John Pads    Possible Skin Injury Yes     Pictures Uploaded Into Epic Yes  Wound Consult Placed Yes - Dr. Souza notified via [employer] regarding POA pressure injuries on right Lower extremity.   RN Wound Prevention Protocol Ordered Yes

## 2024-12-11 NOTE — DISCHARGE PLANNING
DPA received choice form and scanned to wrong chart. BI CRISTINA notified DPA and document correction started. DPA re scanned document to place in correct chart.

## 2024-12-11 NOTE — CARE PLAN
Problem: Knowledge Deficit - Standard  Goal: Patient and family/care givers will demonstrate understanding of plan of care, disease process/condition, diagnostic tests and medications  Outcome: Progressing     Problem: Skin Integrity  Goal: Skin integrity is maintained or improved  Outcome: Progressing     Problem: Fall Risk - Rehab  Goal: Patient will remain free from falls  Outcome: Progressing     The patient is Stable - Low risk of patient condition declining or worsening    Shift Goals  Clinical Goals: safety  Patient Goals: safety ; pain control  Family Goals: safety

## 2024-12-11 NOTE — DISCHARGE PLANNING
Approved transfer to [facility] Dr. Souza is accepting [facility] transport arranged for  . Spouse is aware. Attending team notified.    CDMP - Acute respiratory distress with hypoxia

## 2024-12-11 NOTE — PROGRESS NOTES
NEW ADMIT FALL PREVENTION EDUCATION      AND INTERVENTION       Fall Prevention Education Video watched: Yes    Strip Alarm in place: Yes    Alarming seatbelt No     Does patient need a posey bed?: No     Does patient have the right size non-skid sock?: Yes              Admitting RN: Rani Cook RN

## 2024-12-11 NOTE — DISCHARGE PLANNING
Case Management Discharge Planning    Admission Date: 12/7/2024  GMLOS: 6.3  ALOS: 2    6-Clicks ADL Score: 17  6-Clicks Mobility Score: 10  PT and/or OT Eval ordered: Yes  Post-acute Referrals Ordered: Yes  Post-acute Choice Obtained: Yes  Has referral(s) been sent to post-acute provider:  Yes      Anticipated Discharge Dispo: Discharge Disposition: Disch to IP rehab facility or distinct part unit (62)  Discharge Address: [facility]    DME Needed: No    Action(s) Taken: Updated Provider/Nurse on Discharge Plan    Pt discussed in 0815 rounds. Pt is medically cleared for rehab.     Message sent to rehab coordinator to follow up.     1030-  Pt discussed in IDT rounds. Pt has been accepted for rehab, transport initially set for 1300, however pt is needing blood transfusion. Transport is now scheduled for 1500.     Cobra completed, obtained signature from MD.     Met with pt at bedside. Pt reported she has not had a bowel movement since Saturday and was experiencing pain in her stomach. Bedside RN and Charge RN notified.     1430-  LSW spoke with charge RN regarding pt to see if she was ready to go to rehab. Per charge RN, pt is only just getting her blood transfusion started. Pt also has not had bowel movement.   Notified rehab coordinator, transfer postponed to tomorrow.     Escalations Completed: None    Medically Clear: No    Next Steps: LSW to follow    Barriers to Discharge: Medical clearance    Is the patient up for discharge tomorrow: No

## 2024-12-11 NOTE — PROGRESS NOTES
Received bedside report from NOC RN, assumed care of patient. Patient is sleeping comfortably in bed, chest rise and fall observed, no visible signs of distress at this time. 92% on 1L via nasal canula. Call light is within reach, bed locked in lowest position with alarm active, hourly rounding in place.

## 2024-12-11 NOTE — CARE PLAN
Problem: Pain - Standard  Goal: Alleviation of pain or a reduction in pain to the patient’s comfort goal  Outcome: Progressing     Problem: Skin Integrity  Goal: Skin integrity is maintained or improved  Outcome: Progressing     Problem: Fall Risk  Goal: Patient will remain free from falls  Outcome: Progressing   The patient is Stable - Low risk of patient condition declining or worsening    Shift Goals  Clinical Goals: pain control,bowel movement,ambulate  Patient Goals: rest,bowel movement  Family Goals: Good rest    Progress made toward(s) clinical / shift goals:  No falls or injuries sustained during shift.Pain at tolerable level.Bowel movement x2    Patient is not progressing towards the following goals:

## 2024-12-11 NOTE — DISCHARGE PLANNING
Case Management Discharge Planning    Admission Date: 12/7/2024  GMLOS: 6.3  ALOS: 3    6-Clicks ADL Score: 17  6-Clicks Mobility Score: 10    Anticipated Discharge Dispo: Discharge Disposition: Disch to  rehab facility or distinct part unit (62)  Discharge Address: [facility]    DME Needed: No    Action(s) Taken: Pt was discussed during morning rounds. Pt was initially set to DC to [facility] however, pt required blood transfusion and BM prior to transfer. Per MD, pt is doing well post-transfusion and is medically cleared to transfer to [facility]. SW informed Baldomero VALENZUELA via Vo[employer].     UPDATE 0940  LMSW received notice that pt has been accepted to [facility] at 1130. LMSW completed COBRA packet and handed to bedside RN. Anticipating no further CM needs at this time.     Escalations Completed: None    Medically Clear: Yes    Next Steps: LMSW to follow for any additional CM needs.    Barriers to Discharge: None    Is the patient up for discharge tomorrow: No, today.

## 2024-12-11 NOTE — CARE PLAN
The patient is Stable - Low risk of patient condition declining or worsening    Shift Goals  Clinical Goals: Control pain, ambulate  Patient Goals: rest  Family Goals: Good rest    Progress made toward(s) clinical / shift goals:  Pain controlled with PRN pain meds Pt rested well. No acute events.    Patient is not progressing towards the following goals:

## 2024-12-11 NOTE — FLOWSHEET NOTE
12/11/24 1155   Events/Summary/Plan   Events/Summary/Plan RT Consult   Vital Signs   Pulse 63   Respiration 16   Pulse Oximetry 92 %   $ Pulse Oximetry (Spot Check) Yes   Respiratory Assessment   Level of Consciousness Alert   Chest Exam   Work Of Breathing / Effort Within Normal Limits   Breath Sounds   RUL Breath Sounds Clear   RML Breath Sounds Clear   RLL Breath Sounds Diminished   DINO Breath Sounds Clear   LLL Breath Sounds Diminished   Oxygen   O2 Delivery Device Room air w/o2 available   Smoking History   Have you ever smoked Yes   Have you smoked in the last 12 months No   Confirm Quit Date   (quit 30 years ago)

## 2024-12-12 ENCOUNTER — APPOINTMENT (OUTPATIENT)
Dept: OCCUPATIONAL THERAPY | Facility: REHABILITATION | Age: 79
DRG: 559 | End: 2024-12-12
Attending: PHYSICAL MEDICINE & REHABILITATION
Payer: MEDICARE

## 2024-12-12 ENCOUNTER — APPOINTMENT (OUTPATIENT)
Dept: PHYSICAL THERAPY | Facility: REHABILITATION | Age: 79
DRG: 559 | End: 2024-12-12
Attending: PHYSICAL MEDICINE & REHABILITATION
Payer: MEDICARE

## 2024-12-12 LAB
25(OH)D3 SERPL-MCNC: 37 NG/ML (ref 30–100)
ALBUMIN SERPL BCP-MCNC: 3.2 G/DL (ref 3.2–4.9)
ALBUMIN/GLOB SERPL: 1.1 G/DL
ALP SERPL-CCNC: 74 U/L (ref 30–99)
ALT SERPL-CCNC: 10 U/L (ref 2–50)
ANION GAP SERPL CALC-SCNC: 8 MMOL/L (ref 7–16)
AST SERPL-CCNC: 21 U/L (ref 12–45)
BASOPHILS # BLD AUTO: 0.1 % (ref 0–1.8)
BASOPHILS # BLD: 0.01 K/UL (ref 0–0.12)
BILIRUB SERPL-MCNC: 0.8 MG/DL (ref 0.1–1.5)
BUN SERPL-MCNC: 26 MG/DL (ref 8–22)
CALCIUM ALBUM COR SERPL-MCNC: 10.9 MG/DL (ref 8.5–10.5)
CALCIUM SERPL-MCNC: 10.3 MG/DL (ref 8.5–10.5)
CHLORIDE SERPL-SCNC: 98 MMOL/L (ref 96–112)
CO2 SERPL-SCNC: 25 MMOL/L (ref 20–33)
CREAT SERPL-MCNC: 0.69 MG/DL (ref 0.5–1.4)
EOSINOPHIL # BLD AUTO: 0.08 K/UL (ref 0–0.51)
EOSINOPHIL NFR BLD: 0.8 % (ref 0–6.9)
ERYTHROCYTE [DISTWIDTH] IN BLOOD BY AUTOMATED COUNT: 43.3 FL (ref 35.9–50)
GFR SERPLBLD CREATININE-BSD FMLA CKD-EPI: 89 ML/MIN/1.73 M 2
GLOBULIN SER CALC-MCNC: 2.8 G/DL (ref 1.9–3.5)
GLUCOSE SERPL-MCNC: 104 MG/DL (ref 65–99)
HCT VFR BLD AUTO: 24.1 % (ref 37–47)
HGB BLD-MCNC: 8.1 G/DL (ref 12–16)
IMM GRANULOCYTES # BLD AUTO: 0.05 K/UL (ref 0–0.11)
IMM GRANULOCYTES NFR BLD AUTO: 0.5 % (ref 0–0.9)
LYMPHOCYTES # BLD AUTO: 1.77 K/UL (ref 1–4.8)
LYMPHOCYTES NFR BLD: 17.8 % (ref 22–41)
MCH RBC QN AUTO: 30 PG (ref 27–33)
MCHC RBC AUTO-ENTMCNC: 33.6 G/DL (ref 32.2–35.5)
MCV RBC AUTO: 89.3 FL (ref 81.4–97.8)
MONOCYTES # BLD AUTO: 0.81 K/UL (ref 0–0.85)
MONOCYTES NFR BLD AUTO: 8.2 % (ref 0–13.4)
NEUTROPHILS # BLD AUTO: 7.2 K/UL (ref 1.82–7.42)
NEUTROPHILS NFR BLD: 72.6 % (ref 44–72)
NRBC # BLD AUTO: 0 K/UL
NRBC BLD-RTO: 0 /100 WBC (ref 0–0.2)
PLATELET # BLD AUTO: 259 K/UL (ref 164–446)
PMV BLD AUTO: 9.5 FL (ref 9–12.9)
POTASSIUM SERPL-SCNC: 4.2 MMOL/L (ref 3.6–5.5)
PROT SERPL-MCNC: 6 G/DL (ref 6–8.2)
RBC # BLD AUTO: 2.7 M/UL (ref 4.2–5.4)
SODIUM SERPL-SCNC: 131 MMOL/L (ref 135–145)
TSH SERPL DL<=0.005 MIU/L-ACNC: 3.33 UIU/ML (ref 0.38–5.33)
WBC # BLD AUTO: 9.9 K/UL (ref 4.8–10.8)

## 2024-12-12 PROCEDURE — 97535 SELF CARE MNGMENT TRAINING: CPT

## 2024-12-12 PROCEDURE — 770010 HCHG ROOM/CARE - REHAB SEMI PRIVAT*

## 2024-12-12 PROCEDURE — 97110 THERAPEUTIC EXERCISES: CPT

## 2024-12-12 PROCEDURE — 99233 SBSQ HOSP IP/OBS HIGH 50: CPT | Performed by: PHYSICAL MEDICINE & REHABILITATION

## 2024-12-12 PROCEDURE — 97167 OT EVAL HIGH COMPLEX 60 MIN: CPT

## 2024-12-12 PROCEDURE — A9270 NON-COVERED ITEM OR SERVICE: HCPCS | Performed by: PHYSICAL MEDICINE & REHABILITATION

## 2024-12-12 PROCEDURE — 80053 COMPREHEN METABOLIC PANEL: CPT

## 2024-12-12 PROCEDURE — 97530 THERAPEUTIC ACTIVITIES: CPT

## 2024-12-12 PROCEDURE — 84443 ASSAY THYROID STIM HORMONE: CPT

## 2024-12-12 PROCEDURE — 700111 HCHG RX REV CODE 636 W/ 250 OVERRIDE (IP): Mod: JZ | Performed by: PHYSICAL MEDICINE & REHABILITATION

## 2024-12-12 PROCEDURE — 85025 COMPLETE CBC W/AUTO DIFF WBC: CPT

## 2024-12-12 PROCEDURE — 97162 PT EVAL MOD COMPLEX 30 MIN: CPT

## 2024-12-12 PROCEDURE — 700102 HCHG RX REV CODE 250 W/ 637 OVERRIDE(OP): Performed by: PHYSICAL MEDICINE & REHABILITATION

## 2024-12-12 PROCEDURE — 83036 HEMOGLOBIN GLYCOSYLATED A1C: CPT

## 2024-12-12 PROCEDURE — 36415 COLL VENOUS BLD VENIPUNCTURE: CPT

## 2024-12-12 PROCEDURE — 82306 VITAMIN D 25 HYDROXY: CPT

## 2024-12-12 RX ADMIN — OMEPRAZOLE 20 MG: 20 CAPSULE, DELAYED RELEASE ORAL at 08:33

## 2024-12-12 RX ADMIN — AMLODIPINE BESYLATE 5 MG: 5 TABLET ORAL at 05:31

## 2024-12-12 RX ADMIN — OXYCODONE HYDROCHLORIDE 10 MG: 10 TABLET ORAL at 08:33

## 2024-12-12 RX ADMIN — LISINOPRIL 20 MG: 20 TABLET ORAL at 05:31

## 2024-12-12 RX ADMIN — OXYCODONE HYDROCHLORIDE 10 MG: 10 TABLET ORAL at 13:05

## 2024-12-12 RX ADMIN — AMOXICILLIN AND CLAVULANATE POTASSIUM 1 TABLET: 875; 125 TABLET, FILM COATED ORAL at 20:33

## 2024-12-12 RX ADMIN — SENNOSIDES AND DOCUSATE SODIUM 2 TABLET: 50; 8.6 TABLET ORAL at 20:33

## 2024-12-12 RX ADMIN — OXYCODONE HYDROCHLORIDE 10 MG: 10 TABLET ORAL at 02:20

## 2024-12-12 RX ADMIN — ENOXAPARIN SODIUM 40 MG: 100 INJECTION SUBCUTANEOUS at 17:06

## 2024-12-12 RX ADMIN — AMOXICILLIN AND CLAVULANATE POTASSIUM 1 TABLET: 875; 125 TABLET, FILM COATED ORAL at 08:33

## 2024-12-12 ASSESSMENT — PATIENT HEALTH QUESTIONNAIRE - PHQ9
2. FEELING DOWN, DEPRESSED, IRRITABLE, OR HOPELESS: NOT AT ALL
1. LITTLE INTEREST OR PLEASURE IN DOING THINGS: NOT AT ALL
SUM OF ALL RESPONSES TO PHQ9 QUESTIONS 1 AND 2: 0

## 2024-12-12 ASSESSMENT — BRIEF INTERVIEW FOR MENTAL STATUS (BIMS)
ASKED TO RECALL BED: YES, NO CUE REQUIRED
WHAT MONTH IS IT: ACCURATE WITHIN 5 DAYS
ASKED TO RECALL SOCK: YES, NO CUE REQUIRED
WHAT YEAR IS IT: CORRECT
INITIAL REPETITION OF BED BLUE SOCK - FIRST ATTEMPT: 3
ASKED TO RECALL BLUE: YES, NO CUE REQUIRED
BIMS SUMMARY SCORE: 14
WHAT DAY OF THE WEEK IS IT: INCORRECT

## 2024-12-12 ASSESSMENT — ACTIVITIES OF DAILY LIVING (ADL)
BED_CHAIR_WHEELCHAIR_TRANSFER_DESCRIPTION: ADAPTIVE EQUIPMENT;INCREASED TIME;REQUIRES LIFT;SET-UP OF EQUIPMENT
TOILETING: INDEPENDENT
BED_CHAIR_WHEELCHAIR_TRANSFER_DESCRIPTION: INCREASED TIME;INITIAL PREPARATION FOR TASK;SET-UP OF EQUIPMENT;SUPERVISION FOR SAFETY;VERBAL CUEING
TUB_SHOWER_TRANSFER_DESCRIPTION: GRAB BAR;INCREASED TIME;INITIAL PREPARATION FOR TASK;SET-UP OF EQUIPMENT;SUPERVISION FOR SAFETY;VERBAL CUEING
BED_CHAIR_WHEELCHAIR_TRANSFER_DESCRIPTION: ADAPTIVE EQUIPMENT;INCREASED TIME;INITIAL PREPARATION FOR TASK;SLIDEBOARD TRANSFER FROM BED TO WHEELCHAIR;SET-UP OF EQUIPMENT;SUPERVISION FOR SAFETY;VERBAL CUEING

## 2024-12-12 ASSESSMENT — PAIN DESCRIPTION - PAIN TYPE
TYPE: ACUTE PAIN
TYPE: SURGICAL PAIN;ACUTE PAIN
TYPE: ACUTE PAIN

## 2024-12-12 ASSESSMENT — GAIT ASSESSMENTS
GAIT LEVEL OF ASSIST: UNABLE TO PARTICIPATE
ASSISTIVE DEVICE: PARALLEL BARS
GAIT LEVEL OF ASSIST: UNABLE TO PARTICIPATE

## 2024-12-12 NOTE — PROGRESS NOTES
"..                                                         NURSING DAILY NOTE    Name: Franchesca Paige   Date of Admission: 12/11/2024   Admitting Diagnosis: Other fracture of right femur, initial encounter for closed fracture (HCC)  Attending Physician: ARMANDO GLASS M.D.  Allergies: Ibuprofen and Sulfa drugs    Safety  Patient Assist     Patient Precautions     Precaution Comments     Bed Transfer Status     Toilet Transfer Status      Assistive Devices  Gait Belt, Rails, Wheelchair  Oxygen  None - Room Air  Diet/Therapeutic Dining  Current Diet Order   Procedures    Diet Order Diet: Regular     Pill Administration  whole  Agitated Behavioral Scale     ABS Level of Severity       Fall Risk  Has the patient had a fall this admission?   No  Candice Porter Fall Risk Scoring  25, HIGH RISK  Fall Risk Safety Measures  bed alarm, chair alarm, and low vision/ hearing    Vitals  Temperature: 36.5 °C (97.7 °F)  Temp src: Oral  Pulse: 65  Respiration: 20  Blood Pressure : 116/52  Blood Pressure MAP (Calculated): 73 MM HG  BP Location: Right, Upper Arm  Patient BP Position: Supine     Oxygen  Pulse Oximetry: 94 %  O2 (LPM): 0  O2 Delivery Device: None - Room Air    Bowel and Bladder  Last Bowel Movement  12/11/24 (in AM per patient)  Stool Type     Bowel Device     Continent  Bladder:     Bowel: Continent movement  Bladder Function  Urine Void (mL): 100 ml (urinal, bed side commode)  Number of Times Voided: 1  Urine Color: Rocio  Number of Times Incontinent of Urine: 0  Straight Catheter: 1000 ml  Genitourinary Assessment   Bladder Assessment (WDL):  WDL Except  Urinary Symptoms: Dysuria  Urine Color: Rocio  Number of Bladder Accidents: 1  Total Number of Bladder of Accidents in Last 7 Days: 1  Number of Times Incontinent of Urine: 0  Bladder Device: Urinal  Bladder Scan: Re-Void  $ Bladder Scan Results (mL): 771 (notified SANTANA Childress)  Intermittent Catheter: Yes -Go To LDA \"Indwelling Catheter " "Group\"    Skin  Felix Score   15  Sensory Interventions   Skin Preventative Measures: Pillows in Use for Support / Positioning  Moisture Interventions  Moisturizers/Barriers: Moisturizer       Pain  Pain Rating Scale  8 - Awful, hard to do anything  Pain Location  Hip, Knee  Pain Location Orientation  Right  Pain Interventions   Medication (see MAR)    ADLs    Bathing      Linen Change      Personal Hygiene  Moist Joycelyn Wipes  Chlorhexidine Bath      Oral Care     Teeth/Dentures     Shave     Nutrition Percentage Eaten  Between 25-50% Consumed  Environmental Precautions     Patient Turns/Positioning  Supine  Patient Turns Assistance/Tolerance  Assistance of One  Bed Positions  Bed Controls On, Bed Locked  Head of Bed Elevated         Psychosocial/Neurologic Assessment  Psychosocial Assessment  Psychosocial (WDL):  Within Defined Limits  Neurologic Assessment  Neuro (WDL): Exceptions to WDL (neuropathy RLE)  Level of Consciousness: Alert  Orientation Level: Oriented X4  EENT (WDL):  WDL Except    Cardio/Pulmonary Assessment  Edema   RUE Edema: 1+  RLE Edema: 2+  LUE Edema: Trace  LLE Edema: 1+  Respiratory Breath Sounds  RUL Breath Sounds: Clear  RML Breath Sounds: Clear  RLL Breath Sounds: Diminished  DINO Breath Sounds: Clear  LLL Breath Sounds: Diminished  Cardiac Assessment   Cardiac (WDL):  Within Defined Limits        "

## 2024-12-12 NOTE — THERAPY
Occupational Therapy   Initial Evaluation     Patient Name: Franchesca Paige  Age:  79 y.o., Sex:  female  Medical Record #: [id number]  Today's Date: 12/12/2024     Subjective    Pt supine in bed upon arrival. Pt pleasant and cooperative, agreeable to therapy.     Objective       12/12/24 0701   OT Charge Group   Charges Yes   OT Evaluation OT Evaluation High   OT Total Time Spent   OT Individual Total Time Spent (Mins) 60   Prior Living Situation   Prior Services Home-Independent   Housing / Facility 1 Story House   Steps Into Home 3   Steps In Home 0   Rail Unable To Determine At This Time   Elevator No   Bathroom Set up Walk In Shower;Shower Chair   Equipment Owned Front-Wheel Walker;Single Point Cane;Raised Toilet Seat With Arms;Reacher   Lives with - Patient's Self Care Capacity Spouse   Prior Level of ADL Function   Self Feeding Independent   Grooming / Hygiene Independent   Bathing Independent   Dressing Independent   Toileting Independent   Prior Level of IADL Function   Medication Management Independent   Laundry Independent   Kitchen Mobility Independent   Finances Independent   Home Management Independent   Shopping Independent   Prior Level Of Mobility Independent Without Device in Community;Independent Without Device in Home   Driving / Transportation Driving Independent   Occupation (Pre-Hospital Vocational) Retired Due To Age   Leisure Interests Gardening;Pets   Prior Functioning: Everyday Activities   Self Care Independent   Indoor Mobility (Ambulation) Independent   Stairs Independent   Functional Cognition Independent   Prior Device Use None of the given options   Vitals   O2 Delivery Device None - Room Air   Pain 0 - 10 Group   Location Hip;Knee   Location Orientation Right   Description Aching   Comfort Goal Comfort with Movement   Therapist Pain Assessment Prior to Activity;During Activity   Non Verbal Descriptors   Non Verbal Scale  Grimacing;Moaning   Cognitive Pattern Assessment   Cognitive  "Pattern Assessment Used BIMS   Brief Interview for Mental Status (BIMS)   Repetition of Three Words (First Attempt) 3   Temporal Orientation: Year Correct   Temporal Orientation: Month Accurate within 5 days   Temporal Orientation: Day Incorrect   Recall: \"Sock\" Yes, no cue required   Recall: \"Blue\" Yes, no cue required   Recall: \"Bed\" Yes, no cue required   BIMS Summary Score 14   Confusion Assessment Method (CAM)   Is there evidence of an acute change in mental status from the patient's baseline? No   Inattention Behavior not present   Disorganized thinking Behavior not present   Altered level of consciousness Behavior not present   Bed Mobility    Supine to Sit Maximal Assist   Hearing, Speech, and Vision   Ability to Hear Adequate   Ability to See in Adequate Light Adequate   Expression of Ideas and Wants Without difficulty   Understanding Verbal and Non-Verbal Content Understands   Functional Level of Assist   Grooming Standby Assist;Seated   Grooming Description Increased time;Seated in wheelchair at sink;Supervision for safety;Set-up of equipment  (combed hair)   Bathing Maximal Assist   Bathing Description Grab bar;Hand held shower;Assit with back;Assit wtih lower extremities;Assit with perineal;Increased time;Initial preparation for task;Set-up of equipment;Set up for wound protection;Supervision for safety;Verbal cueing   Upper Body Dressing Stand by Assist   Upper Body Dressing Description Increased time;Supervision for safety   Lower Body Dressing Maximal Assist   Lower Body Dressing Description Grab bar;Assist with threading into pant leg;Increased time;Initial preparation for task;Set-up of equipment;Supervision for safety;Verbal cueing  (assist to pull pants over hips; assist to doff socks)   Bed, Chair, Wheelchair Transfer Maximal Assist   Bed Chair Wheelchair Transfer Description Increased time;Initial preparation for task;Set-up of equipment;Supervision for safety;Verbal cueing  (lateral scoot from " EOB to w/c)   Tub / Shower Transfers Maximal Assist   Tub Shower Transfer Description Grab bar;Increased time;Initial preparation for task;Set-up of equipment;Supervision for safety;Verbal cueing  (lateral scoot from w/c <> fold down bench)   Eating   Assistance Needed Set-up / clean-up   Physical Assistance Level No physical assistance   CARE Score - Eating 5   Oral Hygiene   Assistance Needed Set-up / clean-up   Physical Assistance Level No physical assistance   CARE Score - Oral Hygiene 5   Shower/Bathe Self   Assistance Needed Physical assistance   Physical Assistance Level 76% or more   CARE Score - Shower/Bathe Self 2   Upper Body Dressing   Assistance Needed Set-up / clean-up   Physical Assistance Level No physical assistance   CARE Score - Upper Body Dressing 5   Lower Body Dressing   Assistance Needed Physical assistance   Physical Assistance Level 76% or more   CARE Score - Lower Body Dressing 2   Putting On/Taking Off Footwear   Assistance Needed Physical assistance   Physical Assistance Level Total assistance   CARE Score - Putting On/Taking Off Footwear 1   Toileting Hygiene   Assistance Needed Physical assistance   Physical Assistance Level 76% or more   CARE Score - Toileting Hygiene 2   Toilet Transfer   Assistance Needed Physical assistance   Physical Assistance Level 76% or more   CARE Score - Toilet Transfer 2   Problem List   Problem List Decreased Active Daily Living Skills;Decreased Homemaking Skills;Decreased Functional Mobility;Decreased Activity Tolerance;Impaired Postural Control / Balance   Precautions   Precautions Fall Risk;Weight Bearing As Tolerated Right Lower Extremity   Current Discharge Plan   Current Discharge Plan Return to Prior Living Situation   Benefit    Therapy Benefit Patient Would Benefit from Inpatient Rehab Occupational Therapy to Maximize Kevin with ADLs, IADLs and Functional Mobility.   Interdisciplinary Plan of Care Collaboration   IDT Collaboration with   Therapy Tech   Patient Position at End of Therapy Seated;Chair Alarm On;Call Light within Reach;Tray Table within Reach;Phone within Reach   Collaboration Comments assist for transfers   Strengths & Barriers   Strengths Able to follow instructions;Effective communication skills;Good insight into deficits/needs;Independent prior level of function;Making steady progress towards goals;Motivated for self care and independence;Pleasant and cooperative;Willingly participates in therapeutic activities   Barriers Decreased endurance;Fatigue;Generalized weakness;Impaired activity tolerance;Impaired balance;Limited mobility;Pain;Pain poorly managed   Occupational Therapist Assigned   Assigned OT / Treatment Time / Comments Ita/Robert     Pt provided education on role of OT, rehab goals, Passport to [place], and fall risk/use of the call light for assistance.    Assessment  Patient is 79 y.o. female with a diagnosis of R distal femur fracture following a GLF. She underwent ORIF on 12/7/24. Hospital course complicated by SOB concerning for pneumonia, leukocytosis, and HTN.  Additional factors influencing patient status / progress (ie: cognitive factors, co-morbidities, social support, etc): PMH of bladder cancer, HTN, A fib, HLD, and osteopenia.      Pt had no LOB during OT eval. Pt has a past history of GLFs and presents with significant RLE knee pain, generalized weakness, impaired strength/endurance, and impaired sitting and standing balance. Pt was unable to dangle RLE when sitting EOB and on shower fold down bench d/t knee pain, placed green half step under RLE during shower to relieve pain. Pt unable to bear weight onto RLE during bathroom transfers and ADLs. Pt is below baseline and the above deficits have a negative impact on ADLs/IADLs, functional mobility and a safe return home. Pt would benefit from skilled OT services to address the above deficits. Pt was IND prior to admission. Pt lives in [place], [place], in a 1  story house and has support from . Pt has no other family members in the area. Pt owns DME.     Plan  Recommend Occupational Therapy  minutes per day 5-6 days per week for 2 weeks for the following treatments:  OT Group Therapy, OT Self Care/ADL, OT Community Reintegration, OT Manual Ther Technique, OT Neuro Re-Ed/Balance, OT Therapeutic Activity, OT Aquatic Therapy, OT Evaluation, and OT Therapeutic Exercise.    Passport items to be completed:  Perform bathroom transfers, complete dressing, complete feeding, get ready for the day, prepare a simple meal, participate in household tasks, adapt home for safety needs, demonstrate home exercise program, complete caregiver training     Goals:  Long term and short term goals have been discussed with patient and they are in agreement.    Occupational Therapy Goals (Active)       There are no active problems.

## 2024-12-12 NOTE — THERAPY
"Occupational Therapy  Daily Treatment     Patient Name: Franchesca Paige  Age:  79 y.o., Sex:  female  Medical Record #: [id number]  Today's Date: 12/12/2024     Precautions  Precautions: (P) Fall Risk, Weight Bearing As Tolerated Right Lower Extremity         Subjective    Pt seated in w/c upon arrival. Pt pleasant and cooperative, agreeable to therapy. Pt reports feeling \"foggy\" and fatigued from previous therapies.     Objective       12/12/24 1401   OT Charge Group   Charges Yes   OT Self Care / ADL (Units) 2   OT Total Time Spent   OT Individual Total Time Spent (Mins) 30   Precautions   Precautions Fall Risk;Weight Bearing As Tolerated Right Lower Extremity   Vitals   O2 Delivery Device None - Room Air   Pain 0 - 10 Group   Location Knee   Location Orientation Right   Description Aching   Comfort Goal Comfort with Movement   Therapist Pain Assessment Prior to Activity   Functional Level of Assist   Lower Body Dressing Minimal Assist   Lower Body Dressing Description Dressing stick;Sock aid;Increased time;Set-up of equipment;Supervision for safety;Initial preparation for task;Verbal cueing  (donning/doffing treaded socks using AE seated in w/c.)   Bed, Chair, Wheelchair Transfer Maximal Assist   Bed Chair Wheelchair Transfer Description Adaptive equipment;Increased time;Initial preparation for task;Slideboard transfer from bed to wheelchair;Set-up of equipment;Supervision for safety;Verbal cueing  (slide board transfer from w/c to EOB)   Bed Mobility    Sit to Supine Maximal Assist   Interdisciplinary Plan of Care Collaboration   Patient Position at End of Therapy In Bed;Bed Alarm On;Call Light within Reach;Tray Table within Reach;Phone within Reach;Other (Comments)  (CNA in room for bladder scan)     AE Education/Demo  - Pt issued sock aid and dressing stick   - Pt provided verbal education and demo of dressing stick and sock aid for doffing/donning socks. Pt able to complete with extra time, VC for sequencing " with min A to nikita R sock.    In addition on how to purchase online individual pieces or the bundle set. Demo and hands-on practice completed. Pt demo good understanding and able to demo action back with min A, will reinforce in next ADL dressing routine and complete edu on rest of hip kit AE.    Assessment    Pt tolerated LBD well. Pt limited by RLE pain when donning R sock with sock aid, requiring OT physical assist to be successful with task. Pt reports feeling significant fatigue and brain fog from previous sessions with slow verbal responses to OT questions and mild confusion noted. RN notified. Pt declined to participate in toileting tasks before transferring into bed. Pt tolerated w/c to bed transfer using slide board well with verbal cueing for sequencing of tasks.    Strengths: Able to follow instructions, Effective communication skills, Good insight into deficits/needs, Independent prior level of function, Making steady progress towards goals, Motivated for self care and independence, Pleasant and cooperative, Willingly participates in therapeutic activities  Barriers: Decreased endurance, Fatigue, Generalized weakness, Impaired activity tolerance, Impaired balance, Limited mobility, Pain, Pain poorly managed    Plan    Continue LBD AE edu and practice, ADLs/IADLs, standing tolerance, general strengthening/endurance    DME       Passport items to be completed:  Perform bathroom transfers, complete dressing, complete feeding, get ready for the day, prepare a simple meal, participate in household tasks, adapt home for safety needs, demonstrate home exercise program, complete caregiver training     Occupational Therapy Goals (Active)       Problem: Bathing       Dates: Start:  12/12/24         Goal: STG-Within one week, patient will bathe with mod A using AE as needed.       Dates: Start:  12/12/24               Problem: Dressing       Dates: Start:  12/12/24         Goal: STG-Within one week, patient will  dress LB with mod A using AE as needed.       Dates: Start:  12/12/24               Problem: Functional Transfers       Dates: Start:  12/12/24         Goal: STG-Within one week, patient will transfer to tub/shower with mod A.       Dates: Start:  12/12/24               Problem: OT Long Term Goals       Dates: Start:  12/12/24         Goal: LTG-By discharge, patient will complete basic self care tasks with supervision.       Dates: Start:  12/12/24            Goal: LTG-By discharge, patient will perform bathroom transfers with supervision.       Dates: Start:  12/12/24               Problem: Toileting       Dates: Start:  12/12/24         Goal: STG-Within one week, patient will complete toileting tasks with mod A.       Dates: Start:  12/12/24

## 2024-12-12 NOTE — PROGRESS NOTES
"  Physical Medicine & Rehabilitation Progress Note    Encounter Date: 12/12/2024    Chief Complaint: Decreased mobility, weakness    Interval Events (Subjective):  Patient sitting up in room. She reports therapy went well. She reports she is less anxious. Discussed about AM labs including improved Leukocytosis, ongoing anemia and ongoing hyponatremia.     Objective:  VITAL SIGNS: /64   Pulse 71   Temp 36.2 °C (97.2 °F) (Oral)   Resp 18   Ht 1.702 m (5' 7\")   Wt 97.1 kg (214 lb)   SpO2 95%   BMI 33.52 kg/m²   Gen: NAD  Psych: Mood and affect appropriate  CV: RRR, 1+ edema  Resp: CTAB, no upper airway sounds  Abd: NTND  Neuro: AOx4, following commands    Laboratory Values:  Recent Results (from the past 72 hours)   CBC WITHOUT DIFFERENTIAL    Collection Time: 12/10/24  7:29 AM   Result Value Ref Range    WBC 11.1 (H) 4.8 - 10.8 K/uL    RBC 2.65 (L) 4.20 - 5.40 M/uL    Hemoglobin 7.9 (L) 12.0 - 16.0 g/dL    Hematocrit 24.8 (L) 37.0 - 47.0 %    MCV 93.6 81.4 - 97.8 fL    MCH 29.8 27.0 - 33.0 pg    MCHC 31.9 (L) 32.2 - 35.5 g/dL    RDW 45.3 35.9 - 50.0 fL    Platelet Count 206 164 - 446 K/uL    MPV 10.1 9.0 - 12.9 fL   COD - Adult (Type and Screen)    Collection Time: 12/10/24 11:33 AM   Result Value Ref Range    ABO Grouping Only O     Rh Grouping Only POS     Antibody Screen-Cod NEG     Component R       Red Cells, LR       [id number]   transfused   12/10/24 14:12    Product Type R99     Dispense Status transfused     Unit Number (Barcoded) [id number]     Product Code (Barcoded) [id number]     Blood Type (Barcoded) 5100    CBC WITHOUT DIFFERENTIAL    Collection Time: 12/11/24  2:51 AM   Result Value Ref Range    WBC 12.1 (H) 4.8 - 10.8 K/uL    RBC 2.65 (L) 4.20 - 5.40 M/uL    Hemoglobin 8.0 (L) 12.0 - 16.0 g/dL    Hematocrit 24.2 (L) 37.0 - 47.0 %    MCV 91.3 81.4 - 97.8 fL    MCH 30.2 27.0 - 33.0 pg    MCHC 33.1 32.2 - 35.5 g/dL    RDW 43.9 35.9 - 50.0 fL    Platelet Count 197 164 - 446 K/uL    MPV " 9.9 9.0 - 12.9 fL   Basic Metabolic Panel    Collection Time: 12/11/24  2:51 AM   Result Value Ref Range    Sodium 130 (L) 135 - 145 mmol/L    Potassium 4.2 3.6 - 5.5 mmol/L    Chloride 96 96 - 112 mmol/L    Co2 24 20 - 33 mmol/L    Glucose 122 (H) 65 - 99 mg/dL    Bun 31 (H) 8 - 22 mg/dL    Creatinine 0.71 0.50 - 1.40 mg/dL    Calcium 10.4 (H) 8.4 - 10.2 mg/dL    Anion Gap 10.0 7.0 - 16.0   ESTIMATED GFR    Collection Time: 12/11/24  2:51 AM   Result Value Ref Range    GFR (CKD-EPI) 87 >60 mL/min/1.73 m 2   CBC with Differential    Collection Time: 12/12/24  6:03 AM   Result Value Ref Range    WBC 9.9 4.8 - 10.8 K/uL    RBC 2.70 (L) 4.20 - 5.40 M/uL    Hemoglobin 8.1 (L) 12.0 - 16.0 g/dL    Hematocrit 24.1 (L) 37.0 - 47.0 %    MCV 89.3 81.4 - 97.8 fL    MCH 30.0 27.0 - 33.0 pg    MCHC 33.6 32.2 - 35.5 g/dL    RDW 43.3 35.9 - 50.0 fL    Platelet Count 259 164 - 446 K/uL    MPV 9.5 9.0 - 12.9 fL    Neutrophils-Polys 72.60 (H) 44.00 - 72.00 %    Lymphocytes 17.80 (L) 22.00 - 41.00 %    Monocytes 8.20 0.00 - 13.40 %    Eosinophils 0.80 0.00 - 6.90 %    Basophils 0.10 0.00 - 1.80 %    Immature Granulocytes 0.50 0.00 - 0.90 %    Nucleated RBC 0.00 0.00 - 0.20 /100 WBC    Neutrophils (Absolute) 7.20 1.82 - 7.42 K/uL    Lymphs (Absolute) 1.77 1.00 - 4.80 K/uL    Monos (Absolute) 0.81 0.00 - 0.85 K/uL    Eos (Absolute) 0.08 0.00 - 0.51 K/uL    Baso (Absolute) 0.01 0.00 - 0.12 K/uL    Immature Granulocytes (abs) 0.05 0.00 - 0.11 K/uL    NRBC (Absolute) 0.00 K/uL   Comp Metabolic Panel (CMP)    Collection Time: 12/12/24  6:03 AM   Result Value Ref Range    Sodium 131 (L) 135 - 145 mmol/L    Potassium 4.2 3.6 - 5.5 mmol/L    Chloride 98 96 - 112 mmol/L    Co2 25 20 - 33 mmol/L    Anion Gap 8.0 7.0 - 16.0    Glucose 104 (H) 65 - 99 mg/dL    Bun 26 (H) 8 - 22 mg/dL    Creatinine 0.69 0.50 - 1.40 mg/dL    Calcium 10.3 8.5 - 10.5 mg/dL    Correct Calcium 10.9 (H) 8.5 - 10.5 mg/dL    AST(SGOT) 21 12 - 45 U/L    ALT(SGPT) 10 2 - 50  U/L    Alkaline Phosphatase 74 30 - 99 U/L    Total Bilirubin 0.8 0.1 - 1.5 mg/dL    Albumin 3.2 3.2 - 4.9 g/dL    Total Protein 6.0 6.0 - 8.2 g/dL    Globulin 2.8 1.9 - 3.5 g/dL    A-G Ratio 1.1 g/dL   TSH with Reflex to FT4    Collection Time: 12/12/24  6:03 AM   Result Value Ref Range    TSH 3.330 0.380 - 5.330 uIU/mL   Vitamin D, 25-hydroxy (blood)    Collection Time: 12/12/24  6:03 AM   Result Value Ref Range    25-Hydroxy   Vitamin D 25 37 30 - 100 ng/mL   ESTIMATED GFR    Collection Time: 12/12/24  6:03 AM   Result Value Ref Range    GFR (CKD-EPI) 89 >60 mL/min/1.73 m 2       Medications:  Scheduled Medications   Medication Dose Frequency    senna-docusate  2 Tablet Q EVENING    omeprazole  20 mg DAILY    amLODIPine  5 mg DAILY    amoxicillin-clavulanate  1 Tablet Q12HRS    enoxaparin (LOVENOX) injection  40 mg DAILY AT 1800    lactulose  30 mL TID    lisinopril  20 mg DAILY     PRN medications: hydrALAZINE, acetaminophen, senna-docusate **AND** polyethylene glycol/lytes, docusate sodium, magnesium hydroxide, carboxymethylcellulose, benzocaine-menthol, mag hydrox-al hydrox-simeth, ondansetron **OR** ondansetron, traZODone, sodium chloride, oxyCODONE immediate-release **OR** oxyCODONE immediate-release    Diet:  Current Diet Order   Procedures    Diet Order Diet: Regular       Medical Decision Making and Plan:  R distal femur fracture - Patient with mechanical fall onto right knee on 12/7 with distal femur fracture s/p ORIF on 12/7 with Dr. Garzon  -PT and OT for mobility and ADLs. Per guidelines, 15 hours per week between PT, OT and/or SLP.  -Follow-up Dr. Garzon. WBAT     HTN - Patient on Amlodipine 5 mg daily and Lisinopril 20 mg daily      Pneumonia - Patient on Augmentin and Zithromax. Completed Zithromax and continue additional dose of Augmentin     Leukocytosis - Check AM CBC, on antibiotics. WBC 9.9 on admission, will monitor     Anemia - Check AM CBC - 8.1, ongoing      Hyponatremia -  Check AM CMP - 131,      Azotemia - Check AM CMP - 26    Hypercalcemia - mild elevation on corrected calcium. Will monitor     Obesity due to excess calories - BMI of 32.1 on admission, meets medical criteria. Dietitian to consult     Pain - Patient on PRN Oxycodone and Tylenol      Skin - Patient at risk for skin breakdown due to debility in areas including sacrum, achilles, elbows and head in addition to other sites. Nursing to assess skin daily.      GI Ppx - Patient on Prilosec for GERD prophylaxis. Patient on Senna-docusate for constipation prophylaxis.      DVT Ppx - Patient Lovenox on transfer.  ____________________________________    T. Brian Souza MD/PhD  [facility] - Physical Medicine & Rehabilitation   [facility] - Brain Injury Medicine   ____________________________________    Total time:  50 minutes. Time spent included pre-rounding review of vitals and tests, unit/floor time, face-to-face time with the patient including physical examination, care coordination, counseling of patient and/or family, ordering medications/procedures/tests, discussion with CM, PT, OT, SLP and/or other healthcare providers, and documentation in the electronic medical record. Topics discussed included pain control, anxiety, improved WBC, and hypercalcemia.

## 2024-12-12 NOTE — CARE PLAN
Problem: Pain - Standard  Goal: Alleviation of pain or a reduction in pain to the patient’s comfort goal  Outcome: Progressing     Problem: Fall Risk - Rehab  Goal: Patient will remain free from falls  Outcome: Progressing     The patient is Stable - Low risk of patient condition declining or worsening    Shift Goals  Clinical Goals: safety  Patient Goals: pain management  Family Goals: safety

## 2024-12-12 NOTE — THERAPY
Physical Therapy   Initial Evaluation     Patient Name: Franchesca Paige  Age:  79 y.o., Sex:  female  Medical Record #: [id number]  Today's Date: 12/12/2024     Subjective    Pt up in wc, willing to participate.     Objective       12/12/24 1001   PT Charge Group   Charges Yes   PT Therapeutic Exercise (Units) 1   PT Evaluation PT Evaluation Mod   PT Total Time Spent   PT Individual Total Time Spent (Mins) 60   Prior Living Situation   Prior Services Home-Independent   Housing / Facility 1 Story House   Steps Into Home 3   Steps In Home 0   Equipment Owned Front-Wheel Walker;Single Point Cane;Raised Toilet Seat With Arms;Reacher   Lives with - Patient's Self Care Capacity Spouse   Prior Level of Functional Mobility   Bed Mobility Independent   Transfer Status Independent   Ambulation Independent   Distance Ambulation (Feet)   (community)   Assistive Devices Used None   Stairs Independent   Prior Functioning: Everyday Activities   Self Care Independent   Indoor Mobility (Ambulation) Independent   Stairs Independent   Functional Cognition Independent   Prior Device Use None of the given options   Pain 0 - 10 Group   Therapist Pain Assessment During Activity;8   Passive ROM Lower Body   Passive ROM Lower Body   (RLE limited by pain/ swelling)   Active ROM Lower Body    Comments RLE limited by pain/ swelling   Strength Lower Body   Comments RLE limited by pain/ swelling - grossly 2/5 with AAROM. LLE grossly 4/5   Balance Assessment   Sitting Balance (Static) Fair   Sitting Balance (Dynamic) Fair -   Standing Balance (Static) Trace +   Standing Balance (Dynamic) Trace   Weight Shift Sitting Poor   Weight Shift Standing Poor   Comments UE support required for standing   Bed Mobility    Supine to Sit Maximal Assist   Sit to Supine Total Assist X 2   Sit to Stand Total Assist X 2   Scooting Maximal Assist   Rolling Unable to Participate   Roll Left and Right   Comment pain/ swelling RLE   Reason if not Attempted Medical  concerns   CARE Score - Roll Left and Right 88   Sit to Lying   Assistance Needed Physical assistance   Physical Assistance Level Total assistance   Comment requires 2 person A 2* pain/ swelling RLE   CARE Score - Sit to Lying 1   Lying to Sitting on Side of Bed   Assistance Needed Physical assistance   Physical Assistance Level 76% or more   CARE Score - Lying to Sitting on Side of Bed 2   Sit to Stand   Assistance Needed Adaptive equipment;Physical assistance   Physical Assistance Level Total assistance   Comment requires 2 person A 2* pain/ swelling RLE   CARE Score - Sit to Stand 1   Chair/Bed-to-Chair Transfer   Assistance Needed Physical assistance   Physical Assistance Level Total assistance   Comment requires 2 person A 2* pain/ swelling RLE   CARE Score - Chair/Bed-to-Chair Transfer 1   Car Transfer   Comment RLE pain/ swelling   Reason if not Attempted Safety concerns   CARE Score - Car Transfer 88   Walk 10 Feet   Comment requires 2 person A 2* pain/ swelling RLE/ unable to tolerate WB   Reason if not Attempted Medical concerns   CARE Score - Walk 10 Feet 88   Walk 50 Feet with Two Turns   Comment requires 2 person A 2* pain/ swelling RLE/ unable to tolerate WB   Reason if not Attempted Medical concerns   CARE Score - Walk 50 Feet with Two Turns 88   Walk 150 Feet   Comment requires 2 person A 2* pain/ swelling RLE/ unable to tolerate WB   Reason if not Attempted Medical concerns   CARE Score - Walk 150 Feet 88   Walking 10 Feet on Uneven Surfaces   Comment requires 2 person A 2* pain/ swelling RLE/ unable to tolerate WB   Reason if not Attempted Medical concerns   CARE Score - Walking 10 Feet on Uneven Surfaces 88   1 Step (Curb)   Comment requires 2 person A 2* pain/ swelling RLE/ unable to tolerate WB   Reason if not Attempted Medical concerns   CARE Score - 1 Step (Curb) 88   4 Steps   Comment requires 2 person A 2* pain/ swelling RLE/ unable to tolerate WB   Reason if not Attempted Medical concerns  "  CARE Score - 4 Steps 88   12 Steps   Comment requires 2 person A 2* pain/ swelling RLE/ unable to tolerate WB   Reason if not Attempted Medical concerns   CARE Score - 12 Steps 88   Picking Up Object   Comment requires 2 person A 2* pain/ swelling RLE/ unable to tolerate WB   Reason if not Attempted Safety concerns   CARE Score - Picking Up Object 88   Wheel 50 Feet with Two Turns   Assistance Needed Physical assistance   Physical Assistance Level Total assistance   CARE Score - Wheel 50 Feet with Two Turns 1   Type of Wheelchair/Scooter Manual   Wheel 150 Feet   Assistance Needed Physical assistance   Physical Assistance Level Total assistance   CARE Score - Wheel 150 Feet 1   Type of Wheelchair/Scooter Manual   Gait Functional Level of Assist    Gait Level Of Assist Unable to Participate   Assistive Device Parallel Bars   Wheelchair Functional Level of Assist   Wheelchair Assist Total Assist   Stairs Functional Level of Assist   Level of Assist with Stairs Unable to Participate   Transfer Functional Level of Assist   Bed, Chair, Wheelchair Transfer Maximal Assist  (requires 2 person A 2* pain/ swelling RLE/ unable to tolerate WB)   Bed Chair Wheelchair Transfer Description Adaptive equipment;Increased time;Requires lift;Set-up of equipment;Squat pivot transfer to wheelchair;Verbal cueing  (slide board wc>edge of bed)   Problem List    Problems Pain;Impaired Bed Mobility;Impaired Transfers;Impaired Ambulation;Functional ROM Deficit;Functional Strength Deficit;Impaired Balance;Decreased Activity Tolerance   Current Discharge Plan   Current Discharge Plan Return to Prior Living Situation   Interdisciplinary Plan of Care Collaboration   Patient Position at End of Therapy In Bed;Call Light within Reach;Tray Table within Reach;Phone within Reach   PT DME Recommendations   Wheelchair 20\" Width;Lightweight;18\" Width  (width TBD)   Cushion Standard   Assistive Device Front Wheeled Walker   Physical Therapist Assigned "   Assigned PT / Treatment Time / Comments robinson 60+30, tech assist   Benefit   Therapy Benefit Patient Would Benefit from Inpatient Rehabilitation Physical Therapy to Maximize Functional [place] with ADLs, IADLs and Mobility.   Strengths & Barriers   Strengths Able to follow instructions;Alert and oriented;Effective communication skills;Good insight into deficits/needs;Independent prior level of function;Motivated for self care and independence;Pleasant and cooperative;Supportive family;Willingly participates in therapeutic activities   Barriers Decreased endurance;Fatigue;Generalized weakness;Home accessibility;Impaired activity tolerance;Impaired balance;Limited mobility;Pain     Pt oriented to rehab/ PT and POC.  Pt provided with leg  and instructed for use with RLE re-positioning / lifting and AAROM.  Pt completed seated exercises 2 x 10 for hip flex/ abd/add, knee flex/ ext and ankle pumps - requires AAROM / manual assist for all RLE ROM.    Assessment  Patient is 79 y.o. female with a diagnosis of R distal femur fx, s/p ORIF 12/7.  Additional factors influencing patient status / progress (ie: cognitive factors, co-morbidities, social support, etc): inc;ude post-op SOB/ questionable pneumonia, leukocytosis, HTN, anxiety and anemia requiring transfusion.  PMH for bladder CA, HTN, A-fib, HLD and osteopenia. Pt reports independent without device prior to admit. Pt presents with rehab with significant RLE pain/ swelling, weakness, limited ROM and requires max/total A of 1-2 for bed mobility, sit<>stand, transfers with slide board vs FWW and is unable to ambulate/ bear wt to RLE at this time. Pt has supportive spouse, is motivated for improved pain control and mobility skills and should benefit from rehab PT to maximize household mobility and assist with DME needs for safe return home with spouse and HHPT    Plan  Recommend Physical Therapy  minutes per day 5-7 days per week for 2-3 weeks for the  following treatments:  PT Group Therapy, PT Gait Training, PT Self Care/Home Eval, PT Therapeutic Exercises, PT TENS Application, PT Neuro Re-Ed/Balance, PT Therapeutic Activity, PT Manual Therapy, and PT Evaluation.    Passport items to be completed:  Get in/out of bed safely, in/out of a vehicle, safely use mobility device, walk or wheel around home/community, navigate up and down stairs, show how to get up/down from the ground, ensure home is accessible, demonstrate HEP, complete caregiver training    Goals:  Long term and short term goals have been discussed with patient and they are in agreement.    Physical Therapy Problems (Active)       Problem: Mobility       Dates: Start:  12/12/24         Goal: STG-Within one week, patient will propel wheelchair household distances SBA with BUE support x 25 ft x 2       Dates: Start:  12/12/24            Goal: STG-Within one week, patient will ambulate household distance min A with // bars and wc follow 10 ft x 2       Dates: Start:  12/12/24               Problem: Mobility Transfers       Dates: Start:  12/12/24         Goal: STG-Within one week, patient will perform bed mobility mod A of 1 with leg / bed controls as needed       Dates: Start:  12/12/24            Goal: STG-Within one week, patient will transfer bed to chair min / mod A of 1 for stand-step with FWW vs slide board       Dates: Start:  12/12/24               Problem: PT-Long Term Goals       Dates: Start:  12/12/24         Goal: LTG-By discharge, patient will propel wheelchair modified independent 50 ft x 2       Dates: Start:  12/12/24            Goal: LTG-By discharge, patient will ambulate SBA/ CGA with FWW 50 ft x 2       Dates: Start:  12/12/24            Goal: LTG-By discharge, patient will transfer one surface to another SBA/ SPV with FWW       Dates: Start:  12/12/24            Goal: LTG-By discharge, patient will ambulate up/down 4-6 stairs SBA/ CGA with hand rails vs FWW x 3 steps into home        Dates: Start:  12/12/24            Goal: LTG-By discharge, patient will transfer in/out of a car SBA/ CGA with FWW       Dates: Start:  12/12/24

## 2024-12-12 NOTE — IDT DISCHARGE PLANNING
CASE MANAGEMENT INITIAL ASSESSMENT    Admit Date:  12/11/2024     CM reviewed the medical chart and will meet with the patient to discuss role of case management / discharge planning / team conference.       Diagnosis: Closed displaced fracture of distal epiphysis of right femur with routine healing [S72.441D]    Co-morbidities:   Patient Active Problem List    Diagnosis Date Noted    Closed displaced fracture of distal epiphysis of right femur with routine healing 12/11/2024    Other fracture of right femur, initial encounter for closed fracture (HCC) 12/10/2024    Hyperglycemia 12/08/2024    Hypoxia 12/08/2024    Advance care planning 12/08/2024    Fracture 12/07/2024    Preoperative examination 12/07/2024    Cardiomyopathy due to hypertension, without heart failure (HCC) 12/07/2024    Class 1 obesity without serious comorbidity with body mass index (BMI) of 31.0 to 31.9 in adult 12/07/2024    Leukocytosis 12/07/2024    Hypercalcemia 12/07/2024    Paroxysmal SVT (supraventricular tachycardia) ([facility]) 07/01/2024    Aortic atherosclerosis ([facility]) 03/22/2024    Other forms of angina pectoris ([facility]) 03/22/2024    Thyromegaly 03/22/2024    Onychomycosis of toenail 11/14/2023    Hypercoagulable state due to paroxysmal atrial fibrillation (HCC) 11/08/2023    Coronary atherosclerosis due to calcified coronary lesion 11/08/2023    Fibrosis of liver 11/08/2023    History of community acquired pneumonia 11/08/2023    Hypertensive urgency 10/07/2023    Paroxysmal atrial fibrillation (HCC) 10/07/2023    ACP (advance care planning) 10/07/2023    Status post left hip replacement 05/22/2023    Decreased hearing, bilateral 05/22/2023    Osteoporosis of femur without pathological fracture 02/01/2023    Caregiver stress 02/11/2022    Situational depression 02/11/2022    Anemia 05/03/2019    Bradycardia 11/01/2018    Ocular migraine 04/16/2018    Osteopenia of multiple sites     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/21/2018    Obesity, Class I,  BMI 30-34.9 02/24/2017    Need for subacute bacterial endocarditis prophylaxis 01/21/2016    Essential hypertension 06/11/2015    Status post total left knee replacement 06/11/2015    Gastroesophageal reflux disease without esophagitis 06/11/2015    Allergic rhinitis due to pollen 06/11/2015    History of bladder cancer 06/11/2015    Primary osteoarthritis involving multiple joints      Prior Living Situation:  Housing / Facility: [address]  Lives with - Patient's Self Care Capacity: Spouse    Prior Level of Function:  Medication Management: Independent  Finances: Independent  Home Management: Independent  Shopping: Independent  Prior Level Of Mobility: Independent Without Device in Community, Independent Without Device in Home  Driving / Transportation: Driving Independent    Support Systems:  Primary : Jean Claude Paige         Previous Services Utilized:   Equipment Owned: Front-Wheel Walker, Single Point Cane, Raised Toilet Seat With Arms, Reacher  Prior Services: Home-Independent    Other Information:  Occupation (Pre-Hospital Vocational): Retired Due To Age     Primary Payor Source: Medicare A, Medicare B  Primary Care Practitioner : Tate De La Rosa    Patient / Family Goal:  Patient / Family Goal: Return home.    Plan:  1. Continue to follow patient through hospitalization and provide discharge planning in collaboration with patient, family, physicians and ancillary services.     2. Utilize community resources to ensure a safe discharge.

## 2024-12-12 NOTE — THERAPY
Physical Therapy   Daily Treatment     Patient Name: Franchesca Paige  Age:  79 y.o., Sex:  female  Medical Record #: [id number]  Today's Date: 12/12/2024     Precautions  Precautions: Fall Risk, Weight Bearing As Tolerated Right Lower Extremity    Subjective    Pt resting in bed, willing to participate with encouragement     Objective       12/12/24 1301   PT Charge Group   PT Therapeutic Activities (Units) 2   PT Total Time Spent   PT Individual Total Time Spent (Mins) 30   Gait Functional Level of Assist    Gait Level Of Assist Unable to Participate   Wheelchair Functional Level of Assist   Wheelchair Assist Stand by Assist   Distance Wheelchair (Feet or Distance) 25   Wheelchair Description Extra time;Limited by fatigue;Impaired coordination;Verbal cueing   Transfer Functional Level of Assist   Bed, Chair, Wheelchair Transfer Maximal Assist   Bed Chair Wheelchair Transfer Description Adaptive equipment;Increased time;Requires lift;Set-up of equipment  (FWW for stand-pivot wv slide board -- see note)   Bed Mobility    Supine to Sit Maximal Assist   Sit to Stand Moderate Assist  (from elevated edge of bed / mat table to FWW)     Pt completed bed>wc transfer with FWW for stand-pivot to L with mod A.  Pt completed slide board transfers wc<>mat table to both L and R with min A and max cues for sequencing/ scooting and set-up.  Pt completed transfer from elevated mat table to FWW for stand-pivot transfer to both L and R -- increased difficulty completed pivot to R -- min/mod to L, mod A to R with increased time.    Assessment    Pt limited by RLE pain/ swelling, weakness and inability to bear wt for functional mobility. Pt requires increased time and encouragement to complete all mobility tasks. Pt should progress with pain management and progressive mobility training.   Strengths: Able to follow instructions, Alert and oriented, Effective communication skills, Good insight into deficits/needs, Independent prior level  "of function, Motivated for self care and independence, Pleasant and cooperative, Supportive family, Willingly participates in therapeutic activities  Barriers: Decreased endurance, Fatigue, Generalized weakness, Home accessibility, Impaired activity tolerance, Impaired balance, Limited mobility, Pain    Plan    Sit<>stand/ transfer training with FWW, slide board transfers for pain control, AAROM/ strength training, bed mobility training with leg  and bed controls as needed, Modalities for pain management.      DME  PT DME Recommendations  Wheelchair: 20\" Width, Lightweight, 18\" Width (width TBD)  Cushion: Standard  Assistive Device: Front Wheeled Walker    Passport items to be completed:  Get in/out of bed safely, in/out of a vehicle, safely use mobility device, walk or wheel around home/community, navigate up and down stairs, show how to get up/down from the ground, ensure home is accessible, demonstrate HEP, complete caregiver training    Physical Therapy Problems (Active)       Problem: Mobility       Dates: Start:  12/12/24         Goal: STG-Within one week, patient will propel wheelchair household distances SBA with BUE support x 25 ft x 2       Dates: Start:  12/12/24            Goal: STG-Within one week, patient will ambulate household distance min A with // bars and wc follow 10 ft x 2       Dates: Start:  12/12/24               Problem: Mobility Transfers       Dates: Start:  12/12/24         Goal: STG-Within one week, patient will perform bed mobility mod A of 1 with leg / bed controls as needed       Dates: Start:  12/12/24            Goal: STG-Within one week, patient will transfer bed to chair min / mod A of 1 for stand-step with FWW vs slide board       Dates: Start:  12/12/24               Problem: PT-Long Term Goals       Dates: Start:  12/12/24         Goal: LTG-By discharge, patient will propel wheelchair modified independent 50 ft x 2       Dates: Start:  12/12/24            Goal: " LTG-By discharge, patient will ambulate SBA/ CGA with FWW 50 ft x 2       Dates: Start:  12/12/24            Goal: LTG-By discharge, patient will transfer one surface to another SBA/ SPV with FWW       Dates: Start:  12/12/24            Goal: LTG-By discharge, patient will ambulate up/down 4-6 stairs SBA/ CGA with hand rails vs FWW x 3 steps into home       Dates: Start:  12/12/24            Goal: LTG-By discharge, patient will transfer in/out of a car SBA/ CGA with FWW       Dates: Start:  12/12/24

## 2024-12-13 ENCOUNTER — APPOINTMENT (OUTPATIENT)
Dept: PHYSICAL THERAPY | Facility: REHABILITATION | Age: 79
DRG: 559 | End: 2024-12-13
Attending: PHYSICAL MEDICINE & REHABILITATION
Payer: MEDICARE

## 2024-12-13 ENCOUNTER — APPOINTMENT (OUTPATIENT)
Dept: OCCUPATIONAL THERAPY | Facility: REHABILITATION | Age: 79
DRG: 559 | End: 2024-12-13
Attending: PHYSICAL MEDICINE & REHABILITATION
Payer: MEDICARE

## 2024-12-13 LAB
APPEARANCE UR: CLEAR
BILIRUB UR QL STRIP.AUTO: NEGATIVE
COLOR UR: YELLOW
GLUCOSE UR STRIP.AUTO-MCNC: NEGATIVE MG/DL
KETONES UR STRIP.AUTO-MCNC: NEGATIVE MG/DL
LEUKOCYTE ESTERASE UR QL STRIP.AUTO: NEGATIVE
MICRO URNS: NORMAL
NITRITE UR QL STRIP.AUTO: NEGATIVE
PH UR STRIP.AUTO: 6 [PH] (ref 5–8)
PROT UR QL STRIP: NEGATIVE MG/DL
RBC UR QL AUTO: NEGATIVE
SP GR UR STRIP.AUTO: 1.01
UROBILINOGEN UR STRIP.AUTO-MCNC: 0.2 EU/DL

## 2024-12-13 PROCEDURE — 700102 HCHG RX REV CODE 250 W/ 637 OVERRIDE(OP): Performed by: PHYSICAL MEDICINE & REHABILITATION

## 2024-12-13 PROCEDURE — 97535 SELF CARE MNGMENT TRAINING: CPT

## 2024-12-13 PROCEDURE — 770010 HCHG ROOM/CARE - REHAB SEMI PRIVAT*

## 2024-12-13 PROCEDURE — 97530 THERAPEUTIC ACTIVITIES: CPT

## 2024-12-13 PROCEDURE — A9270 NON-COVERED ITEM OR SERVICE: HCPCS | Performed by: PHYSICAL MEDICINE & REHABILITATION

## 2024-12-13 PROCEDURE — 97110 THERAPEUTIC EXERCISES: CPT

## 2024-12-13 PROCEDURE — 99232 SBSQ HOSP IP/OBS MODERATE 35: CPT | Performed by: PHYSICAL MEDICINE & REHABILITATION

## 2024-12-13 PROCEDURE — 97602 WOUND(S) CARE NON-SELECTIVE: CPT

## 2024-12-13 PROCEDURE — 700111 HCHG RX REV CODE 636 W/ 250 OVERRIDE (IP): Mod: JZ | Performed by: PHYSICAL MEDICINE & REHABILITATION

## 2024-12-13 PROCEDURE — 81003 URINALYSIS AUTO W/O SCOPE: CPT

## 2024-12-13 RX ORDER — LISINOPRIL 5 MG/1
10 TABLET ORAL DAILY
Status: DISCONTINUED | OUTPATIENT
Start: 2024-12-14 | End: 2024-12-16

## 2024-12-13 RX ORDER — MECLIZINE HYDROCHLORIDE 25 MG/1
25 TABLET ORAL 3 TIMES DAILY PRN
Status: DISCONTINUED | OUTPATIENT
Start: 2024-12-13 | End: 2024-12-24 | Stop reason: HOSPADM

## 2024-12-13 RX ORDER — METAXALONE 800 MG/1
800 TABLET ORAL 3 TIMES DAILY PRN
Status: DISCONTINUED | OUTPATIENT
Start: 2024-12-13 | End: 2024-12-24 | Stop reason: HOSPADM

## 2024-12-13 RX ADMIN — LACTULOSE 30 ML: 10 SOLUTION ORAL at 08:36

## 2024-12-13 RX ADMIN — LISINOPRIL 20 MG: 20 TABLET ORAL at 05:44

## 2024-12-13 RX ADMIN — ENOXAPARIN SODIUM 40 MG: 100 INJECTION SUBCUTANEOUS at 17:44

## 2024-12-13 RX ADMIN — OMEPRAZOLE 20 MG: 20 CAPSULE, DELAYED RELEASE ORAL at 08:35

## 2024-12-13 RX ADMIN — OXYCODONE HYDROCHLORIDE 5 MG: 5 TABLET ORAL at 21:04

## 2024-12-13 RX ADMIN — LACTULOSE 30 ML: 10 SOLUTION ORAL at 14:24

## 2024-12-13 RX ADMIN — OXYCODONE HYDROCHLORIDE 5 MG: 5 TABLET ORAL at 08:35

## 2024-12-13 RX ADMIN — SENNOSIDES AND DOCUSATE SODIUM 2 TABLET: 50; 8.6 TABLET ORAL at 20:11

## 2024-12-13 RX ADMIN — AMOXICILLIN AND CLAVULANATE POTASSIUM 1 TABLET: 875; 125 TABLET, FILM COATED ORAL at 20:11

## 2024-12-13 RX ADMIN — AMLODIPINE BESYLATE 5 MG: 5 TABLET ORAL at 05:44

## 2024-12-13 RX ADMIN — AMOXICILLIN AND CLAVULANATE POTASSIUM 1 TABLET: 875; 125 TABLET, FILM COATED ORAL at 08:35

## 2024-12-13 ASSESSMENT — GAIT ASSESSMENTS: GAIT LEVEL OF ASSIST: UNABLE TO PARTICIPATE

## 2024-12-13 ASSESSMENT — PAIN DESCRIPTION - PAIN TYPE
TYPE: ACUTE PAIN;SURGICAL PAIN
TYPE: ACUTE PAIN

## 2024-12-13 NOTE — CARE PLAN
The patient is Stable - Low risk of patient condition declining or worsening    Shift Goals  Clinical Goals: safety  Patient Goals: pain management  Family Goals: safety    Patient is not progressing towards the following goals:    Problem: Bladder / Voiding  Goal: Patient will establish and maintain regular urinary output  Outcome: Not Progressing  Note: Patient straight cathed this AM for PVR retention > 400ml

## 2024-12-13 NOTE — PROGRESS NOTES
NURSING DAILY NOTE    Name: Franchesca Paige   Date of Admission: 12/11/2024   Admitting Diagnosis: Other fracture of right femur, initial encounter for closed fracture (HCC)  Attending Physician: ARMANDO GLASS M.D.  Allergies: Ibuprofen and Sulfa drugs    Safety  Patient Assist     Patient Precautions  Fall Risk, Weight Bearing As Tolerated Right Lower Extremity  Precaution Comments     Bed Transfer Status  Maximal Assist  Toilet Transfer Status      Assistive Devices  Gait Belt, Rails, Wheelchair  Oxygen  None - Room Air  Diet/Therapeutic Dining  Current Diet Order   Procedures    Diet Order Diet: Regular     Pill Administration  whole  Agitated Behavioral Scale     ABS Level of Severity       Fall Risk  Has the patient had a fall this admission?   No  Candice Porter Fall Risk Scoring  25, HIGH RISK  Fall Risk Safety Measures  bed alarm and chair alarm    Vitals  Temperature: 36.3 °C (97.4 °F)  Temp src: Oral  Pulse: 61  Respiration: 18  Blood Pressure : 122/55  Blood Pressure MAP (Calculated): 77 MM HG  BP Location: Upper Arm, Right  Patient BP Position: Supine     Oxygen  Pulse Oximetry: 95 %  O2 (LPM): 0  O2 Delivery Device: None - Room Air    Bowel and Bladder  Last Bowel Movement  12/11/24 (in AM per patient)  Stool Type     Bowel Device     Continent  Bladder:     Bowel: Continent movement  Bladder Function  Urine Void (mL): 100 ml (urinal, bed side commode)  Number of Times Voided: 1  Urine Color: Unable To Evaluate  Number of Times Incontinent of Urine: 0  Straight Catheter: 350 ml  Genitourinary Assessment   Bladder Assessment (WDL):  WDL Except  Urinary Symptoms: Dysuria  Urine Color: Unable To Evaluate  Number of Bladder Accidents: 1  Total Number of Bladder of Accidents in Last 7 Days: 1  Number of Times Incontinent of Urine: 0  Bladder Device: Urinal  Bladder Scan: Unable To Void  $ Bladder Scan Results (mL): 365  Intermittent  "Catheter: Yes -Go To LDA \"Indwelling Catheter Group\"    Skin  Felix Score   15  Sensory Interventions   Skin Preventative Measures: Pillows in Use for Support / Positioning  Moisture Interventions  Moisturizers/Barriers: Moisturizer       Pain  Pain Rating Scale  8 - Awful, hard to do anything  Pain Location  Knee  Pain Location Orientation  Right  Pain Interventions   Medication (see MAR)    ADLs    Bathing      Linen Change      Personal Hygiene  Moist Joycelyn Wipes  Chlorhexidine Bath      Oral Care     Teeth/Dentures     Shave     Nutrition Percentage Eaten  Lunch, Between 25-50% Consumed  Environmental Precautions     Patient Turns/Positioning  Supine  Patient Turns Assistance/Tolerance  Assistance of One  Bed Positions  Bed Controls On, Bed Locked  Head of Bed Elevated         Psychosocial/Neurologic Assessment  Psychosocial Assessment  Psychosocial (WDL):  Within Defined Limits  Neurologic Assessment  Neuro (WDL): Exceptions to WDL (neuropathy RLE)  Level of Consciousness: Alert  Orientation Level: Oriented X4  Cognition: Appropriate safety awareness, Appropriate judgement  EENT (WDL):  WDL Except    Cardio/Pulmonary Assessment  Edema   RUE Edema: 1+  RLE Edema: 2+  LUE Edema: Trace  LLE Edema: 1+  Respiratory Breath Sounds  RUL Breath Sounds: Clear  RML Breath Sounds: Clear  RLL Breath Sounds: Diminished  DINO Breath Sounds: Clear  LLL Breath Sounds: Diminished  Cardiac Assessment   Cardiac (WDL):  Within Defined Limits       "

## 2024-12-13 NOTE — THERAPY
Physical Therapy   Daily Treatment     Patient Name: Franchesca Paige  Age:  79 y.o., Sex:  female  Medical Record #: [id number]  Today's Date: 12/13/2024     Precautions  Precautions: Fall Risk, Weight Bearing As Tolerated Right Lower Extremity    Subjective    Pt resting in bed, willing to participate     Objective       12/13/24 1301   PT Charge Group   PT Therapeutic Exercise (Units) 2   PT Therapeutic Activities (Units) 2   PT Total Time Spent   PT Individual Total Time Spent (Mins) 60   Vitals   Pulse 70   Blood Pressure  105/70   Room Air Oximetry 97   Gait Functional Level of Assist    Gait Level Of Assist Unable to Participate   Transfer Functional Level of Assist   Bed, Chair, Wheelchair Transfer Minimal Assist  (with slide board for lateral scoot bed<>wc)   Bed Chair Wheelchair Transfer Description Increased time;Slideboard transfer from bed to wheelchair;Set-up of equipment;Verbal cueing   Supine Lower Body Exercise   Supine Lower Body Exercises Yes   Bridges One Legged;2 sets of 10  (RLE extended on pillows)   Sitting Lower Body Exercises   Sitting Lower Body Exercises Yes   Ankle Pumps 2 sets of 10   Hip Flexion 2 sets of 10   Hip Abduction 2 sets of 10   Hip Adduction 2 sets of 10   Long Arc Quad 2 sets of 10   Hamstring Curl 2 sets of 10   Comments AAROM and leg  for RLE   Bed Mobility    Supine to Sit Minimal Assist   Sit to Supine Moderate Assist   Sit to Stand Minimal Assist  (elevated edge of bed to FWW x 6 reps)   Neuro-Muscular Treatments   Comments TENS to RLE x 30 minutes during therapy session.   Physical Therapist Assigned   Assigned PT / Treatment Time / Comments robinson 30 + 60     Sit<>stand from elevated edge of bed to FWW, pt completed small lateral scoots in standing from foot of bed>head of bed. Required multiple seated rests throughout activity.    Assessment    Pt with slow progress at this point 2* RLE pain/ swelling and inability to tolerate WB, however demonstrated improved  "sequencing and wt shifting with slide board transfers. TENS provided some pain distraction throughout session.    Strengths: Able to follow instructions, Alert and oriented, Effective communication skills, Good insight into deficits/needs, Independent prior level of function, Motivated for self care and independence, Pleasant and cooperative, Supportive family, Willingly participates in therapeutic activities  Barriers: Decreased endurance, Fatigue, Generalized weakness, Home accessibility, Impaired activity tolerance, Impaired balance, Limited mobility, Pain    Plan    Sit<>stand/ transfer training with FWW, slide board transfers for pain control, AAROM/ strength training, bed mobility training with leg  and bed controls as needed, Modalities for pain management. Progress gait when able    DME  PT DME Recommendations  Wheelchair: 20\" Width, Lightweight, 18\" Width (width TBD)  Cushion: Standard  Assistive Device: Front Wheeled Walker    Passport items to be completed:  Get in/out of bed safely, in/out of a vehicle, safely use mobility device, walk or wheel around home/community, navigate up and down stairs, show how to get up/down from the ground, ensure home is accessible, demonstrate HEP, complete caregiver training    Physical Therapy Problems (Active)       Problem: Mobility       Dates: Start:  12/12/24         Goal: STG-Within one week, patient will propel wheelchair household distances SBA with BUE support x 25 ft x 2       Dates: Start:  12/12/24            Goal: STG-Within one week, patient will ambulate household distance min A with // bars and wc follow 10 ft x 2       Dates: Start:  12/12/24               Problem: Mobility Transfers       Dates: Start:  12/12/24         Goal: STG-Within one week, patient will perform bed mobility mod A of 1 with leg / bed controls as needed       Dates: Start:  12/12/24            Goal: STG-Within one week, patient will transfer bed to chair min / mod A of 1 " for stand-step with FWW vs slide board       Dates: Start:  12/12/24               Problem: PT-Long Term Goals       Dates: Start:  12/12/24         Goal: LTG-By discharge, patient will propel wheelchair modified independent 50 ft x 2       Dates: Start:  12/12/24            Goal: LTG-By discharge, patient will ambulate SBA/ CGA with FWW 50 ft x 2       Dates: Start:  12/12/24            Goal: LTG-By discharge, patient will transfer one surface to another SBA/ SPV with FWW       Dates: Start:  12/12/24            Goal: LTG-By discharge, patient will ambulate up/down 4-6 stairs SBA/ CGA with hand rails vs FWW x 3 steps into home       Dates: Start:  12/12/24            Goal: LTG-By discharge, patient will transfer in/out of a car SBA/ CGA with FWW       Dates: Start:  12/12/24

## 2024-12-13 NOTE — WOUND TEAM
[facility]  Inpatient Services  Initial Wound and Skin Care Evaluation    Admission Date: 2024     Last order of IP CONSULT TO WOUND CARE was found on 2024 from Hospital Encounter on 12/10/2024     HPI, PMH, SH: Reviewed    Past Surgical History:   Procedure Laterality Date    ORIF, FRACTURE, FEMUR Right 2024    Procedure: RIGHT DISTAL FEMUR OPEN REDUCTION INTERNAL FIXATION, INTRAMEDULLARY NAIL, RIGHT THIGH LIPOMA REMOVAL;  Surgeon: Hi Garzon M.D.;  Location: SURGERY [facility];  Service: Orthopedics    HI TOTAL HIP ARTHROPLASTY Left 3/14/2023    Procedure: LEFT TOTAL HIP ARTHROPLASTY, ANTERIOR APPROACH;  Surgeon: Arvind Diaz M.D.;  Location: SURGERY [facility];  Service: Orthopedics    OTHER Right 10/07/2018    vein ablation right thigh, Dr. Azevedo    KNEE REPLACEMENT, TOTAL  2012    Dr. Harry, left knee    COLONOSCOPY  2010    diverticulosis only, due in     ORIF, WRIST Left     HARDWARE REMOVAL ORTHO Left     wrist    HYSTERECTOMY, TOTAL ABDOMINAL  1980s    has one half of an ovary and a fallopian tube     Social History     Tobacco Use    Smoking status: Former     Current packs/day: 0.00     Average packs/day: 1 pack/day for 36.2 years (36.2 ttl pk-yrs)     Types: Cigarettes     Start date: 1964     Quit date: 2000     Years since quittin.6    Smokeless tobacco: Never   Substance Use Topics    Alcohol use: No     Alcohol/week: 0.0 oz     No chief complaint on file.    Diagnosis: Closed displaced fracture of distal epiphysis of right femur with routine healing [S72.441D]    Unit where seen by Wound Team: [facility]14/01     WOUND CONSULT RELATED TO:  right ankle, right medial knee and right thigh    WOUND TEAM PLAN OF CARE - Frequency of Follow-up:   Nursing to follow dressing orders written for wound care. Contact wound team if area fails to progress, deteriorates or with any questions/concerns if something comes up before  next scheduled follow up (See below as to whether wound is following and frequency of wound follow up)   Weekly - right lateral ankle DTI and right posterior medial knee DTI    WOUND HISTORY:   Pt was wearing ACE bandage on admission on the right leg    Patient is a 79 y.o. female with a PMH of bladder cancer, HTN, A fib, HLD, and osteopenia who presented on 12/7/24 with GLF. Patient reportedly had a mechanical fall onto right knee with severe 10/10 pain. In ED she was found to have distal femur fracture. Orthopedic surgery was consulted and recommended ORIF. She underwent ORIF on 12/7/24 with Dr. Garzon. Patient is WBAT. Hospital course complicated by SOB concerning for pneumonia, leukocytosis, and HTN. She has been started on antibiotics for the elevated WBC.        WOUND ASSESSMENT/LDA  Wound 12/11/24 Other (comment) Thigh Anterior Right blanchable redness with blistering (Active)   Date First Assessed: 12/11/24   Present on Original Admission: Yes  Hand Hygiene Completed: Yes  Primary Wound Type: (c) Other (comment)  Location: Thigh  Wound Orientation: Anterior  Laterality: Right  Wound Description (Comments): blanchable redness...      Assessments 12/13/2024 11:30 AM   Wound Image     Site Assessment Clean;Dry;Intact;Red;Other (Comment)   Periwound Assessment Clean;Dry;Intact   Margins Attached edges   Drainage Amount None   Treatments Other (Comment)   Dressing Status Open to Air   NEXT Weekly Photo (Inpatient Only) 12/15/24   Wound Team Following Weekly   Wound Length (cm) 0.5 cm   Wound Width (cm) 10 cm   Wound Depth (cm) 0.1 cm   Wound Surface Area (cm^2) 5 cm^2   Wound Volume (cm^3) 0.5 cm^3   Shape linear       Wound 12/11/24 Pressure Injury Knee;Thigh Anterior;Medial;Posterior Right right medial posterior, knee - --->12/13 DTI (Active)   Date First Assessed: 12/11/24   Present on Original Admission: Yes  Hand Hygiene Completed: Yes  Primary Wound Type: Pressure Injury  Location: Knee;Thigh  Wound  Orientation: Anterior;Medial;Posterior  Laterality: Right  Wound Description (Comments): ...      Assessments 12/13/2024 11:30 AM   Wound Image     Site Assessment Clean;Dry;Intact;Purple;Red   Periwound Assessment Clean;Dry;Intact   Margins Attached edges   Drainage Amount None   Treatments Cleansed;Nonselective debridement;Site care   Wound Cleansing Approved Wound Cleanser   Periwound Protectant No-sting Skin Prep   Dressing Status Clean;Dry;Intact   Dressing Changed New   Dressing Options Silicone Adhesive Foam   Dressing Change/Treatment Frequency Every 72 hrs, and As Needed   NEXT Dressing Change/Treatment Date 12/16/24   NEXT Weekly Photo (Inpatient Only) 12/16/24   Wound Team Following Weekly   Pressure Injury Stage Deep Tissue   Wound Length (cm) 1 cm   Wound Width (cm) 5 cm   Wound Depth (cm) 0 cm   Wound Surface Area (cm^2) 5 cm^2   Wound Volume (cm^3) 0 cm^3   Wound Odor None   WOUND NURSE ONLY - Time Spent with Patient (mins) 30       Wound 12/11/24 Ankle Anterior;Lateral Right lateral ankle- DTI (Active)   Date First Assessed: 12/11/24   Present on Original Admission: Yes  Hand Hygiene Completed: Yes  Location: Ankle  Wound Orientation: Anterior;Lateral  Laterality: Right  Wound Description (Comments): lateral ankle- DTI      Assessments 12/13/2024 11:30 AM   Wound Image     Site Assessment Clean;Dry;Intact;Purple;Red   Periwound Assessment Clean;Dry;Intact   Margins Attached edges   Drainage Amount None   Treatments Cleansed;Nonselective debridement;Site care   Wound Cleansing Approved Wound Cleanser   Periwound Protectant No-sting Skin Prep   Dressing Status Clean;Dry;Intact   Dressing Changed Changed   Dressing Change/Treatment Frequency Every 72 hrs, and As Needed   NEXT Dressing Change/Treatment Date 12/16/24   NEXT Weekly Photo (Inpatient Only) 12/16/24   Wound Team Following Weekly   Wound Length (cm) 0.9 cm   Wound Width (cm) 0.8 cm   Wound Depth (cm) 0 cm   Wound Surface Area (cm^2) 0.72 cm^2    Wound Volume (cm^3) 0 cm^3   WOUND NURSE ONLY - Time Spent with Patient (mins) 30        Vascular:    BABAR:   No results found.    Lab Values:    Lab Results   Component Value Date/Time    WBC 9.9 12/12/2024 06:03 AM    WBC 5.3 09/15/2009 08:49 AM    RBC 2.70 (L) 12/12/2024 06:03 AM    RBC 4.66 09/15/2009 08:49 AM    HEMOGLOBIN 8.1 (L) 12/12/2024 06:03 AM    HEMATOCRIT 24.1 (L) 12/12/2024 06:03 AM    HBA1C 5.8 (H) 12/08/2024 02:21 AM         Culture Results show:  No results found for this or any previous visit (from the past 720 hours).    Pain Level/Medicated:  None, Tolerated without pain medication       INTERVENTIONS BY WOUND TEAM:  Chart and images reviewed. Discussed with bedside RN. All areas of concern (based on picture review, LDA review and discussion with bedside RN) have been thoroughly assessed. Documentation of areas based on significant findings. This RN in to assess patient. Performed standard wound care which includes appropriate positioning, dressing removal and non-selective debridement. Pictures and measurements obtained weekly if/when required.    Wound:  right thigh-blanchable redness with blistering.  Primary Dressing:  left open to air. The blisters are reabsorbing on their own. There is no induration, no signs of infection and it can be left open to air . The area was clean and dry. She showered yesterday.      Wound:  right medial, posterior knee -cluster of DTI pressure injury, where the ACE bandage was on admission  Cleansed/Non-selectively Debrided with:  Wound cleanser and Gauze  Joycelyn wound: Cleansed with Wound cleanser and Gauze, Prepped with No Sting  Primary Dressing:  no sting barrier film, allowed to dry completely  Secondary (Outer) Dressing: silicone adhesive foam     Wound:  right lateral ankle-DTI  Preparation for Dressing removal: Removed without difficulty  Cleansed/Non-selectively Debrided with:  Wound cleanser and Gauze  Joycelyn wound: Cleansed with Wound cleanser and Gauze,  Prepped with No Sting  Primary Dressing:  no sting barrier film, allowed to dry completely   Secondary (Outer) Dressing: silicone adhesive foam    Advanced Wound Care Discharge Planning  Number of Clinicians necessary to complete wound care: 1  Is patient requiring IV pain medications for dressing changes:  No   Length of time for dressing change 15 min. (This does not include chart review, pre-medication time, set up, clean up or time spent charting.)    Interdisciplinary consultation: Patient, Bedside RN (Charley), Provider, Dr Souza  Pressure injury and staging reviewed with N/A.     EVALUATION / RATIONALE FOR TREATMENT:     Date:  12/13/24  Wound Status:  Wound progressing as expected (initial full evaluation, but this patient was briefly assessed for a brief skin check-on admission)    Right lateral ankle & right medial, posterior knee- These wounds are dry and purple non blanching. No drainage. May or may not open up and evolve.   Barrier film provides a clear protective barrier. Silicone adhesive foam offloads It and protects it from other things that might touch like shoes and wheelchair etc.         Goals: Steady decrease in wound area and depth weekly.    NURSING PLAN OF CARE ORDERS:  Dressing changes: See Dressing Care orders    NUTRITION RECOMMENDATIONS   Wound Team Recommendations:  N/A    DIET ORDERS (From admission to next 24h)       Start     Ordered    12/11/24 1156  Diet Order Diet: Regular  ALL MEALS        Question:  Diet:  Answer:  Regular    12/11/24 1155                    PREVENTATIVE INTERVENTIONS:    Q shift Felix - performed per nursing policy  Q shift pressure point assessments - performed per nursing policy    Surface/Positioning  Standard/trauma mattress - Currently in Place  Reposition q 2 hours - Ordered  TAPs Turning system - Ordered  Waffle overlay  - Currently in Place    Offloading/Redistribution  Heels floated with waffle overlay - Currently in Place  Float Heels off Bed with  Pillows - Ordered           Containment/Moisture Prevention    Dri-yuly pad - Currently in Place  Brief with mobilization - Currently in Place  Barrier paste - Currently in Place    Mobilization      Working with therapies      Anticipated discharge plans:  Self/Family Care        Vac Discharge Needs:  Vac Discharge plan is purely a recommendation from wound team and not a requirement for discharge unless otherwise stated by physician.  Not Applicable Pt not on a wound vac

## 2024-12-13 NOTE — PROGRESS NOTES
"  Physical Medicine & Rehabilitation Progress Note    Encounter Date: 12/13/2024    Chief Complaint: Decreased mobility, weakness    Interval Events (Subjective):  Patient sitting up in room. She reports she is doing much better today. She reports she thinks the pain medication was a little too strong for her because she is having trouble remembering what she did in therapy. She reports she did have retention overnight and required CIC. Discussed would check UA    Objective:  VITAL SIGNS: BP 99/60   Pulse 63   Temp 37.3 °C (99.1 °F) (Oral)   Resp 18   Ht 1.702 m (5' 7\")   Wt 97.1 kg (214 lb)   SpO2 90%   BMI 33.52 kg/m²   Gen: NAD  Psych: Mood and affect appropriate  CV: RRR, 1+ edema  Resp: CTAB, no upper airway sounds  Abd: NTND  Neuro: AOx3, poor memory, following commands    Laboratory Values:  Recent Results (from the past 72 hours)   CBC WITHOUT DIFFERENTIAL    Collection Time: 12/11/24  2:51 AM   Result Value Ref Range    WBC 12.1 (H) 4.8 - 10.8 K/uL    RBC 2.65 (L) 4.20 - 5.40 M/uL    Hemoglobin 8.0 (L) 12.0 - 16.0 g/dL    Hematocrit 24.2 (L) 37.0 - 47.0 %    MCV 91.3 81.4 - 97.8 fL    MCH 30.2 27.0 - 33.0 pg    MCHC 33.1 32.2 - 35.5 g/dL    RDW 43.9 35.9 - 50.0 fL    Platelet Count 197 164 - 446 K/uL    MPV 9.9 9.0 - 12.9 fL   Basic Metabolic Panel    Collection Time: 12/11/24  2:51 AM   Result Value Ref Range    Sodium 130 (L) 135 - 145 mmol/L    Potassium 4.2 3.6 - 5.5 mmol/L    Chloride 96 96 - 112 mmol/L    Co2 24 20 - 33 mmol/L    Glucose 122 (H) 65 - 99 mg/dL    Bun 31 (H) 8 - 22 mg/dL    Creatinine 0.71 0.50 - 1.40 mg/dL    Calcium 10.4 (H) 8.4 - 10.2 mg/dL    Anion Gap 10.0 7.0 - 16.0   ESTIMATED GFR    Collection Time: 12/11/24  2:51 AM   Result Value Ref Range    GFR (CKD-EPI) 87 >60 mL/min/1.73 m 2   CBC with Differential    Collection Time: 12/12/24  6:03 AM   Result Value Ref Range    WBC 9.9 4.8 - 10.8 K/uL    RBC 2.70 (L) 4.20 - 5.40 M/uL    Hemoglobin 8.1 (L) 12.0 - 16.0 g/dL    " Hematocrit 24.1 (L) 37.0 - 47.0 %    MCV 89.3 81.4 - 97.8 fL    MCH 30.0 27.0 - 33.0 pg    MCHC 33.6 32.2 - 35.5 g/dL    RDW 43.3 35.9 - 50.0 fL    Platelet Count 259 164 - 446 K/uL    MPV 9.5 9.0 - 12.9 fL    Neutrophils-Polys 72.60 (H) 44.00 - 72.00 %    Lymphocytes 17.80 (L) 22.00 - 41.00 %    Monocytes 8.20 0.00 - 13.40 %    Eosinophils 0.80 0.00 - 6.90 %    Basophils 0.10 0.00 - 1.80 %    Immature Granulocytes 0.50 0.00 - 0.90 %    Nucleated RBC 0.00 0.00 - 0.20 /100 WBC    Neutrophils (Absolute) 7.20 1.82 - 7.42 K/uL    Lymphs (Absolute) 1.77 1.00 - 4.80 K/uL    Monos (Absolute) 0.81 0.00 - 0.85 K/uL    Eos (Absolute) 0.08 0.00 - 0.51 K/uL    Baso (Absolute) 0.01 0.00 - 0.12 K/uL    Immature Granulocytes (abs) 0.05 0.00 - 0.11 K/uL    NRBC (Absolute) 0.00 K/uL   Comp Metabolic Panel (CMP)    Collection Time: 12/12/24  6:03 AM   Result Value Ref Range    Sodium 131 (L) 135 - 145 mmol/L    Potassium 4.2 3.6 - 5.5 mmol/L    Chloride 98 96 - 112 mmol/L    Co2 25 20 - 33 mmol/L    Anion Gap 8.0 7.0 - 16.0    Glucose 104 (H) 65 - 99 mg/dL    Bun 26 (H) 8 - 22 mg/dL    Creatinine 0.69 0.50 - 1.40 mg/dL    Calcium 10.3 8.5 - 10.5 mg/dL    Correct Calcium 10.9 (H) 8.5 - 10.5 mg/dL    AST(SGOT) 21 12 - 45 U/L    ALT(SGPT) 10 2 - 50 U/L    Alkaline Phosphatase 74 30 - 99 U/L    Total Bilirubin 0.8 0.1 - 1.5 mg/dL    Albumin 3.2 3.2 - 4.9 g/dL    Total Protein 6.0 6.0 - 8.2 g/dL    Globulin 2.8 1.9 - 3.5 g/dL    A-G Ratio 1.1 g/dL   TSH with Reflex to FT4    Collection Time: 12/12/24  6:03 AM   Result Value Ref Range    TSH 3.330 0.380 - 5.330 uIU/mL   Vitamin D, 25-hydroxy (blood)    Collection Time: 12/12/24  6:03 AM   Result Value Ref Range    25-Hydroxy   Vitamin D 25 37 30 - 100 ng/mL   ESTIMATED GFR    Collection Time: 12/12/24  6:03 AM   Result Value Ref Range    GFR (CKD-EPI) 89 >60 mL/min/1.73 m 2       Medications:  Scheduled Medications   Medication Dose Frequency    senna-docusate  2 Tablet Q EVENING     omeprazole  20 mg DAILY    amLODIPine  5 mg DAILY    amoxicillin-clavulanate  1 Tablet Q12HRS    enoxaparin (LOVENOX) injection  40 mg DAILY AT 1800    lactulose  30 mL TID    lisinopril  20 mg DAILY     PRN medications: hydrALAZINE, acetaminophen, senna-docusate **AND** polyethylene glycol/lytes, docusate sodium, magnesium hydroxide, carboxymethylcellulose, benzocaine-menthol, mag hydrox-al hydrox-simeth, ondansetron **OR** ondansetron, traZODone, sodium chloride, oxyCODONE immediate-release **OR** oxyCODONE immediate-release    Diet:  Current Diet Order   Procedures    Diet Order Diet: Regular       Medical Decision Making and Plan:  R distal femur fracture - Patient with mechanical fall onto right knee on 12/7 with distal femur fracture s/p ORIF on 12/7 with Dr. Garzon  -PT and OT for mobility and ADLs. Per guidelines, 15 hours per week between PT, OT and/or SLP.  -Follow-up Dr. Garzon. WBAT     HTN - Patient on Amlodipine 5 mg daily and Lisinopril 20 mg daily. Low SBP, reduce ACEi to 10 mg     Confusion - Will check UA, is retaining fluid with dysuria but may be traumatic catheterization. UTI vs pain medication    Pneumonia - Patient on Augmentin and Zithromax. Completed Zithromax, to complete Augmentin 12/13     Leukocytosis - Check AM CBC, on antibiotics. WBC 9.9 on admission, will monitor     Anemia - Check AM CBC - 8.1, ongoing      Hyponatremia - Check AM CMP - 131, repeat 12/16     Azotemia - Check AM CMP - 26. Repeat 12/16    Hypercalcemia - mild elevation on corrected calcium. Will monitor     Obesity due to excess calories - BMI of 32.1 on admission, meets medical criteria. Dietitian to consult     Pain - Patient on PRN Oxycodone and Tylenol      Skin - Patient at risk for skin breakdown due to debility in areas including sacrum, achilles, elbows and head in addition to other sites. Nursing to assess skin daily.      GI Ppx - Patient on Prilosec for GERD prophylaxis. Patient on  Senna-docusate for constipation prophylaxis.      DVT Ppx - Patient Lovenox on transfer. Limited mobiliyt, continue Lovenox  ____________________________________    T. Brian Souza MD/PhD  [facility] - Physical Medicine & Rehabilitation   [facility] - Brain Injury Medicine   ____________________________________

## 2024-12-13 NOTE — PROGRESS NOTES
..                                                         NURSING DAILY NOTE    Name: Franchesca Paige   Date of Admission: 12/11/2024   Admitting Diagnosis: Other fracture of right femur, initial encounter for closed fracture (HCC)  Attending Physician: ARMANDO GLASS M.D.  Allergies: Ibuprofen and Sulfa drugs    Safety  Patient Assist     Patient Precautions  Fall Risk, Weight Bearing As Tolerated Right Lower Extremity  Precaution Comments     Bed Transfer Status  Maximal Assist  Toilet Transfer Status      Assistive Devices  Gait Belt, Rails, Wheelchair  Oxygen  None - Room Air  Diet/Therapeutic Dining  Current Diet Order   Procedures    Diet Order Diet: Regular     Pill Administration  whole  Agitated Behavioral Scale     ABS Level of Severity       Fall Risk  Has the patient had a fall this admission?   No  Candice Porter Fall Risk Scoring  25, HIGH RISK  Fall Risk Safety Measures  bed alarm and chair alarm    Vitals  Temperature: 36.3 °C (97.4 °F)  Temp src: Oral  Pulse: 61  Respiration: 18  Blood Pressure : 122/55  Blood Pressure MAP (Calculated): 77 MM HG  BP Location: Upper Arm, Right  Patient BP Position: Supine     Oxygen  Pulse Oximetry: 95 %  O2 (LPM): 0  O2 Delivery Device: None - Room Air    Bowel and Bladder  Last Bowel Movement  12/11/24 (in AM per patient)  Stool Type     Bowel Device     Continent  Bladder:     Bowel: Continent movement  Bladder Function  Urine Void (mL): 100 ml (urinal, bed side commode)  Number of Times Voided: 1  Urine Color: Unable To Evaluate  Number of Times Incontinent of Urine: 0  Straight Catheter: 350 ml  Genitourinary Assessment   Bladder Assessment (WDL):  WDL Except  Urinary Symptoms: Dysuria  Urine Color: Unable To Evaluate  Number of Bladder Accidents: 1  Total Number of Bladder of Accidents in Last 7 Days: 1  Number of Times Incontinent of Urine: 0  Bladder Device: Urinal  Time Void: Yes  Bladder Scan: Unable To Void  $ Bladder Scan Results (mL):  "140  Intermittent Catheter: Yes -Go To LDA \"Indwelling Catheter Group\"    Skin  Felix Score   15  Sensory Interventions   Skin Preventative Measures: Pillows in Use for Support / Positioning  Moisture Interventions  Moisturizers/Barriers: Moisturizer       Pain  Pain Rating Scale  0 - No Pain  Pain Location  Knee  Pain Location Orientation  Right  Pain Interventions   Declines    ADLs    Bathing      Linen Change      Personal Hygiene  Moist Joycelyn Wipes  Chlorhexidine Bath      Oral Care     Teeth/Dentures     Shave     Nutrition Percentage Eaten  Lunch, Between 25-50% Consumed  Environmental Precautions     Patient Turns/Positioning  Supine  Patient Turns Assistance/Tolerance  Assistance of One  Bed Positions  Bed Controls On, Bed Locked  Head of Bed Elevated         Psychosocial/Neurologic Assessment  Psychosocial Assessment  Psychosocial (WDL):  Within Defined Limits  Neurologic Assessment  Neuro (WDL): Exceptions to WDL (neuropathy RLE)  Level of Consciousness: Alert  Orientation Level: Oriented X4  Cognition: Appropriate safety awareness, Appropriate judgement  EENT (WDL):  WDL Except    Cardio/Pulmonary Assessment  Edema   RUE Edema: 1+  RLE Edema: 2+  LUE Edema: Trace  LLE Edema: 1+  Respiratory Breath Sounds  RUL Breath Sounds: Clear  RML Breath Sounds: Clear  RLL Breath Sounds: Diminished  DINO Breath Sounds: Clear  LLL Breath Sounds: Diminished  Cardiac Assessment   Cardiac (WDL):  Within Defined Limits        "

## 2024-12-13 NOTE — THERAPY
Occupational Therapy  Daily Treatment     Patient Name: Franchesca Paige  Age:  79 y.o., Sex:  female  Medical Record #: [id number]  Today's Date: 12/13/2024     Precautions  Precautions: (P) Fall Risk, Weight Bearing As Tolerated Right Lower Extremity         Subjective    8899-2270: pt seated in w/c in hallway upon arrival. Pt pleasant and cooperative, agreeable to therapy.    [id number]: pt supine in bed upon arrival. Pt pleasant and cooperative, agreeable to therapy.     Objective       12/13/24 0931   OT Charge Group   Charges Yes   OT Therapy Activity (Units) 3   OT Therapeutic Exercise (Units) 1   OT Total Time Spent   OT Individual Total Time Spent (Mins) 60   Precautions   Precautions Fall Risk;Weight Bearing As Tolerated Right Lower Extremity   Vitals   Pulse 71   Patient BP Position Sitting   Blood Pressure  90/68   Pulse Oximetry 97 %   O2 (LPM) 0   O2 Delivery Device None - Room Air   Vitals Comments Following sit to stand up to parallel bars; see note for additional vitals. Pt may have orthostatic hypotension   Pain 0 - 10 Group   Location Knee   Location Orientation Right   Description Aching   Comfort Goal Comfort with Movement   Therapist Pain Assessment During Activity   Functional Level of Assist   Bed, Chair, Wheelchair Transfer Maximal Assist   Bed Chair Wheelchair Transfer Description Increased time;Adaptive equipment;Initial preparation for task;Slideboard transfer from bed to wheelchair;Set-up of equipment;Supervision for safety;Verbal cueing  (slide board transfer from w/c to EOB)   Sitting Upper Body Exercises   Sitting Upper Body Exercises Yes   Shoulder Press 1 set of 10;Bilateral;Weight (See Comments for lbs)  (3lbs dowel)   Bed Mobility    Sit to Supine Maximal Assist   Interdisciplinary Plan of Care Collaboration   IDT Collaboration with  Nursing;Physical Therapist   Patient Position at End of Therapy In Bed;Bed Alarm On;Call Light within Reach;Tray Table within Reach;Phone within Reach  "  Collaboration Comments blood pressure     Vitals:  -Attempted to take BP in standing, pt unable to stand long enough d/t nausea and dizziness. BP: 97/48 seated in w/c  -Following 1 set of shoulder press seated in w/c: 88/47. C/o nausea and dizziness    Bathroom edu: reviewed home bathroom set-up. Pt has walk-in shower with half shower curtain and half glass door. Pt owns shower chair. Pt states SO is working on installing GB in shower. Pt interested in tub transfer bench to sit over walk-in shower threshold to eliminate having to step over threshold (about 4\" high). Pt stated SO will take pictures of shower to discuss possible modification options before d/c home.    Standing tolerance activity: Pt completed 3 Rounds of corn hole seated in w/c in between parallel bars with min A to increase standing tolerance and endurance to maximize independence with ADLs/IADLs. Pt threw 4 beanbags with RUE and LUE supporting self on bars. Pt then completed sit to stand from w/c up to parallel bars with min A for lift. Deferred sit to stand for final round of cornhole d/t knee pain and dizziness.       12/13/24 1431   OT Charge Group   Charges Yes   OT Self Care / ADL (Units) 2   OT Total Time Spent   OT Individual Total Time Spent (Mins) 30   Precautions   Precautions Fall Risk;Weight Bearing As Tolerated Right Lower Extremity   Vitals   O2 Delivery Device None - Room Air   Pain 0 - 10 Group   Location Knee   Location Orientation Right   Description Aching   Comfort Goal Comfort with Movement   Therapist Pain Assessment During Activity   Functional Level of Assist   Lower Body Dressing Moderate Assist   Lower Body Dressing Description Reacher;Increased time;Set-up of equipment;Supervision for safety;Verbal cueing  (donned sweatpants seated EOB. Assist to pull pants up over hips standing with FWW. Assist with STS from EOB up to FWW. Required 3 STS to complete LBD tasks)   Supine Upper Body Exercises   Supine Upper Body Exercises " Yes   Front Arm Raise 1 set of 10;Right;Left;Other Resistance (See Comments)  (pink theraband)   Bed Mobility    Supine to Sit Minimal Assist   Sit to Supine Moderate Assist   Interdisciplinary Plan of Care Collaboration   Patient Position at End of Therapy In Bed;Bed Alarm On;Tray Table within Reach;Call Light within Reach;Phone within Reach     AE Education/Demo  - Pt issued reacher for LB dressing   - Pt provided verbal education and demo of reacher for doffing/donning pants and depends. Pt able to complete with extra time, VC for sequencing Mod A    Demo and hands-on practice completed. Pt demo good understanding and able to demo action back with mod A, will reinforce in next ADL dressing routine.    Pt provided info on how to purchase tub transfer bench online.    Assessment    First session: Pt limited by nausea and dizziness during standing therapeutic activity and UE exercise. RN and PT notified of possible orthostatic hypotension. Pt with significant RLE pain when completing knee flexion to sit back into w/c. Pt open and receptive to all edu given on bathroom DME. Will follow up with pt for home bathroom pictures to decide safest DME for d/c home.    Second session: pt tolerated session well with no LOB noted. Pt open and receptive to edu given on use of reacher to thread pant legs onto BLE. Pt limited by R knee pain and decreased standing tolerance when completing STS from EOB and pulling pants up past hips during LBD. Pt required three attempts to complete standing LBD task but successful with OT physical assist.    Strengths: Able to follow instructions, Effective communication skills, Good insight into deficits/needs, Independent prior level of function, Making steady progress towards goals, Motivated for self care and independence, Pleasant and cooperative, Willingly participates in therapeutic activities  Barriers: Decreased endurance, Fatigue, Generalized weakness, Impaired activity tolerance,  Impaired balance, Limited mobility, Pain, Pain poorly managed    Plan    ADLs/IADLs, standing tolerance, general strengthening/endurance    DME       Passport items to be completed:  Perform bathroom transfers, complete dressing, complete feeding, get ready for the day, prepare a simple meal, participate in household tasks, adapt home for safety needs, demonstrate home exercise program, complete caregiver training     Occupational Therapy Goals (Active)       Problem: Bathing       Dates: Start:  12/12/24         Goal: STG-Within one week, patient will bathe with mod A using AE as needed.       Dates: Start:  12/12/24               Problem: Dressing       Dates: Start:  12/12/24         Goal: STG-Within one week, patient will dress LB with mod A using AE as needed.       Dates: Start:  12/12/24               Problem: Functional Transfers       Dates: Start:  12/12/24         Goal: STG-Within one week, patient will transfer to tub/shower with mod A.       Dates: Start:  12/12/24               Problem: OT Long Term Goals       Dates: Start:  12/12/24         Goal: LTG-By discharge, patient will complete basic self care tasks with supervision.       Dates: Start:  12/12/24            Goal: LTG-By discharge, patient will perform bathroom transfers with supervision.       Dates: Start:  12/12/24               Problem: Toileting       Dates: Start:  12/12/24         Goal: STG-Within one week, patient will complete toileting tasks with mod A.       Dates: Start:  12/12/24

## 2024-12-13 NOTE — THERAPY
"Physical Therapy   Daily Treatment     Patient Name: Franchesca Paige  Age:  79 y.o., Sex:  female  Medical Record #: [id number]  Today's Date: 12/13/2024     Precautions  Precautions: Fall Risk, Weight Bearing As Tolerated Right Lower Extremity    Subjective    Pt received in bed and motivated to participate in therapy. \"I feel good this morning!\"     Objective     12/13/24 0830   PT Charge Group   PT Therapeutic Activities (Units) 2   PT Total Time Spent   PT Individual Total Time Spent (Mins) 30   Precautions   Precautions Fall Risk;Weight Bearing As Tolerated Right Lower Extremity   Vitals   O2 Delivery Device None - Room Air   Pain   Intervention Rest;Repositioned   Pain 0 - 10 Group   Location Knee   Location Orientation Right   Therapist Pain Assessment During Activity;Post Activity Pain Same as Prior to Activity   Transfer Functional Level of Assist   Bed, Chair, Wheelchair Transfer Contact Guard Assist  (to close SBA)   Bed Chair Wheelchair Transfer Description Adaptive equipment;Slideboard transfer from bed to wheelchair;Set-up of equipment;Supervision for safety  (bed>w/c, pt required placement and removal of slideboard)   Bed Mobility    Supine to Sit Standby Assist  (HOB elevated)   Sit to Stand Moderate Assist  (from w/c in // bars x1)   Scooting Contact Guard Assist   Interdisciplinary Plan of Care Collaboration   IDT Collaboration with  Family / Caregiver;Nursing   Patient Position at End of Therapy Seated;Chair Alarm On;Call Light within Reach;Tray Table within Reach;Phone within Reach   Collaboration Comments pt's  present at beginning and session, RN administered meds     Independent UB dressing, Max LB dressing    Assessment    Pt demonstrated significant improvement during bed>w/c slideboard transfer compared to previous sessions including correct sequencing and body mechanics.    Strengths: Able to follow instructions, Alert and oriented, Effective communication skills, Good insight into " "deficits/needs, Independent prior level of function, Motivated for self care and independence, Pleasant and cooperative, Supportive family, Willingly participates in therapeutic activities  Barriers: Decreased endurance, Fatigue, Generalized weakness, Home accessibility, Impaired activity tolerance, Impaired balance, Limited mobility, Pain    Plan    Sit<>stand/ transfer training with FWW, slide board transfers for pain control, AAROM/ strength training, bed mobility training with leg  and bed controls as needed, Modalities for pain management.     DME  PT DME Recommendations  Wheelchair: 20\" Width, Lightweight, 18\" Width (width TBD)  Cushion: Standard  Assistive Device: Front Wheeled Walker    Passport items to be completed:  Get in/out of bed safely, in/out of a vehicle, safely use mobility device, walk or wheel around home/community, navigate up and down stairs, show how to get up/down from the ground, ensure home is accessible, demonstrate HEP, complete caregiver training    Physical Therapy Problems (Active)       Problem: Mobility       Dates: Start:  12/12/24         Goal: STG-Within one week, patient will propel wheelchair household distances SBA with BUE support x 25 ft x 2       Dates: Start:  12/12/24            Goal: STG-Within one week, patient will ambulate household distance min A with // bars and wc follow 10 ft x 2       Dates: Start:  12/12/24               Problem: Mobility Transfers       Dates: Start:  12/12/24         Goal: STG-Within one week, patient will perform bed mobility mod A of 1 with leg / bed controls as needed       Dates: Start:  12/12/24            Goal: STG-Within one week, patient will transfer bed to chair min / mod A of 1 for stand-step with FWW vs slide board       Dates: Start:  12/12/24               Problem: PT-Long Term Goals       Dates: Start:  12/12/24         Goal: LTG-By discharge, patient will propel wheelchair modified independent 50 ft x 2       Dates: " Start:  12/12/24            Goal: LTG-By discharge, patient will ambulate SBA/ CGA with FWW 50 ft x 2       Dates: Start:  12/12/24            Goal: LTG-By discharge, patient will transfer one surface to another SBA/ SPV with FWW       Dates: Start:  12/12/24            Goal: LTG-By discharge, patient will ambulate up/down 4-6 stairs SBA/ CGA with hand rails vs FWW x 3 steps into home       Dates: Start:  12/12/24            Goal: LTG-By discharge, patient will transfer in/out of a car SBA/ CGA with FWW       Dates: Start:  12/12/24

## 2024-12-14 ENCOUNTER — APPOINTMENT (OUTPATIENT)
Dept: OCCUPATIONAL THERAPY | Facility: REHABILITATION | Age: 79
DRG: 559 | End: 2024-12-14
Attending: PHYSICAL MEDICINE & REHABILITATION
Payer: MEDICARE

## 2024-12-14 LAB
EST. AVERAGE GLUCOSE BLD GHB EST-MCNC: 94 MG/DL
HBA1C MFR BLD: 4.9 % (ref 4–5.6)

## 2024-12-14 PROCEDURE — 700111 HCHG RX REV CODE 636 W/ 250 OVERRIDE (IP): Mod: JZ | Performed by: PHYSICAL MEDICINE & REHABILITATION

## 2024-12-14 PROCEDURE — 97535 SELF CARE MNGMENT TRAINING: CPT

## 2024-12-14 PROCEDURE — A9270 NON-COVERED ITEM OR SERVICE: HCPCS | Performed by: PHYSICAL MEDICINE & REHABILITATION

## 2024-12-14 PROCEDURE — 700102 HCHG RX REV CODE 250 W/ 637 OVERRIDE(OP): Performed by: PHYSICAL MEDICINE & REHABILITATION

## 2024-12-14 PROCEDURE — 770010 HCHG ROOM/CARE - REHAB SEMI PRIVAT*

## 2024-12-14 RX ADMIN — OXYCODONE HYDROCHLORIDE 5 MG: 5 TABLET ORAL at 08:34

## 2024-12-14 RX ADMIN — ENOXAPARIN SODIUM 40 MG: 100 INJECTION SUBCUTANEOUS at 17:40

## 2024-12-14 RX ADMIN — LACTULOSE 30 ML: 10 SOLUTION ORAL at 08:28

## 2024-12-14 RX ADMIN — OXYCODONE HYDROCHLORIDE 5 MG: 5 TABLET ORAL at 15:58

## 2024-12-14 RX ADMIN — OMEPRAZOLE 20 MG: 20 CAPSULE, DELAYED RELEASE ORAL at 08:28

## 2024-12-14 ASSESSMENT — PAIN DESCRIPTION - PAIN TYPE
TYPE: ACUTE PAIN

## 2024-12-14 ASSESSMENT — ACTIVITIES OF DAILY LIVING (ADL)
BED_CHAIR_WHEELCHAIR_TRANSFER_DESCRIPTION: INCREASED TIME;INITIAL PREPARATION FOR TASK;SET-UP OF EQUIPMENT;SQUAT PIVOT TRANSFER TO WHEELCHAIR;SUPERVISION FOR SAFETY
TUB_SHOWER_TRANSFER_DESCRIPTION: GRAB BAR;SHOWER BENCH;INCREASED TIME;INITIAL PREPARATION FOR TASK;SET-UP OF EQUIPMENT;SUPERVISION FOR SAFETY;VERBAL CUEING

## 2024-12-14 NOTE — PROGRESS NOTES
NURSING DAILY NOTE    Name: Franchesca Paige   Date of Admission: 12/11/2024   Admitting Diagnosis: Other fracture of right femur, initial encounter for closed fracture (HCC)  Attending Physician: ARMANDO GLASS M.D.  Allergies: Ibuprofen and Sulfa drugs    Safety  Patient Assist  max 2  Patient Precautions  Fall Risk, Weight Bearing As Tolerated Right Lower Extremity  Precaution Comments     Bed Transfer Status  Minimal Assist (with slide board for lateral scoot bed<>wc)  Toilet Transfer Status      Assistive Devices  Other (Comments) (kwame lift)  Oxygen  None - Room Air  Diet/Therapeutic Dining  Current Diet Order   Procedures    Diet Order Diet: Regular     Pill Administration  whole  Agitated Behavioral Scale     ABS Level of Severity       Fall Risk  Has the patient had a fall this admission?   No  Candice Porter Fall Risk Scoring  28, HIGH RISK  Fall Risk Safety Measures  bed alarm, chair alarm, poor balance, and low vision/ hearing    Vitals  Temperature: 37.2 °C (99 °F)  Temp src: Oral  Pulse: 61  Respiration: 16  Blood Pressure : 122/68  Blood Pressure MAP (Calculated): 86 MM HG  BP Location: Right, Upper Arm  Patient BP Position: [place]'s Position     Oxygen  Pulse Oximetry: 92 %  O2 (LPM): 0  O2 Delivery Device: None - Room Air    Bowel and Bladder  Last Bowel Movement  12/11/24 (in AM per patient)  Stool Type     Bowel Device     Continent  Bladder:     Bowel: Continent movement  Bladder Function  Urine Void (mL): 100 ml (urinal, bed side commode)  Number of Times Voided: 1  Urine Color: Yellow  Number of Times Incontinent of Urine: 0  Straight Catheter: 200 ml  Genitourinary Assessment   Bladder Assessment (WDL):  WDL Except  Urinary Symptoms: Dysuria  Urine Color: Yellow  Number of Bladder Accidents: 0  Total Number of Bladder of Accidents in Last 7 Days: 1  Number of Times Incontinent of Urine: 0  Bladder Device: Other (Comment)  "(Straight cath)  Time Void: Yes  Bladder Scan: Unable To Void  $ Bladder Scan Results (mL): 175  Intermittent Catheter: Yes -Go To LDA \"Indwelling Catheter Group\"    Skin  Felix Score   15  Sensory Interventions   Skin Preventative Measures: Pillows in Use for Support / Positioning  Moisture Interventions  Moisturizers/Barriers: Moisturizer       Pain  Pain Rating Scale  6 - Hard to ignore, avoid usual activities  Pain Location  Knee  Pain Location Orientation  Right  Pain Interventions   Medication (see MAR)    ADLs    Bathing      Linen Change      Personal Hygiene  Moist Joycelyn Wipes  Chlorhexidine Bath      Oral Care     Teeth/Dentures     Shave     Nutrition Percentage Eaten  Lunch, Between % Consumed  Environmental Precautions     Patient Turns/Positioning  Supine  Patient Turns Assistance/Tolerance  Assistance of One  Bed Positions  Bed Controls On, Bed Locked  Head of Bed Elevated         Psychosocial/Neurologic Assessment  Psychosocial Assessment  Psychosocial (WDL):  WDL Except  Patient Behaviors: Forgetful  Neurologic Assessment  Neuro (WDL): Exceptions to WDL  Level of Consciousness: Alert  Orientation Level: Oriented X4  Cognition: Appropriate safety awareness, Appropriate judgement  EENT (WDL):  WDL Except    Cardio/Pulmonary Assessment  Edema   RUE Edema: 1+  RLE Edema: 2+  LUE Edema: Trace  LLE Edema: 1+  Respiratory Breath Sounds  RUL Breath Sounds: Clear  RML Breath Sounds: Clear  RLL Breath Sounds: Diminished  DINO Breath Sounds: Clear  LLL Breath Sounds: Diminished  Cardiac Assessment   Cardiac (WDL):  WDL Except (HTN, Afib)        "

## 2024-12-14 NOTE — PROGRESS NOTES
NURSING DAILY NOTE    Name: Franchesca Paige   Date of Admission: 12/11/2024   Admitting Diagnosis: Other fracture of right femur, initial encounter for closed fracture (HCC)  Attending Physician: ARMANDO GLASS M.D.  Allergies: Ibuprofen and Sulfa drugs    Safety  Patient Assist  max 2  Patient Precautions  Fall Risk, Weight Bearing As Tolerated Right Lower Extremity  Precaution Comments     Bed Transfer Status  Minimal Assist (with slide board for lateral scoot bed<>wc)  Toilet Transfer Status      Assistive Devices  Other (Comments) (kwame lift)  Oxygen  None - Room Air  Diet/Therapeutic Dining  Current Diet Order   Procedures    Diet Order Diet: Regular     Pill Administration  whole  Agitated Behavioral Scale     ABS Level of Severity       Fall Risk  Has the patient had a fall this admission?   No  Candice Porter Fall Risk Scoring  28, HIGH RISK  Fall Risk Safety Measures  bed alarm, chair alarm, poor balance, and low vision/ hearing    Vitals  Temperature: 37.1 °C (98.8 °F)  Temp src: Oral  Pulse: 60  Respiration: 16  Blood Pressure : 119/65  Blood Pressure MAP (Calculated): 83 MM HG  BP Location: Right, Upper Arm  Patient BP Position: [place]'s Position     Oxygen  Pulse Oximetry: 93 %  O2 (LPM): 0  O2 Delivery Device: None - Room Air    Bowel and Bladder  Last Bowel Movement  12/13/24  Stool Type  Type 5: Soft blob with clear cut edges (passed easily), Type 6: Fluffy pieces with ragged edges, a mushy stool  Bowel Device     Continent  Bladder:     Bowel: Continent movement  Bladder Function  Urine Void (mL): 100 ml (urinal, bed side commode)  Number of Times Voided: 1  Urine Color: Yellow  Number of Times Incontinent of Urine: 0  Straight Catheter: 525 ml  Genitourinary Assessment   Bladder Assessment (WDL):  WDL Except  Urinary Symptoms: Dysuria  Urine Color: Yellow  Number of Bladder Accidents: 0  Total Number of Bladder of Accidents in Last  "7 Days: 1  Number of Times Incontinent of Urine: 0  Bladder Device: Other (Comment) (Straight cath)  Time Void: Yes  Bladder Scan: Unable To Void  $ Bladder Scan Results (mL): 620  Intermittent Catheter: Yes -Go To LDA \"Indwelling Catheter Group\"    Skin  Felix Score   15  Sensory Interventions   Skin Preventative Measures: Pillows in Use for Support / Positioning, Seat Cushion in Use on Chair when Out of Bed  Moisture Interventions  Moisturizers/Barriers: Moisturizer       Pain  Pain Rating Scale  6 - Hard to ignore, avoid usual activities  Pain Location  Leg  Pain Location Orientation  Right  Pain Interventions   Medication (see MAR)    ADLs    Bathing   Patient Refused Bathing (Nurse notified. Patient said shes tired and doesn't feel like taking a shower.)  Linen Change      Personal Hygiene  Moist Joycelyn Wipes  Chlorhexidine Bath      Oral Care     Teeth/Dentures     Shave     Nutrition Percentage Eaten  Lunch, Between % Consumed  Environmental Precautions  Treaded Slipper Socks on Patient, Personal Belongings, Wastebasket, Call Bell etc. in Easy Reach, Bed in Low Position, Communication Sign for Patients & Families  Patient Turns/Positioning  Supine  Patient Turns Assistance/Tolerance  Assistance of One  Bed Positions  Bed Controls On, Bed Locked  Head of Bed Elevated  Less than 30 degrees      Psychosocial/Neurologic Assessment  Psychosocial Assessment  Psychosocial (WDL):  WDL Except  Patient Behaviors: Forgetful  Neurologic Assessment  Neuro (WDL): Exceptions to WDL  Level of Consciousness: Alert  Orientation Level: Oriented X4  Cognition: Appropriate safety awareness, Appropriate judgement  Speech: Clear  EENT (WDL):  WDL Except    Cardio/Pulmonary Assessment  Edema   RUE Edema: 1+  RLE Edema: 2+  LUE Edema: Trace  LLE Edema: 1+  Respiratory Breath Sounds  RUL Breath Sounds: Clear  RML Breath Sounds: Clear  RLL Breath Sounds: Diminished  DINO Breath Sounds: Clear  LLL Breath Sounds: Diminished  Cardiac " Assessment   Cardiac (WDL):  WDL Except (Hx of HTN, A. Fib.)

## 2024-12-14 NOTE — CARE PLAN
"  Problem: Fall Risk - Rehab  Goal: Patient will remain free from falls  Outcome: Progressing   Candice German Fall risk Assessment Score: 28    High fall risk Interventions   - Bed and strip alarm   - Yellow sign by the door   - Yellow wrist band \"Fall risk\"  - Do not leave patient unattended in the bathroom  - Fall risk education provided  - Call light within reach   - Yellow  socks   - Belongings within reach   - Bed in the lowest position        Problem: Bladder / Voiding  Goal: Patient will establish and maintain regular urinary output  Outcome: Progressing   Patient unable to void, straight cath required. Will continue to monitor.     Problem: Pain - Standard  Goal: Alleviation of pain or a reduction in pain to the patient’s comfort goal  Outcome: Progressing   Patient able to rate pain on 0-10 scale.   "

## 2024-12-14 NOTE — THERAPY
"Occupational Therapy  Daily Treatment     Patient Name: Franchesca Paige  Age:  79 y.o., Sex:  female  Medical Record #: [id number]  Today's Date: 12/14/2024     Precautions  Precautions: Fall Risk, Weight Bearing As Tolerated Right Lower Extremity         Subjective    \"I would really love a shower.\"      Objective       12/14/24 0931   OT Charge Group   OT Self Care / ADL (Units) 4   OT Total Time Spent   OT Individual Total Time Spent (Mins) 60   Pain   Intervention Distraction;Emotional Support   Pain 0 - 10 Group   Location Leg   Location Orientation Right   Pain Rating Scale (NPRS) 5   Description Aching;Deep;Penetrating   Comfort Goal Comfort with Movement;Perform Activity;Sleep Comfortably   Therapist Pain Assessment During Activity   Cognition    Level of Consciousness Alert   Functional Level of Assist   Bathing Minimal Assist   Bathing Description Grab bar;Tub bench;Assit with perineal;Increased time;Initial preparation for task;Set-up of equipment;Set up for shower sleeve;Supervision for safety;Verbal cueing   Upper Body Dressing Stand by Assist   Upper Body Dressing Description Increased time;Initial preparation for task;Set-up of equipment   Lower Body Dressing Maximal Assist   Lower Body Dressing Description Grab bar;Assist with threading into pant leg;Increased time;Initial preparation for task;Set-up of equipment;Supervision for safety;Verbal cueing;Reacher   Bed, Chair, Wheelchair Transfer Minimal Assist   Bed Chair Wheelchair Transfer Description Increased time;Initial preparation for task;Set-up of equipment;Squat pivot transfer to wheelchair;Supervision for safety  (lateral scoot from EOB to w/c; SB xfer back into bed from w/c)   Tub / Shower Transfers Minimal Assist   Tub Shower Transfer Description Grab bar;Shower bench;Increased time;Initial preparation for task;Set-up of equipment;Supervision for safety;Verbal cueing  (lateral scoot from w/c <> tub bench)   Bed Mobility    Supine to Sit " Minimal Assist   Sit to Supine Moderate Assist   Scooting Contact Guard Assist   Interdisciplinary Plan of Care Collaboration   Patient Position at End of Therapy Bed Alarm On;Call Light within Reach;Tray Table within Reach;Phone within Reach;Other (Comments);Edge of Bed  (pt left with CNA and nurse present at end of session- pt assist pt back into bed and with LBD)         Assessment    Pt tolerated session well. Pt pleasant and motivated to take a shower this morning. Pt able to complete lateral scoot xfer's with CGA-min a. Pt very fatigued at end of session and required increased assist for SB xfer into the bed and bed mobility. Pt demonstrated improvements in bathing this session with use of LH sponge. Pt noted to have red itchy bumps on back- nurse notified and lotion applied to back to assist with pain mgmt. Pt attempted to stand at end of session for LBD assist- pt unable 2/2 fatigue. Due to time constraints, pt left sitting EOB with CNA present to assist with bed mobility and LBD in the bed as per pt's request. Pt stating that she needed to rest in bed and felt dizzy.     Strengths: Able to follow instructions, Effective communication skills, Good insight into deficits/needs, Independent prior level of function, Making steady progress towards goals, Motivated for self care and independence, Pleasant and cooperative, Willingly participates in therapeutic activities  Barriers: Decreased endurance, Fatigue, Generalized weakness, Impaired activity tolerance, Impaired balance, Limited mobility, Pain, Pain poorly managed    Plan    ADLs/IADLs, standing tolerance, general strengthening/endurance     Occupational Therapy Goals (Active)       Problem: Bathing       Dates: Start:  12/12/24         Goal: STG-Within one week, patient will bathe with mod A using AE as needed.       Dates: Start:  12/12/24               Problem: Dressing       Dates: Start:  12/12/24         Goal: STG-Within one week, patient will dress  LB with mod A using AE as needed.       Dates: Start:  12/12/24               Problem: Functional Transfers       Dates: Start:  12/12/24         Goal: STG-Within one week, patient will transfer to tub/shower with mod A.       Dates: Start:  12/12/24               Problem: OT Long Term Goals       Dates: Start:  12/12/24         Goal: LTG-By discharge, patient will complete basic self care tasks with supervision.       Dates: Start:  12/12/24            Goal: LTG-By discharge, patient will perform bathroom transfers with supervision.       Dates: Start:  12/12/24               Problem: Toileting       Dates: Start:  12/12/24         Goal: STG-Within one week, patient will complete toileting tasks with mod A.       Dates: Start:  12/12/24

## 2024-12-14 NOTE — CARE PLAN
The patient is Stable - Low risk of patient condition declining or worsening    Shift Goals  Clinical Goals: Safety, Pain control, Wound care  Patient Goals: Pain control, Rest  Family Goals: ILIANA    Progress made toward(s) clinical / shift goals:      Problem: Knowledge Deficit - Standard  Goal: Patient and family/care givers will demonstrate understanding of plan of care, disease process/condition, diagnostic tests and medications  Outcome: Progressing     Problem: Skin Integrity  Goal: Skin integrity is maintained or improved  Outcome: Progressing   Felix Scale: 15  Interventions:  -Barrier wipes in use  -Wheelchair cushion in place  -Pillows for positioning and floating heels    Problem: Pain - Standard  Goal: Alleviation of pain or a reduction in pain to the patient’s comfort goal  Outcome: Progressing     Problem: Fall Risk - Rehab  Goal: Patient will remain free from falls  Outcome: Progressing  Harvey German Score: 28  Interventions:  -Bed/Strip alarm in place  -Chair strip alarm in place  -Treaded socks on pt  -Fall risk band and communication signs in place  -Bed rails in place  -Call light and belongings within reach

## 2024-12-15 ENCOUNTER — APPOINTMENT (OUTPATIENT)
Dept: OCCUPATIONAL THERAPY | Facility: REHABILITATION | Age: 79
DRG: 559 | End: 2024-12-15
Attending: PHYSICAL MEDICINE & REHABILITATION
Payer: MEDICARE

## 2024-12-15 ENCOUNTER — APPOINTMENT (OUTPATIENT)
Dept: PHYSICAL THERAPY | Facility: REHABILITATION | Age: 79
DRG: 559 | End: 2024-12-15
Attending: PHYSICAL MEDICINE & REHABILITATION
Payer: MEDICARE

## 2024-12-15 PROCEDURE — 770010 HCHG ROOM/CARE - REHAB SEMI PRIVAT*

## 2024-12-15 PROCEDURE — 700102 HCHG RX REV CODE 250 W/ 637 OVERRIDE(OP): Performed by: PHYSICAL MEDICINE & REHABILITATION

## 2024-12-15 PROCEDURE — 700111 HCHG RX REV CODE 636 W/ 250 OVERRIDE (IP): Mod: JZ | Performed by: PHYSICAL MEDICINE & REHABILITATION

## 2024-12-15 PROCEDURE — 97535 SELF CARE MNGMENT TRAINING: CPT

## 2024-12-15 PROCEDURE — 97530 THERAPEUTIC ACTIVITIES: CPT

## 2024-12-15 PROCEDURE — A9270 NON-COVERED ITEM OR SERVICE: HCPCS | Performed by: PHYSICAL MEDICINE & REHABILITATION

## 2024-12-15 PROCEDURE — 97110 THERAPEUTIC EXERCISES: CPT

## 2024-12-15 RX ADMIN — OXYCODONE HYDROCHLORIDE 5 MG: 5 TABLET ORAL at 14:05

## 2024-12-15 RX ADMIN — SENNOSIDES AND DOCUSATE SODIUM 2 TABLET: 50; 8.6 TABLET ORAL at 20:01

## 2024-12-15 RX ADMIN — LACTULOSE 30 ML: 10 SOLUTION ORAL at 20:01

## 2024-12-15 RX ADMIN — LISINOPRIL 10 MG: 5 TABLET ORAL at 05:14

## 2024-12-15 RX ADMIN — MECLIZINE HYDROCHLORIDE 25 MG: 25 TABLET ORAL at 08:30

## 2024-12-15 RX ADMIN — ENOXAPARIN SODIUM 40 MG: 100 INJECTION SUBCUTANEOUS at 17:19

## 2024-12-15 RX ADMIN — OXYCODONE HYDROCHLORIDE 5 MG: 5 TABLET ORAL at 05:13

## 2024-12-15 ASSESSMENT — ACTIVITIES OF DAILY LIVING (ADL)
BED_CHAIR_WHEELCHAIR_TRANSFER_DESCRIPTION: SLIDEBOARD TRANSFER FROM BED TO WHEELCHAIR
TOILET_TRANSFER_DESCRIPTION: INCREASED TIME;SET-UP OF EQUIPMENT;SUPERVISION FOR SAFETY;VERBAL CUEING

## 2024-12-15 ASSESSMENT — PAIN DESCRIPTION - PAIN TYPE
TYPE: ACUTE PAIN

## 2024-12-15 ASSESSMENT — GAIT ASSESSMENTS: GAIT LEVEL OF ASSIST: UNABLE TO PARTICIPATE

## 2024-12-15 NOTE — PROGRESS NOTES
NURSING DAILY NOTE    Name: Franchesca Paige   Date of Admission: 12/11/2024   Admitting Diagnosis: Other fracture of right femur, initial encounter for closed fracture (HCC)  Attending Physician: ARMANDO GLASS M.D.  Allergies: Ibuprofen and Sulfa drugs    Safety  Patient Assist  max2  Patient Precautions  Fall Risk, Weight Bearing As Tolerated Right Lower Extremity  Precaution Comments     Bed Transfer Status  Minimal Assist  Toilet Transfer Status      Assistive Devices  Wheelchair, Slide board, Rails  Oxygen  None - Room Air  Diet/Therapeutic Dining  Current Diet Order   Procedures    Diet Order Diet: Regular     Pill Administration  whole  Agitated Behavioral Scale     ABS Level of Severity       Fall Risk  Has the patient had a fall this admission?   No  Candice Porter Fall Risk Scoring  28, HIGH RISK  Fall Risk Safety Measures  bed alarm, chair alarm, poor balance, and low vision/ hearing    Vitals  Temperature: 36.6 °C (97.8 °F)  Temp src: Temporal  Pulse: 61  Respiration: 18  Blood Pressure : 111/65  Blood Pressure MAP (Calculated): 80 MM HG  BP Location: Right, Upper Arm  Patient BP Position: [place]'s Position     Oxygen  Pulse Oximetry: 92 %  O2 (LPM): 0  O2 Delivery Device: None - Room Air    Bowel and Bladder  Last Bowel Movement  12/14/24  Stool Type  Type 5: Soft blob with clear cut edges (passed easily), Type 6: Fluffy pieces with ragged edges, a mushy stool  Bowel Device     Continent  Bladder:     Bowel: Continent movement  Bladder Function  Urine Void (mL): 100 ml (urinal, bed side commode)  Number of Times Voided: 1  Urine Color: Rocio  Number of Times Incontinent of Urine: 0  Straight Catheter: 400 ml  Genitourinary Assessment   Bladder Assessment (WDL):  WDL Except  Urinary Symptoms: Dysuria  Urine Color: Rocio  Number of Bladder Accidents: 0  Total Number of Bladder of Accidents in Last 7 Days: 1  Number of Times Incontinent of  "Urine: 0  Bladder Device: Other (Comment) (Straight cath)  Time Void: Yes  Bladder Scan: Post Void  $ Bladder Scan Results (mL): 307  Intermittent Catheter: Yes -Go To LDA \"Indwelling Catheter Group\"    Skin  Felix Score   15  Sensory Interventions   Skin Preventative Measures: Pillows in Use for Support / Positioning  Moisture Interventions  Moisturizers/Barriers: Moisturizer       Pain  Pain Rating Scale  5 - Interrupts some activities  Pain Location  Leg  Pain Location Orientation  Right  Pain Interventions   Medication (see MAR)    ADLs    Bathing   Patient Refused Bathing (Nurse notified. Patient said shes tired and doesn't feel like taking a shower.)  Linen Change      Personal Hygiene  Moist Joycelyn Wipes  Chlorhexidine Bath      Oral Care     Teeth/Dentures     Shave     Nutrition Percentage Eaten  Lunch, Between % Consumed  Environmental Precautions  Treaded Slipper Socks on Patient, Personal Belongings, Wastebasket, Call Bell etc. in Easy Reach, Bed in Low Position, Communication Sign for Patients & Families  Patient Turns/Positioning  Supine, Patient turns self independently side to side without assistance, to offload sacral area  Patient Turns Assistance/Tolerance  Assistance of One  Bed Positions  Bed Controls On, Bed Locked  Head of Bed Elevated  Self regulated      Psychosocial/Neurologic Assessment  Psychosocial Assessment  Psychosocial (WDL):  WDL Except  Patient Behaviors: Forgetful  Neurologic Assessment  Neuro (WDL): Exceptions to WDL  Level of Consciousness: Alert  Orientation Level: Oriented X4  Cognition: Appropriate safety awareness, Appropriate judgement  Speech: Clear  EENT (WDL):  WDL Except    Cardio/Pulmonary Assessment  Edema   RUE Edema: 1+  RLE Edema: 2+  LUE Edema: Trace  LLE Edema: 1+  Respiratory Breath Sounds  RUL Breath Sounds: Clear  RML Breath Sounds: Clear  RLL Breath Sounds: Diminished  DINO Breath Sounds: Clear  LLL Breath Sounds: Diminished  Cardiac Assessment   Cardiac " (WDL):  WDL Except (Hx- HTN, Afib)

## 2024-12-15 NOTE — CARE PLAN
"  Problem: Fall Risk - Rehab  Goal: Patient will remain free from falls  Note: Candice Porter Fall risk Assessment Score: 28    High fall risk Interventions   - Alarming seatbelt  - Bed and strip alarm   - Yellow sign by the door   - Yellow wrist band \"Fall risk\"  - Room near to the nurse station  - Do not leave patient unattended in the bathroom  - Fall risk education provided        Problem: Bladder / Voiding  Goal: Patient will establish and maintain regular urinary output  Note: Unable to void, ICP every 6 hours. CN notified.         The patient is Watcher - Medium risk of patient condition declining or worsening    Shift Goals  Clinical Goals: Safety, Pain Control  Patient Goals: Safety  Family Goals: ILIANA      "

## 2024-12-15 NOTE — THERAPY
Physical Therapy   Daily Treatment     Patient Name: Franchesca Paige  Age:  79 y.o., Sex:  female  Medical Record #: [id number]  Today's Date: 12/15/2024     Precautions  Precautions: Fall Risk, Weight Bearing As Tolerated Right Lower Extremity    Subjective    Patient encouraged by right leg movement, discouraged by intense pain with WB     Objective       12/15/24 0931   PT Charge Group   PT Therapeutic Exercise (Units) 2   PT Therapeutic Activities (Units) 2   PT Total Time Spent   PT Individual Total Time Spent (Mins) 60   Precautions   Precautions Fall Risk;Weight Bearing As Tolerated Right Lower Extremity   Gait Functional Level of Assist    Gait Level Of Assist Unable to Participate   Transfer Functional Level of Assist   Bed, Chair, Wheelchair Transfer Contact Guard Assist   Bed Chair Wheelchair Transfer Description Slideboard transfer from bed to wheelchair;Set-up of equipment;Increased time   Supine Lower Body Exercise   Supine Lower Body Exercises   (hand-out given)   Hip Abduction 1 set of 10;Bilateral  (isometric)   Hip Adduction  1 set of 10;Bilateral  (isometric)   Straight Leg Raises 1 set of 10;Left   Ankle Pumps 1 set of 10;Reciprocal   Gluteal Isometrics 1 set of 10;Bilateral   Quadriceps Isometrics 1 set of 10;Bilateral   Other Exercises hamstring isometrics 10 reps   Sitting Lower Body Exercises   Sitting Lower Body Exercises   (with TENS on right LE)   Ankle Pumps 1 set of 10;Bilateral   Hip Flexion 1 set of 10;Bilateral   Hip Abduction 1 set of 10;Left   Hip Adduction 1 set of 10;Left  (provokes right knee pain so discontinued right adduction)   Hamstring Curl 1 set of 10;Left;Medium Resistance Theraband   Neuro-Muscular Treatments   Comments TENS right LE 17min, intensity= 10mA-15mA   Interdisciplinary Plan of Care Collaboration   IDT Collaboration with  Certified Nursing Assistant;Occupational Therapist   Collaboration Comments vitalscheck, treatment planning     Changed to right elevating  "leg rest for increased comfort with positioning  Given 2 ice packs at conclusion of treatment for pain control    Assessment    Able to complete slideboard transfer with CGA and set-up for slideboard placement  Able to complete supine and seated there-ex packet with no increased in sharp pain  Using ice packs and TENS effectively for pain management    Strengths: Able to follow instructions, Alert and oriented, Effective communication skills, Good insight into deficits/needs, Independent prior level of function, Motivated for self care and independence, Pleasant and cooperative, Supportive family, Willingly participates in therapeutic activities  Barriers: Decreased endurance, Fatigue, Generalized weakness, Home accessibility, Impaired activity tolerance, Impaired balance, Limited mobility, Pain    Plan    Sit<>stand/ transfer training with FWW, slide board transfers for pain control, AAROM/ strength training, bed mobility training with leg  and bed controls as needed, Modalities for pain management.      DME  PT DME Recommendations  Wheelchair: 20\" Width, Lightweight, 18\" Width (width TBD)  Cushion: Standard  Assistive Device: Front Wheeled Walker     Passport items to be completed:  Get in/out of bed safely, in/out of a vehicle, safely use mobility device, walk or wheel around home/community, navigate up and down stairs, show how to get up/down from the ground, ensure home is accessible, demonstrate HEP, complete caregiver training    Physical Therapy Problems (Active)       Problem: Mobility       Dates: Start:  12/12/24         Goal: STG-Within one week, patient will propel wheelchair household distances SBA with BUE support x 25 ft x 2       Dates: Start:  12/12/24            Goal: STG-Within one week, patient will ambulate household distance min A with // bars and wc follow 10 ft x 2       Dates: Start:  12/12/24               Problem: Mobility Transfers       Dates: Start:  12/12/24         Goal: " STG-Within one week, patient will perform bed mobility mod A of 1 with leg / bed controls as needed       Dates: Start:  12/12/24            Goal: STG-Within one week, patient will transfer bed to chair min / mod A of 1 for stand-step with FWW vs slide board       Dates: Start:  12/12/24               Problem: PT-Long Term Goals       Dates: Start:  12/12/24         Goal: LTG-By discharge, patient will propel wheelchair modified independent 50 ft x 2       Dates: Start:  12/12/24            Goal: LTG-By discharge, patient will ambulate SBA/ CGA with FWW 50 ft x 2       Dates: Start:  12/12/24            Goal: LTG-By discharge, patient will transfer one surface to another SBA/ SPV with FWW       Dates: Start:  12/12/24            Goal: LTG-By discharge, patient will ambulate up/down 4-6 stairs SBA/ CGA with hand rails vs FWW x 3 steps into home       Dates: Start:  12/12/24            Goal: LTG-By discharge, patient will transfer in/out of a car SBA/ CGA with FWW       Dates: Start:  12/12/24

## 2024-12-15 NOTE — PROGRESS NOTES
NURSING DAILY NOTE    Name: Franchesca Paige   Date of Admission: 12/11/2024   Admitting Diagnosis: Other fracture of right femur, initial encounter for closed fracture (HCC)  Attending Physician: ARMANDO GLASS M.D.  Allergies: Ibuprofen and Sulfa drugs    Safety  Patient Assist  max2  Patient Precautions  Fall Risk, Weight Bearing As Tolerated Right Lower Extremity  Precaution Comments     Bed Transfer Status  Minimal Assist  Toilet Transfer Status      Assistive Devices  Rails  Oxygen  None - Room Air  Diet/Therapeutic Dining  Current Diet Order   Procedures    Diet Order Diet: Regular     Pill Administration  whole  Agitated Behavioral Scale     ABS Level of Severity       Fall Risk  Has the patient had a fall this admission?   No  Candice Porter Fall Risk Scoring  28, HIGH RISK  Fall Risk Safety Measures  bed alarm, chair alarm, poor balance, and low vision/ hearing    Vitals  Temperature: 36.7 °C (98.1 °F)  Temp src: Oral  Pulse: (!) 51  Respiration: 16  Blood Pressure : 123/54  Blood Pressure MAP (Calculated): 77 MM HG  BP Location: Right, Upper Arm  Patient BP Position: Supine     Oxygen  Pulse Oximetry: 93 %  O2 (LPM): 0  O2 Delivery Device: None - Room Air    Bowel and Bladder  Last Bowel Movement  12/14/24  Stool Type  Type 6: Fluffy pieces with ragged edges, a mushy stool  Bowel Device     Continent  Bladder:     Bowel: Continent movement  Bladder Function  Urine Void (mL): 100 ml (urinal, bed side commode)  Number of Times Voided: 1  Urine Color: Rocio  Number of Times Incontinent of Urine: 0  Straight Catheter: 400 ml  Genitourinary Assessment   Bladder Assessment (WDL):  WDL Except  Urinary Symptoms: Dysuria  Urine Color: Rocio  Number of Bladder Accidents: 0  Total Number of Bladder of Accidents in Last 7 Days: 1  Number of Times Incontinent of Urine: 0  Bladder Device: Other (Comment) (Straight cath)  Time Void: Yes  Bladder Scan:  "Unable To Void  $ Bladder Scan Results (mL): 521  Intermittent Catheter: Yes -Go To LDA \"Indwelling Catheter Group\"    Skin  Felix Score   15  Sensory Interventions   Skin Preventative Measures: Pillows in Use for Support / Positioning  Moisture Interventions  Moisturizers/Barriers: Moisturizer       Pain  Pain Rating Scale  6 - Hard to ignore, avoid usual activities  Pain Location  Hip  Pain Location Orientation  Right  Pain Interventions   Medication (see MAR)    ADLs    Bathing   Patient Refused Bathing (Nurse notified. Patient said shes tired and doesn't feel like taking a shower.)  Linen Change      Personal Hygiene  Moist Joycelyn Wipes  Chlorhexidine Bath      Oral Care     Teeth/Dentures     Shave     Nutrition Percentage Eaten  Lunch, Between % Consumed  Environmental Precautions  Treaded Slipper Socks on Patient, Personal Belongings, Wastebasket, Call Bell etc. in Easy Reach, Bed in Low Position, Communication Sign for Patients & Families  Patient Turns/Positioning  Patient turns self independently side to side without assistance, to offload sacral area  Patient Turns Assistance/Tolerance  Assistance of One  Bed Positions  Bed Controls On, Bed Locked  Head of Bed Elevated  Self regulated      Psychosocial/Neurologic Assessment  Psychosocial Assessment  Psychosocial (WDL):  WDL Except  Patient Behaviors: Forgetful  Neurologic Assessment  Neuro (WDL): Exceptions to WDL  Level of Consciousness: Alert  Orientation Level: Oriented X4  Cognition: Appropriate safety awareness, Appropriate judgement  Speech: Clear  EENT (WDL):  WDL Except    Cardio/Pulmonary Assessment  Edema   RUE Edema: 1+  RLE Edema: 2+  LUE Edema: Trace  LLE Edema: 1+  Respiratory Breath Sounds  RUL Breath Sounds: Clear  RML Breath Sounds: Clear  RLL Breath Sounds: Diminished  DINO Breath Sounds: Clear  LLL Breath Sounds: Diminished  Cardiac Assessment   Cardiac (WDL):  WDL Except (Hx- HTN, Afib)      "

## 2024-12-15 NOTE — CARE PLAN
"  Problem: Fall Risk - Rehab  Goal: Patient will remain free from falls  Outcome: Progressing   Candice Porter Fall risk Assessment Score: 28    High fall risk Interventions   - Bed and strip alarm   - Yellow sign by the door   - Yellow wrist band \"Fall risk\"  - Do not leave patient unattended in the bathroom  - Fall risk education provided  - Call light within reach   - Yellow  socks   - Belongings within reach   - Bed in the lowest position        Problem: Pain - Standard  Goal: Alleviation of pain or a reduction in pain to the patient’s comfort goal  Outcome: Progressing   Patient is able to rate pain on 0-10 scale. PRN Oxycodone given.      Problem: Bladder / Voiding  Goal: Patient will establish and maintain regular urinary output  Outcome: Not Met   Patient retaining urine, high PVR. Straight cath needed at this time.   "

## 2024-12-15 NOTE — THERAPY
Occupational Therapy  Daily Treatment     Patient Name: Franchesca Paige  Age:  79 y.o., Sex:  female  Medical Record #: [id number]  Today's Date: 12/15/2024     Precautions  Precautions: Fall Risk, Weight Bearing As Tolerated Right Lower Extremity         Subjective    Pt found resting in bed and she requested to complete shower/bathing routine.   At end of session pt requested RN be notified that she was requesting a pain med - OT texted RN via 2nd Watch phone.      Objective       12/15/24 1301   OT Charge Group   OT Self Care / ADL (Units) 4   OT Total Time Spent   OT Individual Total Time Spent (Mins) 60   Pain 0 - 10 Group   Location Leg   Location Orientation Right   Pain Rating Scale (NPRS) 6   Description Aching   Comfort Goal Comfort with Movement   Cognition    Level of Consciousness Alert   Sleep/Wake Cycle   Sleep & Rest Resting   Functional Level of Assist   Bathing Minimal Assist   Bathing Description Grab bar;Hand held shower;Long handled bath tool;Assit with back;Increased time;Supervision for safety;Verbal cueing   Upper Body Dressing Supervision   Upper Body Dressing Description Increased time;Set-up of equipment   Lower Body Dressing Maximal Assist   Lower Body Dressing Description   (pt threaded pants seated in w/c and then requested to transfer to bed to pull over hips while in supine)   Toileting Maximal Assist   Toileting Description Assist to pull pants up;Assist to pull pants down;Assist for standing balance;Grab bar;Increased time;Set-up of equipment;Supervision for safety   Bed, Chair, Wheelchair Transfer Minimal Assist   Bed Chair Wheelchair Transfer Description Slideboard transfer from bed to wheelchair   Toilet Transfers Moderate Assist   Toilet Transfer Description Increased time;Set-up of equipment;Supervision for safety;Verbal cueing   Tub / Shower Transfers Minimal Assist   Tub Shower Transfer Description Increased time;Set-up of equipment;Supervision for safety;Grab bar;Shower bench    Interdisciplinary Plan of Care Collaboration   IDT Collaboration with  Physical Therapist   Patient Position at End of Therapy Bed Alarm On;Call Light within Reach;Tray Table within Reach;Phone within Reach   Collaboration Comments PT re: CLOF       Assessment    Pt tolerated therapy fairly well focused on self-care routine. Pt limited by pain in RLE and functional mobility in which she would continue to benefit from OT until goals are met. Pt was left happy and with all needs met resting in bed.     Strengths: Able to follow instructions, Effective communication skills, Good insight into deficits/needs, Independent prior level of function, Making steady progress towards goals, Motivated for self care and independence, Pleasant and cooperative, Willingly participates in therapeutic activities  Barriers: Decreased endurance, Fatigue, Generalized weakness, Impaired activity tolerance, Impaired balance, Limited mobility, Pain, Pain poorly managed    Plan    ADLs/IADLs, standing tolerance, general strengthening/endurance        DME       Passport items to be completed:  Perform bathroom transfers, complete dressing, complete feeding, get ready for the day, prepare a simple meal, participate in household tasks, adapt home for safety needs, demonstrate home exercise program, complete caregiver training     Occupational Therapy Goals (Active)       Problem: Bathing       Dates: Start:  12/12/24         Goal: STG-Within one week, patient will bathe with mod A using AE as needed.       Dates: Start:  12/12/24               Problem: Dressing       Dates: Start:  12/12/24         Goal: STG-Within one week, patient will dress LB with mod A using AE as needed.       Dates: Start:  12/12/24               Problem: Functional Transfers       Dates: Start:  12/12/24         Goal: STG-Within one week, patient will transfer to tub/shower with mod A.       Dates: Start:  12/12/24               Problem: OT Long Term Goals       Dates:  Start:  12/12/24         Goal: LTG-By discharge, patient will complete basic self care tasks with supervision.       Dates: Start:  12/12/24            Goal: LTG-By discharge, patient will perform bathroom transfers with supervision.       Dates: Start:  12/12/24               Problem: Toileting       Dates: Start:  12/12/24         Goal: STG-Within one week, patient will complete toileting tasks with mod A.       Dates: Start:  12/12/24

## 2024-12-15 NOTE — CARE PLAN
The patient is Stable - Low risk of patient condition declining or worsening    Shift Goals  Clinical Goals: safety  Patient Goals: Safety  Family Goals: ILIANA    Progress made toward(s) clinical / shift goals:      Problem: Knowledge Deficit - Standard  Goal: Patient and family/care givers will demonstrate understanding of plan of care, disease process/condition, diagnostic tests and medications  Outcome: Progressing     Problem: Pain - Standard  Goal: Alleviation of pain or a reduction in pain to the patient’s comfort goal  Outcome: Progressing  Patient c/o right knee pain. PRN oxycodone given. Will continue to monitor.      Problem: Fall Risk - Rehab  Goal: Patient will remain free from falls  Outcome: Progressing  Patient use call light for assistance.        Patient is not progressing towards the following goals:

## 2024-12-16 ENCOUNTER — APPOINTMENT (OUTPATIENT)
Dept: PHYSICAL THERAPY | Facility: REHABILITATION | Age: 79
DRG: 559 | End: 2024-12-16
Attending: PHYSICAL MEDICINE & REHABILITATION
Payer: MEDICARE

## 2024-12-16 ENCOUNTER — APPOINTMENT (OUTPATIENT)
Dept: OCCUPATIONAL THERAPY | Facility: REHABILITATION | Age: 79
DRG: 559 | End: 2024-12-16
Attending: PHYSICAL MEDICINE & REHABILITATION
Payer: MEDICARE

## 2024-12-16 PROBLEM — R50.9 FEVER: Status: ACTIVE | Noted: 2024-12-16

## 2024-12-16 LAB
ANION GAP SERPL CALC-SCNC: 9 MMOL/L (ref 7–16)
APPEARANCE UR: CLEAR
BASOPHILS # BLD AUTO: 0.1 % (ref 0–1.8)
BASOPHILS # BLD: 0.01 K/UL (ref 0–0.12)
BILIRUB UR QL STRIP.AUTO: NEGATIVE
BUN SERPL-MCNC: 17 MG/DL (ref 8–22)
CALCIUM SERPL-MCNC: 10.5 MG/DL (ref 8.5–10.5)
CHLORIDE SERPL-SCNC: 102 MMOL/L (ref 96–112)
CO2 SERPL-SCNC: 25 MMOL/L (ref 20–33)
COLOR UR: YELLOW
CREAT SERPL-MCNC: 0.65 MG/DL (ref 0.5–1.4)
EOSINOPHIL # BLD AUTO: 0.13 K/UL (ref 0–0.51)
EOSINOPHIL NFR BLD: 1.5 % (ref 0–6.9)
ERYTHROCYTE [DISTWIDTH] IN BLOOD BY AUTOMATED COUNT: 43.5 FL (ref 35.9–50)
FLUAV RNA SPEC QL NAA+PROBE: NEGATIVE
FLUBV RNA SPEC QL NAA+PROBE: NEGATIVE
GFR SERPLBLD CREATININE-BSD FMLA CKD-EPI: 91 ML/MIN/1.73 M 2
GLUCOSE SERPL-MCNC: 104 MG/DL (ref 65–99)
GLUCOSE UR STRIP.AUTO-MCNC: NEGATIVE MG/DL
HCT VFR BLD AUTO: 26.2 % (ref 37–47)
HGB BLD-MCNC: 8.6 G/DL (ref 12–16)
IMM GRANULOCYTES # BLD AUTO: 0.06 K/UL (ref 0–0.11)
IMM GRANULOCYTES NFR BLD AUTO: 0.7 % (ref 0–0.9)
KETONES UR STRIP.AUTO-MCNC: NEGATIVE MG/DL
LEUKOCYTE ESTERASE UR QL STRIP.AUTO: NEGATIVE
LYMPHOCYTES # BLD AUTO: 1.66 K/UL (ref 1–4.8)
LYMPHOCYTES NFR BLD: 19.2 % (ref 22–41)
MAGNESIUM SERPL-MCNC: 2 MG/DL (ref 1.5–2.5)
MCH RBC QN AUTO: 29.9 PG (ref 27–33)
MCHC RBC AUTO-ENTMCNC: 32.8 G/DL (ref 32.2–35.5)
MCV RBC AUTO: 91 FL (ref 81.4–97.8)
MICRO URNS: NORMAL
MONOCYTES # BLD AUTO: 0.82 K/UL (ref 0–0.85)
MONOCYTES NFR BLD AUTO: 9.5 % (ref 0–13.4)
NEUTROPHILS # BLD AUTO: 5.95 K/UL (ref 1.82–7.42)
NEUTROPHILS NFR BLD: 69 % (ref 44–72)
NITRITE UR QL STRIP.AUTO: NEGATIVE
NRBC # BLD AUTO: 0 K/UL
NRBC BLD-RTO: 0 /100 WBC (ref 0–0.2)
PH UR STRIP.AUTO: 6 [PH] (ref 5–8)
PLATELET # BLD AUTO: 395 K/UL (ref 164–446)
PMV BLD AUTO: 8.8 FL (ref 9–12.9)
POTASSIUM SERPL-SCNC: 3.7 MMOL/L (ref 3.6–5.5)
PROT UR QL STRIP: NEGATIVE MG/DL
RBC # BLD AUTO: 2.88 M/UL (ref 4.2–5.4)
RBC UR QL AUTO: NEGATIVE
RSV RNA SPEC QL NAA+PROBE: NEGATIVE
SARS-COV-2 RNA RESP QL NAA+PROBE: NOTDETECTED
SCCMEC + MECA PNL NOSE NAA+PROBE: NEGATIVE
SODIUM SERPL-SCNC: 136 MMOL/L (ref 135–145)
SP GR UR STRIP.AUTO: 1.01
UROBILINOGEN UR STRIP.AUTO-MCNC: 0.2 EU/DL
WBC # BLD AUTO: 8.6 K/UL (ref 4.8–10.8)

## 2024-12-16 PROCEDURE — 700111 HCHG RX REV CODE 636 W/ 250 OVERRIDE (IP): Mod: JZ | Performed by: PHYSICAL MEDICINE & REHABILITATION

## 2024-12-16 PROCEDURE — 36415 COLL VENOUS BLD VENIPUNCTURE: CPT

## 2024-12-16 PROCEDURE — 700105 HCHG RX REV CODE 258: Performed by: HOSPITALIST

## 2024-12-16 PROCEDURE — 770010 HCHG ROOM/CARE - REHAB SEMI PRIVAT*

## 2024-12-16 PROCEDURE — 87040 BLOOD CULTURE FOR BACTERIA: CPT | Mod: 91

## 2024-12-16 PROCEDURE — 700102 HCHG RX REV CODE 250 W/ 637 OVERRIDE(OP): Performed by: HOSPITALIST

## 2024-12-16 PROCEDURE — A9270 NON-COVERED ITEM OR SERVICE: HCPCS | Performed by: PHYSICAL MEDICINE & REHABILITATION

## 2024-12-16 PROCEDURE — 97530 THERAPEUTIC ACTIVITIES: CPT

## 2024-12-16 PROCEDURE — 97110 THERAPEUTIC EXERCISES: CPT | Mod: CO

## 2024-12-16 PROCEDURE — 99223 1ST HOSP IP/OBS HIGH 75: CPT | Mod: AI | Performed by: HOSPITALIST

## 2024-12-16 PROCEDURE — 85025 COMPLETE CBC W/AUTO DIFF WBC: CPT

## 2024-12-16 PROCEDURE — 94760 N-INVAS EAR/PLS OXIMETRY 1: CPT

## 2024-12-16 PROCEDURE — 99232 SBSQ HOSP IP/OBS MODERATE 35: CPT | Performed by: PHYSICAL MEDICINE & REHABILITATION

## 2024-12-16 PROCEDURE — 81003 URINALYSIS AUTO W/O SCOPE: CPT

## 2024-12-16 PROCEDURE — 87641 MR-STAPH DNA AMP PROBE: CPT

## 2024-12-16 PROCEDURE — 700111 HCHG RX REV CODE 636 W/ 250 OVERRIDE (IP): Mod: JZ | Performed by: HOSPITALIST

## 2024-12-16 PROCEDURE — 700102 HCHG RX REV CODE 250 W/ 637 OVERRIDE(OP): Performed by: PHYSICAL MEDICINE & REHABILITATION

## 2024-12-16 PROCEDURE — 83735 ASSAY OF MAGNESIUM: CPT

## 2024-12-16 PROCEDURE — A9270 NON-COVERED ITEM OR SERVICE: HCPCS | Performed by: HOSPITALIST

## 2024-12-16 PROCEDURE — 97110 THERAPEUTIC EXERCISES: CPT

## 2024-12-16 PROCEDURE — 97116 GAIT TRAINING THERAPY: CPT

## 2024-12-16 PROCEDURE — 80048 BASIC METABOLIC PNL TOTAL CA: CPT

## 2024-12-16 PROCEDURE — 0241U HCHG SARS-COV-2 COVID-19 NFCT DS RESP RNA 4 TRGT ED POC: CPT

## 2024-12-16 RX ORDER — LISINOPRIL 5 MG/1
5 TABLET ORAL DAILY
Status: DISCONTINUED | OUTPATIENT
Start: 2024-12-17 | End: 2024-12-19

## 2024-12-16 RX ORDER — LINEZOLID 600 MG/1
600 TABLET, FILM COATED ORAL EVERY 12 HOURS
Status: DISCONTINUED | OUTPATIENT
Start: 2024-12-16 | End: 2024-12-17

## 2024-12-16 RX ORDER — LACTULOSE 10 G/15ML
30 SOLUTION ORAL 3 TIMES DAILY PRN
Status: DISCONTINUED | OUTPATIENT
Start: 2024-12-16 | End: 2024-12-24 | Stop reason: HOSPADM

## 2024-12-16 RX ADMIN — ENOXAPARIN SODIUM 40 MG: 100 INJECTION SUBCUTANEOUS at 17:56

## 2024-12-16 RX ADMIN — LISINOPRIL 10 MG: 5 TABLET ORAL at 05:06

## 2024-12-16 RX ADMIN — OXYCODONE HYDROCHLORIDE 5 MG: 5 TABLET ORAL at 06:33

## 2024-12-16 RX ADMIN — OXYCODONE HYDROCHLORIDE 5 MG: 5 TABLET ORAL at 12:59

## 2024-12-16 RX ADMIN — OMEPRAZOLE 20 MG: 20 CAPSULE, DELAYED RELEASE ORAL at 08:08

## 2024-12-16 RX ADMIN — PIPERACILLIN AND TAZOBACTAM 3.38 G: 3; .375 INJECTION, POWDER, FOR SOLUTION INTRAVENOUS at 18:00

## 2024-12-16 RX ADMIN — MECLIZINE HYDROCHLORIDE 25 MG: 25 TABLET ORAL at 08:08

## 2024-12-16 RX ADMIN — LINEZOLID 600 MG: 600 TABLET, FILM COATED ORAL at 20:14

## 2024-12-16 RX ADMIN — SENNOSIDES AND DOCUSATE SODIUM 2 TABLET: 50; 8.6 TABLET ORAL at 20:14

## 2024-12-16 RX ADMIN — AMLODIPINE BESYLATE 5 MG: 5 TABLET ORAL at 05:06

## 2024-12-16 RX ADMIN — ACETAMINOPHEN 650 MG: 325 TABLET ORAL at 15:01

## 2024-12-16 ASSESSMENT — ACTIVITIES OF DAILY LIVING (ADL): BED_CHAIR_WHEELCHAIR_TRANSFER_DESCRIPTION: ADAPTIVE EQUIPMENT;INCREASED TIME;SET-UP OF EQUIPMENT;VERBAL CUEING

## 2024-12-16 ASSESSMENT — PAIN DESCRIPTION - PAIN TYPE
TYPE: ACUTE PAIN
TYPE: OTHER (COMMENT)
TYPE: ACUTE PAIN
TYPE: SURGICAL PAIN

## 2024-12-16 ASSESSMENT — GAIT ASSESSMENTS
DEVIATION: ANTALGIC;STEP TO;BRADYKINETIC;SHUFFLED GAIT
ASSISTIVE DEVICE: PARALLEL BARS
GAIT LEVEL OF ASSIST: MINIMAL ASSIST
DEVIATION: ANTALGIC;STEP TO;BRADYKINETIC;SHUFFLED GAIT
DISTANCE (FEET): 8
GAIT LEVEL OF ASSIST: MINIMAL ASSIST
ASSISTIVE DEVICE: PARALLEL BARS
DISTANCE (FEET): 8

## 2024-12-16 NOTE — THERAPY
Occupational Therapy  Daily Treatment     Patient Name: Franchesca Paige  Age:  79 y.o., Sex:  female  Medical Record #: [id number]  Today's Date: 12/16/2024     Precautions  Precautions: (P) Fall Risk, Weight Bearing As Tolerated Right Lower Extremity         Subjective    Pt supine in bed upon arrival. Pt pleasant and cooperative, agreeable to therapy.     Objective       12/16/24 0701   OT Charge Group   Charges Yes   OT Therapy Activity (Units) 4   OT Total Time Spent   OT Individual Total Time Spent (Mins) 60   Precautions   Precautions Fall Risk;Weight Bearing As Tolerated Right Lower Extremity   Vitals   Pulse 62   Patient BP Position Sitting   Blood Pressure  115/48   Pulse Oximetry 94 %   O2 Delivery Device None - Room Air   Vitals Comments Pre standing activity, see note for additional vitals   Pain 0 - 10 Group   Location Knee   Location Orientation Right   Description Aching   Comfort Goal Comfort with Movement   Therapist Pain Assessment Prior to Activity   Functional Level of Assist   Bed, Chair, Wheelchair Transfer Contact Guard Assist   Bed Chair Wheelchair Transfer Description Increased time;Supervision for safety;Squat pivot transfer to wheelchair;Verbal cueing  (squat pivot from EOB to w/c)   Bed Mobility    Supine to Sit Contact Guard Assist   Interdisciplinary Plan of Care Collaboration   Patient Position at End of Therapy Seated;Chair Alarm On;Other (Comments)  (in dining room for breakfast)     Standing tolerance activity: Pt completed 3 Rounds of ladder ball standing in between parallel bars with CGA to increase standing tolerance and cardiovascular endurance to maximize independence and safety with ADL/IADLs and functional mobility tasks. Pt completed sit to stand from w/c to parallel bars, threw six balls with RUE and LUE supporting self on bars. During first round, pt threw 4 balls, took a seated rest break, then threw last two. All other rounds pt able to throw all 6 without taking seated  rest break. Pt took a seated rest break in w/c in between rounds.     Pt completed dynamic reaching touching Ots hand following ladder ball throws to promote weight shift onto RLE. Pt completed 1 repetition with each UE following ladder ball throws.     Vitals:  Following round 1 of ladder ball: /66, HR 63 bpm, SpO2 93%  Following round 2 ladder ball: /53    Assessment    Pt tolerated session well with no LOB noted. Pt limited by RLE pain and dizziness upon standing but still able to tolerate all therapeutic activity. Pt slowly progressing but standing tolerance improving.     Strengths: Able to follow instructions, Effective communication skills, Good insight into deficits/needs, Independent prior level of function, Making steady progress towards goals, Motivated for self care and independence, Pleasant and cooperative, Willingly participates in therapeutic activities  Barriers: Decreased endurance, Fatigue, Generalized weakness, Impaired activity tolerance, Impaired balance, Limited mobility, Pain, Pain poorly managed    Plan    ADLs/IADLs, standing tolerance, general strengthening/endurance     DME       Passport items to be completed:  Perform bathroom transfers, complete dressing, complete feeding, get ready for the day, prepare a simple meal, participate in household tasks, adapt home for safety needs, demonstrate home exercise program, complete caregiver training     Occupational Therapy Goals (Active)       Problem: Bathing       Dates: Start:  12/12/24         Goal: STG-Within one week, patient will bathe with mod A using AE as needed.       Dates: Start:  12/12/24               Problem: Dressing       Dates: Start:  12/12/24         Goal: STG-Within one week, patient will dress LB with mod A using AE as needed.       Dates: Start:  12/12/24               Problem: Functional Transfers       Dates: Start:  12/12/24         Goal: STG-Within one week, patient will transfer to tub/shower with mod A.        Dates: Start:  12/12/24               Problem: OT Long Term Goals       Dates: Start:  12/12/24         Goal: LTG-By discharge, patient will complete basic self care tasks with supervision.       Dates: Start:  12/12/24            Goal: LTG-By discharge, patient will perform bathroom transfers with supervision.       Dates: Start:  12/12/24               Problem: Toileting       Dates: Start:  12/12/24         Goal: STG-Within one week, patient will complete toileting tasks with mod A.       Dates: Start:  12/12/24

## 2024-12-16 NOTE — PROGRESS NOTES
"  Physical Medicine & Rehabilitation Progress Note    Encounter Date: 12/16/2024    Chief Complaint: Decreased mobility, weakness    Interval Events (Subjective):  Patient sitting up in therapy gym. She reports her confusion has resolved. Discussed UA was negative.     Objective:  VITAL SIGNS: /48   Pulse 62   Temp 36.8 °C (98.2 °F) (Oral)   Resp 16   Ht 1.702 m (5' 7\")   Wt 97.1 kg (214 lb)   SpO2 94%   BMI 33.52 kg/m²   Gen: NAD  Psych: Mood and affect appropriate  CV: RRR, 0 edema  Resp: CTAB, no upper airway sounds  Abd: NTND  Neuro: AOx3, following commands    Laboratory Values:  Recent Results (from the past 72 hours)   Basic Metabolic Panel    Collection Time: 12/16/24  5:37 AM   Result Value Ref Range    Sodium 136 135 - 145 mmol/L    Potassium 3.7 3.6 - 5.5 mmol/L    Chloride 102 96 - 112 mmol/L    Co2 25 20 - 33 mmol/L    Glucose 104 (H) 65 - 99 mg/dL    Bun 17 8 - 22 mg/dL    Creatinine 0.65 0.50 - 1.40 mg/dL    Calcium 10.5 8.5 - 10.5 mg/dL    Anion Gap 9.0 7.0 - 16.0   MAGNESIUM    Collection Time: 12/16/24  5:37 AM   Result Value Ref Range    Magnesium 2.0 1.5 - 2.5 mg/dL   CBC WITH DIFFERENTIAL    Collection Time: 12/16/24  5:37 AM   Result Value Ref Range    WBC 8.6 4.8 - 10.8 K/uL    RBC 2.88 (L) 4.20 - 5.40 M/uL    Hemoglobin 8.6 (L) 12.0 - 16.0 g/dL    Hematocrit 26.2 (L) 37.0 - 47.0 %    MCV 91.0 81.4 - 97.8 fL    MCH 29.9 27.0 - 33.0 pg    MCHC 32.8 32.2 - 35.5 g/dL    RDW 43.5 35.9 - 50.0 fL    Platelet Count 395 164 - 446 K/uL    MPV 8.8 (L) 9.0 - 12.9 fL    Neutrophils-Polys 69.00 44.00 - 72.00 %    Lymphocytes 19.20 (L) 22.00 - 41.00 %    Monocytes 9.50 0.00 - 13.40 %    Eosinophils 1.50 0.00 - 6.90 %    Basophils 0.10 0.00 - 1.80 %    Immature Granulocytes 0.70 0.00 - 0.90 %    Nucleated RBC 0.00 0.00 - 0.20 /100 WBC    Neutrophils (Absolute) 5.95 1.82 - 7.42 K/uL    Lymphs (Absolute) 1.66 1.00 - 4.80 K/uL    Monos (Absolute) 0.82 0.00 - 0.85 K/uL    Eos (Absolute) 0.13 0.00 - " 0.51 K/uL    Baso (Absolute) 0.01 0.00 - 0.12 K/uL    Immature Granulocytes (abs) 0.06 0.00 - 0.11 K/uL    NRBC (Absolute) 0.00 K/uL   ESTIMATED GFR    Collection Time: 12/16/24  5:37 AM   Result Value Ref Range    GFR (CKD-EPI) 91 >60 mL/min/1.73 m 2       Medications:  Scheduled Medications   Medication Dose Frequency    lisinopril  10 mg DAILY    senna-docusate  2 Tablet Q EVENING    omeprazole  20 mg DAILY    amLODIPine  5 mg DAILY    enoxaparin (LOVENOX) injection  40 mg DAILY AT 1800    lactulose  30 mL TID     PRN medications: meclizine, metaxalone, hydrALAZINE, acetaminophen, senna-docusate **AND** polyethylene glycol/lytes, docusate sodium, magnesium hydroxide, carboxymethylcellulose, benzocaine-menthol, mag hydrox-al hydrox-simeth, ondansetron **OR** ondansetron, traZODone, sodium chloride, oxyCODONE immediate-release **OR** oxyCODONE immediate-release    Diet:  Current Diet Order   Procedures    Diet Order Diet: Regular       Medical Decision Making and Plan:  R distal femur fracture - Patient with mechanical fall onto right knee on 12/7 with distal femur fracture s/p ORIF on 12/7 with Dr. Garzon  -PT and OT for mobility and ADLs. Per guidelines, 15 hours per week between PT, OT and/or SLP.  -Follow-up Dr. Garzon. WBAT     HTN - Patient on Amlodipine 5 mg daily and Lisinopril 20 mg daily. Low SBP, reduce ACEi to 10 mg.  SBP into 90s over weekend, reduce Lisinopril to 5      Confusion - Will check UA, is retaining fluid with dysuria but may be traumatic catheterization. UTI vs pain medication    Pneumonia - Patient on Augmentin and Zithromax. Completed Zithromax, to complete Augmentin 12/13     Leukocytosis - Check AM CBC, on antibiotics. WBC 9.9 on admission, will monitor     Anemia - Check AM CBC - 8.1, ongoing      Hyponatremia - Check AM CMP - 131, repeat 12/16     Azotemia - Check AM CMP - 26. Repeat 12/16    Hypercalcemia - mild elevation on corrected calcium. Will monitor     Obesity due  to excess calories - BMI of 32.1 on admission, meets medical criteria. Dietitian to consult     Pain - Patient on PRN Oxycodone and Tylenol      Skin - Patient at risk for skin breakdown due to debility in areas including sacrum, achilles, elbows and head in addition to other sites. Nursing to assess skin daily.      GI Ppx - Patient on Prilosec for GERD prophylaxis. Patient on Senna-docusate for constipation prophylaxis.   -Discontinue Lactulose     DVT Ppx - Patient Lovenox on transfer. Limited mobiliyt, continue Lovenox  ____________________________________    T. Brian Souza MD/PhD  [facility] - Physical Medicine & Rehabilitation   [facility]   ____________________________________

## 2024-12-16 NOTE — CARE PLAN
"The patient is Stable - Low risk of patient condition declining or worsening    Shift Goals  Clinical Goals: Rest and safety  Patient Goals: Safety  Family Goals: ILIANA    Progress made toward(s) clinical / shift goals:    Problem: Pain - Standard  Goal: Alleviation of pain or a reduction in pain to the patient’s comfort goal  Outcome: Progressing  Note: Patient states that her pain medications are working to keep her pain at a tolerable level.     Problem: Fall Risk - Rehab  Goal: Patient will remain free from falls  Outcome: Progressing  Note: Candice Porter Fall risk Assessment Score: 28    High fall risk Interventions   - Bed and strip alarm   - Yellow sign by the door   - Yellow wrist band \"Fall risk\"  - Room near to the nurse station  - Do not leave patient unattended in the bathroom  - Fall risk education provided       Patient is not progressing towards the following goals:      "

## 2024-12-16 NOTE — THERAPY
"Occupational Therapy  Daily Treatment     Patient Name: Franchesca Paige  Age:  79 y.o., Sex:  female  Medical Record #: [id number]  Today's Date: 12/16/2024     Precautions  Precautions: (P) Fall Risk, Weight Bearing As Tolerated Right Lower Extremity         Subjective    \"  I  need an ice pack  \" stated  at end of session requesting for right knee        Objective       12/16/24 0833   OT Charge Group   OT Therapeutic Exercise (Units) 2   OT Total Time Spent   OT Individual Total Time Spent (Mins) 30   Precautions   Precautions Fall Risk;Weight Bearing As Tolerated Right Lower Extremity   Sitting Upper Body Exercises   Front Arm Raise 2 sets of 10;Right ;Left   Shoulder Press 2 sets of 10;Right ;Left   Internal Shoulder Rotation 2 sets of 10;Right ;Left   External Shoulder Rotation 2 sets of 10;Right ;Left   Bicep Curls 2 sets of 10;Right ;Left   Pronation / Supination 2 sets of 10;Right ;Left   Other Exercise 2 sets of 10;Right ;Left; arm circles forward and  back    1 set of 10  right /  left knee to shoudler cross overs   Comments 2lb weight in each hand   Interdisciplinary Plan of Care Collaboration   Patient Position at End of Therapy Seated;Chair Alarm On;Self Releasing Lap Belt Applied  (call light on for CNA to assist to bed  no time left in session   no CNA assigned to voalte messaging)         Assessment     Completed ther ex no complaints    demonstrated increased difficulty with  front arm raises  shoulder presses and arm circles as she fatigued  Strengths: Able to follow instructions, Effective communication skills, Good insight into deficits/needs, Independent prior level of function, Making steady progress towards goals, Motivated for self care and independence, Pleasant and cooperative, Willingly participates in therapeutic activities  Barriers: Decreased endurance, Fatigue, Generalized weakness, Impaired activity tolerance, Impaired balance, Limited mobility, Pain, Pain poorly " managed    Plan  ADLs/IADLs, standing tolerance, general strengthening/endurance          DME         Occupational Therapy Goals (Active)       Problem: Bathing       Dates: Start:  12/12/24         Goal: STG-Within one week, patient will bathe with mod A using AE as needed.       Dates: Start:  12/12/24               Problem: Dressing       Dates: Start:  12/12/24         Goal: STG-Within one week, patient will dress LB with mod A using AE as needed.       Dates: Start:  12/12/24               Problem: Functional Transfers       Dates: Start:  12/12/24         Goal: STG-Within one week, patient will transfer to tub/shower with mod A.       Dates: Start:  12/12/24               Problem: OT Long Term Goals       Dates: Start:  12/12/24         Goal: LTG-By discharge, patient will complete basic self care tasks with supervision.       Dates: Start:  12/12/24            Goal: LTG-By discharge, patient will perform bathroom transfers with supervision.       Dates: Start:  12/12/24               Problem: Toileting       Dates: Start:  12/12/24         Goal: STG-Within one week, patient will complete toileting tasks with mod A.       Dates: Start:  12/12/24

## 2024-12-16 NOTE — PROGRESS NOTES
Patient had temp 101.0 this afternoon. Dr Souza notified. PRN tylenol given and UA collected. Blood cultures and Labs ordered and collected. Will continue to monitor.

## 2024-12-16 NOTE — THERAPY
"Physical Therapy   Daily Treatment     Patient Name: Franchesca Paige  Age:  79 y.o., Sex:  female  Medical Record #: [id number]  Today's Date: 12/16/2024     Precautions  Precautions: Fall Risk, Weight Bearing As Tolerated Right Lower Extremity    Subjective    Pt resting in bed, just finished ICP with nursing.     Objective       12/16/24 1031   PT Charge Group   PT Gait Training (Units) 1   PT Therapeutic Exercise (Units) 2   PT Therapeutic Activities (Units) 1   PT Total Time Spent   PT Individual Total Time Spent (Mins) 60   Gait Functional Level of Assist    Gait Level Of Assist Minimal Assist   Assistive Device Parallel Bars   Distance (Feet) 8   # of Times Distance was Traveled 2   Deviation Antalgic;Step To;Bradykinetic;Shuffled Gait   Transfer Functional Level of Assist   Bed, Chair, Wheelchair Transfer Contact Guard Assist  (with slide board , pt declined  FWW for stand-step transfer)   Bed Chair Wheelchair Transfer Description Adaptive equipment;Increased time;Set-up of equipment;Slideboard transfer from bed to wheelchair;Verbal cueing   Sitting Lower Body Exercises   Ankle Pumps 1 set of 10   Hip Flexion 1 set of 10   Hip Abduction 1 set of 10   Hip Adduction 1 set of 10   Long Arc Quad 1 set of 10   Hamstring Curl 1 set of 10   Nustep Resistance Level 2  (3.5 minutes with BUE's/ LLE only, uable to tolerate AAROM for RLE)   PT DME Recommendations   Wheelchair 20\" Width;Lightweight;Removable/Flip Back Armrests;Standard Leg Rests   Cushion Standard   Assistive Device   (pt has FWW)   Additional Equipment Transfer Board (30\" Length)  (potentially -- TBD)         Assessment    Pt able to progress gait initiation today with support of // bars and PWB to RLE but remains limited by pain/ swelling and fear/ anxiety for falls. Improving slide board transfers but resistant to progress with FWW.    Strengths: Able to follow instructions, Alert and oriented, Effective communication skills, Good insight into " "deficits/needs, Independent prior level of function, Motivated for self care and independence, Pleasant and cooperative, Supportive family, Willingly participates in therapeutic activities  Barriers: Decreased endurance, Fatigue, Generalized weakness, Home accessibility, Impaired activity tolerance, Impaired balance, Limited mobility, Pain    Plan      Sit<>stand/ transfer training with FWW, slide board transfers for pain control, AAROM/ strength training, bed mobility training with leg  and bed controls as needed, progress gait with // bars, Modalities for pain management.     DME  PT DME Recommendations  Wheelchair: (P) 20\" Width, Lightweight, Removable/Flip Back Armrests, Standard Leg Rests  Cushion: (P) Standard  Assistive Device: (P)  (pt has FWW)  Additional Equipment: (P) Transfer Board (30\" Length) (potentially -- TBD)    Passport items to be completed:  Get in/out of bed safely, in/out of a vehicle, safely use mobility device, walk or wheel around home/community, navigate up and down stairs, show how to get up/down from the ground, ensure home is accessible, demonstrate HEP, complete caregiver training    Physical Therapy Problems (Active)       Problem: Mobility       Dates: Start:  12/12/24         Goal: STG-Within one week, patient will propel wheelchair household distances SBA with BUE support x 25 ft x 2       Dates: Start:  12/12/24            Goal: STG-Within one week, patient will ambulate household distance min A with // bars and wc follow 10 ft x 2       Dates: Start:  12/12/24               Problem: Mobility Transfers       Dates: Start:  12/12/24         Goal: STG-Within one week, patient will perform bed mobility mod A of 1 with leg / bed controls as needed       Dates: Start:  12/12/24            Goal: STG-Within one week, patient will transfer bed to chair min / mod A of 1 for stand-step with FWW vs slide board       Dates: Start:  12/12/24               Problem: PT-Long Term " Goals       Dates: Start:  12/12/24         Goal: LTG-By discharge, patient will propel wheelchair modified independent 50 ft x 2       Dates: Start:  12/12/24            Goal: LTG-By discharge, patient will ambulate SBA/ CGA with FWW 50 ft x 2       Dates: Start:  12/12/24            Goal: LTG-By discharge, patient will transfer one surface to another SBA/ SPV with FWW       Dates: Start:  12/12/24            Goal: LTG-By discharge, patient will ambulate up/down 4-6 stairs SBA/ CGA with hand rails vs FWW x 3 steps into home       Dates: Start:  12/12/24            Goal: LTG-By discharge, patient will transfer in/out of a car SBA/ CGA with FWW       Dates: Start:  12/12/24

## 2024-12-16 NOTE — THERAPY
Physical Therapy   Daily Treatment     Patient Name: Franchesca Paige  Age:  79 y.o., Sex:  female  Medical Record #: [id number]  Today's Date: 12/16/2024     Precautions  Precautions: Fall Risk, Weight Bearing As Tolerated Right Lower Extremity    Subjective    Pt resting in bed, willing to participate.     Objective       12/16/24 1231   PT Charge Group   PT Therapeutic Exercise (Units) 1   PT Therapeutic Activities (Units) 1   PT Total Time Spent   PT Individual Total Time Spent (Mins) 30   Gait Functional Level of Assist    Gait Level Of Assist Minimal Assist   Assistive Device Parallel Bars   Distance (Feet) 8   # of Times Distance was Traveled 1   Deviation Antalgic;Step To;Bradykinetic;Shuffled Gait   Wheelchair Functional Level of Assist   Wheelchair Assist Stand by Assist   Distance Wheelchair (Feet or Distance) 100 x 2   Wheelchair Description Extra time;Limited by fatigue;Verbal cueing   Transfer Functional Level of Assist   Bed, Chair, Wheelchair Transfer Minimal Assist  (FWW from elevated edge of bed)   Bed Chair Wheelchair Transfer Description Adaptive equipment;Increased time;Set-up of equipment;Verbal cueing   Sitting Lower Body Exercises   Ankle Pumps 2 sets of 10   Hip Flexion 2 sets of 10   Hip Abduction 2 sets of 10   Hip Adduction 2 sets of 10   Long Arc Quad 2 sets of 10   Hamstring Curl 2 sets of 10   Comments AAROM for RLE   Bed Mobility    Supine to Sit Contact Guard Assist   Sit to Supine Minimal Assist  (for RLE)   Sit to Stand Minimal Assist         Assessment    Pt remains slow to mobilize 2* RLE pain/swelling/ weakness and limited weight bearing tolerance but making slow/steady gains. Completed stand-pivot transfer with FWW OOB with min A as noted, CGA for lateral scoot back to bed 2* fatigue  Strengths: Able to follow instructions, Alert and oriented, Effective communication skills, Good insight into deficits/needs, Independent prior level of function, Motivated for self care and  "independence, Pleasant and cooperative, Supportive family, Willingly participates in therapeutic activities  Barriers: Decreased endurance, Fatigue, Generalized weakness, Home accessibility, Impaired activity tolerance, Impaired balance, Limited mobility, Pain    Plan    Sit<>stand/ transfer training with FWW, slide board transfers for pain control, AAROM/ strength training, bed mobility training with leg  and bed controls as needed, progress gait with // bars, Modalities for pain management.     DME  PT DME Recommendations  Wheelchair: 20\" Width, Lightweight, Removable/Flip Back Armrests, Standard Leg Rests  Cushion: Standard  Assistive Device:  (pt has FWW)  Additional Equipment: Transfer Board (30\" Length) (potentially -- TBD)    Passport items to be completed:  Get in/out of bed safely, in/out of a vehicle, safely use mobility device, walk or wheel around home/community, navigate up and down stairs, show how to get up/down from the ground, ensure home is accessible, demonstrate HEP, complete caregiver training    Physical Therapy Problems (Active)       Problem: Mobility       Dates: Start:  12/12/24         Goal: STG-Within one week, patient will propel wheelchair household distances SBA with BUE support x 25 ft x 2       Dates: Start:  12/12/24            Goal: STG-Within one week, patient will ambulate household distance min A with // bars and wc follow 10 ft x 2       Dates: Start:  12/12/24               Problem: Mobility Transfers       Dates: Start:  12/12/24         Goal: STG-Within one week, patient will perform bed mobility mod A of 1 with leg / bed controls as needed       Dates: Start:  12/12/24            Goal: STG-Within one week, patient will transfer bed to chair min / mod A of 1 for stand-step with FWW vs slide board       Dates: Start:  12/12/24               Problem: PT-Long Term Goals       Dates: Start:  12/12/24         Goal: LTG-By discharge, patient will propel wheelchair " modified independent 50 ft x 2       Dates: Start:  12/12/24            Goal: LTG-By discharge, patient will ambulate SBA/ CGA with FWW 50 ft x 2       Dates: Start:  12/12/24            Goal: LTG-By discharge, patient will transfer one surface to another SBA/ SPV with FWW       Dates: Start:  12/12/24            Goal: LTG-By discharge, patient will ambulate up/down 4-6 stairs SBA/ CGA with hand rails vs FWW x 3 steps into home       Dates: Start:  12/12/24            Goal: LTG-By discharge, patient will transfer in/out of a car SBA/ CGA with FWW       Dates: Start:  12/12/24

## 2024-12-16 NOTE — PROGRESS NOTES
NURSING DAILY NOTE    Name: Franchesca Paige   Date of Admission: 12/11/2024   Admitting Diagnosis: Other fracture of right femur, initial encounter for closed fracture (HCC)  Attending Physician: ARMANDO GLASS M.D.  Allergies: Ibuprofen and Sulfa drugs    Safety  Patient Assist  max2  Patient Precautions  Fall Risk, Weight Bearing As Tolerated Right Lower Extremity  Precaution Comments     Bed Transfer Status  Minimal Assist  Toilet Transfer Status   Moderate Assist  Assistive Devices  Rails  Oxygen  None - Room Air  Diet/Therapeutic Dining  Current Diet Order   Procedures    Diet Order Diet: Regular     Pill Administration  whole  Agitated Behavioral Scale     ABS Level of Severity       Fall Risk  Has the patient had a fall this admission?   No  Candice Porter Fall Risk Scoring  28, HIGH RISK  Fall Risk Safety Measures  bed alarm and chair alarm    Vitals  Temperature: 36.8 °C (98.2 °F)  Temp src: Temporal  Pulse: (!) 58  Respiration: 18  Blood Pressure : 116/48  Blood Pressure MAP (Calculated): 71 MM HG  BP Location: Right, Upper Arm  Patient BP Position: [place]'s Position     Oxygen  Pulse Oximetry: 95 %  O2 (LPM): 0  O2 Delivery Device: None - Room Air    Bowel and Bladder  Last Bowel Movement  12/14/24  Stool Type  Type 6: Fluffy pieces with ragged edges, a mushy stool  Bowel Device     Continent  Bladder:     Bowel: Continent movement  Bladder Function  Urine Void (mL): 100 ml (urinal, bed side commode)  Number of Times Voided: 1  Urine Color: Yellow  Number of Times Incontinent of Urine: 0  Straight Catheter: 400 ml  Genitourinary Assessment   Bladder Assessment (WDL):  WDL Except  Urinary Symptoms: Dysuria  Urine Color: Yellow  Number of Bladder Accidents: 0  Total Number of Bladder of Accidents in Last 7 Days: 1  Number of Times Incontinent of Urine: 0  Bladder Device: Other (Comment) (Straight cath)  Time Void: Yes  Bladder Scan: Unable  "To Void  $ Bladder Scan Results (mL): 409  Intermittent Catheter: Yes -Go To LDA \"Indwelling Catheter Group\"    Skin  Felix Score   15  Sensory Interventions   Skin Preventative Measures: Pillows in Use for Support / Positioning  Moisture Interventions  Moisturizers/Barriers: Moisturizer       Pain  Pain Rating Scale  6 - Hard to ignore, avoid usual activities  Pain Location  Leg  Pain Location Orientation  Right  Pain Interventions   Medication (see MAR)    ADLs    Bathing   Patient Refused Bathing (Nurse notified. Patient said shes tired and doesn't feel like taking a shower.)  Linen Change      Personal Hygiene  Moist Joycelyn Wipes  Chlorhexidine Bath      Oral Care     Teeth/Dentures     Shave     Nutrition Percentage Eaten  Lunch, Between % Consumed  Environmental Precautions  Treaded Slipper Socks on Patient, Personal Belongings, Wastebasket, Call Bell etc. in Easy Reach, Bed in Low Position, Communication Sign for Patients & Families  Patient Turns/Positioning  Sitting up in wheelchair  Patient Turns Assistance/Tolerance  Assistance of One  Bed Positions  Bed Controls On, Bed Locked  Head of Bed Elevated  Self regulated      Psychosocial/Neurologic Assessment  Psychosocial Assessment  Psychosocial (WDL):  WDL Except  Patient Behaviors: Forgetful  Neurologic Assessment  Neuro (WDL): Exceptions to WDL  Level of Consciousness: Alert  Orientation Level: Oriented X4  Cognition: Appropriate safety awareness, Appropriate judgement  Speech: Clear  EENT (WDL):  WDL Except    Cardio/Pulmonary Assessment  Edema   RUE Edema: 1+  RLE Edema: 2+  LUE Edema: Trace  LLE Edema: 1+  Respiratory Breath Sounds  RUL Breath Sounds: Clear  RML Breath Sounds: Clear  RLL Breath Sounds: Diminished  DINO Breath Sounds: Clear  LLL Breath Sounds: Diminished  Cardiac Assessment   Cardiac (WDL):  WDL Except       "

## 2024-12-16 NOTE — PROGRESS NOTES
NURSING DAILY NOTE    Name: Franchesca Paige   Date of Admission: 12/11/2024   Admitting Diagnosis: Other fracture of right femur, initial encounter for closed fracture (HCC)  Attending Physician: ARMANDO GLASS M.D.  Allergies: Ibuprofen and Sulfa drugs    Safety  Patient Assist  max2  Patient Precautions  Fall Risk, Weight Bearing As Tolerated Right Lower Extremity  Precaution Comments     Bed Transfer Status  Minimal Assist  Toilet Transfer Status   Moderate Assist  Assistive Devices  Rails  Oxygen  None - Room Air  Diet/Therapeutic Dining  Current Diet Order   Procedures    Diet Order Diet: Regular     Pill Administration  whole  Agitated Behavioral Scale     ABS Level of Severity       Fall Risk  Has the patient had a fall this admission?   No  Candice Porter Fall Risk Scoring  28, HIGH RISK  Fall Risk Safety Measures  bed alarm, chair alarm, and poor balance    Vitals  Temperature: 36.8 °C (98.2 °F)  Temp src: Oral  Pulse: (!) 53  Respiration: 16  Blood Pressure : 128/61  Blood Pressure MAP (Calculated): 83 MM HG  BP Location: Right, Upper Arm  Patient BP Position: Supine     Oxygen  Pulse Oximetry: 92 %  O2 (LPM): 0  O2 Delivery Device: None - Room Air    Bowel and Bladder  Last Bowel Movement  12/14/24  Stool Type  Type 6: Fluffy pieces with ragged edges, a mushy stool  Bowel Device     Continent  Bladder:     Bowel: Continent movement  Bladder Function  Urine Void (mL): 100 ml (urinal, bed side commode)  Number of Times Voided: 1  Urine Color: Rocio  Number of Times Incontinent of Urine: 0  Straight Catheter: 425 ml  Genitourinary Assessment   Bladder Assessment (WDL):  Within Defined Limits  Urinary Symptoms: Dysuria  Urine Color: Rocio  Number of Bladder Accidents: 0  Total Number of Bladder of Accidents in Last 7 Days: 1  Number of Times Incontinent of Urine: 0  Bladder Device: Bathroom  Time Void: Yes  Bladder Scan: Unable To Void  $  "Bladder Scan Results (mL): 559  Intermittent Catheter: Yes -Go To LDA \"Indwelling Catheter Group\"    Skin  Felix Score   15  Sensory Interventions   Skin Preventative Measures: Pillows in Use for Support / Positioning  Moisture Interventions  Moisturizers/Barriers: Moisturizer       Pain  Pain Rating Scale  3 - Sometimes distracts me  Pain Location  Hip, Knee  Pain Location Orientation  Right  Pain Interventions   Cold Pack    ADLs    Bathing   Patient Refused Bathing (Nurse notified. Patient said shes tired and doesn't feel like taking a shower.)  Linen Change      Personal Hygiene  Moist Joycelyn Wipes  Chlorhexidine Bath      Oral Care     Teeth/Dentures     Shave     Nutrition Percentage Eaten  Lunch, Between % Consumed  Environmental Precautions  Treaded Slipper Socks on Patient, Communication Sign for Patients & Families, Mobility Assessed & Appropriate Sign Placed  Patient Turns/Positioning  Sitting up in wheelchair  Patient Turns Assistance/Tolerance  Assistance of One  Bed Positions  Bed Controls On, Bed Locked  Head of Bed Elevated  Self regulated      Psychosocial/Neurologic Assessment  Psychosocial Assessment  Psychosocial (WDL):  Within Defined Limits  Patient Behaviors: Forgetful  Neurologic Assessment  Neuro (WDL): Within Defined Limits  Level of Consciousness: Alert  Orientation Level: Oriented X4  Cognition: Appropriate judgement  Speech: Clear  EENT (WDL):  WDL Except    Cardio/Pulmonary Assessment  Edema   RUE Edema: 1+  RLE Edema: 2+  LUE Edema: Trace  LLE Edema: 1+  Respiratory Breath Sounds  RUL Breath Sounds: Clear  RML Breath Sounds: Clear  RLL Breath Sounds: Diminished  DINO Breath Sounds: Clear  LLL Breath Sounds: Diminished  Cardiac Assessment   Cardiac (WDL):  Within Defined Limits        "

## 2024-12-17 ENCOUNTER — APPOINTMENT (OUTPATIENT)
Dept: PHYSICAL THERAPY | Facility: REHABILITATION | Age: 79
DRG: 559 | End: 2024-12-17
Attending: PHYSICAL MEDICINE & REHABILITATION
Payer: MEDICARE

## 2024-12-17 ENCOUNTER — APPOINTMENT (OUTPATIENT)
Dept: OCCUPATIONAL THERAPY | Facility: REHABILITATION | Age: 79
DRG: 559 | End: 2024-12-17
Attending: PHYSICAL MEDICINE & REHABILITATION
Payer: MEDICARE

## 2024-12-17 ENCOUNTER — ANCILLARY PROCEDURE (OUTPATIENT)
Dept: CARDIOLOGY | Facility: REHABILITATION | Age: 79
DRG: 559 | End: 2024-12-17
Attending: HOSPITALIST
Payer: MEDICARE

## 2024-12-17 ENCOUNTER — APPOINTMENT (OUTPATIENT)
Dept: RADIOLOGY | Facility: REHABILITATION | Age: 79
DRG: 559 | End: 2024-12-17
Attending: HOSPITALIST
Payer: MEDICARE

## 2024-12-17 LAB
BASOPHILS # BLD AUTO: 0.1 % (ref 0–1.8)
BASOPHILS # BLD: 0.01 K/UL (ref 0–0.12)
EOSINOPHIL # BLD AUTO: 0.13 K/UL (ref 0–0.51)
EOSINOPHIL NFR BLD: 1.6 % (ref 0–6.9)
ERYTHROCYTE [DISTWIDTH] IN BLOOD BY AUTOMATED COUNT: 44.6 FL (ref 35.9–50)
HCT VFR BLD AUTO: 28.4 % (ref 37–47)
HGB BLD-MCNC: 9.5 G/DL (ref 12–16)
IMM GRANULOCYTES # BLD AUTO: 0.07 K/UL (ref 0–0.11)
IMM GRANULOCYTES NFR BLD AUTO: 0.9 % (ref 0–0.9)
LYMPHOCYTES # BLD AUTO: 1.59 K/UL (ref 1–4.8)
LYMPHOCYTES NFR BLD: 19.5 % (ref 22–41)
MCH RBC QN AUTO: 30.5 PG (ref 27–33)
MCHC RBC AUTO-ENTMCNC: 33.5 G/DL (ref 32.2–35.5)
MCV RBC AUTO: 91.3 FL (ref 81.4–97.8)
MONOCYTES # BLD AUTO: 0.72 K/UL (ref 0–0.85)
MONOCYTES NFR BLD AUTO: 8.8 % (ref 0–13.4)
NEUTROPHILS # BLD AUTO: 5.63 K/UL (ref 1.82–7.42)
NEUTROPHILS NFR BLD: 69.1 % (ref 44–72)
NRBC # BLD AUTO: 0 K/UL
NRBC BLD-RTO: 0 /100 WBC (ref 0–0.2)
PLATELET # BLD AUTO: 410 K/UL (ref 164–446)
PMV BLD AUTO: 8.8 FL (ref 9–12.9)
PROCALCITONIN SERPL-MCNC: 0.06 NG/ML
RBC # BLD AUTO: 3.11 M/UL (ref 4.2–5.4)
WBC # BLD AUTO: 8.2 K/UL (ref 4.8–10.8)

## 2024-12-17 PROCEDURE — 700102 HCHG RX REV CODE 250 W/ 637 OVERRIDE(OP): Performed by: PHYSICAL MEDICINE & REHABILITATION

## 2024-12-17 PROCEDURE — 700111 HCHG RX REV CODE 636 W/ 250 OVERRIDE (IP): Mod: JZ | Performed by: HOSPITALIST

## 2024-12-17 PROCEDURE — 97116 GAIT TRAINING THERAPY: CPT

## 2024-12-17 PROCEDURE — 99233 SBSQ HOSP IP/OBS HIGH 50: CPT | Performed by: PHYSICAL MEDICINE & REHABILITATION

## 2024-12-17 PROCEDURE — 770010 HCHG ROOM/CARE - REHAB SEMI PRIVAT*

## 2024-12-17 PROCEDURE — 97530 THERAPEUTIC ACTIVITIES: CPT

## 2024-12-17 PROCEDURE — 97535 SELF CARE MNGMENT TRAINING: CPT

## 2024-12-17 PROCEDURE — 700102 HCHG RX REV CODE 250 W/ 637 OVERRIDE(OP): Performed by: HOSPITALIST

## 2024-12-17 PROCEDURE — 85025 COMPLETE CBC W/AUTO DIFF WBC: CPT

## 2024-12-17 PROCEDURE — A9270 NON-COVERED ITEM OR SERVICE: HCPCS | Performed by: HOSPITALIST

## 2024-12-17 PROCEDURE — 97110 THERAPEUTIC EXERCISES: CPT

## 2024-12-17 PROCEDURE — 97112 NEUROMUSCULAR REEDUCATION: CPT

## 2024-12-17 PROCEDURE — 700105 HCHG RX REV CODE 258: Performed by: HOSPITALIST

## 2024-12-17 PROCEDURE — 36415 COLL VENOUS BLD VENIPUNCTURE: CPT

## 2024-12-17 PROCEDURE — A9270 NON-COVERED ITEM OR SERVICE: HCPCS | Performed by: PHYSICAL MEDICINE & REHABILITATION

## 2024-12-17 PROCEDURE — 71045 X-RAY EXAM CHEST 1 VIEW: CPT

## 2024-12-17 PROCEDURE — 84145 PROCALCITONIN (PCT): CPT

## 2024-12-17 PROCEDURE — 99232 SBSQ HOSP IP/OBS MODERATE 35: CPT | Performed by: HOSPITALIST

## 2024-12-17 PROCEDURE — 93971 EXTREMITY STUDY: CPT | Mod: RT

## 2024-12-17 PROCEDURE — 700111 HCHG RX REV CODE 636 W/ 250 OVERRIDE (IP): Mod: JZ | Performed by: PHYSICAL MEDICINE & REHABILITATION

## 2024-12-17 RX ADMIN — LINEZOLID 600 MG: 600 TABLET, FILM COATED ORAL at 08:08

## 2024-12-17 RX ADMIN — OXYCODONE HYDROCHLORIDE 5 MG: 5 TABLET ORAL at 05:52

## 2024-12-17 RX ADMIN — OXYCODONE HYDROCHLORIDE 5 MG: 5 TABLET ORAL at 20:13

## 2024-12-17 RX ADMIN — OMEPRAZOLE 20 MG: 20 CAPSULE, DELAYED RELEASE ORAL at 08:08

## 2024-12-17 RX ADMIN — PIPERACILLIN AND TAZOBACTAM 3.38 G: 3; .375 INJECTION, POWDER, FOR SOLUTION INTRAVENOUS at 05:59

## 2024-12-17 RX ADMIN — AMLODIPINE BESYLATE 5 MG: 5 TABLET ORAL at 05:53

## 2024-12-17 RX ADMIN — ENOXAPARIN SODIUM 40 MG: 100 INJECTION SUBCUTANEOUS at 17:46

## 2024-12-17 RX ADMIN — PIPERACILLIN AND TAZOBACTAM 3.38 G: 3; .375 INJECTION, POWDER, FOR SOLUTION INTRAVENOUS at 00:11

## 2024-12-17 RX ADMIN — OXYCODONE HYDROCHLORIDE 5 MG: 5 TABLET ORAL at 11:11

## 2024-12-17 RX ADMIN — METAXALONE 800 MG: 800 TABLET ORAL at 23:06

## 2024-12-17 RX ADMIN — LACTULOSE 30 ML: 10 SOLUTION ORAL at 05:52

## 2024-12-17 RX ADMIN — LISINOPRIL 5 MG: 5 TABLET ORAL at 05:53

## 2024-12-17 ASSESSMENT — PAIN DESCRIPTION - PAIN TYPE
TYPE: ACUTE PAIN

## 2024-12-17 ASSESSMENT — ACTIVITIES OF DAILY LIVING (ADL)
TOILET_TRANSFER_DESCRIPTION: ADAPTIVE EQUIPMENT;GRAB BAR
BED_CHAIR_WHEELCHAIR_TRANSFER_DESCRIPTION: INCREASED TIME;SET-UP OF EQUIPMENT;SUPERVISION FOR SAFETY;VERBAL CUEING
BED_CHAIR_WHEELCHAIR_TRANSFER_DESCRIPTION: ADAPTIVE EQUIPMENT;SET-UP OF EQUIPMENT
BED_CHAIR_WHEELCHAIR_TRANSFER_DESCRIPTION: ADAPTIVE EQUIPMENT;INCREASED TIME;SET-UP OF EQUIPMENT;VERBAL CUEING
TOILET_TRANSFER_DESCRIPTION: GRAB BAR;INCREASED TIME;SET-UP OF EQUIPMENT;SUPERVISION FOR SAFETY;VERBAL CUEING
TOILETING_LEVEL_OF_ASSIST_DESCRIPTION: ASSIST TO PULL PANTS UP;GRAB BAR;INCREASED TIME;SUPERVISION FOR SAFETY

## 2024-12-17 ASSESSMENT — GAIT ASSESSMENTS
DEVIATION: ANTALGIC;STEP TO;BRADYKINETIC
ASSISTIVE DEVICE: FRONT WHEEL WALKER
DISTANCE (FEET): 10
GAIT LEVEL OF ASSIST: MINIMAL ASSIST
GAIT LEVEL OF ASSIST: MINIMAL ASSIST
ASSISTIVE DEVICE: FRONT WHEEL WALKER
DISTANCE (FEET): 10
DEVIATION: ANTALGIC;BRADYKINETIC;DECREASED HEEL STRIKE;DECREASED TOE OFF

## 2024-12-17 NOTE — THERAPY
Physical Therapy   Daily Treatment     Patient Name: Franchesca Paige  Age:  79 y.o., Sex:  female  Medical Record #: [id number]  Today's Date: 12/17/2024     Precautions  Precautions: (P) Fall Risk, Weight Bearing As Tolerated Right Lower Extremity    Subjective    Pt reported that install has begun on grab bars in the bathroom, and the ramp (3 KEVIN).      Objective       12/17/24 1431   PT Charge Group   PT Neuromuscular Re-Education / Balance (Units) 2   PT Therapeutic Activities (Units) 2   PT Total Time Spent   PT Individual Total Time Spent (Mins) 60   Precautions   Precautions Fall Risk;Weight Bearing As Tolerated Right Lower Extremity   Pain 0 - 10 Group   Location Hip   Location Orientation Right   Gait Functional Level of Assist    Gait Level Of Assist Minimal Assist   Assistive Device Front Wheel Walker   Distance (Feet) 10  (with full turns to sit)   # of Times Distance was Traveled 2   Deviation Antalgic;Bradykinetic;Decreased Heel Strike;Decreased Toe Off   Transfer Functional Level of Assist   Bed, Chair, Wheelchair Transfer Minimal Assist   Bed Chair Wheelchair Transfer Description Adaptive equipment;Set-up of equipment  (SPT wc <> bed)   Toilet Transfers Minimal Assist   Toilet Transfer Description Adaptive equipment;Grab bar  (wc <> toilet)   Bed Mobility    Supine to Sit Standby Assist  (impulsive, leg )   Sit to Supine Minimal Assist  (leg )   Sit to Stand Minimal Assist   Scooting Standby Assist   Neuro-Muscular Treatments   Neuro-Muscular Treatments Weight Shift Left;Weight Shift Right;Verbal Cuing;Tactile Cuing;Sequencing   Comments Ice to RLE during use of tilt board for ankle rocking + home safety/ fall prevention handout review.   Interdisciplinary Plan of Care Collaboration   IDT Collaboration with  Nursing   Patient Position at End of Therapy In Bed;Call Light within Reach;Tray Table within Reach   Collaboration Comments Pt urinated during session, RN notified for potential  "bladder scan/ cath needs.         Assessment    Pt was receptive to home safety/ fall prevention education during ice application to RLE. Pt continues to require extra time, and sequencing cues for sit <> stand, toilet tranfers, and bed transfers d/t pain and FOF. Pt remains limited by 10 ft amb with FWW d/t antalgia, and dec activity tolerance. Pt demo'ed impulsiveness getting to EOB upon PT arrival, requiring extensive safety cues to ensure set up was complete.     Strengths: Able to follow instructions, Alert and oriented, Effective communication skills, Good insight into deficits/needs, Independent prior level of function, Motivated for self care and independence, Pleasant and cooperative, Supportive family, Willingly participates in therapeutic activities  Barriers: Decreased endurance, Fatigue, Generalized weakness, Home accessibility, Impaired activity tolerance, Impaired balance, Limited mobility, Pain    Plan    Sit<>stand/ transfer training with FWW, AAROM/ strength training, bed mobility training with leg  and bed controls as needed, progress gait with FWW, Modalities for pain management.     DME  PT DME Recommendations  Wheelchair: 20\" Width, Lightweight, Removable/Flip Back Armrests, Standard Leg Rests  Cushion: Standard  Assistive Device:  (pt has FWW)  Additional Equipment: Transfer Board (30\" Length) (potentially -- TBD)    Passport items to be completed:  Get in/out of bed safely, in/out of a vehicle, safely use mobility device, walk or wheel around home/community, navigate up and down stairs, , ensure home is accessible, demonstrate HEP, complete caregiver training    Physical Therapy Problems (Active)       Problem: PT-Long Term Goals       Dates: Start:  12/12/24         Goal: LTG-By discharge, patient will propel wheelchair modified independent 50 ft x 2       Dates: Start:  12/12/24            Goal: LTG-By discharge, patient will ambulate SBA/ CGA with FWW 50 ft x 2       Dates: Start:  " 12/12/24            Goal: LTG-By discharge, patient will transfer one surface to another SBA/ SPV with FWW       Dates: Start:  12/12/24            Goal: LTG-By discharge, patient will ambulate up/down 4-6 stairs SBA/ CGA with hand rails vs FWW x 3 steps into home       Dates: Start:  12/12/24            Goal: LTG-By discharge, patient will transfer in/out of a car SBA/ CGA with FWW       Dates: Start:  12/12/24

## 2024-12-17 NOTE — PROGRESS NOTES
"  Physical Medicine & Rehabilitation Progress Note    Encounter Date: 12/17/2024    Chief Complaint: Decreased mobility, weakness    Interval Events (Subjective):  Patient sitting up in room. She had a fever yesterday afternoon. Hospitalist was consulted and started on Zyvox.      _____________________________________  Interdisciplinary Team Conference   Most recent IDT on 12/17/2024    ISushant M.D./Ph.D., was present and led the interdisciplinary team conference on 12/17/2024.  I led the IDT conference and agree with the IDT conference documentation and plan of care as noted below.     Nursing:  Diet Current Diet Order   Procedures    Diet Order Diet: Regular       Eating ADL        % of Last Meal  Oral Nutrition: Dinner, Between 50-75% Consumed   Sleep    Bowel Last BM: 12/17/24   Bladder Straight cath   Barriers to Discharge Home:  Retaining    Physical Therapy:  Bed Mobility modA   Transfers Minimal Assist  Adaptive equipment, Increased time, Set-up of equipment, Verbal cueing (FWW)   Mobility Minimal Assist   Stairs    Barriers to Discharge Home:  Inconsistent walker use  Leg lifting   Swelling  RLE ROM limited  Needs a ramp/wc    Occupational Therapy:  Grooming Standby Assist, Seated   Bathing Minimal Assist   UB Dressing Supervision   LB Dressing Maximal Assist   Toileting Minimal Assist   Shower & Transfer CGA   Barriers to Discharge Home:  Limited by poor weight bearing tolerance    Respiratory Therapy:  O2 (LPM): 0  O2 Delivery Device: None - Room Air    Case Management:  Continues to work on disposition and DME needs.      Discharge Date/Disposition:  12/28/24  _____________________________________      Objective:  VITAL SIGNS: /58   Pulse (!) 58   Temp 36.6 °C (97.8 °F) (Oral)   Resp 18   Ht 1.702 m (5' 7\")   Wt 97.1 kg (214 lb)   SpO2 95%   BMI 33.52 kg/m²   Gen: NAD  Psych: Mood and affect appropriate  CV: RRR, 0 edema  Resp: CTAB, no upper airway sounds  Abd: " NTND  Neuro: AOx3, following commands  Unchanged from 12/16/24    Laboratory Values:  Recent Results (from the past 72 hours)   Basic Metabolic Panel    Collection Time: 12/16/24  5:37 AM   Result Value Ref Range    Sodium 136 135 - 145 mmol/L    Potassium 3.7 3.6 - 5.5 mmol/L    Chloride 102 96 - 112 mmol/L    Co2 25 20 - 33 mmol/L    Glucose 104 (H) 65 - 99 mg/dL    Bun 17 8 - 22 mg/dL    Creatinine 0.65 0.50 - 1.40 mg/dL    Calcium 10.5 8.5 - 10.5 mg/dL    Anion Gap 9.0 7.0 - 16.0   MAGNESIUM    Collection Time: 12/16/24  5:37 AM   Result Value Ref Range    Magnesium 2.0 1.5 - 2.5 mg/dL   CBC WITH DIFFERENTIAL    Collection Time: 12/16/24  5:37 AM   Result Value Ref Range    WBC 8.6 4.8 - 10.8 K/uL    RBC 2.88 (L) 4.20 - 5.40 M/uL    Hemoglobin 8.6 (L) 12.0 - 16.0 g/dL    Hematocrit 26.2 (L) 37.0 - 47.0 %    MCV 91.0 81.4 - 97.8 fL    MCH 29.9 27.0 - 33.0 pg    MCHC 32.8 32.2 - 35.5 g/dL    RDW 43.5 35.9 - 50.0 fL    Platelet Count 395 164 - 446 K/uL    MPV 8.8 (L) 9.0 - 12.9 fL    Neutrophils-Polys 69.00 44.00 - 72.00 %    Lymphocytes 19.20 (L) 22.00 - 41.00 %    Monocytes 9.50 0.00 - 13.40 %    Eosinophils 1.50 0.00 - 6.90 %    Basophils 0.10 0.00 - 1.80 %    Immature Granulocytes 0.70 0.00 - 0.90 %    Nucleated RBC 0.00 0.00 - 0.20 /100 WBC    Neutrophils (Absolute) 5.95 1.82 - 7.42 K/uL    Lymphs (Absolute) 1.66 1.00 - 4.80 K/uL    Monos (Absolute) 0.82 0.00 - 0.85 K/uL    Eos (Absolute) 0.13 0.00 - 0.51 K/uL    Baso (Absolute) 0.01 0.00 - 0.12 K/uL    Immature Granulocytes (abs) 0.06 0.00 - 0.11 K/uL    NRBC (Absolute) 0.00 K/uL   ESTIMATED GFR    Collection Time: 12/16/24  5:37 AM   Result Value Ref Range    GFR (CKD-EPI) 91 >60 mL/min/1.73 m 2   URINALYSIS    Collection Time: 12/16/24  2:47 PM    Specimen: Urine, Cath   Result Value Ref Range    Color Yellow     Character Clear     Specific Gravity 1.008 <1.035    Ph 6.0 5.0 - 8.0    Glucose Negative Negative mg/dL    Ketones Negative Negative mg/dL     Protein Negative Negative mg/dL    Bilirubin Negative Negative    Urobilinogen, Urine 0.2 <=1.0 EU/dL    Nitrite Negative Negative    Leukocyte Esterase Negative Negative    Occult Blood Negative Negative    Micro Urine Req see below    Blood Culture    Collection Time: 12/16/24  4:20 PM    Specimen: Peripheral; Blood   Result Value Ref Range    Significant Indicator NEG     Source BLD     Site Peripheral     Culture Result       No Growth  Note: Blood cultures are incubated for 5 days and  are monitored continuously.Positive blood cultures  are called to the RN and reported as soon as  they are identified.     Blood Culture    Collection Time: 12/16/24  4:35 PM    Specimen: Peripheral; Blood   Result Value Ref Range    Significant Indicator NEG     Source BLD     Site Peripheral     Culture Result       No Growth  Note: Blood cultures are incubated for 5 days and  are monitored continuously.Positive blood cultures  are called to the RN and reported as soon as  they are identified.     MRSA By PCR (Amp)    Collection Time: 12/16/24  4:38 PM    Specimen: Nares; Respirate   Result Value Ref Range    MRSA by PCR Negative Negative   POC CoV-2, FLU A/B, RSV by PCR    Collection Time: 12/16/24  4:57 PM   Result Value Ref Range    POC Influenza A RNA, PCR Negative Negative    POC Influenza B RNA, PCR Negative Negative    POC RSV, by PCR Negative Negative    POC SARS-CoV-2, PCR NotDetected NotDetected   CBC WITH DIFFERENTIAL    Collection Time: 12/17/24  5:48 AM   Result Value Ref Range    WBC 8.2 4.8 - 10.8 K/uL    RBC 3.11 (L) 4.20 - 5.40 M/uL    Hemoglobin 9.5 (L) 12.0 - 16.0 g/dL    Hematocrit 28.4 (L) 37.0 - 47.0 %    MCV 91.3 81.4 - 97.8 fL    MCH 30.5 27.0 - 33.0 pg    MCHC 33.5 32.2 - 35.5 g/dL    RDW 44.6 35.9 - 50.0 fL    Platelet Count 410 164 - 446 K/uL    MPV 8.8 (L) 9.0 - 12.9 fL    Neutrophils-Polys 69.10 44.00 - 72.00 %    Lymphocytes 19.50 (L) 22.00 - 41.00 %    Monocytes 8.80 0.00 - 13.40 %    Eosinophils  1.60 0.00 - 6.90 %    Basophils 0.10 0.00 - 1.80 %    Immature Granulocytes 0.90 0.00 - 0.90 %    Nucleated RBC 0.00 0.00 - 0.20 /100 WBC    Neutrophils (Absolute) 5.63 1.82 - 7.42 K/uL    Lymphs (Absolute) 1.59 1.00 - 4.80 K/uL    Monos (Absolute) 0.72 0.00 - 0.85 K/uL    Eos (Absolute) 0.13 0.00 - 0.51 K/uL    Baso (Absolute) 0.01 0.00 - 0.12 K/uL    Immature Granulocytes (abs) 0.07 0.00 - 0.11 K/uL    NRBC (Absolute) 0.00 K/uL   PROCALCITONIN    Collection Time: 12/17/24  5:48 AM   Result Value Ref Range    Procalcitonin 0.06 <0.25 ng/mL       Medications:  Scheduled Medications   Medication Dose Frequency    lisinopril  5 mg DAILY    senna-docusate  2 Tablet Q EVENING    omeprazole  20 mg DAILY    amLODIPine  5 mg DAILY    enoxaparin (LOVENOX) injection  40 mg DAILY AT 1800     PRN medications: lactulose, meclizine, metaxalone, hydrALAZINE, acetaminophen, senna-docusate **AND** polyethylene glycol/lytes, docusate sodium, magnesium hydroxide, carboxymethylcellulose, benzocaine-menthol, mag hydrox-al hydrox-simeth, ondansetron **OR** ondansetron, traZODone, sodium chloride, oxyCODONE immediate-release **OR** oxyCODONE immediate-release    Diet:  Current Diet Order   Procedures    Diet Order Diet: Regular       Medical Decision Making and Plan:  R distal femur fracture - Patient with mechanical fall onto right knee on 12/7 with distal femur fracture s/p ORIF on 12/7 with Dr. Garzon  -PT and OT for mobility and ADLs. Per guidelines, 15 hours per week between PT, OT and/or SLP.  -Follow-up Dr. Garzon. WBAT     HTN - Patient on Amlodipine 5 mg daily and Lisinopril 20 mg daily. Low SBP, reduce ACEi to 10 mg.  SBP into 90s over weekend, reduce Lisinopril to 5      Confusion - Will check UA, is retaining fluid with dysuria but may be traumatic catheterization. UTI vs pain medication    Pneumonia - Patient on Augmentin and Zithromax. Completed Zithromax, completed Augmentin 12/13     Leukocytosis - Check AM  CBC, on antibiotics. WBC 9.9 on admission, will monitor  -Fever on 12/17 up to 101 and sustained. Started on Zyvox, hospitalist consulted.      Anemia - Check AM CBC - 8.1, ongoing      Hyponatremia - Check AM CMP - 131, repeat 12/16     Azotemia - Check AM CMP - 26. Repeat 12/16    Hypercalcemia - mild elevation on corrected calcium. Will monitor     Obesity due to excess calories - BMI of 32.1 on admission, meets medical criteria. Dietitian to consult     Pain - Patient on PRN Oxycodone and Tylenol      Skin - Patient at risk for skin breakdown due to debility in areas including sacrum, achilles, elbows and head in addition to other sites. Nursing to assess skin daily.      GI Ppx - Patient on Prilosec for GERD prophylaxis. Patient on Senna-docusate for constipation prophylaxis.   -Discontinue Lactulose     DVT Ppx - Patient Lovenox on transfer. Limited mobility, continue Lovenox  ____________________________________    T. Brian Souza MD/PhD  [facility] - Physical Medicine & Rehabilitation   [facility] - Brain Injury Medicine   ____________________________________    Total time:  52 minutes. Time spent included pre-rounding review of vitals and tests, unit/floor time, face-to-face time with the patient including physical examination, care coordination, counseling of patient and/or family, ordering medications/procedures/tests, discussion with CM, PT, OT, SLP and/or other healthcare providers, and documentation in the electronic medical record. Topics discussed included new fever, hospitalist consulted yesterday, started on zyvox and monitor electrolytes. Patient was discussed separately in IDT today; please see details above.

## 2024-12-17 NOTE — THERAPY
Physical Therapy   Daily Treatment     Patient Name: Franchesca Paige  Age:  79 y.o., Sex:  female  Medical Record #: [id number]  Today's Date: 12/17/2024     Precautions  Precautions: Fall Risk, Weight Bearing As Tolerated Right Lower Extremity    Subjective    Pt up in wc, willing to participate     Objective       12/17/24 1001   PT Charge Group   PT Gait Training (Units) 1   PT Therapeutic Exercise (Units) 2   PT Therapeutic Activities (Units) 1   PT Total Time Spent   PT Individual Total Time Spent (Mins) 60   Gait Functional Level of Assist    Gait Level Of Assist Minimal Assist   Assistive Device Front Wheel Walker   Distance (Feet) 10   # of Times Distance was Traveled 3   Deviation Antalgic;Step To;Bradykinetic  (manual cues for RLE hip/knee ext stabilization)   Transfer Functional Level of Assist   Bed, Chair, Wheelchair Transfer Minimal Assist   Bed Chair Wheelchair Transfer Description Adaptive equipment;Increased time;Set-up of equipment;Verbal cueing  (FWW)   Supine Lower Body Exercise   Bridges One Legged;2 sets of 10  (RLE over small bolster)   Short Arc Quad 2 sets of 10   Sitting Lower Body Exercises   Hip Flexion 2 sets of 10   Long Arc Quad 2 sets of 10   Hamstring Curl 2 sets of 10  (BLE's on small bolster for AAROM)   Comments AAROM for RLE and leg    Bed Mobility    Supine to Sit Minimal Assist   Sit to Supine Minimal Assist   Sit to Stand Contact Guard Assist     Pt completed car transfer to [facility] with FWW and min A for stand-step transfer/ mod A to lift LE's into vehicle.     Assessment    Pt improving slowly but progressed gait with FWW today. Pt tolerated all transfers with FWW for sit<>stand-step, removed slide board from room and will continue to encourage standing transfers.    Strengths: Able to follow instructions, Alert and oriented, Effective communication skills, Good insight into deficits/needs, Independent prior level of function, Motivated for self care and  "independence, Pleasant and cooperative, Supportive family, Willingly participates in therapeutic activities  Barriers: Decreased endurance, Fatigue, Generalized weakness, Home accessibility, Impaired activity tolerance, Impaired balance, Limited mobility, Pain    Plan    Sit<>stand/ transfer training with FWW, AAROM/ strength training, bed mobility training with leg  and bed controls as needed, progress gait with FWW, Modalities for pain management.     DME  PT DME Recommendations  Wheelchair: 20\" Width, Lightweight, Removable/Flip Back Armrests, Standard Leg Rests  Cushion: Standard  Assistive Device:  (pt has FWW)  Additional Equipment: Transfer Board (30\" Length) (potentially -- TBD)    Passport items to be completed:  Get in/out of bed safely, in/out of a vehicle, safely use mobility device, walk or wheel around home/community, navigate up and down stairs, show how to get up/down from the ground, ensure home is accessible, demonstrate HEP, complete caregiver training    Physical Therapy Problems (Active)       Problem: Mobility       Dates: Start:  12/12/24         Goal: STG-Within one week, patient will propel wheelchair household distances SBA with BUE support x 25 ft x 2       Dates: Start:  12/12/24            Goal: STG-Within one week, patient will ambulate household distance min A with // bars and wc follow 10 ft x 2       Dates: Start:  12/12/24               Problem: Mobility Transfers       Dates: Start:  12/12/24         Goal: STG-Within one week, patient will perform bed mobility mod A of 1 with leg / bed controls as needed       Dates: Start:  12/12/24            Goal: STG-Within one week, patient will transfer bed to chair min / mod A of 1 for stand-step with FWW vs slide board       Dates: Start:  12/12/24               Problem: PT-Long Term Goals       Dates: Start:  12/12/24         Goal: LTG-By discharge, patient will propel wheelchair modified independent 50 ft x 2       Dates: " Start:  12/12/24            Goal: LTG-By discharge, patient will ambulate SBA/ CGA with FWW 50 ft x 2       Dates: Start:  12/12/24            Goal: LTG-By discharge, patient will transfer one surface to another SBA/ SPV with FWW       Dates: Start:  12/12/24            Goal: LTG-By discharge, patient will ambulate up/down 4-6 stairs SBA/ CGA with hand rails vs FWW x 3 steps into home       Dates: Start:  12/12/24            Goal: LTG-By discharge, patient will transfer in/out of a car SBA/ CGA with FWW       Dates: Start:  12/12/24

## 2024-12-17 NOTE — CARE PLAN
Problem: Dressing  Goal: STG-Within one week, patient will dress LB with mod A using AE as needed.  Outcome: Not Met  Note: Requires max A     Problem: Bathing  Goal: STG-Within one week, patient will bathe with mod A using AE as needed.  Outcome: Met     Problem: Toileting  Goal: STG-Within one week, patient will complete toileting tasks with mod A.  Outcome: Met     Problem: Functional Transfers  Goal: STG-Within one week, patient will transfer to tub/shower with mod A.  Outcome: Met

## 2024-12-17 NOTE — ASSESSMENT & PLAN NOTE
COVID/FLU/RSV negative  MRSA negative  PCT 0.06  UA negative  CXR negative acute  BCx x 2 NGTD  Has normal WBC  Discontinued Zosyn and Zyvox  Has no s/s of acute infectious process

## 2024-12-17 NOTE — PROGRESS NOTES
Hospital Medicine Daily Progress Note        Chief Complaint  Fever  Atrial Fibrillation  Hypertension    Interval Problem Update  No acute events overnight.    Code Status  Full Code    Disposition  Per Physiatry    Review of Systems  Review of Systems   Constitutional:  Negative for chills and fever.   HENT: Negative.     Eyes: Negative.    Respiratory:  Negative for cough and shortness of breath.    Cardiovascular:  Negative for chest pain and palpitations.   Gastrointestinal:  Negative for abdominal pain, nausea and vomiting.   Musculoskeletal:  Positive for joint pain.   Skin:  Negative for itching and rash.   Endo/Heme/Allergies:  Negative for polydipsia. Does not bruise/bleed easily.        Physical Exam  Temp:  [36 °C (96.8 °F)-37 °C (98.6 °F)] 36 °C (96.8 °F)  Pulse:  [56-62] 60  Resp:  [18] 18  BP: (127-147)/(50-66) 140/62  SpO2:  [94 %-95 %] 95 %    Physical Exam  Vitals reviewed.   Constitutional:       General: She is not in acute distress.     Appearance: Normal appearance. She is not ill-appearing.   HENT:      Head: Normocephalic and atraumatic.      Right Ear: External ear normal.      Left Ear: External ear normal.      Nose: Nose normal.      Mouth/Throat:      Pharynx: Oropharynx is clear.   Eyes:      General:         Right eye: No discharge.         Left eye: No discharge.      Extraocular Movements: Extraocular movements intact.      Conjunctiva/sclera: Conjunctivae normal.   Cardiovascular:      Rate and Rhythm: Normal rate and regular rhythm.   Pulmonary:      Effort: Pulmonary effort is normal. No respiratory distress.      Breath sounds: Normal breath sounds. No wheezing.   Abdominal:      General: Bowel sounds are normal. There is no distension.      Palpations: Abdomen is soft.      Tenderness: There is no abdominal tenderness.   Musculoskeletal:      Cervical back: Normal range of motion and neck supple.      Right lower leg: Edema present.      Left lower leg: No edema.      Comments:  R hip incision C/D/I   Skin:     General: Skin is warm and dry.   Neurological:      Mental Status: She is alert and oriented to person, place, and time.         Fluids         Laboratory                Assessment/Plan  Fever  Assessment & Plan  Viral Panel pending  MRSA negative  PCT 0.06  UA negative  CXR pending  BCx x 2 NGTD  Has normal WBC  Discontinue Zosyn and Zyvox    Closed displaced fracture of distal epiphysis of right femur with routine healing- (present on admission)  Assessment & Plan  2/2 GLF  S/P R femur ORIF on 12/7/24 by Dr. Garzon,  mL  Pain control and wound care per Physiatry  Request Venous Duplex    Anemia- (present on admission)  Assessment & Plan   mL  Has normocytic indices  Follow H/H    Essential hypertension- (present on admission)  Assessment & Plan  Monitor blood pressure trends on Norvasc and Lisinopril       Full Code

## 2024-12-17 NOTE — THERAPY
Occupational Therapy  Daily Treatment     Patient Name: Franchesca Paige  Age:  79 y.o., Sex:  female  Medical Record #: [id number]  Today's Date: 12/17/2024     Precautions  Precautions: (P) Fall Risk, Weight Bearing As Tolerated Right Lower Extremity         Subjective    Pt supine in bed upon arrival. Pt pleasant and cooperative, agreeable to therapy.     Objective       12/17/24 0831   OT Charge Group   Charges Yes   OT Self Care / ADL (Units) 2   OT Therapy Activity (Units) 2   OT Total Time Spent   OT Individual Total Time Spent (Mins) 60   Precautions   Precautions Fall Risk;Weight Bearing As Tolerated Right Lower Extremity   Vitals   Patient BP Position Sitting   Blood Pressure  122/54   O2 Delivery Device None - Room Air   Vitals Comments pre activity   Pain 0 - 10 Group   Location Knee   Location Orientation Right   Description Aching   Comfort Goal Comfort with Movement   Therapist Pain Assessment Prior to Activity   Functional Level of Assist   Toileting Minimal Assist   Toileting Description Assist to pull pants up;Grab bar;Increased time;Supervision for safety   Bed, Chair, Wheelchair Transfer Contact Guard Assist   Bed Chair Wheelchair Transfer Description Increased time;Set-up of equipment;Supervision for safety;Verbal cueing  (stand pivot from EOB to w/c using FWW)   Toilet Transfers Contact Guard Assist   Toilet Transfer Description Grab bar;Increased time;Set-up of equipment;Supervision for safety;Verbal cueing  (stand pivot from w/c <> toilet using FWW)   Bed Mobility    Supine to Sit Contact Guard Assist   Interdisciplinary Plan of Care Collaboration   Patient Position at End of Therapy Seated;Other (Comments)  (completing hand hygiene seated in w/c in bathroom)     Functional mobility: pt self-propelled in w/c with SBA from room 114 to west gym to [employer] gym. Pt took rest break when self-propelling frmo west gym to [employer] gym d/t BUE fatigue.     Standing tolerance activity: pt took two steps from  "w/c forwards and backwards in between parallel bars with CGA to increase standing tolerance and weightbearing onto RLE.    Assessment    Pt had one posterior LOB onto bed when completing initial sit to stand up to FWW. Pt progressing well with standing tolerance and able to tolerate minimal WB on RLE in between parallel bars with encouragement. Pt required VC for functional mobility in w/c for navigating turns and to maintain upright posture while self-propelling.    Strengths: Able to follow instructions, Effective communication skills, Good insight into deficits/needs, Independent prior level of function, Making steady progress towards goals, Motivated for self care and independence, Pleasant and cooperative, Willingly participates in therapeutic activities  Barriers: Decreased endurance, Fatigue, Generalized weakness, Impaired activity tolerance, Impaired balance, Limited mobility, Pain, Pain poorly managed    Plan    ADLs/IADLs, standing tolerance, general strengthening/endurance    DME  OT DME Recommendations  Additional Equipment: Transfer Board (30\" Length) (potentially -- TBD)    Passport items to be completed:  Perform bathroom transfers, complete dressing, complete feeding, get ready for the day, prepare a simple meal, participate in household tasks, adapt home for safety needs, demonstrate home exercise program, complete caregiver training     Occupational Therapy Goals (Active)       Problem: Bathing       Dates: Start:  12/12/24         Goal: STG-Within one week, patient will bathe with mod A using AE as needed.       Dates: Start:  12/12/24               Problem: Dressing       Dates: Start:  12/12/24         Goal: STG-Within one week, patient will dress LB with mod A using AE as needed.       Dates: Start:  12/12/24               Problem: Functional Transfers       Dates: Start:  12/12/24         Goal: STG-Within one week, patient will transfer to tub/shower with mod A.       Dates: Start:  12/12/24   "             Problem: OT Long Term Goals       Dates: Start:  12/12/24         Goal: LTG-By discharge, patient will complete basic self care tasks with supervision.       Dates: Start:  12/12/24            Goal: LTG-By discharge, patient will perform bathroom transfers with supervision.       Dates: Start:  12/12/24               Problem: Toileting       Dates: Start:  12/12/24         Goal: STG-Within one week, patient will complete toileting tasks with mod A.       Dates: Start:  12/12/24

## 2024-12-17 NOTE — PROGRESS NOTES
NURSING DAILY NOTE    Name: Franchesca Paige   Date of Admission: 12/11/2024   Admitting Diagnosis: No Principal Problem: There is no principal problem currently on the Problem List. Please update the Problem List and refresh.  Attending Physician: ARMANDO GLASS M.D.  Allergies: Ibuprofen and Sulfa drugs    Safety  Patient Assist  max 1  Patient Precautions  Fall Risk, Weight Bearing As Tolerated Right Lower Extremity  Precaution Comments     Bed Transfer Status  Minimal Assist (FWW from elevated edge of bed)  Toilet Transfer Status   Moderate Assist  Assistive Devices  Gait Belt, Wheelchair, Slide board  Oxygen  None - Room Air  Diet/Therapeutic Dining  Current Diet Order   Procedures    Diet Order Diet: Regular     Pill Administration  whole  Agitated Behavioral Scale     ABS Level of Severity       Fall Risk  Has the patient had a fall this admission?   No  Candice Porter Fall Risk Scoring  28, HIGH RISK  Fall Risk Safety Measures  bed alarm and chair alarm    Vitals  Temperature: 37.9 °C (100.3 °F)  Temp src: Oral  Pulse: 62  Respiration: 18  Blood Pressure : 126/58  Blood Pressure MAP (Calculated): 81 MM HG  BP Location: Right, Upper Arm  Patient BP Position: Sitting     Oxygen  Pulse Oximetry: 95 %  O2 (LPM): 0  O2 Delivery Device: None - Room Air    Bowel and Bladder  Last Bowel Movement  12/14/24  Stool Type  Type 6: Fluffy pieces with ragged edges, a mushy stool  Bowel Device     Continent  Bladder:     Bowel: Continent movement  Bladder Function  Urine Void (mL): 100 ml (urinal, bed side commode)  Number of Times Voided: 1  Urine Color: Rocio  Number of Times Incontinent of Urine: 0  Straight Catheter: 350 ml  Genitourinary Assessment   Bladder Assessment (WDL):  Within Defined Limits  Urinary Symptoms: Dysuria  Urine Color: Rocio  Number of Bladder Accidents: 0  Total Number of Bladder of Accidents in Last 7 Days: 1  Number of Times  "Incontinent of Urine: 0  Bladder Device: Bathroom  Time Void: Yes  Bladder Scan: Unable To Void  $ Bladder Scan Results (mL): 389  Intermittent Catheter: Yes -Go To LDA \"Indwelling Catheter Group\"    Skin  Felix Score   15  Sensory Interventions   Skin Preventative Measures: Pillows in Use for Support / Positioning  Moisture Interventions  Moisturizers/Barriers: Moisturizer       Pain  Pain Rating Scale  8 - Awful, hard to do anything  Pain Location  Leg  Pain Location Orientation  Right  Pain Interventions   Medication (see MAR)    ADLs    Bathing   Patient Refused Bathing (Nurse notified. Patient said shes tired and doesn't feel like taking a shower.)  Linen Change      Personal Hygiene  Moist Joycelyn Wipes  Chlorhexidine Bath      Oral Care     Teeth/Dentures     Shave     Nutrition Percentage Eaten  Between 25-50% Consumed  Environmental Precautions  Treaded Slipper Socks on Patient, Communication Sign for Patients & Families, Mobility Assessed & Appropriate Sign Placed  Patient Turns/Positioning  Sitting up in wheelchair  Patient Turns Assistance/Tolerance  Assistance of One  Bed Positions  Bed Controls On, Bed Locked  Head of Bed Elevated  Self regulated      Psychosocial/Neurologic Assessment  Psychosocial Assessment  Psychosocial (WDL):  Within Defined Limits  Patient Behaviors: Forgetful  Neurologic Assessment  Neuro (WDL): Within Defined Limits  Level of Consciousness: Alert  Orientation Level: Oriented X4  Cognition: Appropriate judgement  Speech: Clear  EENT (WDL):  WDL Except    Cardio/Pulmonary Assessment  Edema   RUE Edema: 1+  RLE Edema: 2+  LUE Edema: Trace  LLE Edema: 1+  Respiratory Breath Sounds  RUL Breath Sounds: Clear  RML Breath Sounds: Clear  RLL Breath Sounds: Diminished  DINO Breath Sounds: Clear  LLL Breath Sounds: Diminished  Cardiac Assessment   Cardiac (WDL):  Within Defined Limits       "

## 2024-12-17 NOTE — DISCHARGE PLANNING
Case Management/IDT follow up.   Projected dc date: 12/28/24  IDT continues to recommend IRF level of care as patient continue to make progress with all therapies.     DC needs  Home health:  PT/OT/RN/CNA  DME: MICHELL  Family training:    Follow up:   PCP:  Other:     I met with patient and family providing update from IDT and discussed plan of care.  They are in agreement w/ plan.     Plan:  Continue to follow

## 2024-12-17 NOTE — ASSESSMENT & PLAN NOTE
2/2 GLF  S/P R femur ORIF on 12/7/24 by Dr. Garzon,  mL  Pain control and wound care per Physiatry  U/S RLE negative for DVT

## 2024-12-17 NOTE — CONSULTS
DATE OF SERVICE:  12/16/24    REQUESTING PHYSICIAN:  Brian Souza MD    CHIEF COMPLAINT / REASON FOR CONSULTATION:   Fever  Hypertension  Afib  Anemia    HISTORY OF PRESENT ILLNESS:  Patient is a 79 y.o. female with a PMH of bladder cancer, HTN, A fib, HLD, and osteopenia who presented on 12/7/24 with GLF. Patient reportedly had a mechanical fall onto right knee with severe 10/10 pain.  In ED she was found to have distal femur fracture. Orthopedic surgery was consulted and recommended ORIF. She underwent ORIF on 12/7/24 with Dr. Garzon.  Patient is WBAT.  Hospital course complicated by SOB concerning for pneumonia, leukocytosis, and HTN.  She has been started on antibiotics for the elevated WBC.       Patient tolerated transfer to [facility]. She reports anxiety. She reports she had a transfusion yesterday. Denies fever or chills. Denies SOB. Bruising to right eye, she denies cognitive changes.      Because of the patient's weakness and debility, Rehab was consulted, evaluated the patient, and was deemed a good Rehab candidate.  The patient was transferred over to the Rehab facility on 12/11/24.      The patient denies fever, chills, nausea, vomiting, headaches, blurry vision, or chest pain.    REVIEW OF SYSTEMS: All review of systems are negative pre AMA and CMS criteria except for that stated in the HPI.    PAST MEDICAL HISTORY:  Past Medical History:   Diagnosis Date    Anesthesia     PONV, difficulty waking up    Aortic atherosclerosis (HCC) 03/22/2024    Arthritis     Bladder cancer (HCC)     bladder cancer 2007 , march    Cancer (HCC) 2003    Bladder Cancer    Caregiver stress 02/11/2022    Chronic pain of right ankle 02/08/2018    COVID-19     Childhood and mumps and measles    Delayed emergence from general anesthesia     Dizziness 02/22/2018    Dyslipidemia, goal LDL below 130     Hydronephrosis 02/11/2019    Hypertension 1992    Ocular migraine     Osteoarthritis of left hip 11/30/2022    Added  automatically from request for surgery [id number]    Osteopenia     DEXA 9/24/12    Osteopenia of multiple sites     Osteoporosis of femur without pathological fracture 02/01/2023    PONV (postoperative nausea and vomiting)     Total knee replacement status        PAST SURGICAL HISTORY:  Past Surgical History:   Procedure Laterality Date    ORIF, FRACTURE, FEMUR Right 12/7/2024    Procedure: RIGHT DISTAL FEMUR OPEN REDUCTION INTERNAL FIXATION, INTRAMEDULLARY NAIL, RIGHT THIGH LIPOMA REMOVAL;  Surgeon: Hi Garzon M.D.;  Location: SURGERY [facility];  Service: Orthopedics    OK TOTAL HIP ARTHROPLASTY Left 3/14/2023    Procedure: LEFT TOTAL HIP ARTHROPLASTY, ANTERIOR APPROACH;  Surgeon: Arvind Diaz M.D.;  Location: SURGERY [facility];  Service: Orthopedics    OTHER Right 10/07/2018    vein ablation right thigh, Dr. Azevedo    KNEE REPLACEMENT, TOTAL  12/04/2012    Dr. Harry, left knee    COLONOSCOPY  08/03/2010    diverticulosis only, due in 2020    ORIF, WRIST Left 2008    HARDWARE REMOVAL ORTHO Left 2008    wrist    HYSTERECTOMY, TOTAL ABDOMINAL  1980s    has one half of an ovary and a fallopian tube       Allergies   Allergen Reactions    Ibuprofen Unspecified     Increased blood pressure & risk of stroke    Sulfa Drugs Vomiting and Nausea       CURRENT MEDICATIONS:    Current Facility-Administered Medications:     [START ON 12/17/2024] lisinopril    lactulose    meclizine    metaxalone    hydrALAZINE    acetaminophen    senna-docusate **AND** polyethylene glycol/lytes    docusate sodium    magnesium hydroxide    omeprazole    carboxymethylcellulose    benzocaine-menthol    mag hydrox-al hydrox-simeth    ondansetron **OR** ondansetron    traZODone    sodium chloride    oxyCODONE immediate-release **OR** oxyCODONE immediate-release    amLODIPine    enoxaparin (LOVENOX) injection    Social History     Socioeconomic History    Marital status:     Highest education level: Some college, no  degree   Tobacco Use    Smoking status: Former     Current packs/day: 0.00     Average packs/day: 1 pack/day for 36.2 years (36.2 ttl pk-yrs)     Types: Cigarettes     Start date: 1964     Quit date: 2000     Years since quittin.6    Smokeless tobacco: Never   Vaping Use    Vaping status: Never Used   Substance and Sexual Activity    Alcohol use: No     Alcohol/week: 0.0 oz    Drug use: No    Sexual activity: Yes     Social Drivers of Health     Financial Resource Strain: Low Risk  (2024)    Overall Financial Resource Strain (CARDIA)     Difficulty of Paying Living Expenses: Not hard at all   Food Insecurity: No Food Insecurity (2024)    Hunger Vital Sign     Worried About Running Out of Food in the Last Year: Never true     Ran Out of Food in the Last Year: Never true   Transportation Needs: No Transportation Needs (2024)    PRAPARE - Transportation     Lack of Transportation (Medical): No     Lack of Transportation (Non-Medical): No   Physical Activity: Inactive (2024)    Exercise Vital Sign     Days of Exercise per Week: 0 days     Minutes of Exercise per Session: 0 min   Stress: No Stress Concern Present (2024)    [employer] [place] [employer] - Occupational Stress Questionnaire     Feeling of Stress : Not at all   Social Connections: Moderately Isolated (2024)    Social Connection and Isolation Panel [NHANES]     Frequency of Communication with Friends and Family: More than three times a week     Frequency of Social Gatherings with Friends and Family: Once a week     Attends Synagogue Services: Never     Active Member of Clubs or Organizations: No     Attends Club or Organization Meetings: Never     Marital Status:    Intimate Partner Violence: Not At Risk (2024)    Humiliation, Afraid, Rape, and Kick questionnaire     Fear of Current or Ex-Partner: No     Emotionally Abused: No     Physically Abused: No     Sexually Abused: No   Housing  Stability: Low Risk  (12/11/2024)    Housing Stability Vital Sign     Unable to Pay for Housing in the Last Year: No     Number of Times Moved in the Last Year: 0     Homeless in the Last Year: No       FAMILY HISTORY:  was reviewed and is not pertinent to this consultation.    PHYSICAL EXAMINATION:  VITAL SIGNS:  Temp max is 101, blood pressure is 135/54, heart rate is 59, respiratory rate is 19.  GENERAL:  Patient was lying in bed in no distress.  HEENT:  Pupils were equal, round and reactive to light and accomodation.  Oral mucosa was pink and moist.  NECK:  Soft.  Supple.  No JVD.  HEART:  Regular rate and rhythm.  Normal S1 and S2.  No murmurs were appreciated.  LUNGS:  Are clear to auscultation bilaterally.  ABDOMEN:  Soft, non tender, non distended.  Bowels sound were positive in all four quadrants.  EXTREMITIES:  No clubbing, cyanosis.  There was no lower extremity edema.  RLE surgical wounds clinically healing well.  NEUROLOGIC:  Cranial nerves two through twelve were grossly intact.    LABS:  Lab Results   Component Value Date/Time    SODIUM 136 12/16/2024 05:37 AM    POTASSIUM 3.7 12/16/2024 05:37 AM    CHLORIDE 102 12/16/2024 05:37 AM    CO2 25 12/16/2024 05:37 AM    GLUCOSE 104 (H) 12/16/2024 05:37 AM    BUN 17 12/16/2024 05:37 AM    CREATININE 0.65 12/16/2024 05:37 AM    CREATININE 0.74 10/02/2012 08:32 AM    BUNCREATRAT 27 11/10/2023 05:46 AM    BUNCREATRAT 23 10/02/2012 08:32 AM    GLOMRATE >59 09/15/2009 08:49 AM      Lab Results   Component Value Date/Time    WBC 8.6 12/16/2024 05:37 AM    WBC 5.3 09/15/2009 08:49 AM    RBC 2.88 (L) 12/16/2024 05:37 AM    RBC 4.66 09/15/2009 08:49 AM    HEMOGLOBIN 8.6 (L) 12/16/2024 05:37 AM    HEMATOCRIT 26.2 (L) 12/16/2024 05:37 AM    MCV 91.0 12/16/2024 05:37 AM    MCV 94 09/15/2009 08:49 AM    MCH 29.9 12/16/2024 05:37 AM    MCH 31.1 09/15/2009 08:49 AM    MCHC 32.8 12/16/2024 05:37 AM    MPV 8.8 (L) 12/16/2024 05:37 AM    NEUTSPOLYS 69.00 12/16/2024 05:37 AM     LYMPHOCYTES 19.20 (L) 12/16/2024 05:37 AM    MONOCYTES 9.50 12/16/2024 05:37 AM    EOSINOPHILS 1.50 12/16/2024 05:37 AM    BASOPHILS 0.10 12/16/2024 05:37 AM      Lab Results   Component Value Date/Time    PROTHROMBTM 14.1 12/07/2024 03:45 PM    INR 1.05 12/07/2024 03:45 PM        Anemia  H&H stable with Hb 8.6  Recent drop 2nd to surgery -- was near normal before  Will get Fe & B12 at the next blood draw    Closed displaced fracture of distal epiphysis of right femur with routine healing  2nd to GLF  Sustained a right distal fracture   S/P surgical repair    Essential hypertension  BP ok  Cont Norvasc  Cont Lisinopril  Cont to monitor    Gastroesophageal reflux disease without esophagitis  Cont Prilosec    Paroxysmal atrial fibrillation (HCC)  Has a hx per document  HR ok  Not on any rate controlling meds  Monitor    Fever  Developed a fever today of 101  No leukocytosis (12/12-12/16)  RLE surgical wound clinically healing well  Denies diarrhea  Denies cough or sore throat  Denies discomfort on urination  Will check BC, U/A, CXR  Will check Covid/RSV  Will check PCT and nasal swab for MRSA  Will start empiric abx of Zozyn and Zyvox  Monitor closely      This case has been discussed with the attending Physiatrist.    Thank you for the consultation.  Will follow the patient with you.

## 2024-12-17 NOTE — CARE PLAN
The patient is Watcher - Medium risk of patient condition declining or worsening    Shift Goals  Clinical Goals: safety  Patient Goals: Safety  Family Goals: ILIANA    Progress made toward(s) clinical / shift goals:      Problem: Knowledge Deficit - Standard  Goal: Patient and family/care givers will demonstrate understanding of plan of care, disease process/condition, diagnostic tests and medications  Outcome: Progressing     Problem: Pain - Standard  Goal: Alleviation of pain or a reduction in pain to the patient’s comfort goal  Outcome: Progressing     Problem: Bladder / Voiding  Goal: Patient will establish and maintain regular urinary output  Outcome: Progressing  Patient is unable to urinate. Bladder scan and ICP per order. Patient tolerated well.        Patient is not progressing towards the following goals:

## 2024-12-17 NOTE — CARE PLAN
"The patient is Watcher - Medium risk of patient condition declining or worsening    Shift Goals  Clinical Goals: safety and restful sleep  Patient Goals: sleep well  Family Goals: ILIANA    Progress made toward(s) clinical / shift goals:    Problem: Knowledge Deficit - Standard  Goal: Patient and family/care givers will demonstrate understanding of plan of care, disease process/condition, diagnostic tests and medications  Outcome: Progressing  Reviewed POC, all questions answered at this time.      Problem: Skin Integrity  Goal: Skin integrity is maintained or improved  Outcome: Progressing  Patient's skin remains intact and free from new or accidental injury this shift.  Will continue to monitor.     Problem: Pain - Standard  Goal: Alleviation of pain or a reduction in pain to the patient’s comfort goal  Outcome: Progressing  Patient able to verbalize pain level and verbalize an acceptable level of pain.     Problem: Fall Risk - Rehab  Goal: Patient will remain free from falls  Outcome: Progressing  Candice Porter Fall risk Assessment Score: 26    High fall risk Interventions   - Alarming seatbelt  - Wander guard  - Bed and strip alarm   - Yellow sign by the door   - Yellow wrist band \"Fall risk\"  - Room near to the nurse station  - Do not leave patient unattended in the bathroom  - Fall risk education provided     Patient is not progressing towards the following goals:    Problem: Bladder / Voiding  Goal: Patient will establish and maintain regular urinary output  Outcome: Not Progressing  Goal: Patient will establish and maintain bladder regimen  Outcome: Not Progressing    Bladder scan if not voiding every 4-6 hrs. ICP as needed if scanned amount is more than 400ml.     "

## 2024-12-17 NOTE — PROGRESS NOTES
NURSING DAILY NOTE    Name: Franchesca Paige   Date of Admission: 12/11/2024   Admitting Diagnosis: No Principal Problem: There is no principal problem currently on the Problem List. Please update the Problem List and refresh.  Attending Physician: ARMANDO GLASS M.D.  Allergies: Ibuprofen and Sulfa drugs    Safety  Patient Assist  MAX 1  Patient Precautions  Fall Risk, Weight Bearing As Tolerated Right Lower Extremity  Precaution Comments     Bed Transfer Status  Minimal Assist (FWW from elevated edge of bed)  Toilet Transfer Status   Moderate Assist  Assistive Devices  Rails  Oxygen  None - Room Air  Diet/Therapeutic Dining  Current Diet Order   Procedures    Diet Order Diet: Regular     Pill Administration  whole  Agitated Behavioral Scale     ABS Level of Severity       Fall Risk  Has the patient had a fall this admission?   No  Candice Porter Fall Risk Scoring  26, HIGH RISK  Fall Risk Safety Measures  bed alarm, chair alarm, seatbelt alarm, poor balance, and low vision/ hearing    Vitals  Temperature: 36.2 °C (97.2 °F)  Temp src: Temporal  Pulse: (!) 57  Respiration: 18  Blood Pressure : 135/56  Blood Pressure MAP (Calculated): 82 MM HG  BP Location: Right, Upper Arm  Patient BP Position: Supine     Oxygen  Pulse Oximetry: 94 %  O2 (LPM): 0  O2 Delivery Device: None - Room Air    Bowel and Bladder  Last Bowel Movement  12/14/24  Stool Type  Type 6: Fluffy pieces with ragged edges, a mushy stool  Bowel Device  Diaper  Continent  Bladder:     Bowel: Continent movement  Bladder Function  Urine Void (mL): 100 ml (urinal, bed side commode)  Number of Times Voided: 1  Urine Color: Yellow  Number of Times Incontinent of Urine: 0  Straight Catheter: 300ml  Genitourinary Assessment   Bladder Assessment (WDL):  WDL Except  Rodriges Catheter: Not Applicable  Urinary Symptoms: Dysuria  Urine Color: Yellow  Number of Bladder Accidents: 0  Total Number of  "Bladder of Accidents in Last 7 Days: 1  Number of Times Incontinent of Urine: 0  Bladder Device: Bathroom, Urinal, Other Bladder Device (Comment) (Icp)  Time Void: No  Bladder Scan: Unable To Void  $ Bladder Scan Results (mL): 171  Intermittent Catheter: Yes -Go To LDA \"Indwelling Catheter Group\"    Skin  Felix Score   15  Sensory Interventions   Skin Preventative Measures: Pillows in Use for Support / Positioning, Pillows in Use to Float Heels, Waffle Overlay  Moisture Interventions  Moisturizers/Barriers: Barrier Wipes, Moisturizer       Pain  Pain Rating Scale  1 - Hardly Notice Pain  Pain Location  Hip, Leg  Pain Location Orientation  Right  Pain Interventions   Medication (see MAR)    ADLs    Bathing   Patient Refused Bathing (Nurse notified. Patient said shes tired and doesn't feel like taking a shower.)  Linen Change      Personal Hygiene  Moist Joycelyn Wipes  Chlorhexidine Bath      Oral Care     Teeth/Dentures     Shave     Nutrition Percentage Eaten  Dinner, Between 50-75% Consumed  Environmental Precautions  Bed in Low Position  Patient Turns/Positioning  Patient turns self independently side to side without assistance, to offload sacral area  Patient Turns Assistance/Tolerance  Assistance of One  Bed Positions  Bed Controls On, Bed Locked  Head of Bed Elevated  Self regulated      Psychosocial/Neurologic Assessment  Psychosocial Assessment  Psychosocial (WDL):  Within Defined Limits  Patient Behaviors: Forgetful  Neurologic Assessment  Neuro (WDL): Exceptions to WDL  Level of Consciousness: Alert  Orientation Level: Oriented X4  Cognition: Appropriate judgement, Appropriate safety awareness, Appropriate attention/concentration, Appropriate for developmental age, Follows commands  Speech: Clear  EENT (WDL):  WDL Except    Cardio/Pulmonary Assessment  Edema   RUE Edema: 1+  RLE Edema: 1+  LUE Edema: Trace  LLE Edema: 1+  Respiratory Breath Sounds  RUL Breath Sounds: Clear  RML Breath Sounds: Clear  RLL " Breath Sounds: Diminished  DINO Breath Sounds: Clear  LLL Breath Sounds: Diminished  Cardiac Assessment   Cardiac (WDL):  WDL Except

## 2024-12-18 ENCOUNTER — APPOINTMENT (OUTPATIENT)
Dept: PHYSICAL THERAPY | Facility: REHABILITATION | Age: 79
DRG: 559 | End: 2024-12-18
Attending: PHYSICAL MEDICINE & REHABILITATION
Payer: MEDICARE

## 2024-12-18 ENCOUNTER — APPOINTMENT (OUTPATIENT)
Dept: OCCUPATIONAL THERAPY | Facility: REHABILITATION | Age: 79
DRG: 559 | End: 2024-12-18
Attending: PHYSICAL MEDICINE & REHABILITATION
Payer: MEDICARE

## 2024-12-18 PROCEDURE — 99232 SBSQ HOSP IP/OBS MODERATE 35: CPT | Performed by: PHYSICAL MEDICINE & REHABILITATION

## 2024-12-18 PROCEDURE — 97530 THERAPEUTIC ACTIVITIES: CPT | Mod: CQ

## 2024-12-18 PROCEDURE — 97535 SELF CARE MNGMENT TRAINING: CPT

## 2024-12-18 PROCEDURE — 770010 HCHG ROOM/CARE - REHAB SEMI PRIVAT*

## 2024-12-18 PROCEDURE — A9270 NON-COVERED ITEM OR SERVICE: HCPCS | Performed by: PHYSICAL MEDICINE & REHABILITATION

## 2024-12-18 PROCEDURE — 700111 HCHG RX REV CODE 636 W/ 250 OVERRIDE (IP): Mod: JZ | Performed by: PHYSICAL MEDICINE & REHABILITATION

## 2024-12-18 PROCEDURE — 97116 GAIT TRAINING THERAPY: CPT | Mod: CQ

## 2024-12-18 PROCEDURE — 97110 THERAPEUTIC EXERCISES: CPT

## 2024-12-18 PROCEDURE — 700102 HCHG RX REV CODE 250 W/ 637 OVERRIDE(OP): Performed by: PHYSICAL MEDICINE & REHABILITATION

## 2024-12-18 PROCEDURE — 99232 SBSQ HOSP IP/OBS MODERATE 35: CPT | Performed by: HOSPITALIST

## 2024-12-18 PROCEDURE — 97110 THERAPEUTIC EXERCISES: CPT | Mod: CQ

## 2024-12-18 RX ADMIN — SENNOSIDES AND DOCUSATE SODIUM 2 TABLET: 50; 8.6 TABLET ORAL at 19:24

## 2024-12-18 RX ADMIN — ENOXAPARIN SODIUM 40 MG: 100 INJECTION SUBCUTANEOUS at 18:14

## 2024-12-18 RX ADMIN — OXYCODONE HYDROCHLORIDE 5 MG: 5 TABLET ORAL at 05:40

## 2024-12-18 RX ADMIN — OXYCODONE HYDROCHLORIDE 5 MG: 5 TABLET ORAL at 11:19

## 2024-12-18 RX ADMIN — OMEPRAZOLE 20 MG: 20 CAPSULE, DELAYED RELEASE ORAL at 08:23

## 2024-12-18 RX ADMIN — METAXALONE 800 MG: 800 TABLET ORAL at 09:25

## 2024-12-18 RX ADMIN — LISINOPRIL 5 MG: 5 TABLET ORAL at 05:38

## 2024-12-18 RX ADMIN — AMLODIPINE BESYLATE 5 MG: 5 TABLET ORAL at 05:38

## 2024-12-18 RX ADMIN — OXYCODONE HYDROCHLORIDE 5 MG: 5 TABLET ORAL at 19:24

## 2024-12-18 ASSESSMENT — ACTIVITIES OF DAILY LIVING (ADL)
TOILETING_LEVEL_OF_ASSIST_DESCRIPTION: GRAB BAR;INCREASED TIME;SUPERVISION FOR SAFETY
TUB_SHOWER_TRANSFER_DESCRIPTION: GRAB BAR;INCREASED TIME;SUPERVISION FOR SAFETY
BED_CHAIR_WHEELCHAIR_TRANSFER_DESCRIPTION: INCREASED TIME;SET-UP OF EQUIPMENT;SUPERVISION FOR SAFETY;VERBAL CUEING
TOILET_TRANSFER_DESCRIPTION: INCREASED TIME;SUPERVISION FOR SAFETY
BED_CHAIR_WHEELCHAIR_TRANSFER_DESCRIPTION: ADAPTIVE EQUIPMENT;INCREASED TIME;SET-UP OF EQUIPMENT;SUPERVISION FOR SAFETY
BED_CHAIR_WHEELCHAIR_TRANSFER_DESCRIPTION: ADAPTIVE EQUIPMENT;INCREASED TIME;SET-UP OF EQUIPMENT;SUPERVISION FOR SAFETY;VERBAL CUEING

## 2024-12-18 ASSESSMENT — GAIT ASSESSMENTS
GAIT LEVEL OF ASSIST: CONTACT GUARD ASSIST
ASSISTIVE DEVICE: FRONT WHEEL WALKER;PARALLEL BARS
DEVIATION: ANTALGIC;BRADYKINETIC;DECREASED HEEL STRIKE

## 2024-12-18 ASSESSMENT — PAIN DESCRIPTION - PAIN TYPE
TYPE: ACUTE PAIN

## 2024-12-18 NOTE — PROGRESS NOTES
NURSING DAILY NOTE    Name: Franchesca Paige   Date of Admission: 12/11/2024   Admitting Diagnosis: No Principal Problem: There is no principal problem currently on the Problem List. Please update the Problem List and refresh.  Attending Physician: ARMANDO GLASS M.D.  Allergies: Ibuprofen and Sulfa drugs    Safety  Patient Assist  Max1  Patient Precautions  Fall Risk, Weight Bearing As Tolerated Right Lower Extremity  Precaution Comments     Bed Transfer Status  Minimal Assist  Toilet Transfer Status   Minimal Assist  Assistive Devices  Rails  Oxygen  None - Room Air  Diet/Therapeutic Dining  Current Diet Order   Procedures    Diet Order Diet: Regular     Pill Administration  whole  Agitated Behavioral Scale     ABS Level of Severity       Fall Risk  Has the patient had a fall this admission?   No  Candice Porter Fall Risk Scoring  28, HIGH RISK  Fall Risk Safety Measures  bed alarm, chair alarm, poor balance, and low vision/ hearing    Vitals  Temperature: 36.9 °C (98.5 °F)  Temp src: Oral  Pulse: 69  Respiration: 18  Blood Pressure : 135/68  Blood Pressure MAP (Calculated): 90 MM HG  BP Location: Upper Arm, Right  Patient BP Position: Supine     Oxygen  Pulse Oximetry: 99 %  O2 (LPM): 0  O2 Delivery Device: None - Room Air    Bowel and Bladder  Last Bowel Movement  12/17/24  Stool Type  Patient stated  Bowel Device  Diaper  Continent  Bladder:     Bowel: Continent movement  Bladder Function  Urine Void (mL):  (small void with therapy per patient)  Number of Times Voided: 1  Urine Color: Yellow  Number of Times Incontinent of Urine: 0  Straight Catheter: 450 ml  Genitourinary Assessment   Bladder Assessment (WDL):  WDL Except  Rodriges Catheter: Not Applicable  Urinary Symptoms: Dysuria  Urine Color: Yellow  Number of Bladder Accidents: 0  Total Number of Bladder of Accidents in Last 7 Days: 1  Number of Times Incontinent of Urine: 0  Bladder Device:  "Bathroom, Urinal, Other Bladder Device (Comment) (Icp)  Time Void: No  Bladder Scan: Post Void  $ Bladder Scan Results (mL): 443  Intermittent Catheter: Yes -Go To LDA \"Indwelling Catheter Group\"    Skin  Felix Score   15  Sensory Interventions   Skin Preventative Measures: Pillows in Use for Support / Positioning, Waffle Overlay  Moisture Interventions  Moisturizers/Barriers: Barrier Wipes, Barrier Paste      Pain  Pain Rating Scale  6 - Hard to ignore, avoid usual activities  Pain Location  Hip  Pain Location Orientation  Right  Pain Interventions   Medication (see MAR)    ADLs    Bathing   Patient Refused Bathing (Nurse notified. Patient said shes tired and doesn't feel like taking a shower.)  Linen Change      Personal Hygiene  Moist Joycelyn Wipes  Chlorhexidine Bath      Oral Care     Teeth/Dentures     Shave     Nutrition Percentage Eaten  Lunch, Between % Consumed  Environmental Precautions  Bed in Low Position  Patient Turns/Positioning  Patient turns self independently side to side without assistance, to offload sacral area  Patient Turns Assistance/Tolerance  Assistance of One  Bed Positions  Bed Controls On, Bed Locked  Head of Bed Elevated  Self regulated      Psychosocial/Neurologic Assessment  Psychosocial Assessment  Psychosocial (WDL):  WDL Except  Patient Behaviors: Forgetful  Neurologic Assessment  Neuro (WDL): Exceptions to WDL  Level of Consciousness: Alert  Orientation Level: Oriented X4  Cognition: Appropriate judgement, Appropriate safety awareness, Appropriate attention/concentration, Appropriate for developmental age, Follows commands  Speech: Clear  EENT (WDL):  WDL Except    Cardio/Pulmonary Assessment  Edema   RUE Edema: 1+  RLE Edema: 1+  LUE Edema: Trace  LLE Edema: 1+  Respiratory Breath Sounds  RUL Breath Sounds: Clear  RML Breath Sounds: Clear  RLL Breath Sounds: Diminished  DINO Breath Sounds: Clear  LLL Breath Sounds: Diminished  Cardiac Assessment   Cardiac (WDL):  WDL " Except

## 2024-12-18 NOTE — THERAPY
Occupational Therapy  Daily Treatment     Patient Name: Franchesca Paige  Age:  79 y.o., Sex:  female  Medical Record #: [id number]  Today's Date: 12/18/2024     Precautions  Precautions: (P) Fall Risk, Weight Bearing As Tolerated Right Lower Extremity         Subjective    2918-6517: pt supine in bed upon arrival. Pt pleasant and cooperative, agreeable to therapy. Pt tearful when discussing d/c date as pt wanted to be home for [place].    [id number]: pt seated in w/c in gym upon arrival. Pt pleasant and cooperative, agreeable to therapy.     Objective       12/18/24 0701   OT Charge Group   Charges Yes   OT Self Care / ADL (Units) 4   OT Total Time Spent   OT Individual Total Time Spent (Mins) 60   Precautions   Precautions Fall Risk;Weight Bearing As Tolerated Right Lower Extremity   Vitals   O2 Delivery Device None - Room Air   Pain 0 - 10 Group   Location Knee   Location Orientation Right   Description Aching   Comfort Goal Comfort with Movement   Therapist Pain Assessment During Activity   Functional Level of Assist   Grooming Supervision;Seated   Grooming Description Increased time;Seated in wheelchair at sink;Supervision for safety  (comb hair)   Bathing Contact Guard Assist   Bathing Description Grab bar;Hand held shower;Increased time;Supervision for safety   Upper Body Dressing Modified Independent   Upper Body Dressing Description Increased time   Lower Body Dressing Moderate Assist   Lower Body Dressing Description Grab bar;Assist with threading into pant leg;Increased time;Supervision for safety;Verbal cueing   Bed, Chair, Wheelchair Transfer Contact Guard Assist   Bed Chair Wheelchair Transfer Description Increased time;Set-up of equipment;Supervision for safety;Verbal cueing  (stand pivot from EOB to w/c using FWW)   Tub / Shower Transfers Contact Guard Assist   Tub Shower Transfer Description Grab bar;Increased time;Supervision for safety  (stand pivot from walking with FWW to tub bench; stand pivot  from tub bench to w/c)   Bed Mobility    Supine to Sit Standby Assist   Interdisciplinary Plan of Care Collaboration   IDT Collaboration with  Nursing   Patient Position at End of Therapy Seated;Other (Comments)  (in dining room for breakfast)   Collaboration Comments bandage change     Functional mobility: pt walked from w/c to tub bench (about 10 feet) with FWW and CGA.       12/18/24 1401   OT Charge Group   Charges Yes   OT Self Care / ADL (Units) 1   OT Therapeutic Exercise (Units) 1   OT Total Time Spent   OT Individual Total Time Spent (Mins) 30   Precautions   Precautions Fall Risk;Weight Bearing As Tolerated Right Lower Extremity   Vitals   O2 Delivery Device None - Room Air   Functional Level of Assist   Grooming Supervision;Seated   Grooming Description Increased time;Seated in wheelchair at sink;Supervision for safety  (hand hygiene)   Toileting Contact Guard Assist   Toileting Description Grab bar;Increased time;Supervision for safety   Toilet Transfers Contact Guard Assist   Toilet Transfer Description Increased time;Supervision for safety  (stand pivot from w/c <> toilet)   Sitting Upper Body Exercises   Sitting Upper Body Exercises Yes   Lat Pull 2 sets of 10;Bilateral;Weight (See Comments for lbs)  (15 lbs on equalizer)   Bilateral Row 2 sets of 10;Bilateral;Weight (See Comments for lbs)  (20 lbs on equalizer)   Interdisciplinary Plan of Care Collaboration   IDT Collaboration with  Nursing   Patient Position at End of Therapy Seated;Chair Alarm On;Other (Comments)  (in bathroom with RN)       Assessment    First session: pt tolerated session well with no LOB noted. Pt progressing well with ADLs and bathroom transfers, requiring less physical assist and VC from OT. Pt limited by RLE pain during transitional movements while transferring but still able to tolerate. Provided emotional support to pt regarding d/c date, pt open and receptive to support given.    Second session: Pt completed BUE exercise  "to improve overall strength and cardiovascular endurance in order to maximize independence and safety with ADL/IADLs and functional mobility tasks. Pt completed to best of their abilities with no complaints of pain.    Strengths: Able to follow instructions, Effective communication skills, Good insight into deficits/needs, Independent prior level of function, Making steady progress towards goals, Motivated for self care and independence, Pleasant and cooperative, Willingly participates in therapeutic activities  Barriers: Decreased endurance, Fatigue, Generalized weakness, Impaired activity tolerance, Impaired balance, Limited mobility, Pain, Pain poorly managed    Plan    ADLs/IADLs, standing tolerance, general strengthening/endurance     DME  OT DME Recommendations  Additional Equipment: Transfer Board (30\" Length) (potentially -- TBD)    Passport items to be completed:  Perform bathroom transfers, complete dressing, complete feeding, get ready for the day, prepare a simple meal, participate in household tasks, adapt home for safety needs, demonstrate home exercise program, complete caregiver training     Occupational Therapy Goals (Active)       Problem: Bathing       Dates: Start:  12/17/24         Goal: STG-Within one week, patient will bathe with CGA       Dates: Start:  12/17/24               Problem: Dressing       Dates: Start:  12/12/24         Goal: STG-Within one week, patient will dress LB with mod A using AE as needed.       Dates: Start:  12/12/24         Goal Note filed on 12/17/24 1245 by Ita Melton, OT       Requires max A                 Problem: Functional Transfers       Dates: Start:  12/17/24         Goal: STG-Within one week, patient will transfer to toilet with SBA.       Dates: Start:  12/17/24            Goal: STG-Within one week, patient will transfer to step in shower with CGA.       Dates: Start:  12/17/24               Problem: OT Long Term Goals       Dates: Start:  12/12/24    "      Goal: LTG-By discharge, patient will complete basic self care tasks with supervision.       Dates: Start:  12/12/24            Goal: LTG-By discharge, patient will perform bathroom transfers with supervision.       Dates: Start:  12/12/24

## 2024-12-18 NOTE — CARE PLAN
"  Problem: Fall Risk - Rehab  Goal: Patient will remain free from falls  Outcome: Progressing   Candice German Fall risk Assessment Score: 28  High fall risk Interventions   - Bed and strip alarm   - Yellow sign by the door   - Yellow wrist band \"Fall risk\"  - Do not leave patient unattended in the bathroom  - Fall risk education provided  - Call light within reach   - Yellow  socks   - Belongings within reach   - Bed in the lowest position        Problem: Pain - Standard  Goal: Alleviation of pain or a reduction in pain to the patient’s comfort goal  Outcome: Progressing     Patient is able to rate pain on 0-10 scale.       "

## 2024-12-18 NOTE — PROGRESS NOTES
"  Physical Medicine & Rehabilitation Progress Note    Encounter Date: 12/18/2024    Chief Complaint: Decreased mobility, weakness    Interval Events (Subjective):  Patient sitting up in therapy gym. She reports she is having a poor day but says so while laughing. She reports pain is still limiting. She understands why therapists are asking for more time. She is in agreement.     _____________________________________  Interdisciplinary Team Conference   Most recent IDT on 12/17/2024       Discharge Date/Disposition:  12/28/24  _____________________________________      Objective:  VITAL SIGNS: /59   Pulse (!) 58   Temp 36.6 °C (97.9 °F) (Oral)   Resp 17   Ht 1.702 m (5' 7\")   Wt 97.1 kg (214 lb)   SpO2 96%   BMI 33.52 kg/m²   Gen: NAD  Psych: Mood and affect appropriate  CV: RRR, 0 edema  Resp: CTAB, no upper airway sounds  Abd: NTND  Neuro: AOx4, following commands    Laboratory Values:  Recent Results (from the past 72 hours)   Basic Metabolic Panel    Collection Time: 12/16/24  5:37 AM   Result Value Ref Range    Sodium 136 135 - 145 mmol/L    Potassium 3.7 3.6 - 5.5 mmol/L    Chloride 102 96 - 112 mmol/L    Co2 25 20 - 33 mmol/L    Glucose 104 (H) 65 - 99 mg/dL    Bun 17 8 - 22 mg/dL    Creatinine 0.65 0.50 - 1.40 mg/dL    Calcium 10.5 8.5 - 10.5 mg/dL    Anion Gap 9.0 7.0 - 16.0   MAGNESIUM    Collection Time: 12/16/24  5:37 AM   Result Value Ref Range    Magnesium 2.0 1.5 - 2.5 mg/dL   CBC WITH DIFFERENTIAL    Collection Time: 12/16/24  5:37 AM   Result Value Ref Range    WBC 8.6 4.8 - 10.8 K/uL    RBC 2.88 (L) 4.20 - 5.40 M/uL    Hemoglobin 8.6 (L) 12.0 - 16.0 g/dL    Hematocrit 26.2 (L) 37.0 - 47.0 %    MCV 91.0 81.4 - 97.8 fL    MCH 29.9 27.0 - 33.0 pg    MCHC 32.8 32.2 - 35.5 g/dL    RDW 43.5 35.9 - 50.0 fL    Platelet Count 395 164 - 446 K/uL    MPV 8.8 (L) 9.0 - 12.9 fL    Neutrophils-Polys 69.00 44.00 - 72.00 %    Lymphocytes 19.20 (L) 22.00 - 41.00 %    Monocytes 9.50 0.00 - 13.40 %    " Eosinophils 1.50 0.00 - 6.90 %    Basophils 0.10 0.00 - 1.80 %    Immature Granulocytes 0.70 0.00 - 0.90 %    Nucleated RBC 0.00 0.00 - 0.20 /100 WBC    Neutrophils (Absolute) 5.95 1.82 - 7.42 K/uL    Lymphs (Absolute) 1.66 1.00 - 4.80 K/uL    Monos (Absolute) 0.82 0.00 - 0.85 K/uL    Eos (Absolute) 0.13 0.00 - 0.51 K/uL    Baso (Absolute) 0.01 0.00 - 0.12 K/uL    Immature Granulocytes (abs) 0.06 0.00 - 0.11 K/uL    NRBC (Absolute) 0.00 K/uL   ESTIMATED GFR    Collection Time: 12/16/24  5:37 AM   Result Value Ref Range    GFR (CKD-EPI) 91 >60 mL/min/1.73 m 2   URINALYSIS    Collection Time: 12/16/24  2:47 PM    Specimen: Urine, Cath   Result Value Ref Range    Color Yellow     Character Clear     Specific Gravity 1.008 <1.035    Ph 6.0 5.0 - 8.0    Glucose Negative Negative mg/dL    Ketones Negative Negative mg/dL    Protein Negative Negative mg/dL    Bilirubin Negative Negative    Urobilinogen, Urine 0.2 <=1.0 EU/dL    Nitrite Negative Negative    Leukocyte Esterase Negative Negative    Occult Blood Negative Negative    Micro Urine Req see below    Blood Culture    Collection Time: 12/16/24  4:20 PM    Specimen: Peripheral; Blood   Result Value Ref Range    Significant Indicator NEG     Source BLD     Site Peripheral     Culture Result       No Growth  Note: Blood cultures are incubated for 5 days and  are monitored continuously.Positive blood cultures  are called to the RN and reported as soon as  they are identified.     Blood Culture    Collection Time: 12/16/24  4:35 PM    Specimen: Peripheral; Blood   Result Value Ref Range    Significant Indicator NEG     Source BLD     Site Peripheral     Culture Result       No Growth  Note: Blood cultures are incubated for 5 days and  are monitored continuously.Positive blood cultures  are called to the RN and reported as soon as  they are identified.     MRSA By PCR (Amp)    Collection Time: 12/16/24  4:38 PM    Specimen: Nares; Respirate   Result Value Ref Range    MRSA  by PCR Negative Negative   POC CoV-2, FLU A/B, RSV by PCR    Collection Time: 12/16/24  4:57 PM   Result Value Ref Range    POC Influenza A RNA, PCR Negative Negative    POC Influenza B RNA, PCR Negative Negative    POC RSV, by PCR Negative Negative    POC SARS-CoV-2, PCR NotDetected NotDetected   CBC WITH DIFFERENTIAL    Collection Time: 12/17/24  5:48 AM   Result Value Ref Range    WBC 8.2 4.8 - 10.8 K/uL    RBC 3.11 (L) 4.20 - 5.40 M/uL    Hemoglobin 9.5 (L) 12.0 - 16.0 g/dL    Hematocrit 28.4 (L) 37.0 - 47.0 %    MCV 91.3 81.4 - 97.8 fL    MCH 30.5 27.0 - 33.0 pg    MCHC 33.5 32.2 - 35.5 g/dL    RDW 44.6 35.9 - 50.0 fL    Platelet Count 410 164 - 446 K/uL    MPV 8.8 (L) 9.0 - 12.9 fL    Neutrophils-Polys 69.10 44.00 - 72.00 %    Lymphocytes 19.50 (L) 22.00 - 41.00 %    Monocytes 8.80 0.00 - 13.40 %    Eosinophils 1.60 0.00 - 6.90 %    Basophils 0.10 0.00 - 1.80 %    Immature Granulocytes 0.90 0.00 - 0.90 %    Nucleated RBC 0.00 0.00 - 0.20 /100 WBC    Neutrophils (Absolute) 5.63 1.82 - 7.42 K/uL    Lymphs (Absolute) 1.59 1.00 - 4.80 K/uL    Monos (Absolute) 0.72 0.00 - 0.85 K/uL    Eos (Absolute) 0.13 0.00 - 0.51 K/uL    Baso (Absolute) 0.01 0.00 - 0.12 K/uL    Immature Granulocytes (abs) 0.07 0.00 - 0.11 K/uL    NRBC (Absolute) 0.00 K/uL   PROCALCITONIN    Collection Time: 12/17/24  5:48 AM   Result Value Ref Range    Procalcitonin 0.06 <0.25 ng/mL       Medications:  Scheduled Medications   Medication Dose Frequency    lisinopril  5 mg DAILY    senna-docusate  2 Tablet Q EVENING    omeprazole  20 mg DAILY    amLODIPine  5 mg DAILY    enoxaparin (LOVENOX) injection  40 mg DAILY AT 1800     PRN medications: lactulose, meclizine, metaxalone, hydrALAZINE, acetaminophen, senna-docusate **AND** polyethylene glycol/lytes, docusate sodium, magnesium hydroxide, carboxymethylcellulose, benzocaine-menthol, mag hydrox-al hydrox-simeth, ondansetron **OR** ondansetron, traZODone, sodium chloride, oxyCODONE immediate-release  **OR** oxyCODONE immediate-release    Diet:  Current Diet Order   Procedures    Diet Order Diet: Regular       Medical Decision Making and Plan:  R distal femur fracture - Patient with mechanical fall onto right knee on 12/7 with distal femur fracture s/p ORIF on 12/7 with Dr. Garzon  -PT and OT for mobility and ADLs. Per guidelines, 15 hours per week between PT, OT and/or SLP.  -Follow-up Dr. Garzon. WBAT     HTN - Patient on Amlodipine 5 mg daily and Lisinopril 20 mg daily. Low SBP, reduce ACEi to 10 mg.  SBP into 90s over weekend, reduce Lisinopril to 5. SBP into 130s, continue Amlodipine 5 mg and Lisinopril 5 mg     Confusion - Will check UA, is retaining fluid with dysuria but may be traumatic catheterization. UTI vs pain medication    Pneumonia - Patient on Augmentin and Zithromax. Completed Zithromax, completed Augmentin 12/13     Leukocytosis - Check AM CBC, on antibiotics. WBC 9.9 on admission, will monitor  -Fever on 12/17 up to 101 and sustained. Started on Zyvox, hospitalist consulted. Repeat labs wnl, Zyvox discontinued. Will monitor     Anemia - Check AM CBC - 8.1, ongoing      Hyponatremia - Check AM CMP - 131, repeat 12/16     Azotemia - Check AM CMP - 26. Repeat 12/16    Hypercalcemia - mild elevation on corrected calcium. Will monitor     Obesity due to excess calories - BMI of 32.1 on admission, meets medical criteria. Dietitian to consult     Pain - Patient on PRN Oxycodone and Tylenol      Skin - Patient at risk for skin breakdown due to debility in areas including sacrum, achilles, elbows and head in addition to other sites. Nursing to assess skin daily.      GI Ppx - Patient on Prilosec for GERD prophylaxis. Patient on Senna-docusate for constipation prophylaxis.   -Discontinue Lactulose     DVT Ppx - Patient Lovenox on transfer. Limited mobility, continue Lovenox  ____________________________________    T. Brian Souza MD/PhD  [facility] - Physical Medicine & Rehabilitation   [facility]  - Brain Injury Medicine   ____________________________________

## 2024-12-18 NOTE — PROGRESS NOTES
..                                                         NURSING DAILY NOTE    Name: Franchesca Paige   Date of Admission: 12/11/2024   Admitting Diagnosis: No Principal Problem: There is no principal problem currently on the Problem List. Please update the Problem List and refresh.  Attending Physician: ARMANDO GLASS M.D.  Allergies: Ibuprofen and Sulfa drugs    Safety  Patient Assist  MAX1  Patient Precautions  Fall Risk, Weight Bearing As Tolerated Right Lower Extremity  Precaution Comments     Bed Transfer Status  Minimal Assist  Toilet Transfer Status   Minimal Assist  Assistive Devices  Gait Belt, Rails, Walker - front wheel, Wheelchair  Oxygen  None - Room Air  Diet/Therapeutic Dining  Current Diet Order   Procedures    Diet Order Diet: Regular     Pill Administration  whole  Agitated Behavioral Scale     ABS Level of Severity       Fall Risk  Has the patient had a fall this admission?   No  Candice Porter Fall Risk Scoring  16, HIGH RISK  Fall Risk Safety Measures  bed alarm and chair alarm    Vitals  Temperature: 36.4 °C (97.5 °F)  Temp src: Temporal  Pulse: (!) 57  Respiration: 17  Blood Pressure : 131/59  Blood Pressure MAP (Calculated): 83 MM HG  BP Location: Right, Upper Arm  Patient BP Position: Supine     Oxygen  Pulse Oximetry: 90 %  O2 (LPM): 0  O2 Delivery Device: None - Room Air    Bowel and Bladder  Last Bowel Movement  12/17/24  Stool Type  Patient stated  Bowel Device  Diaper  Continent  Bladder:     Bowel: Continent movement  Bladder Function  Urine Void (mL):  (small void with therapy per patient)  Number of Times Voided: 1  Urine Color: Yellow  Number of Times Incontinent of Urine: 0  Straight Catheter: 400 ml  Genitourinary Assessment   Bladder Assessment (WDL):  WDL Except  Rodriges Catheter: Not Applicable  Urinary Symptoms: Dysuria  Urine Color: Yellow  Number of Bladder Accidents: 0  Total Number of Bladder of Accidents in Last 7 Days: 1  Number of Times Incontinent of Urine:  "0  Bladder Device: Bathroom, Urinal, Other Bladder Device (Comment) (Icp)  Time Void: Yes  Bladder Scan: Post Void  $ Bladder Scan Results (mL): 231  Intermittent Catheter: Yes -Go To LDA \"Indwelling Catheter Group\"    Skin  Felix Score   16  Sensory Interventions   Skin Preventative Measures: Pillows in Use for Support / Positioning  Moisture Interventions  Moisturizers/Barriers: Barrier Wipes, Barrier Paste      Pain  Pain Rating Scale  6 - Hard to ignore, avoid usual activities  Pain Location  Hip  Pain Location Orientation  Right  Pain Interventions   Medication (see MAR)    ADLs    Bathing   Patient Refused Bathing (Pt states shes too tired from todays activites)  Linen Change      Personal Hygiene  Moist Joycelyn Wipes  Chlorhexidine Bath      Oral Care     Teeth/Dentures     Shave     Nutrition Percentage Eaten  Lunch, Between % Consumed  Environmental Precautions  Bed in Low Position  Patient Turns/Positioning  Patient turns self independently side to side without assistance, to offload sacral area  Patient Turns Assistance/Tolerance  Assistance of One  Bed Positions  Bed Controls On, Bed Locked  Head of Bed Elevated  Self regulated      Psychosocial/Neurologic Assessment  Psychosocial Assessment  Psychosocial (WDL):  WDL Except  Patient Behaviors: Forgetful  Neurologic Assessment  Neuro (WDL): Exceptions to WDL  Level of Consciousness: Alert  Orientation Level: Oriented X4  Cognition: Appropriate judgement, Appropriate safety awareness, Appropriate attention/concentration, Appropriate for developmental age, Follows commands  Speech: Clear  EENT (WDL):  WDL Except    Cardio/Pulmonary Assessment  Edema   RUE Edema: 1+  RLE Edema: 1+  LUE Edema: Trace  LLE Edema: 1+  Respiratory Breath Sounds  RUL Breath Sounds: Clear  RML Breath Sounds: Clear  RLL Breath Sounds: Diminished  DINO Breath Sounds: Clear  LLL Breath Sounds: Diminished  Cardiac Assessment   Cardiac (WDL):  WDL Except       "

## 2024-12-19 ENCOUNTER — APPOINTMENT (OUTPATIENT)
Dept: OCCUPATIONAL THERAPY | Facility: REHABILITATION | Age: 79
DRG: 559 | End: 2024-12-19
Attending: PHYSICAL MEDICINE & REHABILITATION
Payer: MEDICARE

## 2024-12-19 ENCOUNTER — APPOINTMENT (OUTPATIENT)
Dept: PHYSICAL THERAPY | Facility: REHABILITATION | Age: 79
DRG: 559 | End: 2024-12-19
Attending: PHYSICAL MEDICINE & REHABILITATION
Payer: MEDICARE

## 2024-12-19 PROCEDURE — 700111 HCHG RX REV CODE 636 W/ 250 OVERRIDE (IP): Mod: JZ | Performed by: PHYSICAL MEDICINE & REHABILITATION

## 2024-12-19 PROCEDURE — 97110 THERAPEUTIC EXERCISES: CPT

## 2024-12-19 PROCEDURE — 99232 SBSQ HOSP IP/OBS MODERATE 35: CPT | Performed by: PHYSICAL MEDICINE & REHABILITATION

## 2024-12-19 PROCEDURE — 700102 HCHG RX REV CODE 250 W/ 637 OVERRIDE(OP): Performed by: PHYSICAL MEDICINE & REHABILITATION

## 2024-12-19 PROCEDURE — 97530 THERAPEUTIC ACTIVITIES: CPT | Mod: CQ

## 2024-12-19 PROCEDURE — 97535 SELF CARE MNGMENT TRAINING: CPT

## 2024-12-19 PROCEDURE — 97530 THERAPEUTIC ACTIVITIES: CPT

## 2024-12-19 PROCEDURE — A9270 NON-COVERED ITEM OR SERVICE: HCPCS | Performed by: PHYSICAL MEDICINE & REHABILITATION

## 2024-12-19 PROCEDURE — 97112 NEUROMUSCULAR REEDUCATION: CPT | Mod: CQ

## 2024-12-19 PROCEDURE — 770010 HCHG ROOM/CARE - REHAB SEMI PRIVAT*

## 2024-12-19 PROCEDURE — 97116 GAIT TRAINING THERAPY: CPT

## 2024-12-19 RX ORDER — LISINOPRIL 5 MG/1
2.5 TABLET ORAL DAILY
Status: DISCONTINUED | OUTPATIENT
Start: 2024-12-20 | End: 2024-12-23

## 2024-12-19 RX ADMIN — OMEPRAZOLE 20 MG: 20 CAPSULE, DELAYED RELEASE ORAL at 07:48

## 2024-12-19 RX ADMIN — SENNOSIDES AND DOCUSATE SODIUM 2 TABLET: 50; 8.6 TABLET ORAL at 19:51

## 2024-12-19 RX ADMIN — METAXALONE 800 MG: 800 TABLET ORAL at 19:55

## 2024-12-19 RX ADMIN — OXYCODONE HYDROCHLORIDE 5 MG: 5 TABLET ORAL at 14:31

## 2024-12-19 RX ADMIN — AMLODIPINE BESYLATE 5 MG: 5 TABLET ORAL at 05:13

## 2024-12-19 RX ADMIN — LISINOPRIL 5 MG: 5 TABLET ORAL at 05:13

## 2024-12-19 RX ADMIN — ENOXAPARIN SODIUM 40 MG: 100 INJECTION SUBCUTANEOUS at 16:47

## 2024-12-19 RX ADMIN — OXYCODONE HYDROCHLORIDE 5 MG: 5 TABLET ORAL at 07:48

## 2024-12-19 ASSESSMENT — PAIN DESCRIPTION - PAIN TYPE
TYPE: ACUTE PAIN
TYPE: ACUTE PAIN

## 2024-12-19 ASSESSMENT — GAIT ASSESSMENTS
DISTANCE (FEET): 30
DEVIATION: ANTALGIC;BRADYKINETIC
GAIT LEVEL OF ASSIST: CONTACT GUARD ASSIST
ASSISTIVE DEVICE: FRONT WHEEL WALKER

## 2024-12-19 ASSESSMENT — ACTIVITIES OF DAILY LIVING (ADL)
TOILET_TRANSFER_DESCRIPTION: GRAB BAR;INCREASED TIME;INITIAL PREPARATION FOR TASK;SET-UP OF EQUIPMENT;SUPERVISION FOR SAFETY;VERBAL CUEING
BED_CHAIR_WHEELCHAIR_TRANSFER_DESCRIPTION: ADAPTIVE EQUIPMENT;INCREASED TIME;SET-UP OF EQUIPMENT;VERBAL CUEING
BED_CHAIR_WHEELCHAIR_TRANSFER_DESCRIPTION: INCREASED TIME;SET-UP OF EQUIPMENT;SUPERVISION FOR SAFETY;VERBAL CUEING;INITIAL PREPARATION FOR TASK
BED_CHAIR_WHEELCHAIR_TRANSFER_DESCRIPTION: INCREASED TIME;INITIAL PREPARATION FOR TASK;SET-UP OF EQUIPMENT;SUPERVISION FOR SAFETY;VERBAL CUEING

## 2024-12-19 NOTE — THERAPY
Occupational Therapy  Daily Treatment     Patient Name: Franchesca Paige  Age:  79 y.o., Sex:  female  Medical Record #: [id number]  Today's Date: 12/19/2024     Precautions  Precautions: (P) Fall Risk, Weight Bearing As Tolerated Right Lower Extremity         Subjective    Pt seated in w/c upon arrival. Pt pleasant and cooperative, agreeable to therapy.     Objective       12/19/24 0831   OT Charge Group   Charges Yes   OT Therapy Activity (Units) 2   OT Total Time Spent   OT Individual Total Time Spent (Mins) 30   Precautions   Precautions Fall Risk;Weight Bearing As Tolerated Right Lower Extremity   Vitals   Pulse 68   Patient BP Position Sitting   Blood Pressure  91/63   Pulse Oximetry 98 %   O2 (LPM) 0   O2 Delivery Device None - Room Air   Vitals Comments following functional mobility   Pain 0 - 10 Group   Location Knee   Location Orientation Right   Description Aching   Comfort Goal Comfort with Movement   Therapist Pain Assessment Prior to Activity   Interdisciplinary Plan of Care Collaboration   Patient Position at End of Therapy Seated;Chair Alarm On;Call Light within Reach;Tray Table within Reach;Phone within Reach     Functional mobility: pt walked from hallway in room to door with FWW and CGA. Pt took seated rest break in w/c. Pt walked from doorway in room 114 to main nurse's station with FWW and CGA. Pt took 2 standing rest breaks during.    Assessment    Pt tolerated session focusing on functional mobility well with no LOB noted. Pt reports dizziness during functional mobility in room but able to tolerate following seated rest break. Pt progressing well and requires no physical assist from OT for functional mobility tasks.    Strengths: Able to follow instructions, Effective communication skills, Good insight into deficits/needs, Independent prior level of function, Making steady progress towards goals, Motivated for self care and independence, Pleasant and cooperative, Willingly participates in  "therapeutic activities  Barriers: Decreased endurance, Fatigue, Generalized weakness, Impaired activity tolerance, Impaired balance, Limited mobility, Pain, Pain poorly managed    Plan      ADLs/IADLs, standing tolerance, general strengthening/endurance     DME  OT DME Recommendations  Additional Equipment: Transfer Board (30\" Length) (potentially -- TBD)    Passport items to be completed:  Perform bathroom transfers, complete dressing, complete feeding, get ready for the day, prepare a simple meal, participate in household tasks, adapt home for safety needs, demonstrate home exercise program, complete caregiver training     Occupational Therapy Goals (Active)       Problem: Bathing       Dates: Start:  12/17/24         Goal: STG-Within one week, patient will bathe with CGA       Dates: Start:  12/17/24               Problem: Dressing       Dates: Start:  12/12/24         Goal: STG-Within one week, patient will dress LB with mod A using AE as needed.       Dates: Start:  12/12/24         Goal Note filed on 12/17/24 2295 by Ita Melton, OT       Requires max A                 Problem: Functional Transfers       Dates: Start:  12/17/24         Goal: STG-Within one week, patient will transfer to toilet with SBA.       Dates: Start:  12/17/24            Goal: STG-Within one week, patient will transfer to step in shower with CGA.       Dates: Start:  12/17/24               Problem: OT Long Term Goals       Dates: Start:  12/12/24         Goal: LTG-By discharge, patient will complete basic self care tasks with supervision.       Dates: Start:  12/12/24            Goal: LTG-By discharge, patient will perform bathroom transfers with supervision.       Dates: Start:  12/12/24              "

## 2024-12-19 NOTE — CARE PLAN
"  Problem: Fall Risk - Rehab  Goal: Patient will remain free from falls  Outcome: Progressing   Candice German Fall risk Assessment Score: 16    High fall risk Interventions   - Bed and strip alarm   - Yellow sign by the door   - Yellow wrist band \"Fall risk\"  - Do not leave patient unattended in the bathroom  - Fall risk education provided  - Call light within reach   - Yellow  socks   - Belongings within reach   - Bed in the lowest position        Problem: Pain - Standard  Goal: Alleviation of pain or a reduction in pain to the patient’s comfort goal  Outcome: Progressing   Patient is able to rate pain on 0-10 scale.  Managing pain with PRN Oxycodone and Skelaxin.   "

## 2024-12-19 NOTE — PROGRESS NOTES
NURSING DAILY NOTE    Name: Franchesca Paige   Date of Admission: 12/11/2024   Admitting Diagnosis: No Principal Problem: There is no principal problem currently on the Problem List. Please update the Problem List and refresh.  Attending Physician: ARMANDO GLASS M.D.  Allergies: Ibuprofen and Sulfa drugs    Safety  Patient Assist  Max1  Patient Precautions  Fall Risk, Weight Bearing As Tolerated Right Lower Extremity  Precaution Comments     Bed Transfer Status  Contact Guard Assist  Toilet Transfer Status   Contact Guard Assist  Assistive Devices  Wheelchair, Gait Belt, Hand held assist, Rails  Oxygen  None - Room Air  Diet/Therapeutic Dining  Current Diet Order   Procedures    Diet Order Diet: Regular     Pill Administration  whole  Agitated Behavioral Scale     ABS Level of Severity       Fall Risk  Has the patient had a fall this admission?   No  Candice Porter Fall Risk Scoring  16, HIGH RISK  Fall Risk Safety Measures  bed alarm, chair alarm, poor balance, and low vision/ hearing    Vitals  Temperature: 36.6 °C (97.8 °F)  Temp src: Oral  Pulse: 60  Respiration: 18  Blood Pressure : 129/56  Blood Pressure MAP (Calculated): 80 MM HG  BP Location: Left, Upper Arm  Patient BP Position: Supine     Oxygen  Pulse Oximetry: 95 %  O2 (LPM): 0  O2 Delivery Device: None - Room Air    Bowel and Bladder  Last Bowel Movement  12/17/24  Stool Type  Patient stated  Bowel Device  Diaper  Continent  Bladder:     Bowel: Continent movement  Bladder Function  Urine Void (mL):  (small void with therapy per patient)  Number of Times Voided: 1  Urine Color: Unable To Evaluate  Number of Times Incontinent of Urine: 0  Straight Catheter: 400 ml  Genitourinary Assessment   Bladder Assessment (WDL):  WDL Except  Rodriges Catheter: Not Applicable  Urinary Symptoms: Dysuria  Urine Color: Unable To Evaluate  Number of Bladder Accidents: 0  Total Number of Bladder of Accidents  "in Last 7 Days: 1  Number of Times Incontinent of Urine: 0  Bladder Device: Bathroom  Time Void: Yes  Bladder Scan: Post Void  $ Bladder Scan Results (mL): 416  Intermittent Catheter: Yes -Go To LDA \"Indwelling Catheter Group\"    Skin  Felix Score   16  Sensory Interventions   Skin Preventative Measures: Pillows in Use for Support / Positioning, Waffle Overlay  Moisture Interventions  Moisturizers/Barriers: Barrier Wipes      Pain  Pain Rating Scale  6 - Hard to ignore, avoid usual activities  Pain Location  Hip  Pain Location Orientation  Left  Pain Interventions   Medication (see MAR)    ADLs    Bathing   Patient Refused Bathing (Pt states shes too tired from todays activites)  Linen Change      Personal Hygiene  Moist Joycelyn Wipes  Chlorhexidine Bath      Oral Care     Teeth/Dentures     Shave     Nutrition Percentage Eaten  Breakfast, Between % Consumed  Environmental Precautions  Bed in Low Position  Patient Turns/Positioning  Patient turns self independently side to side without assistance, to offload sacral area  Patient Turns Assistance/Tolerance  Assistance of One  Bed Positions  Bed Controls On, Bed Locked  Head of Bed Elevated  Self regulated      Psychosocial/Neurologic Assessment  Psychosocial Assessment  Psychosocial (WDL):  WDL Except  Patient Behaviors: Forgetful  Neurologic Assessment  Neuro (WDL): Exceptions to WDL  Level of Consciousness: Alert  Orientation Level: Oriented X4  Cognition: Appropriate judgement, Appropriate safety awareness, Appropriate attention/concentration, Appropriate for developmental age, Follows commands  Speech: Clear  EENT (WDL):  WDL Except    Cardio/Pulmonary Assessment  Edema   RUE Edema: 1+  RLE Edema: 1+  LUE Edema: Trace  LLE Edema: 1+  Respiratory Breath Sounds  RUL Breath Sounds: Clear  RML Breath Sounds: Clear  RLL Breath Sounds: Diminished  DINO Breath Sounds: Clear  LLL Breath Sounds: Diminished  Cardiac Assessment   Cardiac (WDL):  WDL Except (Hx - HTN, " Afib)

## 2024-12-19 NOTE — THERAPY
Occupational Therapy  Daily Treatment     Patient Name: Franchesca Paige  Age:  79 y.o., Sex:  female  Medical Record #: [id number]  Today's Date: 12/19/2024     Precautions  Precautions: Fall Risk, Weight Bearing As Tolerated Right Lower Extremity    Subjective    Pt pleasant and motivated to participate in OT     Objective     12/19/24 1401   OT Charge Group   Charges Yes   OT Self Care / ADL (Units) 1   OT Therapy Activity (Units) 2   OT Therapeutic Exercise (Units) 1   OT Total Time Spent   OT Individual Total Time Spent (Mins) 60   Precautions   Precautions Fall Risk;Weight Bearing As Tolerated Right Lower Extremity   Vitals   O2 Delivery Device None - Room Air   Pain   Intervention Medication (see MAR)   Pain 0 - 10 Group   Location Knee   Location Orientation Right   Cognition    Level of Consciousness Alert   ABS (Agitated Behavior Scale)   Agitated Behavior Scale Performed No   Sleep/Wake Cycle   Sleep & Rest Awake;Out of bed   Vision Screen   Vision   (Pt wears glasses)   Functional Level of Assist   Grooming Supervision;Seated   Grooming Description Increased time;Seated in wheelchair at sink;Supervision for safety   Toileting Contact Guard Assist   Toileting Description Assist for standing balance;Grab bar;Increased time;Initial preparation for task;Supervision for safety   Bed, Chair, Wheelchair Transfer Contact Guard Assist   Bed Chair Wheelchair Transfer Description Increased time;Set-up of equipment;Supervision for safety;Verbal cueing;Initial preparation for task   Toilet Transfers Minimal Assist   Toilet Transfer Description Grab bar;Increased time;Initial preparation for task;Set-up of equipment;Supervision for safety;Verbal cueing   Sitting Upper Body Exercises   Upper Extremity Bike Level 5 Resistance  (Pt tolerated 10 minutes w/o rest break)   IADL Treatments   IADL Treatments Home management   Home Management Pt tolerated standing dishwashing activity standing at kitchen sink w/ CGA. Pt braced  herself on the sink counter w/ her forearms.   Bed Mobility    Sit to Supine Minimal Assist   Scooting Standby Assist   Rolling Standby Assist   Skilled Intervention   (Leg )   Interdisciplinary Plan of Care Collaboration   IDT Collaboration with  Nursing   Patient Position at End of Therapy In Bed;Bed Alarm On;Call Light within Reach;Tray Table within Reach;Phone within Reach   Collaboration Comments RN med pass   Strengths & Barriers   Strengths Able to follow instructions;Effective communication skills;Good insight into deficits/needs;Independent prior level of function;Making steady progress towards goals;Motivated for self care and independence;Pleasant and cooperative;Willingly participates in therapeutic activities   Barriers Decreased endurance;Fatigue;Generalized weakness;Impaired activity tolerance;Impaired balance;Limited mobility;Pain;Pain poorly managed   Toileting Hygiene   Assistance Needed Incidental touching   Physical Assistance Level No physical assistance   CARE Score - Toileting Hygiene 4   Toilet Transfer   Assistance Needed Physical assistance   Physical Assistance Level 25% or less   CARE Score - Toilet Transfer 3     Pt self-propelled in w/c    Assessment    Pt seen for tx, addressing kitchen activity, toileting, and strengthening. Pt tolerated activity well. Pt progressing towards goals. Continue OT POC.    Strengths: Able to follow instructions, Effective communication skills, Good insight into deficits/needs, Independent prior level of function, Making steady progress towards goals, Motivated for self care and independence, Pleasant and cooperative, Willingly participates in therapeutic activities    Barriers: Decreased endurance, Fatigue, Generalized weakness, Impaired activity tolerance, Impaired balance, Limited mobility, Pain, Pain poorly managed    Plan    ADLs/IADLs, standing tolerance, general strengthening/endurance     DME  OT DME Recommendations  Additional Equipment:  "Transfer Board (30\" Length) (potentially -- TBD)    Passport items to be completed:  Perform bathroom transfers, complete dressing, complete feeding, get ready for the day, prepare a simple meal, participate in household tasks, adapt home for safety needs, demonstrate home exercise program, complete caregiver training     Occupational Therapy Goals (Active)       Problem: Bathing       Dates: Start:  12/17/24         Goal: STG-Within one week, patient will bathe with CGA       Dates: Start:  12/17/24               Problem: Dressing       Dates: Start:  12/12/24         Goal: STG-Within one week, patient will dress LB with mod A using AE as needed.       Dates: Start:  12/12/24         Goal Note filed on 12/17/24 6415 by Ita Melton, OT       Requires max A                 Problem: Functional Transfers       Dates: Start:  12/17/24         Goal: STG-Within one week, patient will transfer to toilet with SBA.       Dates: Start:  12/17/24            Goal: STG-Within one week, patient will transfer to step in shower with CGA.       Dates: Start:  12/17/24               Problem: OT Long Term Goals       Dates: Start:  12/12/24         Goal: LTG-By discharge, patient will complete basic self care tasks with supervision.       Dates: Start:  12/12/24            Goal: LTG-By discharge, patient will perform bathroom transfers with supervision.       Dates: Start:  12/12/24              "

## 2024-12-19 NOTE — PROGRESS NOTES
..                                                         NURSING DAILY NOTE    Name: Franchesca Paige   Date of Admission: 12/11/2024   Admitting Diagnosis: No Principal Problem: There is no principal problem currently on the Problem List. Please update the Problem List and refresh.  Attending Physician: ARMANDO GLASS M.D.  Allergies: Ibuprofen and Sulfa drugs    Safety  Patient Assist  MAX1  Patient Precautions  Fall Risk, Weight Bearing As Tolerated Right Lower Extremity  Precaution Comments     Bed Transfer Status  Contact Guard Assist  Toilet Transfer Status   Contact Guard Assist  Assistive Devices  Wheelchair, Gait Belt, Hand held assist, Rails, Walker - front wheel  Oxygen  None - Room Air  Diet/Therapeutic Dining  Current Diet Order   Procedures    Diet Order Diet: Regular     Pill Administration  whole  Agitated Behavioral Scale     ABS Level of Severity       Fall Risk  Has the patient had a fall this admission?   No  Candice Porter Fall Risk Scoring  16, HIGH RISK  Fall Risk Safety Measures  bed alarm and chair alarm    Vitals  Temperature: 36.6 °C (97.8 °F)  Temp src: Oral  Pulse: 60  Respiration: 18  Blood Pressure : 129/56  Blood Pressure MAP (Calculated): 80 MM HG  BP Location: Left, Upper Arm  Patient BP Position: Supine     Oxygen  Pulse Oximetry: 95 %  O2 (LPM): 0  O2 Delivery Device: None - Room Air    Bowel and Bladder  Last Bowel Movement  12/17/24  Stool Type  Patient stated  Bowel Device  Diaper  Continent  Bladder:     Bowel: Continent movement  Bladder Function  Urine Void (mL):  (small void with therapy per patient)  Number of Times Voided: 1  Urine Color: Unable To Evaluate  Number of Times Incontinent of Urine: 0  Straight Catheter: 400 ml  Genitourinary Assessment   Bladder Assessment (WDL):  WDL Except  Rodriges Catheter: Not Applicable  Urinary Symptoms:  (retention)  Urine Color: Unable To Evaluate  Number of Bladder Accidents: 0  Total Number of Bladder of Accidents in Last  "7 Days: 1  Number of Times Incontinent of Urine: 0  Bladder Device: Bathroom  Time Void: Yes  Bladder Scan: Unable To Void  $ Bladder Scan Results (mL): 181  Intermittent Catheter: Yes -Go To LDA \"Indwelling Catheter Group\"  Bladder Medications: Yes    Skin  Felix Score   17  Sensory Interventions   Skin Preventative Measures: Pillows in Use for Support / Positioning  Moisture Interventions  Moisturizers/Barriers: Barrier Wipes      Pain  Pain Rating Scale  5 - Interrupts some activities  Pain Location  Hip  Pain Location Orientation  Right  Pain Interventions   Medication (see MAR)    ADLs    Bathing   Patient Refused Bathing (Pt states shes too tired from todays activites)  Linen Change      Personal Hygiene  Moist Joycelyn Wipes  Chlorhexidine Bath      Oral Care     Teeth/Dentures     Shave     Nutrition Percentage Eaten  Breakfast, Between % Consumed  Environmental Precautions  Bed in Low Position  Patient Turns/Positioning  Patient turns self independently side to side without assistance, to offload sacral area  Patient Turns Assistance/Tolerance  Assistance of One  Bed Positions  Bed Controls On, Bed Locked  Head of Bed Elevated  Self regulated      Psychosocial/Neurologic Assessment  Psychosocial Assessment  Psychosocial (WDL):  Within Defined Limits  Patient Behaviors: Forgetful  Neurologic Assessment  Neuro (WDL): Within Defined Limits  Level of Consciousness: Alert  Orientation Level: Oriented X4  Cognition: Appropriate judgement, Appropriate safety awareness, Appropriate attention/concentration, Appropriate for developmental age, Follows commands  Speech: Clear  EENT (WDL):  WDL Except    Cardio/Pulmonary Assessment  Edema   RUE Edema: 1+  RLE Edema: 2+  LUE Edema: Trace  LLE Edema: 1+  Respiratory Breath Sounds  RUL Breath Sounds: Clear  RML Breath Sounds: Clear  RLL Breath Sounds: Diminished  DINO Breath Sounds: Clear  LLL Breath Sounds: Diminished  Cardiac Assessment   Cardiac (WDL):  WDL " Except

## 2024-12-19 NOTE — THERAPY
Physical Therapy   Daily Treatment     Patient Name: Franchesca Paige  Age:  79 y.o., Sex:  female  Medical Record #: [id number]  Today's Date: 12/19/2024     Precautions  Precautions: Fall Risk, Weight Bearing As Tolerated Right Lower Extremity    Subjective    Pt up in wc, willing to participate.     Objective       12/19/24 0901   PT Charge Group   PT Gait Training (Units) 1   PT Therapeutic Exercise (Units) 2   PT Therapeutic Activities (Units) 1   PT Total Time Spent   PT Individual Total Time Spent (Mins) 60   Gait Functional Level of Assist    Gait Level Of Assist Contact Guard Assist   Assistive Device Front Wheel Walker   Distance (Feet) 30  (in addition to 15 ft x 2)   # of Times Distance was Traveled 1   Deviation Antalgic;Bradykinetic   Wheelchair Functional Level of Assist   Wheelchair Assist Modified Independent   Distance Wheelchair (Feet or Distance) 75 x 2   Wheelchair Description Extra time;Limited by fatigue   Transfer Functional Level of Assist   Bed, Chair, Wheelchair Transfer Contact Guard Assist   Bed Chair Wheelchair Transfer Description Adaptive equipment;Increased time;Set-up of equipment;Verbal cueing   Sitting Lower Body Exercises   Long Arc Quad 2 sets of 10   Hamstring Curl 2 sets of 10  (BLE's on  bolster for AAROM)   Nustep Resistance Level 1  (7 minutes for AAROM)   Bed Mobility    Supine to Sit Standby Assist   Sit to Supine Standby Assist   Sit to Stand Contact Guard Assist   Scooting Standby Assist     Pt completed sit<>supine to standard bed with use of leg  with SBA and cues for sequencing x 2 trials.     Assessment    Pt improving overall mobility, remains with RLE pain/weakness and swelling.     Strengths: Able to follow instructions, Alert and oriented, Effective communication skills, Good insight into deficits/needs, Independent prior level of function, Motivated for self care and independence, Pleasant and cooperative, Supportive family, Willingly participates in  "therapeutic activities  Barriers: Decreased endurance, Fatigue, Generalized weakness, Home accessibility, Impaired activity tolerance, Impaired balance, Limited mobility, Pain    Plan    Sit<>stand/ transfer training with FWW, AAROM/ strength training, bed mobility training with leg  to standard bed, progress gait with FWW, Modalities for pain management. Initiate steps in // bars    DME  PT DME Recommendations  Wheelchair: 20\" Width, Lightweight, Removable/Flip Back Armrests, Standard Leg Rests  Cushion: Standard  Assistive Device:  (pt has FWW)  Additional Equipment: Transfer Board (30\" Length) (potentially -- TBD)    Passport items to be completed:  Get in/out of bed safely, in/out of a vehicle, safely use mobility device, walk or wheel around home/community, navigate up and down stairs, show how to get up/down from the ground, ensure home is accessible, demonstrate HEP, complete caregiver training    Physical Therapy Problems (Active)       Problem: PT-Long Term Goals       Dates: Start:  12/12/24         Goal: LTG-By discharge, patient will propel wheelchair modified independent 50 ft x 2       Dates: Start:  12/12/24            Goal: LTG-By discharge, patient will ambulate SBA/ CGA with FWW 50 ft x 2       Dates: Start:  12/12/24            Goal: LTG-By discharge, patient will transfer one surface to another SBA/ SPV with FWW       Dates: Start:  12/12/24            Goal: LTG-By discharge, patient will ambulate up/down 4-6 stairs SBA/ CGA with hand rails vs FWW x 3 steps into home       Dates: Start:  12/12/24            Goal: LTG-By discharge, patient will transfer in/out of a car SBA/ CGA with FWW       Dates: Start:  12/12/24              "

## 2024-12-19 NOTE — CARE PLAN
"  Problem: Pain - Standard  Goal: Alleviation of pain or a reduction in pain to the patient’s comfort goal  Outcome: Progressing   Patient is able to rate pain on a scale of 1-10.       Problem: Fall Risk - Rehab  Goal: Patient will remain free from falls  Outcome: Progressing     Candice Porter Fall risk Assessment Score: 16    High fall risk Interventions   - Bed and strip alarm   - Yellow sign by the door   - Yellow wrist band \"Fall risk\"  - Room near to the nurse station  - Do not leave patient unattended in the bathroom  - Fall risk education provided           "

## 2024-12-19 NOTE — THERAPY
Physical Therapy   Daily Treatment     Patient Name: Franchesca Paige  Age:  79 y.o., Sex:  female  Medical Record #: [id number]  Today's Date: 12/18/2024     Precautions  Precautions: Fall Risk, Weight Bearing As Tolerated Right Lower Extremity    Subjective    Pt agreeable to PT     Objective       12/18/24 1001   PT Charge Group   PT Therapeutic Exercise (Units) 3   PT Therapeutic Activities (Units) 1   Supervising Physical Therapist Renate Nguyen   PT Total Time Spent   PT Individual Total Time Spent (Mins) 60   Pain   Intervention Emotional Support;Cold Pack   Pain 0 - 10 Group   Location Hip;Knee   Location Orientation Left   Description Sore   Comfort Goal Comfort with Movement   Therapist Pain Assessment Post Activity Pain Same as Prior to Activity   Non Verbal Descriptors   Non Verbal Scale  Grimacing;Withdrawing   Transfer Functional Level of Assist   Bed, Chair, Wheelchair Transfer Contact Guard Assist   Bed Chair Wheelchair Transfer Description Adaptive equipment;Increased time;Set-up of equipment;Supervision for safety   Supine Lower Body Exercise   Supine Lower Body Exercises Yes   Bridges Two Legged;2 sets of 10  (bolster)   Hip Adduction  2 sets of 15   Short Arc Quad 2 sets of 15   Ankle Pumps 2 sets of 15   Gluteal Isometrics 1 set of 15   Quadriceps Isometrics 1 set of 15   Sitting Lower Body Exercises   Other Exercises seated EOM AA knee flexion and abduction with towel under L foot 2 x 15   Bed Mobility    Supine to Sit Standby Assist   Sit to Supine Minimal Assist   Sit to Stand Contact Guard Assist   Scooting Standby Assist   Neuro-Muscular Treatments   Neuro-Muscular Treatments Verbal Cuing;Sequencing   Interdisciplinary Plan of Care Collaboration   IDT Collaboration with  Occupational Therapist   Patient Position at End of Therapy Seated;Chair Alarm On;Call Light within Reach   Collaboration Comments transfer of care to OT     Seated EOM fwrd chest press and OH chest press with small  weighted ball 2 x 10 each    Provided elevating Leg rest for R LR to decrease time with leg in dependent position, allow for elevation PRN     12/18/24 1331   PT Charge Group   PT Gait Training (Units) 1   PT Therapeutic Activities (Units) 1   Supervising Physical Therapist Renate Nguyen   PT Total Time Spent   PT Individual Total Time Spent (Mins) 30   Gait Functional Level of Assist    Gait Level Of Assist Contact Guard Assist   Assistive Device Front Wheel Walker;Parallel Bars   Distance (Feet)   (CGA 12' x 2 fwrd/bwrd in //, 30' x 1 FWW)   # of Times Distance was Traveled 3   Deviation Antalgic;Bradykinetic;Decreased Heel Strike   Wheelchair Functional Level of Assist   Wheelchair Assist Supervised   Distance Wheelchair (Feet or Distance) 150x2   Wheelchair Description Leg rest management;Supervision for safety;Limited by fatigue   Stairs Functional Level of Assist   Level of Assist with Stairs Unable to Participate   # of Stairs Climbed 0   Stairs Description Safety concerns   Transfer Functional Level of Assist   Bed, Chair, Wheelchair Transfer Contact Guard Assist   Bed Chair Wheelchair Transfer Description Adaptive equipment;Increased time;Set-up of equipment;Supervision for safety;Verbal cueing   Standing Lower Body Exercises   Standing Lower Body Exercises Yes   Hip Flexion 1 set of 10;Right    Hip Abduction 1 set of 10;Right    Comments in // B UE support btwn each bout of amb   Bed Mobility    Sit to Stand Contact Guard Assist   Interdisciplinary Plan of Care Collaboration   Patient Position at End of Therapy Seated;Chair Alarm On;Call Light within Reach       Assessment    Pt progressing with gait distance and quality. Cont to be limited by pain in the R LE, FOF and generalized weakness.   Strengths: Able to follow instructions, Alert and oriented, Effective communication skills, Good insight into deficits/needs, Independent prior level of function, Motivated for self care and independence, Pleasant  "and cooperative, Supportive family, Willingly participates in therapeutic activities  Barriers: Decreased endurance, Fatigue, Generalized weakness, Home accessibility, Impaired activity tolerance, Impaired balance, Limited mobility, Pain    Plan    Sit<>stand/ transfer training with FWW, AAROM/ strength training, bed mobility training with leg  and bed controls as needed, progress gait with FWW, Modalities for pain management.        DME  PT DME Recommendations  Wheelchair: 20\" Width, Lightweight, Removable/Flip Back Armrests, Standard Leg Rests  Cushion: Standard  Assistive Device:  (pt has FWW)  Additional Equipment: Transfer Board (30\" Length) (potentially -- TBD)    Passport items to be completed:  Get in/out of bed safely, in/out of a vehicle, safely use mobility device, walk or wheel around home/community, navigate up and down stairs, show how to get up/down from the ground, ensure home is accessible, demonstrate HEP, complete caregiver training    Physical Therapy Problems (Active)       Problem: PT-Long Term Goals       Dates: Start:  12/12/24         Goal: LTG-By discharge, patient will propel wheelchair modified independent 50 ft x 2       Dates: Start:  12/12/24            Goal: LTG-By discharge, patient will ambulate SBA/ CGA with FWW 50 ft x 2       Dates: Start:  12/12/24            Goal: LTG-By discharge, patient will transfer one surface to another SBA/ SPV with FWW       Dates: Start:  12/12/24            Goal: LTG-By discharge, patient will ambulate up/down 4-6 stairs SBA/ CGA with hand rails vs FWW x 3 steps into home       Dates: Start:  12/12/24            Goal: LTG-By discharge, patient will transfer in/out of a car SBA/ CGA with FWW       Dates: Start:  12/12/24              "

## 2024-12-19 NOTE — PROGRESS NOTES
"  Physical Medicine & Rehabilitation Progress Note    Encounter Date: 12/19/2024    Chief Complaint: Decreased mobility, weakness    Interval Events (Subjective):  Patient sitting up in room. She reports she is doing better daily. She has been improving in her walking. SBP has been labile down to 90s.     _____________________________________  Interdisciplinary Team Conference   Most recent IDT on 12/17/2024       Discharge Date/Disposition:  12/28/24  _____________________________________      Objective:  VITAL SIGNS: /52   Pulse (!) 59   Temp 37.1 °C (98.7 °F) (Oral)   Resp 18   Ht 1.702 m (5' 7\")   Wt 97.1 kg (214 lb)   SpO2 93%   BMI 33.52 kg/m²   Gen: NAD  Psych: Mood and affect appropriate  CV: RRR, 0 edema  Resp: CTAB, no upper airway sounds  Abd: NTND  Neuro: AOx4, following commands  Unchanged from 12/18/24    Laboratory Values:  Recent Results (from the past 72 hours)   URINALYSIS    Collection Time: 12/16/24  2:47 PM    Specimen: Urine, Cath   Result Value Ref Range    Color Yellow     Character Clear     Specific Gravity 1.008 <1.035    Ph 6.0 5.0 - 8.0    Glucose Negative Negative mg/dL    Ketones Negative Negative mg/dL    Protein Negative Negative mg/dL    Bilirubin Negative Negative    Urobilinogen, Urine 0.2 <=1.0 EU/dL    Nitrite Negative Negative    Leukocyte Esterase Negative Negative    Occult Blood Negative Negative    Micro Urine Req see below    Blood Culture    Collection Time: 12/16/24  4:20 PM    Specimen: Peripheral; Blood   Result Value Ref Range    Significant Indicator NEG     Source BLD     Site Peripheral     Culture Result       No Growth  Note: Blood cultures are incubated for 5 days and  are monitored continuously.Positive blood cultures  are called to the RN and reported as soon as  they are identified.     Blood Culture    Collection Time: 12/16/24  4:35 PM    Specimen: Peripheral; Blood   Result Value Ref Range    Significant Indicator NEG     Source BLD     Site " Peripheral     Culture Result       No Growth  Note: Blood cultures are incubated for 5 days and  are monitored continuously.Positive blood cultures  are called to the RN and reported as soon as  they are identified.     MRSA By PCR (Amp)    Collection Time: 12/16/24  4:38 PM    Specimen: Nares; Respirate   Result Value Ref Range    MRSA by PCR Negative Negative   POC CoV-2, FLU A/B, RSV by PCR    Collection Time: 12/16/24  4:57 PM   Result Value Ref Range    POC Influenza A RNA, PCR Negative Negative    POC Influenza B RNA, PCR Negative Negative    POC RSV, by PCR Negative Negative    POC SARS-CoV-2, PCR NotDetected NotDetected   CBC WITH DIFFERENTIAL    Collection Time: 12/17/24  5:48 AM   Result Value Ref Range    WBC 8.2 4.8 - 10.8 K/uL    RBC 3.11 (L) 4.20 - 5.40 M/uL    Hemoglobin 9.5 (L) 12.0 - 16.0 g/dL    Hematocrit 28.4 (L) 37.0 - 47.0 %    MCV 91.3 81.4 - 97.8 fL    MCH 30.5 27.0 - 33.0 pg    MCHC 33.5 32.2 - 35.5 g/dL    RDW 44.6 35.9 - 50.0 fL    Platelet Count 410 164 - 446 K/uL    MPV 8.8 (L) 9.0 - 12.9 fL    Neutrophils-Polys 69.10 44.00 - 72.00 %    Lymphocytes 19.50 (L) 22.00 - 41.00 %    Monocytes 8.80 0.00 - 13.40 %    Eosinophils 1.60 0.00 - 6.90 %    Basophils 0.10 0.00 - 1.80 %    Immature Granulocytes 0.90 0.00 - 0.90 %    Nucleated RBC 0.00 0.00 - 0.20 /100 WBC    Neutrophils (Absolute) 5.63 1.82 - 7.42 K/uL    Lymphs (Absolute) 1.59 1.00 - 4.80 K/uL    Monos (Absolute) 0.72 0.00 - 0.85 K/uL    Eos (Absolute) 0.13 0.00 - 0.51 K/uL    Baso (Absolute) 0.01 0.00 - 0.12 K/uL    Immature Granulocytes (abs) 0.07 0.00 - 0.11 K/uL    NRBC (Absolute) 0.00 K/uL   PROCALCITONIN    Collection Time: 12/17/24  5:48 AM   Result Value Ref Range    Procalcitonin 0.06 <0.25 ng/mL       Medications:  Scheduled Medications   Medication Dose Frequency    lisinopril  5 mg DAILY    senna-docusate  2 Tablet Q EVENING    omeprazole  20 mg DAILY    amLODIPine  5 mg DAILY    enoxaparin (LOVENOX) injection  40 mg DAILY  AT 1800     PRN medications: lactulose, meclizine, metaxalone, hydrALAZINE, acetaminophen, senna-docusate **AND** polyethylene glycol/lytes, docusate sodium, magnesium hydroxide, carboxymethylcellulose, benzocaine-menthol, mag hydrox-al hydrox-simeth, ondansetron **OR** ondansetron, traZODone, sodium chloride, oxyCODONE immediate-release **OR** oxyCODONE immediate-release    Diet:  Current Diet Order   Procedures    Diet Order Diet: Regular       Medical Decision Making and Plan:  R distal femur fracture - Patient with mechanical fall onto right knee on 12/7 with distal femur fracture s/p ORIF on 12/7 with Dr. Garzon  -PT and OT for mobility and ADLs. Per guidelines, 15 hours per week between PT, OT and/or SLP.  -Follow-up Dr. Garzon. WBAT     HTN - Patient on Amlodipine 5 mg daily and Lisinopril 20 mg daily. Low SBP, reduce ACEi to 10 mg.  SBP into 90s over weekend, reduce Lisinopril to 5. SBP into 130s, continue Amlodipine 5 mg and Lisinopril 5 mg  -Orthostatic 12/19, reduce Lisinopril to 2.5 mg     Confusion - Will check UA, is retaining fluid with dysuria but may be traumatic catheterization. UTI vs pain medication    Pneumonia - Patient on Augmentin and Zithromax. Completed Zithromax, completed Augmentin 12/13     Leukocytosis - Check AM CBC, on antibiotics. WBC 9.9 on admission, will monitor  -Fever on 12/17 up to 101 and sustained. Started on Zyvox, hospitalist consulted. Repeat labs wnl, Zyvox discontinued. Will monitor     Anemia - Check AM CBC - 8.1, ongoing      Hyponatremia - Check AM CMP - 131, repeat 12/16     Azotemia - Check AM CMP - 26. Repeat 12/16    Hypercalcemia - mild elevation on corrected calcium. Will monitor     Class 1 Obesity due to excess calories - BMI of 32.1 on admission, meets medical criteria. Dietitian to consult     Pain - Patient on PRN Oxycodone and Tylenol      Skin - Patient at risk for skin breakdown due to debility in areas including sacrum, achilles, elbows and  head in addition to other sites. Nursing to assess skin daily.      GI Ppx - Patient on Prilosec for GERD prophylaxis. Patient on Senna-docusate for constipation prophylaxis.   -Discontinue Lactulose     DVT Ppx - Patient Lovenox on transfer. Limited mobility, continue Lovenox  ____________________________________    T. Brian Souza MD/PhD  [facility] - Physical Medicine & Rehabilitation   [facility] - Brain Injury Medicine   ____________________________________

## 2024-12-20 ENCOUNTER — APPOINTMENT (OUTPATIENT)
Dept: OCCUPATIONAL THERAPY | Facility: REHABILITATION | Age: 79
DRG: 559 | End: 2024-12-20
Attending: PHYSICAL MEDICINE & REHABILITATION
Payer: MEDICARE

## 2024-12-20 ENCOUNTER — APPOINTMENT (OUTPATIENT)
Dept: PHYSICAL THERAPY | Facility: REHABILITATION | Age: 79
DRG: 559 | End: 2024-12-20
Attending: PHYSICAL MEDICINE & REHABILITATION
Payer: MEDICARE

## 2024-12-20 PROCEDURE — 700111 HCHG RX REV CODE 636 W/ 250 OVERRIDE (IP): Mod: JZ | Performed by: PHYSICAL MEDICINE & REHABILITATION

## 2024-12-20 PROCEDURE — 97110 THERAPEUTIC EXERCISES: CPT

## 2024-12-20 PROCEDURE — 770010 HCHG ROOM/CARE - REHAB SEMI PRIVAT*

## 2024-12-20 PROCEDURE — 97535 SELF CARE MNGMENT TRAINING: CPT

## 2024-12-20 PROCEDURE — 99232 SBSQ HOSP IP/OBS MODERATE 35: CPT | Performed by: PHYSICAL MEDICINE & REHABILITATION

## 2024-12-20 PROCEDURE — 97530 THERAPEUTIC ACTIVITIES: CPT

## 2024-12-20 PROCEDURE — 97116 GAIT TRAINING THERAPY: CPT

## 2024-12-20 PROCEDURE — 700102 HCHG RX REV CODE 250 W/ 637 OVERRIDE(OP): Performed by: PHYSICAL MEDICINE & REHABILITATION

## 2024-12-20 PROCEDURE — 97602 WOUND(S) CARE NON-SELECTIVE: CPT

## 2024-12-20 PROCEDURE — A9270 NON-COVERED ITEM OR SERVICE: HCPCS | Performed by: PHYSICAL MEDICINE & REHABILITATION

## 2024-12-20 RX ADMIN — OXYCODONE HYDROCHLORIDE 5 MG: 5 TABLET ORAL at 09:07

## 2024-12-20 RX ADMIN — LISINOPRIL 2.5 MG: 5 TABLET ORAL at 06:06

## 2024-12-20 RX ADMIN — ENOXAPARIN SODIUM 40 MG: 100 INJECTION SUBCUTANEOUS at 17:03

## 2024-12-20 RX ADMIN — SENNOSIDES AND DOCUSATE SODIUM 2 TABLET: 50; 8.6 TABLET ORAL at 20:14

## 2024-12-20 RX ADMIN — OXYCODONE HYDROCHLORIDE 5 MG: 5 TABLET ORAL at 06:09

## 2024-12-20 RX ADMIN — OMEPRAZOLE 20 MG: 20 CAPSULE, DELAYED RELEASE ORAL at 08:21

## 2024-12-20 RX ADMIN — OXYCODONE HYDROCHLORIDE 5 MG: 5 TABLET ORAL at 15:23

## 2024-12-20 RX ADMIN — AMLODIPINE BESYLATE 5 MG: 5 TABLET ORAL at 06:06

## 2024-12-20 ASSESSMENT — GAIT ASSESSMENTS
GAIT LEVEL OF ASSIST: CONTACT GUARD ASSIST
DISTANCE (FEET): 40
GAIT LEVEL OF ASSIST: CONTACT GUARD ASSIST
DEVIATION: ANTALGIC;STEP TO;BRADYKINETIC
ASSISTIVE DEVICE: FRONT WHEEL WALKER
DEVIATION: ANTALGIC;STEP TO;BRADYKINETIC
ASSISTIVE DEVICE: FRONT WHEEL WALKER

## 2024-12-20 ASSESSMENT — ACTIVITIES OF DAILY LIVING (ADL)
BED_CHAIR_WHEELCHAIR_TRANSFER_DESCRIPTION: INCREASED TIME;SUPERVISION FOR SAFETY;VERBAL CUEING
BED_CHAIR_WHEELCHAIR_TRANSFER_DESCRIPTION: INCREASED TIME;SET-UP OF EQUIPMENT;VERBAL CUEING
TOILET_TRANSFER_DESCRIPTION: ADAPTIVE EQUIPMENT;GRAB BAR;INCREASED TIME;SET-UP OF EQUIPMENT;VERBAL CUEING
BED_CHAIR_WHEELCHAIR_TRANSFER_DESCRIPTION: ADAPTIVE EQUIPMENT;INCREASED TIME;SET-UP OF EQUIPMENT;VERBAL CUEING
TUB_SHOWER_TRANSFER_DESCRIPTION: GRAB BAR;INCREASED TIME;INITIAL PREPARATION FOR TASK;SET-UP OF EQUIPMENT;SUPERVISION FOR SAFETY;VERBAL CUEING

## 2024-12-20 ASSESSMENT — PAIN DESCRIPTION - PAIN TYPE
TYPE: ACUTE PAIN

## 2024-12-20 NOTE — WOUND TEAM
[facility]  Inpatient Services  Wound and Skin Care Follow-up    Admission Date: 2024     Last order of IP CONSULT TO WOUND CARE was found on 2024 from Hospital Encounter on 12/10/2024     HPI, PMH, SH: Reviewed    Past Surgical History:   Procedure Laterality Date    ORIF, FRACTURE, FEMUR Right 2024    Procedure: RIGHT DISTAL FEMUR OPEN REDUCTION INTERNAL FIXATION, INTRAMEDULLARY NAIL, RIGHT THIGH LIPOMA REMOVAL;  Surgeon: Hi Garzon M.D.;  Location: SURGERY [facility];  Service: Orthopedics    TN TOTAL HIP ARTHROPLASTY Left 3/14/2023    Procedure: LEFT TOTAL HIP ARTHROPLASTY, ANTERIOR APPROACH;  Surgeon: Arvind Diaz M.D.;  Location: SURGERY [facility];  Service: Orthopedics    OTHER Right 10/07/2018    vein ablation right thigh, Dr. Azevedo    KNEE REPLACEMENT, TOTAL  2012    Dr. Harry, left knee    COLONOSCOPY  2010    diverticulosis only, due in     ORIF, WRIST Left     HARDWARE REMOVAL ORTHO Left     wrist    HYSTERECTOMY, TOTAL ABDOMINAL  1980s    has one half of an ovary and a fallopian tube     Social History     Tobacco Use    Smoking status: Former     Current packs/day: 0.00     Average packs/day: 1 pack/day for 36.2 years (36.2 ttl pk-yrs)     Types: Cigarettes     Start date: 1964     Quit date: 2000     Years since quittin.6    Smokeless tobacco: Never   Substance Use Topics    Alcohol use: No     Alcohol/week: 0.0 oz     No chief complaint on file.    Diagnosis: Closed displaced fracture of distal epiphysis of right femur with routine healing [S72.441D]    Unit where seen by Wound Team: [id number]/01     WOUND FOLLOW UP RELATED TO:  right ankle DTI, left medial knee DTI       WOUND TEAM PLAN OF CARE - Frequency of Follow-up:   Nursing to follow dressing orders written for wound care. Contact wound team if area fails to progress, deteriorates or with any questions/concerns if something comes up before next  scheduled follow up (See below as to whether wound is following and frequency of wound follow up)  Dressing changes by wound team:                   Weekly - right ankle and left medial knee, and right groin    WOUND HISTORY:       Pt was wearing ACE bandage on admission on the right leg    Patient is a 79 y.o. female with a PMH of bladder cancer, HTN, A fib, HLD, and osteopenia who presented on 12/7/24 with GLF. Patient reportedly had a mechanical fall onto right knee with severe 10/10 pain. In ED she was found to have distal femur fracture. Orthopedic surgery was consulted and recommended ORIF. She underwent ORIF on 12/7/24 with Dr. Garzon. Patient is WBAT. Hospital course complicated by SOB concerning for pneumonia, leukocytosis, and HTN. She has been started on antibiotics for the elevated WBC.        WOUND ASSESSMENT/LDA  Wound 12/11/24 Other (comment) Thigh Anterior Right blanchable redness with blistering (Active)   Date First Assessed: 12/11/24   Present on Original Admission: Yes  Hand Hygiene Completed: Yes  Primary Wound Type: (c) Other (comment)  Location: Thigh  Wound Orientation: Anterior  Laterality: Right  Wound Description (Comments): blanchable redness...      Assessments 12/20/2024 11:00 AM   Site Assessment Clean;Dry;Intact;Epithelialization   Periwound Assessment Clean;Dry;Intact   Margins Attached edges   Drainage Amount None   Dressing Status Open to Air   Wound Length (cm) 0 cm   Wound Width (cm) 0 cm   Wound Depth (cm) 0 cm   Wound Surface Area (cm^2) 0 cm^2   Wound Volume (cm^3) 0 cm^3   Wound Healing % 100       Wound 12/11/24 Pressure Injury Knee;Thigh Anterior;Medial;Posterior Right right medial posterior, knee - --->12/13 DTI (Active)   Date First Assessed: 12/11/24   Present on Original Admission: Yes  Hand Hygiene Completed: Yes  Primary Wound Type: Pressure Injury  Location: Knee;Thigh  Wound Orientation: Anterior;Medial;Posterior  Laterality: Right  Wound Description  (Comments): ...      Assessments 12/20/2024 11:00 AM   Wound Image     Site Assessment Clean;Dry;Intact;Purple;Red   Periwound Assessment Clean;Dry;Intact   Margins Attached edges   Drainage Amount None   Treatments Cleansed;Nonselective debridement;Site care   Wound Cleansing Approved Wound Cleanser   Periwound Protectant No-sting Skin Prep   Dressing Status Clean;Dry;Intact   Dressing Changed Changed   Dressing Change/Treatment Frequency Every 72 hrs, and As Needed   NEXT Dressing Change/Treatment Date 12/23/24   NEXT Weekly Photo (Inpatient Only) 12/23/24   Wound Team Following Weekly   Pressure Injury Stage Deep Tissue   Wound Length (cm) 0.5 cm   Wound Width (cm) 4.5 cm   Wound Depth (cm) 0 cm   Wound Surface Area (cm^2) 2.25 cm^2   Wound Volume (cm^3) 0 cm^3   Wound Odor None   WOUND NURSE ONLY - Time Spent with Patient (mins) 30       Wound 12/11/24 Ankle Anterior;Lateral Right lateral ankle- DTI (Active)   Date First Assessed: 12/11/24   Present on Original Admission: Yes  Hand Hygiene Completed: Yes  Location: Ankle  Wound Orientation: Anterior;Lateral  Laterality: Right  Wound Description (Comments): lateral ankle- DTI      Assessments 12/20/2024 11:00 AM   Wound Image     Site Assessment Clean;Dry;Intact;Red;Pink   Periwound Assessment Clean;Dry;Intact   Margins Attached edges   Closure Open to air   Drainage Amount None   Treatments Cleansed;Nonselective debridement;Site care   Wound Cleansing Approved Wound Cleanser   Periwound Protectant No-sting Skin Prep   Dressing Status Clean;Dry;Intact   Dressing Changed Reinforced   Dressing Change/Treatment Frequency Weekly, and As Needed   NEXT Dressing Change/Treatment Date 12/25/24   NEXT Weekly Photo (Inpatient Only) 12/22/24   Wound Length (cm) 0.8 cm   Wound Width (cm) 0.8 cm   Wound Depth (cm) 0 cm   Wound Surface Area (cm^2) 0.64 cm^2   Wound Volume (cm^3) 0 cm^3   Wound Odor None   WOUND NURSE ONLY - Time Spent with Patient (mins) 30       Moisture  Associated Skin Damage Thigh (Active)   No First Observed Date or First Observed Time found.   Laterality: Right  Wound Location : Thigh      Assessments 12/20/2024 11:00 AM   Wound Image     NEXT Weekly Photo (Inpatient Only) 12/22/24   Drainage Amount None   Periwound Assessment Clean;Dry;Intact   IAD Cleansing Approved Wound Cleanser   Periwound Protectant No-sting Skin Prep   IAD Containment Device None   Length (cm) 10   Width (cm) 10   WOUND NURSE ONLY - Time Spent with Patient (mins) 30        Vascular:    BABAR:   No results found.    Lab Values:    Lab Results   Component Value Date/Time    WBC 8.2 12/17/2024 05:48 AM    WBC 5.3 09/15/2009 08:49 AM    RBC 3.11 (L) 12/17/2024 05:48 AM    RBC 4.66 09/15/2009 08:49 AM    HEMOGLOBIN 9.5 (L) 12/17/2024 05:48 AM    HEMATOCRIT 28.4 (L) 12/17/2024 05:48 AM    HBA1C 4.9 12/12/2024 06:03 AM         Culture Results show:  No results found for this or any previous visit (from the past 720 hours).    Pain Level/Medicated:  None, Tolerated without pain medication       INTERVENTIONS BY WOUND TEAM:  Chart and images reviewed. Discussed with bedside RN. All areas of concern (based on picture review, LDA review and discussion with bedside RN) have been thoroughly assessed. Documentation of areas based on significant findings. This RN in to assess patient. Performed standard wound care which includes appropriate positioning, dressing removal and non-selective debridement. Pictures and measurements obtained weekly if/when required.    Wound:  right thigh- redness with blistering, resolved.  N/A-area is healed. No interventions needed . Open to air     Wound:  right medial knee-DTI  Cleansed/Non-selectively Debrided with:  Wound cleanser and Gauze  Joycelyn wound: Cleansed with Wound cleanser and Gauze, Prepped with No Sting  Primary Dressing:  no sting barrier film  Secondary (Outer) Dressing: open to air     Wound:  Right lateral ankle DTI  Preparation for Dressing removal:  Removed without difficulty  Cleansed/Non-selectively Debrided with:  Wound cleanser and Gauze  Joycelyn wound: Cleansed with Wound cleanser and Gauze, Prepped with No Sting  Primary Dressing:  reinforced the same silicone foam. There is no drainage and can still be used     Wound:  right groin-MASD  Cleansed/Non-selectively Debrided with:  Wound cleanser and Gauze  Joycelyn wound: Cleansed with Wound cleanser and Gauze, Prepped with No Sting  Primary Dressing:  open to air  Secondary (Outer) Dressing: N/A    Advanced Wound Care Discharge Planning  Number of Clinicians necessary to complete wound care: 1  Is patient requiring IV pain medications for dressing changes:  No   Length of time for dressing change 15 min. (This does not include chart review, pre-medication time, set up, clean up or time spent charting.)    Interdisciplinary consultation: Patient, Bedside RN (Em), N/A.  Pressure injury and staging reviewed with N/A.    EVALUATION / RATIONALE FOR TREATMENT:     Date:  12/20/24  Wound Status:  Wound improving    Right ankle is less purple, and is starting to lela on the edges. -barrier film to protect the skin and silicone foam to offload.    Right medial knee-is almost healed. Barrier film to protect the skin and left open to air. It is offloaded. Recommend covering with an offloading foam when she is in the wheelchair, or if it is touching anything    Right groin-barrier film applied. It is mild MASD. Can continue with barrier cream. She says it is not painful, and does not itch.     Date:  12/13/24  Wound Status:  Wound progressing as expected (initial full evaluation, but this patient was briefly assessed for a brief skin check-on admission)    Right lateral ankle & right medial, posterior knee- These wounds are dry and purple non blanching. No drainage. May or may not open up and evolve.   Barrier film provides a clear protective barrier. Silicone adhesive foam offloads It and protects it from other things  that might touch like shoes and wheelchair etc.         Goals: Steady decrease in wound area and depth weekly.    NURSING PLAN OF CARE ORDERS:  Dressing changes: See Dressing Care orders    NUTRITION RECOMMENDATIONS   Wound Team Recommendations:  N/A     DIET ORDERS (From admission to next 24h)       Start     Ordered    12/11/24 1156  Diet Order Diet: Regular  ALL MEALS        Question:  Diet:  Answer:  Regular    12/11/24 1155                    PREVENTATIVE INTERVENTIONS:   Q shift Felix - performed per nursing policy  Q shift pressure point assessments - performed per nursing policy    Surface/Positioning  Standard/trauma mattress - Currently in Place  Reposition q 2 hours - Currently in Place  Waffle overlay  - Currently in Place    Offloading/Redistribution  Heels floated with waffle overlay - Currently in Place  Float Heels off Bed with Pillows - Currently in Place           Containment/Moisture Prevention    Dri-yuly pad - Currently in Place  Brief with mobilization - Currently in Place  Barrier paste - Currently in Place    Mobilization      working with therapies    Anticipated discharge plans:  Self/Family Care        Vac Discharge Needs:  Vac Discharge plan is purely a recommendation from wound team and not a requirement for discharge unless otherwise stated by physician.  Not Applicable Pt not on a wound vac

## 2024-12-20 NOTE — PROGRESS NOTES
..                                                         NURSING DAILY NOTE    Name: Franchesca Paige   Date of Admission: 12/11/2024   Admitting Diagnosis: No Principal Problem: There is no principal problem currently on the Problem List. Please update the Problem List and refresh.  Attending Physician: ARMANDO GLASS M.D.  Allergies: Ibuprofen and Sulfa drugs    Safety  Patient Assist  Max 1  Patient Precautions  Fall Risk, Weight Bearing As Tolerated Right Lower Extremity  Precaution Comments     Bed Transfer Status  Contact Guard Assist  Toilet Transfer Status   Minimal Assist  Assistive Devices  Wheelchair  Oxygen  None - Room Air  Diet/Therapeutic Dining  Current Diet Order   Procedures    Diet Order Diet: Regular     Pill Administration  whole  Agitated Behavioral Scale     ABS Level of Severity       Fall Risk  Has the patient had a fall this admission?   No  Candice Porter Fall Risk Scoring  16, HIGH RISK  Fall Risk Safety Measures  bed alarm and chair alarm    Vitals  Temperature: 37.1 °C (98.7 °F)  Temp src: Oral  Pulse: (!) 58  Respiration: 18  Blood Pressure : 135/52  Blood Pressure MAP (Calculated): 80 MM HG  BP Location: Right, Upper Arm  Patient BP Position: Ireland's Position     Oxygen  Pulse Oximetry: 94 %  O2 (LPM): 0  O2 Delivery Device: None - Room Air    Bowel and Bladder  Last Bowel Movement  12/19/24  Stool Type  Type 1: Separate hard lumps (hard to pass)  Bowel Device  Diaper  Continent  Bladder:     Bowel: Continent movement  Bladder Function  Urine Void (mL): 200 ml  Number of Times Voided: 1  Urine Color: Unable To Evaluate  Number of Times Incontinent of Urine: 0  Straight Catheter: 550 ml  Genitourinary Assessment   Bladder Assessment (WDL):  WDL Except  Rodriges Catheter: Not Applicable  Urinary Symptoms:  (retention)  Urine Color: Unable To Evaluate  Number of Bladder Accidents: 0  Total Number of Bladder of Accidents in Last 7 Days: 1  Number of Times Incontinent of Urine:  "0  Bladder Device: Bathroom  Time Void: Yes  Bladder Scan: Post Void  $ Bladder Scan Results (mL): 266  Intermittent Catheter: Yes -Go To LDA \"Indwelling Catheter Group\"  Bladder Medications: Yes    Skin  Felix Score   17  Sensory Interventions   Skin Preventative Measures: Pillows in Use for Support / Positioning  Moisture Interventions  Moisturizers/Barriers: Barrier Wipes      Pain  Pain Rating Scale  4 - Distracts me, can do usual activities  Pain Location  Leg  Pain Location Orientation  Right  Pain Interventions   Medication (see MAR)    ADLs    Bathing   Patient Refused Bathing (Pt states shes too tired from todays activites)  Linen Change      Personal Hygiene  Perineal Care, Moist Joycelyn Wipes  Chlorhexidine Bath      Oral Care     Teeth/Dentures     Shave     Nutrition Percentage Eaten  *  * Meal *  *, Lunch, Between 25-50% Consumed  Environmental Precautions  Bed in Low Position  Patient Turns/Positioning  Patient turns self independently side to side without assistance, to offload sacral area  Patient Turns Assistance/Tolerance  Assistance of One  Bed Positions  Bed Controls On, Bed Locked  Head of Bed Elevated  Self regulated      Psychosocial/Neurologic Assessment  Psychosocial Assessment  Psychosocial (WDL):  Within Defined Limits  Patient Behaviors: Forgetful  Neurologic Assessment  Neuro (WDL): Within Defined Limits  Level of Consciousness: Alert  Orientation Level: Oriented X4  Cognition: Appropriate judgement, Appropriate safety awareness, Appropriate attention/concentration, Appropriate for developmental age, Follows commands  Speech: Clear  EENT (WDL):  WDL Except    Cardio/Pulmonary Assessment  Edema   RUE Edema: 1+  RLE Edema: 2+  LUE Edema: Trace  LLE Edema: 1+  Respiratory Breath Sounds  RUL Breath Sounds: Clear  RML Breath Sounds: Clear  RLL Breath Sounds: Diminished  DINO Breath Sounds: Clear  LLL Breath Sounds: Diminished  Cardiac Assessment   Cardiac (WDL):  WDL Except (HTN)        "

## 2024-12-20 NOTE — THERAPY
Physical Therapy   Daily Treatment     Patient Name: Franchesca Paige  Age:  79 y.o., Sex:  female  Medical Record #: [id number]  Today's Date: 12/20/2024     Precautions  Precautions: Fall Risk, Weight Bearing As Tolerated Right Lower Extremity    Subjective    Pt resting in bed, willing to participate.     Objective       12/20/24 1231   PT Charge Group   PT Therapeutic Exercise (Units) 1   PT Therapeutic Activities (Units) 1   PT Total Time Spent   PT Individual Total Time Spent (Mins) 30   Gait Functional Level of Assist    Gait Level Of Assist Contact Guard Assist   Assistive Device Front Wheel Walker   Distance (Feet)   (wc<>Nustep 10 ft x 2)   Deviation Antalgic;Step To;Bradykinetic   Transfer Functional Level of Assist   Bed, Chair, Wheelchair Transfer Contact Guard Assist   Bed Chair Wheelchair Transfer Description Increased time;Set-up of equipment;Verbal cueing   Toilet Transfers Contact Guard Assist   Toilet Transfer Description Adaptive equipment;Grab bar;Increased time;Set-up of equipment;Verbal cueing   Sitting Lower Body Exercises   Nustep Resistance Level 1  (10 minutes for AAROM)         Assessment    Pt tolerated increased AAROM duration on Nustep today but took intermittent rests with RLE stretched into extension. Remains limited by RLE pain/ swelling/weakness.    Strengths: Able to follow instructions, Alert and oriented, Effective communication skills, Good insight into deficits/needs, Independent prior level of function, Motivated for self care and independence, Pleasant and cooperative, Supportive family, Willingly participates in therapeutic activities  Barriers: Decreased endurance, Fatigue, Generalized weakness, Home accessibility, Impaired activity tolerance, Impaired balance, Limited mobility, Pain    Plan    Sit<>stand/ transfer training with FWW, AAROM/ strength training, bed mobility training with leg  to standard bed, progress gait with FWW, Modalities for pain management. Steps  "in // bars, 10 MWT    DME  PT DME Recommendations  Wheelchair: 20\" Width, Lightweight, Removable/Flip Back Armrests, Standard Leg Rests  Cushion: Standard  Assistive Device:  (pt has FWW)  Additional Equipment: Transfer Board (30\" Length) (potentially -- TBD)    Passport items to be completed:  Get in/out of bed safely, in/out of a vehicle, safely use mobility device, walk or wheel around home/community, navigate up and down stairs, show how to get up/down from the ground, ensure home is accessible, demonstrate HEP, complete caregiver training    Physical Therapy Problems (Active)       Problem: PT-Long Term Goals       Dates: Start:  12/12/24         Goal: LTG-By discharge, patient will propel wheelchair modified independent 50 ft x 2       Dates: Start:  12/12/24            Goal: LTG-By discharge, patient will ambulate SBA/ CGA with FWW 50 ft x 2       Dates: Start:  12/12/24            Goal: LTG-By discharge, patient will transfer one surface to another SBA/ SPV with FWW       Dates: Start:  12/12/24            Goal: LTG-By discharge, patient will ambulate up/down 4-6 stairs SBA/ CGA with hand rails vs FWW x 3 steps into home       Dates: Start:  12/12/24            Goal: LTG-By discharge, patient will transfer in/out of a car SBA/ CGA with FWW       Dates: Start:  12/12/24              "

## 2024-12-20 NOTE — CARE PLAN
"  Problem: Pain - Standard  Goal: Alleviation of pain or a reduction in pain to the patient’s comfort goal  12/20/2024 1258 by Em Massey R.N.  Outcome: Progressing  Patient is able to rate pain on  a scale of 1-10.                Problem: Fall Risk - Rehab  Goal: Patient will remain free from falls  12/20/2024 1258 by Em Massey R.N.  Outcome: Progressing  Candice Porter Fall risk Assessment Score: 16    High fall risk Interventions     - Bed and strip alarm   - Yellow sign by the door   - Yellow wrist band \"Fall risk\"  - Room near to the nurse station  - Do not leave patient unattended in the bathroom  - Fall risk education provided               "

## 2024-12-20 NOTE — PROGRESS NOTES
"  Physical Medicine & Rehabilitation Progress Note    Encounter Date: 12/20/2024    Chief Complaint: Decreased mobility, weakness    Interval Events (Subjective):  Patient sitting up in room. She reports large swelling in her right ankle developed overnight. She reports no pain but slightly warm. She denies history of gout. Will check US.     _____________________________________  Interdisciplinary Team Conference   Most recent IDT on 12/17/2024       Discharge Date/Disposition:  12/28/24  _____________________________________      Objective:  VITAL SIGNS: /50   Pulse (!) 55   Temp 36.8 °C (98.3 °F) (Oral)   Resp 18   Ht 1.702 m (5' 7\")   Wt 97.1 kg (214 lb)   SpO2 94%   BMI 33.52 kg/m²   Gen: NAD  Psych: Mood and affect appropriate  CV: RRR, 2+ RLE edema  Resp: CTAB, no upper airway sounds  Abd: NTND  Neuro:AOx4, following commands    Laboratory Values:  No results found for this or any previous visit (from the past 72 hours).      Medications:  Scheduled Medications   Medication Dose Frequency    lisinopril  2.5 mg DAILY    senna-docusate  2 Tablet Q EVENING    omeprazole  20 mg DAILY    amLODIPine  5 mg DAILY    enoxaparin (LOVENOX) injection  40 mg DAILY AT 1800     PRN medications: lactulose, meclizine, metaxalone, hydrALAZINE, acetaminophen, senna-docusate **AND** polyethylene glycol/lytes, docusate sodium, magnesium hydroxide, carboxymethylcellulose, benzocaine-menthol, mag hydrox-al hydrox-simeth, ondansetron **OR** ondansetron, traZODone, sodium chloride, oxyCODONE immediate-release **OR** oxyCODONE immediate-release    Diet:  Current Diet Order   Procedures    Diet Order Diet: Regular       Medical Decision Making and Plan:  R distal femur fracture - Patient with mechanical fall onto right knee on 12/7 with distal femur fracture s/p ORIF on 12/7 with Dr. Garzon  -PT and OT for mobility and ADLs. Per guidelines, 15 hours per week between PT, OT and/or SLP.  -Follow-up Dr. Garzon. " WBAT     HTN - Patient on Amlodipine 5 mg daily and Lisinopril 20 mg daily. Low SBP, reduce ACEi to 10 mg.  SBP into 90s over weekend, reduce Lisinopril to 5. SBP into 130s, continue Amlodipine 5 mg and Lisinopril 5 mg  -Orthostatic 12/19, reduce Lisinopril to 2.5 mg  -SBP 110s on 12/20, continue Lisinopril 2.5 mg and amlodipine 5 mg     Confusion - Will check UA, is retaining fluid with dysuria but may be traumatic catheterization. UTI vs pain medication    Pneumonia - Patient on Augmentin and Zithromax. Completed Zithromax, completed Augmentin 12/13     Leukocytosis - Check AM CBC, on antibiotics. WBC 9.9 on admission, will monitor  -Fever on 12/17 up to 101 and sustained. Started on Zyvox, hospitalist consulted. Repeat labs wnl, Zyvox discontinued. Will monitor     Anemia - Check AM CBC - 8.1, ongoing      Hyponatremia - Check AM CMP - 131, repeat 12/16 136     Azotemia - Check AM CMP - 26. Repeat 12/16 - 17, improved.     Hypercalcemia - mild elevation on corrected calcium. Will monitor     Class 1 Obesity due to excess calories - BMI of 32.1 on admission, meets medical criteria. Dietitian to consult     Pain - Patient on PRN Oxycodone and Tylenol      Skin - Patient at risk for skin breakdown due to debility in areas including sacrum, achilles, elbows and head in addition to other sites. Nursing to assess skin daily.      GI Ppx - Patient on Prilosec for GERD prophylaxis. Patient on Senna-docusate for constipation prophylaxis.   -Discontinue Lactulose     DVT Ppx - Patient Lovenox on transfer. Limited mobility, continue Lovenox  -Swelling on 12/20, check US  ____________________________________    T. Brian Souza MD/PhD  [facility] - Physical Medicine & Rehabilitation   [facility] - Brain Injury Medicine   ____________________________________

## 2024-12-20 NOTE — THERAPY
"Physical Therapy   Daily Treatment     Patient Name: Franchesca Paige  Age:  79 y.o., Sex:  female  Medical Record #: [id number]  Today's Date: 12/20/2024     Precautions  Precautions: Fall Risk, Weight Bearing As Tolerated Right Lower Extremity    Subjective    Pt up in wc, willing to participate.     Objective       12/20/24 0831   PT Charge Group   PT Gait Training (Units) 1   PT Therapeutic Exercise (Units) 1   PT Therapeutic Activities (Units) 2   PT Total Time Spent   PT Individual Total Time Spent (Mins) 60   Gait Functional Level of Assist    Gait Level Of Assist Contact Guard Assist   Assistive Device Front Wheel Walker   Distance (Feet) 40  (in addition to 10 ft x 4 wc<>bed/car)   # of Times Distance was Traveled 1   Deviation Antalgic;Step To;Bradykinetic   Wheelchair Functional Level of Assist   Wheelchair Assist Modified Independent   Distance Wheelchair (Feet or Distance) 75 x 2   Wheelchair Description Extra time   Stairs Functional Level of Assist   Level of Assist with Stairs Minimal Assist   # of Stairs Climbed 2  (4\" step stool in // bars)   Stairs Description Extra time;Hand rails;Verbal cueing   Transfer Functional Level of Assist   Bed, Chair, Wheelchair Transfer Contact Guard Assist   Bed Chair Wheelchair Transfer Description Adaptive equipment;Increased time;Set-up of equipment;Verbal cueing   Sitting Lower Body Exercises   Ankle Pumps 3 sets of 10   Hip Flexion 3 sets of 10   Hip Abduction 3 sets of 10   Hip Adduction 3 sets of 10   Long Arc Quad 3 sets of 10   Hamstring Curl 3 sets of 10  (BLE's on  bolster for AAROM)   Bed Mobility    Supine to Sit Modified Independent   Sit to Supine Modified Independent   Sit to Stand Contact Guard Assist   Scooting Modified Independent     Pt able to complete sit<>supine with leg  modified independent to standard bed.  Pt completed car transfer with min A for E's into vehicle and cues for sequencing/ safety    Assessment    Pt continues to improve " "overall mobility tasks, reported increased fatigue with ambulation x 40 ft but ok with multiple shorter distances. Remains with RLE pain/swelling/ weakness and impaired weight bearing.     Strengths: Able to follow instructions, Alert and oriented, Effective communication skills, Good insight into deficits/needs, Independent prior level of function, Motivated for self care and independence, Pleasant and cooperative, Supportive family, Willingly participates in therapeutic activities  Barriers: Decreased endurance, Fatigue, Generalized weakness, Home accessibility, Impaired activity tolerance, Impaired balance, Limited mobility, Pain    Plan    Sit<>stand/ transfer training with FWW, AAROM/ strength training, bed mobility training with leg  to standard bed, progress gait with FWW, Modalities for pain management. Initiate steps in // bars     DME  PT DME Recommendations  Wheelchair: 20\" Width, Lightweight, Removable/Flip Back Armrests, Standard Leg Rests  Cushion: Standard  Assistive Device:  (pt has FWW)  Additional Equipment: Transfer Board (30\" Length) (potentially -- TBD)    Passport items to be completed:  Get in/out of bed safely, in/out of a vehicle, safely use mobility device, walk or wheel around home/community, navigate up and down stairs, show how to get up/down from the ground, ensure home is accessible, demonstrate HEP, complete caregiver training    Physical Therapy Problems (Active)       Problem: PT-Long Term Goals       Dates: Start:  12/12/24         Goal: LTG-By discharge, patient will propel wheelchair modified independent 50 ft x 2       Dates: Start:  12/12/24            Goal: LTG-By discharge, patient will ambulate SBA/ CGA with FWW 50 ft x 2       Dates: Start:  12/12/24            Goal: LTG-By discharge, patient will transfer one surface to another SBA/ SPV with FWW       Dates: Start:  12/12/24            Goal: LTG-By discharge, patient will ambulate up/down 4-6 stairs SBA/ CGA with " hand rails vs FWW x 3 steps into home       Dates: Start:  12/12/24            Goal: LTG-By discharge, patient will transfer in/out of a car SBA/ CGA with FWW       Dates: Start:  12/12/24

## 2024-12-20 NOTE — THERAPY
Occupational Therapy  Daily Treatment     Patient Name: Franchesca Paige  Age:  79 y.o., Sex:  female  Medical Record #: [id number]  Today's Date: 12/20/2024     Precautions  Precautions: (P) Fall Risk, Weight Bearing As Tolerated Right Lower Extremity         Subjective    First session: Pt seated in w/c upon arrival. Pt pleasant and cooperative, agreeable to therapy.    Second session: pt supine in bed upon arrival. Pt pleasant and cooperative, agreeable to therapy.     Objective       12/20/24 0931   OT Charge Group   Charges Yes   OT Therapeutic Exercise (Units) 2   OT Total Time Spent   OT Individual Total Time Spent (Mins) 30   Precautions   Precautions Fall Risk;Weight Bearing As Tolerated Right Lower Extremity   Vitals   O2 Delivery Device None - Room Air   Sitting Upper Body Exercises   Sitting Upper Body Exercises Yes   Chest Press 1 set of 10;Bilateral;Other Resistance (See Comments)  (pink theraband)   Front Arm Raise 1 set of 10;Right ;Left;Other Resistance (See Comments)  (pink theraband)   Shoulder Extension 1 set of 10;Right ;Left;Other Resistance (See Comments)  (pink theraband)   Bicep Curls 1 set of 10;Right ;Left;Other Resistance (See Comments)  (pink theraband)   Tricep Press 1 set of 10;Bilateral;Other Resistance (See Comments)  (w/c pushup)   Elbow Extension 1 set of 10;Right ;Left;Other Resistance (See Comments)  (pink theraband)   Interdisciplinary Plan of Care Collaboration   IDT Collaboration with  Certified Nursing Assistant   Patient Position at End of Therapy Seated;Self Releasing Lap Belt Applied;Other (Comments)  (with CNA in bathroom)     Administered UE HEP for the following exercises: bicep curls, elbow extension, tricep press, chest press, shoulder flexion, and shoulder extension. Pt instructed to complete with pink theraband for 3x10 reps of each exercise 3x/day to increase overall UE strength to increase independence with ADLs/IADLs.        12/20/24 1401   OT Charge Group    Charges Yes   OT Self Care / ADL (Units) 1   OT Therapy Activity (Units) 4   OT Total Time Spent   OT Individual Total Time Spent (Mins) 60   Precautions   Precautions Fall Risk;Weight Bearing As Tolerated Right Lower Extremity   Vitals   Patient BP Position Sitting   Blood Pressure  116/55   O2 (LPM) 0   O2 Delivery Device None - Room Air   Functional Level of Assist   Bed, Chair, Wheelchair Transfer Contact Guard Assist   Bed Chair Wheelchair Transfer Description Increased time;Supervision for safety;Verbal cueing  (stand pivot from EOB to w/c)   Tub / Shower Transfers Contact Guard Assist   Tub Shower Transfer Description Grab bar;Increased time;Initial preparation for task;Set-up of equipment;Supervision for safety;Verbal cueing  (dry shower transfer from walking with FWW <> tub transfer bench in ADL suite walk-in shower)   Sitting Upper Body Exercises   Sitting Upper Body Exercises Yes   Tricep Press 2 sets of 10;Bilateral;Weight (See Comments for lbs)  (32.5 lbs on rickshaw)   IADL Treatments   IADL Treatments Kitchen mobility education   Kitchen Mobility Education kitchen mobility activity; see note for details   Bed Mobility    Supine to Sit Modified Independent   Interdisciplinary Plan of Care Collaboration   Patient Position at End of Therapy Seated;Chair Alarm On;Call Light within Reach;Tray Table within Reach;Phone within Reach       pt completed kitchen mobility activity with SBA and FWW. Pt completed scavenger hunt with 9 small cones placed in kitchen cupboards and appliances in high and low positions to practice kitchen mobility at the FWW level and increase independence with IADLs. Pt took seated rest break in w/c. Following retrieval, pt transported cones scattered across kitchen counter with FWW and SBA.     Pt edu on kitchen set-up at home to improve accessibility. Informed pt on relocating commonly used items onto kitchen counter, high part of low cabinets, and low part of high cabinets to  "avoid overreaching and excessive bending. Pt edu on use of reacher to retrieve items as well.     Assessment    First session: Pt completed BUE exercise to improve overall strength and cardiovascular endurance in order to maximize independence and safety with ADL/IADLs and functional mobility tasks. Pt completed to best of their abilities with no complaints of pain.    Second session: pt tolerated session well with no LOB noted. Pt able to safely navigate through kitchen at FWW level without VC from OT for safety. Pt continues to be limited by pain and inability to fully bear weight onto RLE.    Strengths: Able to follow instructions, Effective communication skills, Good insight into deficits/needs, Independent prior level of function, Making steady progress towards goals, Motivated for self care and independence, Pleasant and cooperative, Willingly participates in therapeutic activities  Barriers: Decreased endurance, Fatigue, Generalized weakness, Impaired activity tolerance, Impaired balance, Limited mobility, Pain, Pain poorly managed    Plan    ADLs/IADLs, standing tolerance, general strengthening/endurance     DME  OT DME Recommendations  Additional Equipment: Transfer Board (30\" Length) (potentially -- TBD)    Passport items to be completed:  Perform bathroom transfers, complete dressing, complete feeding, get ready for the day, prepare a simple meal, participate in household tasks, adapt home for safety needs, demonstrate home exercise program, complete caregiver training     Occupational Therapy Goals (Active)       Problem: Bathing       Dates: Start:  12/17/24         Goal: STG-Within one week, patient will bathe with CGA       Dates: Start:  12/17/24               Problem: Dressing       Dates: Start:  12/12/24         Goal: STG-Within one week, patient will dress LB with mod A using AE as needed.       Dates: Start:  12/12/24         Goal Note filed on 12/17/24 1205 by Ita Melton, OT       " Requires max A                 Problem: Functional Transfers       Dates: Start:  12/17/24         Goal: STG-Within one week, patient will transfer to toilet with SBA.       Dates: Start:  12/17/24            Goal: STG-Within one week, patient will transfer to step in shower with CGA.       Dates: Start:  12/17/24               Problem: OT Long Term Goals       Dates: Start:  12/12/24         Goal: LTG-By discharge, patient will complete basic self care tasks with supervision.       Dates: Start:  12/12/24            Goal: LTG-By discharge, patient will perform bathroom transfers with supervision.       Dates: Start:  12/12/24

## 2024-12-21 LAB
BACTERIA BLD CULT: NORMAL
BACTERIA BLD CULT: NORMAL
SIGNIFICANT IND 70042: NORMAL
SIGNIFICANT IND 70042: NORMAL
SITE SITE: NORMAL
SITE SITE: NORMAL
SOURCE SOURCE: NORMAL
SOURCE SOURCE: NORMAL

## 2024-12-21 PROCEDURE — A9270 NON-COVERED ITEM OR SERVICE: HCPCS | Performed by: PHYSICAL MEDICINE & REHABILITATION

## 2024-12-21 PROCEDURE — 700111 HCHG RX REV CODE 636 W/ 250 OVERRIDE (IP): Mod: JZ | Performed by: PHYSICAL MEDICINE & REHABILITATION

## 2024-12-21 PROCEDURE — 770010 HCHG ROOM/CARE - REHAB SEMI PRIVAT*

## 2024-12-21 PROCEDURE — 700102 HCHG RX REV CODE 250 W/ 637 OVERRIDE(OP): Performed by: PHYSICAL MEDICINE & REHABILITATION

## 2024-12-21 RX ADMIN — OXYCODONE HYDROCHLORIDE 10 MG: 10 TABLET ORAL at 13:13

## 2024-12-21 RX ADMIN — ENOXAPARIN SODIUM 40 MG: 100 INJECTION SUBCUTANEOUS at 17:13

## 2024-12-21 RX ADMIN — OXYCODONE HYDROCHLORIDE 10 MG: 10 TABLET ORAL at 20:52

## 2024-12-21 RX ADMIN — OXYCODONE HYDROCHLORIDE 5 MG: 5 TABLET ORAL at 05:52

## 2024-12-21 RX ADMIN — LISINOPRIL 2.5 MG: 5 TABLET ORAL at 05:29

## 2024-12-21 RX ADMIN — AMLODIPINE BESYLATE 5 MG: 5 TABLET ORAL at 05:29

## 2024-12-21 RX ADMIN — OMEPRAZOLE 20 MG: 20 CAPSULE, DELAYED RELEASE ORAL at 07:31

## 2024-12-21 ASSESSMENT — PAIN DESCRIPTION - PAIN TYPE
TYPE: ACUTE PAIN;SURGICAL PAIN
TYPE: ACUTE PAIN
TYPE: ACUTE PAIN

## 2024-12-21 ASSESSMENT — PATIENT HEALTH QUESTIONNAIRE - PHQ9
2. FEELING DOWN, DEPRESSED, IRRITABLE, OR HOPELESS: NOT AT ALL
SUM OF ALL RESPONSES TO PHQ9 QUESTIONS 1 AND 2: 0
SUM OF ALL RESPONSES TO PHQ9 QUESTIONS 1 AND 2: 0
1. LITTLE INTEREST OR PLEASURE IN DOING THINGS: NOT AT ALL
2. FEELING DOWN, DEPRESSED, IRRITABLE, OR HOPELESS: NOT AT ALL
1. LITTLE INTEREST OR PLEASURE IN DOING THINGS: NOT AT ALL

## 2024-12-21 NOTE — CARE PLAN
Problem: Pain - Standard  Goal: Alleviation of pain or a reduction in pain to the patient’s comfort goal  Outcome: Progressing     Problem: Fall Risk - Rehab  Goal: Patient will remain free from falls  Outcome: Progressing     The patient is Stable - Low risk of patient condition declining or worsening    Shift Goals  Clinical Goals: safety  Patient Goals: rest ; pain control  Family Goals: ILIANA

## 2024-12-21 NOTE — PROGRESS NOTES
NURSING DAILY NOTE    Name: Franchesca Paige   Date of Admission: 12/11/2024   Admitting Diagnosis: No Principal Problem: There is no principal problem currently on the Problem List. Please update the Problem List and refresh.  Attending Physician: ARMANDO GLASS M.D.  Allergies: Ibuprofen and Sulfa drugs    Safety  Patient Assist  Max 1  Patient Precautions  Fall Risk, Weight Bearing As Tolerated Right Lower Extremity  Precaution Comments     Bed Transfer Status  Contact Guard Assist  Toilet Transfer Status   Contact Guard Assist  Assistive Devices  Wheelchair  Oxygen  None - Room Air  Diet/Therapeutic Dining  Current Diet Order   Procedures    Diet Order Diet: Regular     Pill Administration  whole  Agitated Behavioral Scale     ABS Level of Severity       Fall Risk  Has the patient had a fall this admission?   No  Candice Porter Fall Risk Scoring  16, HIGH RISK  Fall Risk Safety Measures  bed alarm, chair alarm, and poor balance    Vitals  Temperature: 36.8 °C (98.2 °F)  Temp src: Oral  Pulse: (!) 55  Respiration: 16  Blood Pressure : 138/65  Blood Pressure MAP (Calculated): 89 MM HG  BP Location: Right, Upper Arm  Patient BP Position: Lying Left Side     Oxygen  Pulse Oximetry: 96 %  O2 (LPM): 0  O2 Delivery Device: None - Room Air    Bowel and Bladder  Last Bowel Movement  12/20/24  Stool Type  Type 3: Like a sausage, but with cracks on its surface  Bowel Device  Diaper  Continent  Bladder:     Bowel: Continent movement  Bladder Function  Urine Void (mL): 300 ml  Number of Times Voided: 1  Urine Color: Yellow  Number of Times Incontinent of Urine: 0  Straight Catheter: 375 ml  Genitourinary Assessment   Bladder Assessment (WDL):  Within Defined Limits  Rodriges Catheter: Not Applicable  Urinary Symptoms:  (retention)  Urine Color: Yellow  Number of Bladder Accidents: 0  Total Number of Bladder of Accidents in Last 7 Days: 1  Number of Times  "Incontinent of Urine: 0  Bladder Device: Bathroom  Time Void: Yes  Bladder Scan: Post Void  $ Bladder Scan Results (mL): 404  Intermittent Catheter: Yes -Go To LDA \"Indwelling Catheter Group\"  Bladder Medications: Yes    Skin  Felix Score   17  Sensory Interventions   Skin Preventative Measures: Pillows in Use for Support / Positioning  Moisture Interventions  Moisturizers/Barriers: Barrier Wipes      Pain  Pain Rating Scale  6 - Hard to ignore, avoid usual activities  Pain Location  Hip  Pain Location Orientation  Right  Pain Interventions   Medication (see MAR)    ADLs    Bathing   Patient Refused Bathing (Pt states shes too tired from todays activites)  Linen Change      Personal Hygiene  Perineal Care, Moist Joycelyn Wipes  Chlorhexidine Bath      Oral Care     Teeth/Dentures     Shave     Nutrition Percentage Eaten  Dinner, Between % Consumed  Environmental Precautions  Treaded Slipper Socks on Patient, Communication Sign for Patients & Families, Mobility Assessed & Appropriate Sign Placed  Patient Turns/Positioning  Patient turns self independently side to side without assistance, to offload sacral area  Patient Turns Assistance/Tolerance  Assistance of One  Bed Positions  Bed Controls On, Bed Locked  Head of Bed Elevated  Self regulated      Psychosocial/Neurologic Assessment  Psychosocial Assessment  Psychosocial (WDL):  Within Defined Limits  Patient Behaviors: Forgetful  Neurologic Assessment  Neuro (WDL): Within Defined Limits  Level of Consciousness: Alert  Orientation Level: Oriented X4  Cognition: Appropriate judgement  Speech: Clear  EENT (WDL):  WDL Except    Cardio/Pulmonary Assessment  Edema   RUE Edema: 1+  RLE Edema: 2+  LUE Edema: Trace  LLE Edema: 1+  Respiratory Breath Sounds  RUL Breath Sounds: Clear  RML Breath Sounds: Clear  RLL Breath Sounds: Diminished  DINO Breath Sounds: Clear  LLL Breath Sounds: Diminished  Cardiac Assessment   Cardiac (WDL):  Within Defined Limits        "

## 2024-12-21 NOTE — PROGRESS NOTES
NURSING DAILY NOTE    Name: Franchesca Paige   Date of Admission: 12/11/2024   Admitting Diagnosis: No Principal Problem: There is no principal problem currently on the Problem List. Please update the Problem List and refresh.  Attending Physician: ARMANDO GLASS M.D.  Allergies: Ibuprofen and Sulfa drugs    Safety  Patient Assist  Max 1  Patient Precautions  Fall Risk, Weight Bearing As Tolerated Right Lower Extremity  Precaution Comments     Bed Transfer Status  Contact Guard Assist  Toilet Transfer Status   Contact Guard Assist  Assistive Devices  Rails, Wheelchair  Oxygen  None - Room Air  Diet/Therapeutic Dining  Current Diet Order   Procedures    Diet Order Diet: Regular     Pill Administration  whole  Agitated Behavioral Scale     ABS Level of Severity       Fall Risk  Has the patient had a fall this admission?   No  Candice Porter Fall Risk Scoring  16, HIGH RISK  Fall Risk Safety Measures  bed alarm and chair alarm    Vitals  Temperature: 36.6 °C (97.9 °F)  Temp src: Oral  Pulse: (!) 55  Respiration: 18  Blood Pressure : 130/58  Blood Pressure MAP (Calculated): 82 MM HG  BP Location: Right, Upper Arm  Patient BP Position: Supine     Oxygen  Pulse Oximetry: 90 %  O2 (LPM): 0  O2 Delivery Device: None - Room Air    Bowel and Bladder  Last Bowel Movement  12/20/24  Stool Type  Type 3: Like a sausage, but with cracks on its surface  Bowel Device  Diaper  Continent  Bladder:     Bowel: Continent movement  Bladder Function  Urine Void (mL): 200 ml  Number of Times Voided: 1  Urine Color: Yellow  Number of Times Incontinent of Urine: 0  Straight Catheter: 550 ml  Genitourinary Assessment   Bladder Assessment (WDL):  WDL Except  Rodriges Catheter: Not Applicable  Urinary Symptoms:  (retention)  Urine Color: Yellow  Number of Bladder Accidents: 0  Total Number of Bladder of Accidents in Last 7 Days: 1  Number of Times Incontinent of Urine: 0  Bladder  "Device: Bathroom  Time Void: Yes  Bladder Scan: Post Void  $ Bladder Scan Results (mL): 141  Intermittent Catheter: Yes -Go To LDA \"Indwelling Catheter Group\"  Bladder Medications: Yes    Skin  Felix Score   17  Sensory Interventions   Skin Preventative Measures: Pillows in Use for Support / Positioning  Moisture Interventions  Moisturizers/Barriers: Barrier Wipes      Pain  Pain Rating Scale  6 - Hard to ignore, avoid usual activities  Pain Location  Leg  Pain Location Orientation  Right  Pain Interventions   Medication (see MAR)    ADLs    Bathing   Patient Refused Bathing (Pt states shes too tired from todays activites)  Linen Change      Personal Hygiene  Perineal Care, Moist Joycelyn Wipes  Chlorhexidine Bath      Oral Care     Teeth/Dentures     Shave     Nutrition Percentage Eaten  *  * Meal *  *, Lunch, Between 25-50% Consumed (Patient ate a sandwich instead of the lunch.)  Environmental Precautions  Bed in Low Position  Patient Turns/Positioning  Patient turns self independently side to side without assistance, to offload sacral area  Patient Turns Assistance/Tolerance  Assistance of One  Bed Positions  Bed Controls On, Bed Locked  Head of Bed Elevated  Self regulated      Psychosocial/Neurologic Assessment  Psychosocial Assessment  Psychosocial (WDL):  Within Defined Limits  Patient Behaviors: Forgetful  Neurologic Assessment  Neuro (WDL): Within Defined Limits  Level of Consciousness: Alert  Orientation Level: Oriented X4  Cognition: Appropriate judgement, Appropriate safety awareness, Appropriate attention/concentration, Appropriate for developmental age, Follows commands  Speech: Clear  EENT (WDL):  WDL Except    Cardio/Pulmonary Assessment  Edema   RUE Edema: 1+  RLE Edema: 2+  LUE Edema: Trace  LLE Edema: 1+  Respiratory Breath Sounds  RUL Breath Sounds: Clear  RML Breath Sounds: Clear  RLL Breath Sounds: Diminished  DINO Breath Sounds: Clear  LLL Breath Sounds: Diminished  Cardiac Assessment   Cardiac " (WDL):  WDL Except (HTN)

## 2024-12-22 ENCOUNTER — ANCILLARY PROCEDURE (OUTPATIENT)
Dept: CARDIOLOGY | Facility: REHABILITATION | Age: 79
DRG: 559 | End: 2024-12-22
Attending: PHYSICAL MEDICINE & REHABILITATION
Payer: MEDICARE

## 2024-12-22 PROCEDURE — 700111 HCHG RX REV CODE 636 W/ 250 OVERRIDE (IP): Mod: JZ | Performed by: PHYSICAL MEDICINE & REHABILITATION

## 2024-12-22 PROCEDURE — A9270 NON-COVERED ITEM OR SERVICE: HCPCS | Performed by: PHYSICAL MEDICINE & REHABILITATION

## 2024-12-22 PROCEDURE — 770010 HCHG ROOM/CARE - REHAB SEMI PRIVAT*

## 2024-12-22 PROCEDURE — 700102 HCHG RX REV CODE 250 W/ 637 OVERRIDE(OP): Performed by: PHYSICAL MEDICINE & REHABILITATION

## 2024-12-22 PROCEDURE — 93971 EXTREMITY STUDY: CPT | Mod: RT

## 2024-12-22 RX ADMIN — OXYCODONE HYDROCHLORIDE 5 MG: 5 TABLET ORAL at 09:21

## 2024-12-22 RX ADMIN — AMLODIPINE BESYLATE 5 MG: 5 TABLET ORAL at 05:12

## 2024-12-22 RX ADMIN — OMEPRAZOLE 20 MG: 20 CAPSULE, DELAYED RELEASE ORAL at 09:24

## 2024-12-22 RX ADMIN — OXYCODONE HYDROCHLORIDE 10 MG: 10 TABLET ORAL at 03:50

## 2024-12-22 RX ADMIN — OXYCODONE HYDROCHLORIDE 5 MG: 5 TABLET ORAL at 15:15

## 2024-12-22 RX ADMIN — LISINOPRIL 2.5 MG: 5 TABLET ORAL at 05:12

## 2024-12-22 RX ADMIN — ENOXAPARIN SODIUM 40 MG: 100 INJECTION SUBCUTANEOUS at 17:16

## 2024-12-22 RX ADMIN — OXYCODONE HYDROCHLORIDE 5 MG: 5 TABLET ORAL at 21:12

## 2024-12-22 RX ADMIN — LACTULOSE 30 ML: 10 SOLUTION ORAL at 05:18

## 2024-12-22 ASSESSMENT — FIBROSIS 4 INDEX: FIB4 SCORE: 1.25

## 2024-12-22 ASSESSMENT — PAIN DESCRIPTION - PAIN TYPE
TYPE: ACUTE PAIN

## 2024-12-22 NOTE — PROGRESS NOTES
NURSING DAILY NOTE    Name: Franchesca Paige   Date of Admission: 12/11/2024   Admitting Diagnosis: No Principal Problem: There is no principal problem currently on the Problem List. Please update the Problem List and refresh.  Attending Physician: ARMANDO GLASS M.D.  Allergies: Ibuprofen and Sulfa drugs    Safety  Patient Assist  Max 1  Patient Precautions  Fall Risk, Weight Bearing As Tolerated Right Lower Extremity  Precaution Comments     Bed Transfer Status  Contact Guard Assist  Toilet Transfer Status   Contact Guard Assist  Assistive Devices  Gait Belt, Rails, Wheelchair, Wheelchair push  Oxygen  None - Room Air  Diet/Therapeutic Dining  Current Diet Order   Procedures    Diet Order Diet: Regular     Pill Administration  whole  Agitated Behavioral Scale     ABS Level of Severity       Fall Risk  Has the patient had a fall this admission?   No  Candice Porter Fall Risk Scoring  16, HIGH RISK  Fall Risk Safety Measures  bed alarm and chair alarm    Vitals  Temperature: 37.1 °C (98.8 °F)  Temp src: Oral  Pulse: (!) 57  Respiration: 18  Blood Pressure : 137/67  Blood Pressure MAP (Calculated): 90 MM HG  BP Location: Right, Upper Arm  Patient BP Position: Sitting     Oxygen  Pulse Oximetry: 94 %  O2 (LPM): 0  O2 Delivery Device: None - Room Air    Bowel and Bladder  Last Bowel Movement  12/20/24  Stool Type  Type 3: Like a sausage, but with cracks on its surface  Bowel Device  Diaper  Continent  Bladder:     Bowel: Continent movement  Bladder Function  Urine Void (mL): 300 ml  Number of Times Voided: 1  Urine Color: Yellow  Number of Times Incontinent of Urine: 0  Straight Catheter: 375 ml  Genitourinary Assessment   Bladder Assessment (WDL):  Within Defined Limits  Rodriges Catheter: Not Applicable  Urinary Symptoms:  (retention)  Urine Color: Yellow  Number of Bladder Accidents: 0  Total Number of Bladder of Accidents in Last 7 Days: 1  Number of  "Times Incontinent of Urine: 0  Bladder Device: Bathroom  Time Void: Yes  Bladder Scan: Post Void  $ Bladder Scan Results (mL): 113  Intermittent Catheter: Yes -Go To LDA \"Indwelling Catheter Group\"  Bladder Medications: Yes    Skin  Felix Score   17  Sensory Interventions   Skin Preventative Measures: Pillows in Use for Support / Positioning  Moisture Interventions  Moisturizers/Barriers: Barrier Wipes      Pain  Pain Rating Scale  8 - Awful, hard to do anything  Pain Location  Leg  Pain Location Orientation  Right  Pain Interventions   Medication (see MAR)    ADLs    Bathing   Patient Refused Bathing (Pt states shes too tired from todays activites)  Linen Change      Personal Hygiene  Moist Joycelyn Wipes, Perineal Care  Chlorhexidine Bath      Oral Care     Teeth/Dentures     Shave     Nutrition Percentage Eaten   (spouse brings in dinner)  Environmental Precautions  Treaded Slipper Socks on Patient  Patient Turns/Positioning  Patient turns self independently side to side without assistance, to offload sacral area  Patient Turns Assistance/Tolerance  Assistance of One  Bed Positions  Bed Controls On, Bed Locked  Head of Bed Elevated  Self regulated      Psychosocial/Neurologic Assessment  Psychosocial Assessment  Psychosocial (WDL):  Within Defined Limits  Patient Behaviors: Fatigue  Neurologic Assessment  Neuro (WDL): Within Defined Limits  Level of Consciousness: Alert  Orientation Level: Oriented X4  Cognition: Appropriate judgement  Speech: Clear  Pupil Assesment: No  EENT (WDL):  WDL Except    Cardio/Pulmonary Assessment  Edema   RUE Edema: 1+  RLE Edema: 2+  LUE Edema: Trace  LLE Edema: 1+  Respiratory Breath Sounds  RUL Breath Sounds: Clear  RML Breath Sounds: Clear  RLL Breath Sounds: Diminished  DINO Breath Sounds: Clear  LLL Breath Sounds: Diminished  Cardiac Assessment   Cardiac (WDL):  Within Defined Limits       "

## 2024-12-22 NOTE — PROGRESS NOTES
NURSING DAILY NOTE    Name: Franchesca Paige   Date of Admission: 12/11/2024   Admitting Diagnosis: No Principal Problem: There is no principal problem currently on the Problem List. Please update the Problem List and refresh.  Attending Physician: ARMANDO GLASS M.D.  Allergies: Ibuprofen and Sulfa drugs    Safety  Patient Assist  Max 1  Patient Precautions  Fall Risk, Weight Bearing As Tolerated Right Lower Extremity  Precaution Comments     Bed Transfer Status  Contact Guard Assist  Toilet Transfer Status   Contact Guard Assist  Assistive Devices  Wheelchair  Oxygen  None - Room Air  Diet/Therapeutic Dining  Current Diet Order   Procedures    Diet Order Diet: Regular     Pill Administration  whole  Agitated Behavioral Scale     ABS Level of Severity       Fall Risk  Has the patient had a fall this admission?   No  Candice Porter Fall Risk Scoring  16, HIGH RISK  Fall Risk Safety Measures  bed alarm and chair alarm    Vitals  Temperature: 36.9 °C (98.5 °F)  Temp src: Oral  Pulse: (!) 55  Respiration: 16  Blood Pressure : 136/59  Blood Pressure MAP (Calculated): 85 MM HG  BP Location: Right, Upper Arm  Patient BP Position: Supine     Oxygen  Pulse Oximetry: 92 %  O2 (LPM): 0  O2 Delivery Device: None - Room Air    Bowel and Bladder  Last Bowel Movement  12/20/24  Stool Type  Type 3: Like a sausage, but with cracks on its surface  Bowel Device  Diaper  Continent  Bladder:     Bowel: Continent movement  Bladder Function  Urine Void (mL):  (moderate)  Number of Times Voided: 1  Urine Color: Yellow  Number of Times Incontinent of Urine: 0  Straight Catheter: 375 ml  Genitourinary Assessment   Bladder Assessment (WDL):  Within Defined Limits  Rodriges Catheter: Not Applicable  Urinary Symptoms:  (retention)  Urine Color: Yellow  Number of Bladder Accidents: 0  Total Number of Bladder of Accidents in Last 7 Days: 1  Number of Times Incontinent of Urine:  "0  Bladder Device: Bathroom  Time Void: Yes  Bladder Scan: Post Void  $ Bladder Scan Results (mL): 375  Intermittent Catheter: Yes -Go To LDA \"Indwelling Catheter Group\"  Bladder Medications: Yes    Skin  Felix Score   17  Sensory Interventions   Skin Preventative Measures: Pillows in Use for Support / Positioning  Moisture Interventions  Moisturizers/Barriers: Barrier Paste      Pain  Pain Rating Scale  7 - Focus of attention, prevents doing daily activities  Pain Location  Hip  Pain Location Orientation  Right  Pain Interventions   Medication (see MAR)    ADLs    Bathing   Patient Refused Bathing (Pt states shes too tired from todays activites)  Linen Change      Personal Hygiene  Moist Joycelyn Wipes, Perineal Care  Chlorhexidine Bath      Oral Care     Teeth/Dentures     Shave     Nutrition Percentage Eaten  *  * Meal *  *, Dinner, Between 25-50% Consumed  Environmental Precautions  Treaded Slipper Socks on Patient  Patient Turns/Positioning  Patient turns self independently side to side without assistance, to offload sacral area  Patient Turns Assistance/Tolerance  Assistance of One  Bed Positions  Bed Controls On, Bed Locked  Head of Bed Elevated  Self regulated      Psychosocial/Neurologic Assessment  Psychosocial Assessment  Psychosocial (WDL):  Within Defined Limits  Patient Behaviors: Fatigue  Neurologic Assessment  Neuro (WDL): Within Defined Limits  Level of Consciousness: Alert  Orientation Level: Oriented X4  Cognition: Appropriate judgement  Speech: Clear  Pupil Assesment: No  EENT (WDL):  WDL Except    Cardio/Pulmonary Assessment  Edema   RUE Edema: 1+  RLE Edema: 2+  LUE Edema: Trace  LLE Edema: 1+  Respiratory Breath Sounds  RUL Breath Sounds: Clear  RML Breath Sounds: Clear  RLL Breath Sounds: Diminished  DINO Breath Sounds: Clear  LLL Breath Sounds: Diminished  Cardiac Assessment   Cardiac (WDL):  Within Defined Limits        "

## 2024-12-22 NOTE — CARE PLAN
"The patient is Stable - Low risk of patient condition declining or worsening    Shift Goals  Clinical Goals: safety  Patient Goals: ret ; pain control  Family Goals: ILIANA    Problem: Skin Integrity  Goal: Skin integrity is maintained or improved  Outcome: Progressing  Note:   Felix Score: 17    Patient's skin remains intact and free from new or accidental injury this shift; no s/s of infection. RN wound protocol checked. Encouraged hydration and educated about the importance of nutrition to keep skin integrity. Will continue to monitor.        Problem: Fall Risk - Rehab  Goal: Patient will remain free from falls  Outcome: Progressing  Note: Candice Porter Fall risk Assessment Score: 16    High fall risk Interventions   - Bed and strip alarm   - Yellow sign by the door   - Yellow wrist band \"Fall risk\"  - Room near to the nurse station  - Do not leave patient unattended in the bathroom  - Fall risk education provided     Pt using call light appropriately when in need of assistance.            "

## 2024-12-23 ENCOUNTER — APPOINTMENT (OUTPATIENT)
Dept: OCCUPATIONAL THERAPY | Facility: REHABILITATION | Age: 79
DRG: 559 | End: 2024-12-23
Attending: PHYSICAL MEDICINE & REHABILITATION
Payer: MEDICARE

## 2024-12-23 ENCOUNTER — APPOINTMENT (OUTPATIENT)
Dept: PHYSICAL THERAPY | Facility: REHABILITATION | Age: 79
DRG: 559 | End: 2024-12-23
Attending: PHYSICAL MEDICINE & REHABILITATION
Payer: MEDICARE

## 2024-12-23 LAB
ANION GAP SERPL CALC-SCNC: 9 MMOL/L (ref 7–16)
BASOPHILS # BLD AUTO: 0.1 % (ref 0–1.8)
BASOPHILS # BLD: 0.01 K/UL (ref 0–0.12)
BUN SERPL-MCNC: 12 MG/DL (ref 8–22)
CALCIUM SERPL-MCNC: 10.3 MG/DL (ref 8.5–10.5)
CHLORIDE SERPL-SCNC: 105 MMOL/L (ref 96–112)
CO2 SERPL-SCNC: 23 MMOL/L (ref 20–33)
CREAT SERPL-MCNC: 0.59 MG/DL (ref 0.5–1.4)
EOSINOPHIL # BLD AUTO: 0.11 K/UL (ref 0–0.51)
EOSINOPHIL NFR BLD: 1.6 % (ref 0–6.9)
ERYTHROCYTE [DISTWIDTH] IN BLOOD BY AUTOMATED COUNT: 48.8 FL (ref 35.9–50)
GFR SERPLBLD CREATININE-BSD FMLA CKD-EPI: 93 ML/MIN/1.73 M 2
GLUCOSE SERPL-MCNC: 102 MG/DL (ref 65–99)
HCT VFR BLD AUTO: 24.9 % (ref 37–47)
HGB BLD-MCNC: 8.3 G/DL (ref 12–16)
IMM GRANULOCYTES # BLD AUTO: 0.04 K/UL (ref 0–0.11)
IMM GRANULOCYTES NFR BLD AUTO: 0.6 % (ref 0–0.9)
LYMPHOCYTES # BLD AUTO: 1.19 K/UL (ref 1–4.8)
LYMPHOCYTES NFR BLD: 17.2 % (ref 22–41)
MAGNESIUM SERPL-MCNC: 2.1 MG/DL (ref 1.5–2.5)
MCH RBC QN AUTO: 30.7 PG (ref 27–33)
MCHC RBC AUTO-ENTMCNC: 33.3 G/DL (ref 32.2–35.5)
MCV RBC AUTO: 92.2 FL (ref 81.4–97.8)
MONOCYTES # BLD AUTO: 0.59 K/UL (ref 0–0.85)
MONOCYTES NFR BLD AUTO: 8.5 % (ref 0–13.4)
NEUTROPHILS # BLD AUTO: 4.98 K/UL (ref 1.82–7.42)
NEUTROPHILS NFR BLD: 72 % (ref 44–72)
NRBC # BLD AUTO: 0 K/UL
NRBC BLD-RTO: 0 /100 WBC (ref 0–0.2)
PLATELET # BLD AUTO: 353 K/UL (ref 164–446)
PMV BLD AUTO: 8.7 FL (ref 9–12.9)
POTASSIUM SERPL-SCNC: 3.6 MMOL/L (ref 3.6–5.5)
RBC # BLD AUTO: 2.7 M/UL (ref 4.2–5.4)
SODIUM SERPL-SCNC: 137 MMOL/L (ref 135–145)
WBC # BLD AUTO: 6.9 K/UL (ref 4.8–10.8)

## 2024-12-23 PROCEDURE — 97530 THERAPEUTIC ACTIVITIES: CPT

## 2024-12-23 PROCEDURE — 97112 NEUROMUSCULAR REEDUCATION: CPT

## 2024-12-23 PROCEDURE — 97116 GAIT TRAINING THERAPY: CPT

## 2024-12-23 PROCEDURE — 700102 HCHG RX REV CODE 250 W/ 637 OVERRIDE(OP): Performed by: PHYSICAL MEDICINE & REHABILITATION

## 2024-12-23 PROCEDURE — 83735 ASSAY OF MAGNESIUM: CPT

## 2024-12-23 PROCEDURE — 36415 COLL VENOUS BLD VENIPUNCTURE: CPT

## 2024-12-23 PROCEDURE — 99232 SBSQ HOSP IP/OBS MODERATE 35: CPT | Performed by: PHYSICAL MEDICINE & REHABILITATION

## 2024-12-23 PROCEDURE — 85025 COMPLETE CBC W/AUTO DIFF WBC: CPT

## 2024-12-23 PROCEDURE — 97535 SELF CARE MNGMENT TRAINING: CPT

## 2024-12-23 PROCEDURE — A9270 NON-COVERED ITEM OR SERVICE: HCPCS | Performed by: PHYSICAL MEDICINE & REHABILITATION

## 2024-12-23 PROCEDURE — 700111 HCHG RX REV CODE 636 W/ 250 OVERRIDE (IP): Mod: JZ | Performed by: PHYSICAL MEDICINE & REHABILITATION

## 2024-12-23 PROCEDURE — 80048 BASIC METABOLIC PNL TOTAL CA: CPT

## 2024-12-23 PROCEDURE — 770010 HCHG ROOM/CARE - REHAB SEMI PRIVAT*

## 2024-12-23 RX ORDER — FOSINOPRIL SODIUM 20 MG/1
20 TABLET ORAL
Qty: 90 TABLET | Refills: 2 | Status: SHIPPED | OUTPATIENT
Start: 2024-12-23 | End: 2024-12-24

## 2024-12-23 RX ORDER — LISINOPRIL 5 MG/1
5 TABLET ORAL DAILY
Status: DISCONTINUED | OUTPATIENT
Start: 2024-12-24 | End: 2024-12-24 | Stop reason: HOSPADM

## 2024-12-23 RX ORDER — AMLODIPINE BESYLATE 5 MG/1
5 TABLET ORAL
Qty: 90 TABLET | Refills: 3 | Status: SHIPPED | OUTPATIENT
Start: 2024-12-23 | End: 2024-12-24

## 2024-12-23 RX ORDER — OXYCODONE HYDROCHLORIDE 5 MG/1
5 TABLET ORAL EVERY 6 HOURS PRN
Qty: 28 TABLET | Refills: 0 | Status: SHIPPED | OUTPATIENT
Start: 2024-12-23 | End: 2024-12-24

## 2024-12-23 RX ADMIN — ENOXAPARIN SODIUM 40 MG: 100 INJECTION SUBCUTANEOUS at 17:48

## 2024-12-23 RX ADMIN — LISINOPRIL 2.5 MG: 5 TABLET ORAL at 05:08

## 2024-12-23 RX ADMIN — OXYCODONE HYDROCHLORIDE 5 MG: 5 TABLET ORAL at 12:25

## 2024-12-23 RX ADMIN — OMEPRAZOLE 20 MG: 20 CAPSULE, DELAYED RELEASE ORAL at 08:39

## 2024-12-23 RX ADMIN — OXYCODONE HYDROCHLORIDE 5 MG: 5 TABLET ORAL at 08:40

## 2024-12-23 RX ADMIN — OXYCODONE HYDROCHLORIDE 5 MG: 5 TABLET ORAL at 05:01

## 2024-12-23 RX ADMIN — SENNOSIDES AND DOCUSATE SODIUM 2 TABLET: 50; 8.6 TABLET ORAL at 19:52

## 2024-12-23 RX ADMIN — OXYCODONE HYDROCHLORIDE 5 MG: 5 TABLET ORAL at 17:48

## 2024-12-23 RX ADMIN — AMLODIPINE BESYLATE 5 MG: 5 TABLET ORAL at 05:08

## 2024-12-23 ASSESSMENT — ACTIVITIES OF DAILY LIVING (ADL)
BED_CHAIR_WHEELCHAIR_TRANSFER_DESCRIPTION: SET-UP OF EQUIPMENT
TOILETING_LEVEL_OF_ASSIST: REQUIRES SUPERVISION WITH TOILETING
TUB_SHOWER_TRANSFER_DESCRIPTION: GRAB BAR;INCREASED TIME;SUPERVISION FOR SAFETY;VERBAL CUEING
TOILET_TRANSFER_LEVEL_OF_ASSIST: REQUIRES SUPERVISION WITH TOILET TRANSFER
TOILETING_LEVEL_OF_ASSIST_DESCRIPTION: GRAB BAR;SUPERVISION FOR SAFETY;INCREASED TIME
BED_CHAIR_WHEELCHAIR_TRANSFER_DESCRIPTION: INCREASED TIME;SUPERVISION FOR SAFETY
TOILET_TRANSFER_DESCRIPTION: GRAB BAR;INCREASED TIME;SUPERVISION FOR SAFETY
SHOWER_TRANSFER_LEVEL_OF_ASSIST: REQUIRES SUPERVISION WITH SHOWER TRANSFER

## 2024-12-23 ASSESSMENT — PAIN DESCRIPTION - PAIN TYPE
TYPE: ACUTE PAIN

## 2024-12-23 ASSESSMENT — GAIT ASSESSMENTS
DISTANCE (FEET): 60
ASSISTIVE DEVICE: FRONT WHEEL WALKER
DISTANCE (FEET): 50
DEVIATION: ANTALGIC;STEP TO;BRADYKINETIC
GAIT LEVEL OF ASSIST: STANDBY ASSIST
DEVIATION: ANTALGIC;STEP TO;BRADYKINETIC;DECREASED HEEL STRIKE;DECREASED TOE OFF
ASSISTIVE DEVICE: FRONT WHEEL WALKER
GAIT LEVEL OF ASSIST: STANDBY ASSIST

## 2024-12-23 ASSESSMENT — BRIEF INTERVIEW FOR MENTAL STATUS (BIMS)
WHAT MONTH IS IT: ACCURATE WITHIN 5 DAYS
INITIAL REPETITION OF BED BLUE SOCK - FIRST ATTEMPT: 3
WHAT DAY OF THE WEEK IS IT: CORRECT
ASKED TO RECALL SOCK: YES, NO CUE REQUIRED
ASKED TO RECALL BED: YES, NO CUE REQUIRED
ASKED TO RECALL BLUE: YES, NO CUE REQUIRED
BIMS SUMMARY SCORE: 15
WHAT YEAR IS IT: CORRECT

## 2024-12-23 NOTE — PROGRESS NOTES
NURSING DAILY NOTE    Name: Franchesca Paige   Date of Admission: 12/11/2024   Admitting Diagnosis: No Principal Problem: There is no principal problem currently on the Problem List. Please update the Problem List and refresh.  Attending Physician: ARMANDO GLASS M.D.  Allergies: Ibuprofen and Sulfa drugs    Safety  Patient Assist  Max 1  Patient Precautions  Fall Risk, Weight Bearing As Tolerated Right Lower Extremity  Precaution Comments     Bed Transfer Status  Contact Guard Assist  Toilet Transfer Status   Contact Guard Assist  Assistive Devices  Wheelchair  Oxygen  None - Room Air  Diet/Therapeutic Dining  Current Diet Order   Procedures    Diet Order Diet: Regular     Pill Administration  whole  Agitated Behavioral Scale     ABS Level of Severity       Fall Risk  Has the patient had a fall this admission?   No  Candice Porter Fall Risk Scoring  16, HIGH RISK  Fall Risk Safety Measures  bed alarm and chair alarm    Vitals  Temperature: 36.6 °C (97.9 °F)  Temp src: Oral  Pulse: (!) 57  Respiration: 16  Blood Pressure : 133/50  Blood Pressure MAP (Calculated): 78 MM HG  BP Location: Right, Upper Arm  Patient BP Position: Supine     Oxygen  Pulse Oximetry: 92 %  O2 (LPM): 0  O2 Delivery Device: None - Room Air    Bowel and Bladder  Last Bowel Movement  12/22/24  Stool Type  Type 4: Like a sausage or snake, smooth and soft  Bowel Device  Diaper  Continent  Bladder:     Bowel: Continent movement  Bladder Function  Urine Void (mL):  (large)  Number of Times Voided: 1  Urine Color: Unable To Evaluate  Number of Times Incontinent of Urine: 0  Straight Catheter: 375 ml  Genitourinary Assessment   Bladder Assessment (WDL):  WDL Except  Rodriges Catheter: Not Applicable  Urinary Symptoms:  (retention)  Urine Color: Unable To Evaluate  Number of Bladder Accidents: 0  Total Number of Bladder of Accidents in Last 7 Days: 1  Number of Times Incontinent of Urine:  "0  Bladder Device: Bathroom  Time Void: Yes  Bladder Scan: Post Void  $ Bladder Scan Results (mL): 201  Intermittent Catheter: Yes -Go To LDA \"Indwelling Catheter Group\"  Bladder Medications: Yes    Skin  Felix Score   17  Sensory Interventions   Skin Preventative Measures: Pillows in Use for Support / Positioning  Moisture Interventions  Moisturizers/Barriers: Barrier Paste      Pain  Pain Rating Scale  5 - Interrupts some activities  Pain Location  Leg  Pain Location Orientation  Right  Pain Interventions   Medication (see MAR)    ADLs    Bathing   Patient Refused Bathing (Pt states shes too tired from todays activites)  Linen Change      Personal Hygiene  Moist Joycelyn Wipes, Perineal Care  Chlorhexidine Bath      Oral Care     Teeth/Dentures     Shave     Nutrition Percentage Eaten  *  * Meal *  *, Lunch, Between 25-50% Consumed  Environmental Precautions  Treaded Slipper Socks on Patient  Patient Turns/Positioning  Patient turns self independently side to side without assistance, to offload sacral area  Patient Turns Assistance/Tolerance  Assistance of One  Bed Positions  Bed Controls On, Bed Locked  Head of Bed Elevated  Self regulated      Psychosocial/Neurologic Assessment  Psychosocial Assessment  Psychosocial (WDL):  Within Defined Limits  Patient Behaviors: Fatigue  Neurologic Assessment  Neuro (WDL): Within Defined Limits  Level of Consciousness: Alert  Orientation Level: Oriented X4  Cognition: Appropriate judgement  Speech: Clear  Pupil Assesment: No  EENT (WDL):  WDL Except    Cardio/Pulmonary Assessment  Edema   RUE Edema: 1+  RLE Edema: 2+  LUE Edema: Trace  LLE Edema: 1+  Respiratory Breath Sounds  RUL Breath Sounds: Clear  RML Breath Sounds: Clear  RLL Breath Sounds: Diminished  DINO Breath Sounds: Clear  LLL Breath Sounds: Diminished  Cardiac Assessment   Cardiac (WDL):  Within Defined Limits        "

## 2024-12-23 NOTE — PROGRESS NOTES
Hospital Medicine Daily Progress Note        Chief Complaint  Fever  Atrial Fibrillation  Hypertension    Interval Problem Update  Doing well.  Imaging results reviewed and discussed.  Will discontinue IV per pt request.    Code Status  Full Code    Disposition  Per Physiatry    Review of Systems  Review of Systems   Constitutional:  Negative for chills and fever.   HENT: Negative.     Eyes: Negative.    Respiratory:  Negative for cough and shortness of breath.    Cardiovascular:  Negative for chest pain and palpitations.   Gastrointestinal:  Negative for nausea and vomiting.   Genitourinary: Negative.    Musculoskeletal:  Positive for joint pain.   Skin:  Negative for itching and rash.   Endo/Heme/Allergies:  Negative for polydipsia. Does not bruise/bleed easily.        Physical Exam  Temp:  [36 °C (96.8 °F)-37 °C (98.6 °F)] 36 °C (96.8 °F)  Pulse:  [56-62] 60  Resp:  [18] 18  BP: (127-147)/(50-66) 140/62  SpO2:  [94 %-95 %] 95 %    Physical Exam  Vitals reviewed.   Constitutional:       General: She is not in acute distress.     Appearance: Normal appearance. She is not ill-appearing.   HENT:      Head: Normocephalic and atraumatic.      Right Ear: External ear normal.      Left Ear: External ear normal.      Nose: Nose normal.      Mouth/Throat:      Pharynx: Oropharynx is clear.   Eyes:      General:         Right eye: No discharge.         Left eye: No discharge.      Extraocular Movements: Extraocular movements intact.      Conjunctiva/sclera: Conjunctivae normal.   Cardiovascular:      Rate and Rhythm: Normal rate and regular rhythm.   Pulmonary:      Effort: Pulmonary effort is normal. No respiratory distress.      Breath sounds: Normal breath sounds. No wheezing.   Abdominal:      General: Bowel sounds are normal. There is no distension.      Palpations: Abdomen is soft.      Tenderness: There is no abdominal tenderness.   Musculoskeletal:      Cervical back: Normal range of motion and neck supple.       Right lower leg: Edema present.      Left lower leg: No edema.   Skin:     General: Skin is warm and dry.   Neurological:      Mental Status: She is alert and oriented to person, place, and time.         Fluids      Laboratory              Assessment/Plan  Fever  Assessment & Plan  COVID/FLU/RSV negative  MRSA negative  PCT 0.06  UA negative  CXR negative acute  BCx x 2 NGTD  Has normal WBC  Discontinued Zosyn and Zyvox  Has no s/s of acute infectious process    Closed displaced fracture of distal epiphysis of right femur with routine healing- (present on admission)  Assessment & Plan  2/2 GLF  S/P R femur ORIF on 12/7/24 by Dr. Garzon,  mL  Pain control and wound care per Physiatry  U/S RLE negative for DVT    Anemia- (present on admission)  Assessment & Plan   mL  Has normocytic indices  Follow H/H    Essential hypertension- (present on admission)  Assessment & Plan  Blood pressures controlled on Norvasc and Lisinopril       Full Code    Pt w/o acute medical issues requiring Hospitalist services at this time.  Will sign off.  Please re-consult as needed.  Discussed w/ Dr. Souza.

## 2024-12-23 NOTE — CARE PLAN
"The patient is Stable - Low risk of patient condition declining or worsening    Shift Goals  Clinical Goals: Rest and safety  Patient Goals: Safety  Family Goals: ILIANA    Progress made toward(s) clinical / shift goals:    Problem: Fall Risk - Rehab  Goal: Patient will remain free from falls  Outcome: Progressing  Note: Candice Poretr Fall risk Assessment Score: 16    High fall risk Interventions   - Bed and strip alarm   - Yellow sign by the door   - Yellow wrist band \"Fall risk\"  - Room near to the nurse station  - Do not leave patient unattended in the bathroom  - Fall risk education provided     Problem: Bladder / Voiding  Goal: Patient will establish and maintain regular urinary output  Outcome: Progressing  Goal: Patient will establish and maintain bladder regimen  Outcome: Progressing       Patient is not progressing towards the following goals:      "

## 2024-12-23 NOTE — DISCHARGE PLANNING
Case management  Reviewed signed copy of IMM and answered all questions.    Dc date /disposition: 12/24/24 Home with spouse no HH, ordered 20 inch WC.

## 2024-12-23 NOTE — THERAPY
"Occupational Therapy   Discharge Summary     Patient Name: Franchesca Paige  Age:  79 y.o., Sex:  female  Medical Record #: [id number]  Today's Date: 12/23/2024     Precautions  Precautions: Fall Risk, Weight Bearing As Tolerated Right Lower Extremity         Subjective  Pt pleasant and agreeable throughout.      Objective     12/23/24 1401   OT Charge Group   OT Self Care / ADL (Units) 4   OT Total Time Spent   OT Individual Total Time Spent (Mins) 60   Vitals   O2 Delivery Device None - Room Air   Bed Mobility    Supine to Sit Modified Independent   Sit to Supine Modified Independent   Interdisciplinary Plan of Care Collaboration   IDT Collaboration with  Nursing   Patient Position at End of Therapy In Bed   Collaboration Comments with RN at end of session for bladder scan, skin check   Eating   Assistance Needed Independent   CARE Score - Eating 6   Oral Hygiene   Assistance Needed Independent;Adaptive equipment   CARE Score - Oral Hygiene 6   Toileting Hygiene   Assistance Needed Independent;Adaptive equipment   CARE Score - Toileting Hygiene 6   Shower/Bathe Self   Assistance Needed Independent;Adaptive equipment   CARE Score - Shower/Bathe Self 6   Upper Body Dressing   Assistance Needed Independent   CARE Score - Upper Body Dressing 6   Lower Body Dressing   Assistance Needed Independent;Adaptive equipment   CARE Score - Lower Body Dressing 6   Putting On/Taking Off Footwear   Assistance Needed Independent   CARE Score - Putting On/Taking Off Footwear 6   Toilet Transfer   Assistance Needed Independent;Adaptive equipment   CARE Score - Toilet Transfer 6   Cognitive Pattern Assessment   Cognitive Pattern Assessment Used BIMS   Brief Interview for Mental Status (BIMS)   Repetition of Three Words (First Attempt) 3   Temporal Orientation: Year Correct   Temporal Orientation: Month Accurate within 5 days   Temporal Orientation: Day Correct   Recall: \"Sock\" Yes, no cue required   Recall: \"Blue\" Yes, no cue required " "  Recall: \"Bed\" Yes, no cue required   BIMS Summary Score 15   Confusion Assessment Method (CAM)   Is there evidence of an acute change in mental status from the patient's baseline? No   Inattention Behavior not present   Disorganized thinking Behavior not present   Altered level of consciousness Behavior not present   Discharge Summary    Discharge Location  Home   Patient Discharging with Assist of Spouse / Significant Other   Level of Supervision Required Intermittent Supervision   Recommended Equipment for Discharge Front-Wheeled Walker;Manual Wheelchair;Grab Bars by Toilet;Grab Bars in Tub / Shower;Hand Held Shower Head;Shower Chair   Recommended Services Upon Discharge No Follow-Up Occupational Therapy Recommended   Long Term Goals Met 2   Long Term Goals Not Met 0   Criteria for Termination of Services Maximum Function Achieved for Inpatient Rehabilitation         Assessment  Pt tolerated session well. She has demonstrated good progress toward her OT goals during this hospitalization, completed ADL routine with adaptive equipment and set-up assist on this date. She reported that she was pleased with her progress and that her pain is well under control as long as she requests meds every few hours. She is confident regarding d/c tomorrow.   Strengths: Able to follow instructions, Effective communication skills, Good insight into deficits/needs, Independent prior level of function, Making steady progress towards goals, Motivated for self care and independence, Pleasant and cooperative, Willingly participates in therapeutic activities  Barriers: Decreased endurance, Fatigue, Generalized weakness, Impaired activity tolerance, Impaired balance, Limited mobility, Pain, Pain poorly managed    Plan  Anticipate d/c to home tomorrow        Occupational Therapy Goals (Active)       Problem: Bathing       Dates: Start:  12/17/24         Goal: STG-Within one week, patient will bathe with CGA       Dates: Start:  12/17/24   "  Expected End:  12/28/24               Problem: Dressing       Dates: Start:  12/12/24         Goal: STG-Within one week, patient will dress LB with mod A using AE as needed.       Dates: Start:  12/12/24    Expected End:  12/28/24         Goal Note filed on 12/17/24 1195 by Ita Melton, OT       Requires max A                 Problem: Functional Transfers       Dates: Start:  12/17/24         Goal: STG-Within one week, patient will transfer to toilet with SBA.       Dates: Start:  12/17/24    Expected End:  12/28/24            Goal: STG-Within one week, patient will transfer to step in shower with CGA.       Dates: Start:  12/17/24    Expected End:  12/28/24

## 2024-12-23 NOTE — THERAPY
Occupational Therapy  Daily Treatment     Patient Name: Franchesca Paige  Age:  79 y.o., Sex:  female  Medical Record #: [id number]  Today's Date: 12/23/2024     Precautions  Precautions: (P) Fall Risk, Weight Bearing As Tolerated Right Lower Extremity         Subjective    Pt seated on toilet upon arrival. Pt pleasant and cooperative, agreeable to therapy. SO present for family training.     Objective       12/23/24 1301   OT Charge Group   Charges Yes   OT Therapy Activity (Units) 2   OT Total Time Spent   OT Individual Total Time Spent (Mins) 30   Precautions   Precautions Fall Risk;Weight Bearing As Tolerated Right Lower Extremity   Vitals   O2 Delivery Device None - Room Air   Functional Level of Assist   Toileting Supervision   Toileting Description Grab bar;Supervision for safety;Increased time   Bed, Chair, Wheelchair Transfer Supervised   Bed Chair Wheelchair Transfer Description Increased time;Supervision for safety  (stand pivot from w/c to EOB using FWW)   Toilet Transfers Supervised   Toilet Transfer Description Grab bar;Increased time;Supervision for safety  (stand pivot from toilet to walking with FWW)   Tub / Shower Transfers Standby Assist   Tub Shower Transfer Description Grab bar;Increased time;Supervision for safety;Verbal cueing  (dry shower transfer from walking with FWW <> tub transfer bench in ADL suite walk-in shower)   Bed Mobility    Sit to Supine Modified Independent   Interdisciplinary Plan of Care Collaboration   IDT Collaboration with  Family / Caregiver   Patient Position at End of Therapy In Bed;Bed Alarm On;Call Light within Reach;Tray Table within Reach;Phone within Reach;Family / Friend in Room   Collaboration Comments SO present for FT     Family training completed with SO - Edu provided on:  - CLOF with ADL's and bathroom transfers at FWW level, reviewed therapeutic goals and current barriers.  - Bathroom DME recommendations: pt has grab bars at shower (see photo), purchase shower  "chair instead of tub transfer bench d/t bathroom set-up              - AE recommendations: reacher.  - Supervision level recommendations: SBA with all standing tasks.  - Home safety  - HEP and transition with HH therapies    Assessment    Pt tolerated session well with no LOB noted. Family training completed - they both demo good understanding and agreeable to edu and recommendations. No further questions/concerns at this time. Pt feels confident with d/c tomorrow.    Strengths: Able to follow instructions, Effective communication skills, Good insight into deficits/needs, Independent prior level of function, Making steady progress towards goals, Motivated for self care and independence, Pleasant and cooperative, Willingly participates in therapeutic activities  Barriers: Decreased endurance, Fatigue, Generalized weakness, Impaired activity tolerance, Impaired balance, Limited mobility, Pain, Pain poorly managed    Plan    D/c tomorrow.    DME  OT DME Recommendations  Additional Equipment: Transfer Board (30\" Length) (potentially -- TBD)    Passport items to be completed:  Perform bathroom transfers, complete dressing, complete feeding, get ready for the day, prepare a simple meal, participate in household tasks, adapt home for safety needs, demonstrate home exercise program, complete caregiver training     Occupational Therapy Goals (Active)       Problem: Bathing       Dates: Start:  12/17/24         Goal: STG-Within one week, patient will bathe with CGA       Dates: Start:  12/17/24    Expected End:  12/28/24               Problem: Dressing       Dates: Start:  12/12/24         Goal: STG-Within one week, patient will dress LB with mod A using AE as needed.       Dates: Start:  12/12/24    Expected End:  12/28/24         Goal Note filed on 12/17/24 4795 by Ita Melton, OT       Requires max A                 Problem: Functional Transfers       Dates: Start:  12/17/24         Goal: STG-Within one week, patient " will transfer to toilet with SBA.       Dates: Start:  12/17/24    Expected End:  12/28/24            Goal: STG-Within one week, patient will transfer to step in shower with CGA.       Dates: Start:  12/17/24    Expected End:  12/28/24

## 2024-12-23 NOTE — PROGRESS NOTES
"  Physical Medicine & Rehabilitation Progress Note    Encounter Date: 12/23/2024    Chief Complaint: Decreased mobility, weakness    Interval Events (Subjective):  Patient sitting up in room. Discussed about US from this weekend. She reports she is elevating her legs.  Otherwise reviewed AM labs and ongoing anemia.     _____________________________________  Interdisciplinary Team Conference   Most recent IDT on 12/17/2024       Discharge Date/Disposition:  12/28/24  _____________________________________      Objective:  VITAL SIGNS: /65   Pulse 62   Temp 37 °C (98.6 °F) (Oral)   Resp 18   Ht 1.702 m (5' 7\")   Wt 98.9 kg (218 lb 0.6 oz)   SpO2 95%   BMI 34.15 kg/m²   Gen: NAD  Psych: Mood and affect appropriate  CV: RRR, 0 edema  Resp: CTAB, no upper airway sounds  Abd: NTND  Neuro: AOx4, following commands    Laboratory Values:  Recent Results (from the past 72 hours)   Basic Metabolic Panel    Collection Time: 12/23/24  5:28 AM   Result Value Ref Range    Sodium 137 135 - 145 mmol/L    Potassium 3.6 3.6 - 5.5 mmol/L    Chloride 105 96 - 112 mmol/L    Co2 23 20 - 33 mmol/L    Glucose 102 (H) 65 - 99 mg/dL    Bun 12 8 - 22 mg/dL    Creatinine 0.59 0.50 - 1.40 mg/dL    Calcium 10.3 8.5 - 10.5 mg/dL    Anion Gap 9.0 7.0 - 16.0   MAGNESIUM    Collection Time: 12/23/24  5:28 AM   Result Value Ref Range    Magnesium 2.1 1.5 - 2.5 mg/dL   CBC WITH DIFFERENTIAL    Collection Time: 12/23/24  5:28 AM   Result Value Ref Range    WBC 6.9 4.8 - 10.8 K/uL    RBC 2.70 (L) 4.20 - 5.40 M/uL    Hemoglobin 8.3 (L) 12.0 - 16.0 g/dL    Hematocrit 24.9 (L) 37.0 - 47.0 %    MCV 92.2 81.4 - 97.8 fL    MCH 30.7 27.0 - 33.0 pg    MCHC 33.3 32.2 - 35.5 g/dL    RDW 48.8 35.9 - 50.0 fL    Platelet Count 353 164 - 446 K/uL    MPV 8.7 (L) 9.0 - 12.9 fL    Neutrophils-Polys 72.00 44.00 - 72.00 %    Lymphocytes 17.20 (L) 22.00 - 41.00 %    Monocytes 8.50 0.00 - 13.40 %    Eosinophils 1.60 0.00 - 6.90 %    Basophils 0.10 0.00 - 1.80 % "    Immature Granulocytes 0.60 0.00 - 0.90 %    Nucleated RBC 0.00 0.00 - 0.20 /100 WBC    Neutrophils (Absolute) 4.98 1.82 - 7.42 K/uL    Lymphs (Absolute) 1.19 1.00 - 4.80 K/uL    Monos (Absolute) 0.59 0.00 - 0.85 K/uL    Eos (Absolute) 0.11 0.00 - 0.51 K/uL    Baso (Absolute) 0.01 0.00 - 0.12 K/uL    Immature Granulocytes (abs) 0.04 0.00 - 0.11 K/uL    NRBC (Absolute) 0.00 K/uL   ESTIMATED GFR    Collection Time: 12/23/24  5:28 AM   Result Value Ref Range    GFR (CKD-EPI) 93 >60 mL/min/1.73 m 2         Medications:  Scheduled Medications   Medication Dose Frequency    lisinopril  2.5 mg DAILY    senna-docusate  2 Tablet Q EVENING    omeprazole  20 mg DAILY    amLODIPine  5 mg DAILY    enoxaparin (LOVENOX) injection  40 mg DAILY AT 1800     PRN medications: lactulose, meclizine, metaxalone, hydrALAZINE, acetaminophen, senna-docusate **AND** polyethylene glycol/lytes, docusate sodium, magnesium hydroxide, carboxymethylcellulose, benzocaine-menthol, mag hydrox-al hydrox-simeth, ondansetron **OR** ondansetron, traZODone, sodium chloride, oxyCODONE immediate-release **OR** oxyCODONE immediate-release    Diet:  Current Diet Order   Procedures    Diet Order Diet: Regular       Medical Decision Making and Plan:  R distal femur fracture - Patient with mechanical fall onto right knee on 12/7 with distal femur fracture s/p ORIF on 12/7 with Dr. Garzon  -PT and OT for mobility and ADLs. Per guidelines, 15 hours per week between PT, OT and/or SLP.  -Follow-up Dr. Garzon. WBAT     HTN - Patient on Amlodipine 5 mg daily and Lisinopril 20 mg daily. Low SBP, reduce ACEi to 10 mg.  SBP into 90s over weekend, reduce Lisinopril to 5. SBP into 130s, continue Amlodipine 5 mg and Lisinopril 5 mg  -Orthostatic 12/19, reduce Lisinopril to 2.5 mg  -SBP 140s 12/22, will increase Lisinopril back to 5     Confusion - Will check UA, is retaining fluid with dysuria but may be traumatic catheterization. UTI vs pain  medication    Pneumonia - Patient on Augmentin and Zithromax. Completed Zithromax, completed Augmentin 12/13     Leukocytosis - Check AM CBC, on antibiotics. WBC 9.9 on admission, will monitor  -Fever on 12/17 up to 101 and sustained. Started on Zyvox, hospitalist consulted. Repeat labs wnl, Zyvox discontinued. Will monitor     Anemia - Check AM CBC - 8.1, ongoing      Hyponatremia - Check AM CMP - 131, repeat 12/16 136     Azotemia - Check AM CMP - 26. Repeat 12/16 - 17, improved.     Hypercalcemia - mild elevation on corrected calcium. Will monitor     Class 1 Obesity due to excess calories - BMI of 32.1 on admission, meets medical criteria. Dietitian to consult     Pain - Patient on PRN Oxycodone and Tylenol      Skin - Patient at risk for skin breakdown due to debility in areas including sacrum, achilles, elbows and head in addition to other sites. Nursing to assess skin daily.      GI Ppx - Patient on Prilosec for GERD prophylaxis. Patient on Senna-docusate for constipation prophylaxis.   -Discontinue Lactulose     DVT Ppx - Patient Lovenox on transfer. Limited mobility, continue Lovenox  -Swelling on 12/20, check US - negative. Continue Lovenox  ____________________________________    T. Brian Souza MD/PhD  [facility] - Physical Medicine & Rehabilitation   [facility] - Brain Injury Medicine   ____________________________________

## 2024-12-23 NOTE — THERAPY
"Physical Therapy   Daily Treatment     Patient Name: Franchesca Paige  Age:  79 y.o., Sex:  female  Medical Record #: [id number]  Today's Date: 12/23/2024     Precautions  Precautions: (P) Fall Risk, Weight Bearing As Tolerated Right Lower Extremity    Subjective    Pt reported that she should not need to navigate stairs at D/C, as her  is installing ramps. There are a couple of single stairs in home that she previously used FWW on.      Objective       12/23/24 0931   PT Charge Group   PT Gait Training (Units) 1   PT Neuromuscular Re-Education / Balance (Units) 1   PT Therapeutic Activities (Units) 2   PT Total Time Spent   PT Individual Total Time Spent (Mins) 60   Precautions   Precautions Fall Risk;Weight Bearing As Tolerated Right Lower Extremity   Pain 0 - 10 Group   Location Leg   Location Orientation Right   Therapist Pain Assessment   (Ice to R hip during wc mobility back to room)   Gait Functional Level of Assist    Gait Level Of Assist Standby Assist   Assistive Device Front Wheel Walker   Distance (Feet) 60  (+ 40 ft)   # of Times Distance was Traveled 1   Deviation Antalgic;Step To;Bradykinetic;Decreased Heel Strike;Decreased Toe Off  (Emphasis on postural awareness, fwd gaze, and relaxing elevated shoulders)   Wheelchair Functional Level of Assist   Wheelchair Assist Modified Independent   Distance Wheelchair (Feet or Distance) 300   Wheelchair Description   (indoors)   Transfer Functional Level of Assist   Bed, Chair, Wheelchair Transfer Standby Assist   Bed Chair Wheelchair Transfer Description Set-up of equipment  (SPT FWW)   Standing Lower Body Exercises   Step Up Bilateral  (4\" step in // bars 2x4 sets, with seated rest after each 2)   Bed Mobility    Supine to Sit Modified Independent  (leg )   Sit to Supine Modified Independent  (leg )   Sit to Stand Standby Assist   Scooting Modified Independent   Neuro-Muscular Treatments   Neuro-Muscular Treatments Weight Shift Left;Weight " Shift Right;Sequencing;Verbal Cuing;Postural Facilitation;Postural Changes   Interdisciplinary Plan of Care Collaboration   Patient Position at End of Therapy Call Light within Reach;Tray Table within Reach   Sit to Lying   Assistance Needed Independent;Adaptive equipment   CARE Score - Sit to Lying 6   Lying to Sitting on Side of Bed   Assistance Needed Independent;Adaptive equipment   CARE Score - Lying to Sitting on Side of Bed 6   Sit to Stand   Assistance Needed Set-up / clean-up;Adaptive equipment   CARE Score - Sit to Stand 5   Chair/Bed-to-Chair Transfer   Assistance Needed Adaptive equipment;Set-up / clean-up   CARE Score - Chair/Bed-to-Chair Transfer 5   Walk 10 Feet   Assistance Needed Set-up / clean-up;Adaptive equipment   CARE Score - Walk 10 Feet 5   Walk 50 Feet with Two Turns   Assistance Needed Set-up / clean-up;Adaptive equipment   CARE Score - Walk 50 Feet with Two Turns 5         Assessment    Pt anibal'ed improved body mechanics with sit <> stand, using FWW, at SBA. Pt also progressed to SBA ambulation with FWW; walker was dec in height to alow for inc offloading through BUE. She continues to require vc for fwd gaze, relaxing ajay shoulders, and step through progression. Pt is motivated toward continued progress. Pt participated in step ups in // bars for step negotiation sequencing and offloading through BUE.     Strengths: Able to follow instructions, Alert and oriented, Effective communication skills, Good insight into deficits/needs, Independent prior level of function, Motivated for self care and independence, Pleasant and cooperative, Supportive family, Willingly participates in therapeutic activities  Barriers: Decreased endurance, Fatigue, Generalized weakness, Home accessibility, Impaired activity tolerance, Impaired balance, Limited mobility, Pain    Plan    Sit<>stand/ transfer training with FWW, AAROM/ strength training, bed mobility training with leg  to standard bed, progress  "gait with FWW, Modalities for pain management. Steps in // bars, 10 MWT     DME  PT DME Recommendations  Wheelchair: 20\" Width, Lightweight, Removable/Flip Back Armrests, Standard Leg Rests  Cushion: Standard  Assistive Device:  (pt has FWW)  Additional Equipment: Transfer Board (30\" Length) (potentially -- TBD)    Passport items to be completed:  Sit<>stand/ transfer training with FWW, AAROM/ strength training, bed mobility training with leg  to standard bed, progress gait with FWW, Modalities for pain management. Steps in // bars, 10 MWT     Physical Therapy Problems (Active)       Problem: PT-Long Term Goals       Dates: Start:  12/12/24         Goal: LTG-By discharge, patient will propel wheelchair modified independent 50 ft x 2       Dates: Start:  12/12/24    Expected End:  12/28/24            Goal: LTG-By discharge, patient will ambulate SBA/ CGA with FWW 50 ft x 2       Dates: Start:  12/12/24    Expected End:  12/28/24            Goal: LTG-By discharge, patient will transfer one surface to another SBA/ SPV with FWW       Dates: Start:  12/12/24    Expected End:  12/28/24            Goal: LTG-By discharge, patient will ambulate up/down 4-6 stairs SBA/ CGA with hand rails vs FWW x 3 steps into home       Dates: Start:  12/12/24    Expected End:  12/28/24            Goal: LTG-By discharge, patient will transfer in/out of a car SBA/ CGA with FWW       Dates: Start:  12/12/24    Expected End:  12/28/24              "

## 2024-12-23 NOTE — THERAPY
Physical Therapy   Daily Treatment     Patient Name: Franchesca Paige  Age:  79 y.o., Sex:  female  Medical Record #: [id number]  Today's Date: 12/23/2024     Precautions  Precautions: Fall Risk, Weight Bearing As Tolerated Right Lower Extremity    Subjective    Pt sitting edge of bed, spouse present for family training.     Objective       12/23/24 1231   PT Charge Group   PT Therapeutic Activities (Units) 2   PT Total Time Spent   PT Individual Total Time Spent (Mins) 60   Gait Functional Level of Assist    Gait Level Of Assist Standby Assist   Assistive Device Front Wheel Walker   Distance (Feet) 50   # of Times Distance was Traveled 2   Deviation Antalgic;Step To;Bradykinetic   Roll Left and Right   Assistance Needed Physical assistance   Physical Assistance Level 25% or less   CARE Score - Roll Left and Right 3   Car Transfer   Assistance Needed Adaptive equipment;Physical assistance   Physical Assistance Level 25% or less   CARE Score - Car Transfer 3   Walking 10 Feet on Uneven Surfaces   Assistance Needed Adaptive equipment;Incidental touching   CARE Score - Walking 10 Feet on Uneven Surfaces 4   1 Step (Curb)   Assistance Needed Adaptive equipment;Physical assistance   Physical Assistance Level 25% or less   CARE Score - 1 Step (Curb) 3   4 Steps   Reason if not Attempted Medical concerns   CARE Score - 4 Steps 88   12 Steps   Reason if not Attempted Medical concerns   CARE Score - 12 Steps 88   Picking Up Object   Assistance Needed Adaptive equipment;Incidental touching   CARE Score - Picking Up Object 4   Wheel 50 Feet with Two Turns   Assistance Needed Independent   CARE Score - Wheel 50 Feet with Two Turns 6   Type of Wheelchair/Scooter Manual   Wheel 150 Feet   Assistance Needed Independent   CARE Score - Wheel 150 Feet 6   Type of Wheelchair/Scooter Manual     Pt's spouse assisted with car transfer into [facility] with Laura and FWW.  Demonstrated wc parts/ management and breakdown for transport,  "spouse able to return demonstration.  Pt navigated 2\" threshold with FWW and CGA/min A for safety 2* LE fatigue following this am therapies.    Assessment    Pt/ spouse feel confident to return home today or tomorrow, communicated with case management. Family training went well.    Strengths: Able to follow instructions, Alert and oriented, Effective communication skills, Good insight into deficits/needs, Independent prior level of function, Motivated for self care and independence, Pleasant and cooperative, Supportive family, Willingly participates in therapeutic activities  Barriers: Decreased endurance, Fatigue, Generalized weakness, Home accessibility, Impaired activity tolerance, Impaired balance, Limited mobility, Pain    Plan    D/c tomorrow with out-pt PT    DME  PT DME Recommendations  Wheelchair: 20\" Width, Lightweight, Removable/Flip Back Armrests, Standard Leg Rests  Cushion: Standard  Assistive Device:  (pt has FWW)  Additional Equipment: NA      Physical Therapy Problems (Active)       Problem: PT-Long Term Goals       Dates: Start:  12/12/24         Goal: LTG-By discharge, patient will propel wheelchair modified independent 50 ft x 2       Dates: Start:  12/12/24    Expected End:  12/28/24            Goal: LTG-By discharge, patient will ambulate SBA/ CGA with FWW 50 ft x 2       Dates: Start:  12/12/24    Expected End:  12/28/24            Goal: LTG-By discharge, patient will transfer one surface to another SBA/ SPV with FWW       Dates: Start:  12/12/24    Expected End:  12/28/24            Goal: LTG-By discharge, patient will ambulate up/down 4-6 stairs SBA/ CGA with hand rails vs FWW x 3 steps into home       Dates: Start:  12/12/24    Expected End:  12/28/24            Goal: LTG-By discharge, patient will transfer in/out of a car SBA/ CGA with FWW       Dates: Start:  12/12/24    Expected End:  12/28/24              "

## 2024-12-23 NOTE — PROGRESS NOTES
NURSING DAILY NOTE    Name: Franchesca Paige   Date of Admission: 12/11/2024   Admitting Diagnosis: No Principal Problem: There is no principal problem currently on the Problem List. Please update the Problem List and refresh.  Attending Physician: ARMANDO GLASS M.D.  Allergies: Ibuprofen and Sulfa drugs    Safety  Patient Assist  Max 1  Patient Precautions  Fall Risk, Weight Bearing As Tolerated Right Lower Extremity  Precaution Comments     Bed Transfer Status  Contact Guard Assist  Toilet Transfer Status   Contact Guard Assist  Assistive Devices  Walker - front wheel  Oxygen  None - Room Air  Diet/Therapeutic Dining  Current Diet Order   Procedures    Diet Order Diet: Regular     Pill Administration  whole  Agitated Behavioral Scale     ABS Level of Severity       Fall Risk  Has the patient had a fall this admission?   No  Candice Porter Fall Risk Scoring  16, HIGH RISK  Fall Risk Safety Measures  bed alarm, chair alarm, and poor balance    Vitals  Temperature: 36.8 °C (98.2 °F)  Temp src: Oral  Pulse: (!) 56  Respiration: 18  Blood Pressure : (!) 147/66  Blood Pressure MAP (Calculated): 93 MM HG  BP Location: Right, Upper Arm  Patient BP Position: Supine     Oxygen  Pulse Oximetry: 94 %  O2 (LPM): 0  O2 Delivery Device: None - Room Air    Bowel and Bladder  Last Bowel Movement  12/22/24  Stool Type  Type 4: Like a sausage or snake, smooth and soft  Bowel Device  Diaper  Continent  Bladder:     Bowel: Continent movement  Bladder Function  Urine Void (mL):  (large)  Number of Times Voided: 1  Urine Color: Yellow  Number of Times Incontinent of Urine: 0  Straight Catheter: 525 ml  Genitourinary Assessment   Bladder Assessment (WDL):  Within Defined Limits  Rodriges Catheter: Not Applicable  Urinary Symptoms:  (retention)  Urine Color: Yellow  Number of Bladder Accidents: 0  Total Number of Bladder of Accidents in Last 7 Days: 1  Number of Times  "Incontinent of Urine: 0  Bladder Device: Bathroom  Time Void: Yes  Bladder Scan: Post Void  $ Bladder Scan Results (mL): 576  Intermittent Catheter: Yes -Go To LDA \"Indwelling Catheter Group\"  Bladder Medications: Yes    Skin  Felix Score   17  Sensory Interventions   Skin Preventative Measures: Pillows in Use for Support / Positioning  Moisture Interventions  Moisturizers/Barriers: Barrier Paste      Pain  Pain Rating Scale  5 - Interrupts some activities  Pain Location  Hip  Pain Location Orientation  Right  Pain Interventions   Medication (see MAR)    ADLs    Bathing   Patient Refused Bathing (Pt states shes too tired from todays activites)  Linen Change      Personal Hygiene  Moist Joycelyn Wipes, Perineal Care  Chlorhexidine Bath      Oral Care     Teeth/Dentures     Shave     Nutrition Percentage Eaten  *  * Meal *  *, Dinner, Between 25-50% Consumed  Environmental Precautions  Treaded Slipper Socks on Patient, Communication Sign for Patients & Families, Mobility Assessed & Appropriate Sign Placed  Patient Turns/Positioning  Patient turns self independently side to side without assistance, to offload sacral area  Patient Turns Assistance/Tolerance  Assistance of One  Bed Positions  Bed Controls On, Bed Locked  Head of Bed Elevated  Self regulated      Psychosocial/Neurologic Assessment  Psychosocial Assessment  Psychosocial (WDL):  Within Defined Limits  Patient Behaviors: Fatigue  Neurologic Assessment  Neuro (WDL): Within Defined Limits  Level of Consciousness: Alert  Orientation Level: Oriented X4  Cognition: Appropriate judgement  Speech: Clear  Pupil Assesment: No  EENT (WDL):  WDL Except    Cardio/Pulmonary Assessment  Edema   RUE Edema: 1+  RLE Edema: 2+  LUE Edema: Trace  LLE Edema: 1+  Respiratory Breath Sounds  RUL Breath Sounds: Clear  RML Breath Sounds: Clear  RLL Breath Sounds: Diminished  DINO Breath Sounds: Clear  LLL Breath Sounds: Diminished  Cardiac Assessment   Cardiac (WDL):  Within Defined " Limits

## 2024-12-23 NOTE — DISCHARGE SUMMARY
Physical Medicine & Rehabilitation Discharge Summary    Admission Date: 12/11/2024    Discharge Date: 12/24/2024    Attending Provider: Sushant Souza MD/PhD    Admission Diagnosis:   Active Hospital Problems    Diagnosis     Fever     Closed displaced fracture of distal epiphysis of right femur with routine healing     Paroxysmal atrial fibrillation (HCC)     Anemia     Essential hypertension     Gastroesophageal reflux disease without esophagitis        Discharge Diagnosis:  Active Hospital Problems    Diagnosis     Fever     Closed displaced fracture of distal epiphysis of right femur with routine healing     Paroxysmal atrial fibrillation (HCC)     Anemia     Essential hypertension     Gastroesophageal reflux disease without esophagitis        HPI per Admission History & Physical:  Patient is a 79 y.o. female with a PMH of bladder cancer, HTN, A fib, HLD, and osteopenia who presented on 12/7/24 with GLF. Patient reportedly had a mechanical fall onto right knee with severe 10/10 pain. In ED she was found to have distal femur fracture. Orthopedic surgery was consulted and recommended ORIF. She underwent ORIF on 12/7/24 with Dr. Garzon. Patient is WBAT. Hospital course complicated by SOB concerning for pneumonia, leukocytosis, and HTN. She has been started on antibiotics for the elevated WBC.     Patient was admitted to [facility] on 12/11/2024.     Hospital Course by Problem List:  R distal femur fracture - Patient with mechanical fall onto right knee on 12/7 with distal femur fracture s/p ORIF on 12/7 with Dr. Garzon  -PT and OT for mobility and ADLs. Per guidelines, 15 hours per week between PT, OT and/or SLP.  -Follow-up Dr. Garzon. WBAT     HTN - Patient on Amlodipine 5 mg daily and Lisinopril 20 mg daily. Low SBP, reduce ACEi to 10 mg.  SBP into 90s over weekend, reduce Lisinopril to 5. SBP into 130s, continue Amlodipine 5 mg and Lisinopril 5 mg  -Orthostatic  12/19, reduce Lisinopril to 2.5 mg  -SBP 140s 12/22, will increase Lisinopril back to 5     Confusion - Will check UA, is retaining fluid with dysuria but may be traumatic catheterization. UTI vs pain medication     Pneumonia - Patient on Augmentin and Zithromax. Completed Zithromax, completed Augmentin 12/13     Leukocytosis - Check AM CBC, on antibiotics. WBC 9.9 on admission, will monitor  -Fever on 12/17 up to 101 and sustained. Started on Zyvox, hospitalist consulted. Repeat labs wnl, Zyvox discontinued. Will monitor     Anemia - Check AM CBC - 8.1, ongoing      Hyponatremia - Check AM CMP - 131, repeat 12/16 136     Azotemia - Check AM CMP - 26. Repeat 12/16 - 17, improved.      Hypercalcemia - mild elevation on corrected calcium. Will monitor     Class 1 Obesity due to excess calories - BMI of 32.1 on admission, meets medical criteria. Dietitian to consult     Pain - Patient on PRN Oxycodone and Tylenol      Skin - Patient at risk for skin breakdown due to debility in areas including sacrum, achilles, elbows and head in addition to other sites. Nursing to assess skin daily.      GI Ppx - Patient on Prilosec for GERD prophylaxis. Patient on Senna-docusate for constipation prophylaxis.   -Discontinue Lactulose     DVT Ppx - Patient Lovenox on transfer. Limited mobility, continue Lovenox  -Swelling on 12/20, check US - negative. Continue Lovenox    Functional Status at Discharge  Eating:     Eating Description:     Grooming:  Supervision, Seated  Grooming Description:  Increased time, Seated in wheelchair at sink, Supervision for safety  Bathing:  Contact Guard Assist  Bathing Description:  Grab bar, Hand held shower, Increased time, Supervision for safety  Upper Body Dressing:  Modified Independent  Upper Body Dressing Description:  Increased time  Lower Body Dressing:  Moderate Assist  Lower Body Dressing Description:  Grab bar, Assist with threading into pant leg, Increased time, Supervision for safety,  Verbal cueing  Discharge Location : Home  Patient Discharging with Assist of: Spouse / Significant Other  Level of Supervision Required: Intermittent Supervision  Recommended Equipment for Discharge: Front-Wheeled Walker;Manual Wheelchair;Grab Bars by Toilet;Grab Bars in Tub / Shower;Hand Held Shower Head;Shower Chair  Recommended Services Upon Discharge: No Follow-Up Occupational Therapy Recommended  Long Term Goals Met: 2  Long Term Goals Not Met: 0  Criteria for Termination of Services: Maximum Function Achieved for Inpatient Rehabilitation  Walk:  Standby Assist  Distance Walked:  50  Number of Times Distance Was Traveled:  2  Assistive Device:  Front Wheel Walker  Gait Deviation:  Antalgic, Step To, Bradykinetic  Wheelchair:  Modified Independent  Distance Propelled:  300   Wheelchair Description:   (indoors)  Stairs Minimal Assist  Stairs Description Extra time, Hand rails, Verbal cueing     Comprehension:     Comprehension Description:     Expression:     Expression Description:     Social Interaction:     Social Interaction Description:     Problem Solving:     Problem Solving Description:     Memory:     Memory Description:          Sushant CHUNG M.D., personally performed a complete drug regimen review and no potential clinically significant medication issues were identified.   Discharge Medication:     Medication List        CONTINUE taking these medications        Instructions   amLODIPine 5 MG Tabs  Commonly known as: Norvasc  Notes to patient: Blood Pressure   Take 1 Tablet by mouth every day.  Dose: 5 mg     ascorbic acid 500 MG Tabs  Commonly known as: Ascorbic Acid  Notes to patient: Vitamin C    Take 500 mg by mouth every day.  Dose: 500 mg     diclofenac sodium 1 % Gel  Commonly known as: Voltaren  Notes to patient: Mild Pain Relief    Apply 4 g topically 4 times a day. To knee  Dose: 4 g     fosinopril 20 MG Tabs  Commonly known as: Monopril  Notes to patient: Blood Pressure   Take  1 Tablet by mouth every day.  Dose: 20 mg     omeprazole 20 MG delayed-release capsule  Commonly known as: PriLOSEC   Take 1 Capsule by mouth every day.  Dose: 20 mg     oxyCODONE immediate-release 5 MG Tabs  Commonly known as: Roxicodone  Notes to patient: Pain Relief    Take 1 Tablet by mouth every 6 hours as needed for Severe Pain for up to 7 days. Indications: Acute Pain  Dose: 5 mg     VITAMIN D PO  Notes to patient: Vitamin D Supplement    Take 1 Tablet by mouth every day.  Dose: 1 Tablet            STOP taking these medications      amoxicillin-clavulanate 875-125 MG Tabs  Commonly known as: Augmentin     aspirin 81 MG EC tablet     polyethylene glycol/lytes Pack  Commonly known as: Miralax     senna-docusate 8.6-50 MG Tabs  Commonly known as: Pericolace Or Senokot S     sodium chloride 0.65 % Soln  Commonly known as: Ocean              Discharge Diet:  Current Diet Order   Procedures    Diet Order Diet: Regular       Discharge Activity:  As tolerated     Disposition:  Patient to discharge home with family support and community resources.    Equipment:  FWW    Follow-up & Discharge Instructions:  Follow up with your primary care provider (PCP) within 7-10 days of discharge to review your medications and take over your care.     If you develop chest pain, fever, chills, change in neurologic function (weakness, sensation changes, vision changes), or other concerning sxs, seek immediate medical attention or call 911.      No future appointments.    Condition on Discharge:  Good    More than 32 minutes was spent on discharging this patient, including face-to-face time, prescription management, and the dictation of this note.    Sushant Souza M.D.    Date of Service: 12/24/2024

## 2024-12-23 NOTE — CARE PLAN
Problem: OT Long Term Goals  Goal: LTG-By discharge, patient will complete basic self care tasks with supervision.  Outcome: Met  Goal: LTG-By discharge, patient will perform bathroom transfers with supervision.  Outcome: Met

## 2024-12-24 VITALS
SYSTOLIC BLOOD PRESSURE: 125 MMHG | TEMPERATURE: 98.6 F | WEIGHT: 218.03 LBS | DIASTOLIC BLOOD PRESSURE: 68 MMHG | OXYGEN SATURATION: 95 % | BODY MASS INDEX: 34.22 KG/M2 | HEIGHT: 67 IN | HEART RATE: 62 BPM | RESPIRATION RATE: 16 BRPM

## 2024-12-24 PROCEDURE — 99239 HOSP IP/OBS DSCHRG MGMT >30: CPT | Performed by: PHYSICAL MEDICINE & REHABILITATION

## 2024-12-24 PROCEDURE — 700102 HCHG RX REV CODE 250 W/ 637 OVERRIDE(OP): Performed by: PHYSICAL MEDICINE & REHABILITATION

## 2024-12-24 PROCEDURE — A9270 NON-COVERED ITEM OR SERVICE: HCPCS | Performed by: PHYSICAL MEDICINE & REHABILITATION

## 2024-12-24 RX ORDER — OXYCODONE HYDROCHLORIDE 5 MG/1
5 TABLET ORAL EVERY 6 HOURS PRN
Qty: 28 TABLET | Refills: 0 | Status: SHIPPED | OUTPATIENT
Start: 2024-12-24 | End: 2024-12-31

## 2024-12-24 RX ORDER — FOSINOPRIL SODIUM 20 MG/1
20 TABLET ORAL
Qty: 90 TABLET | Refills: 2 | Status: SHIPPED | OUTPATIENT
Start: 2024-12-24

## 2024-12-24 RX ORDER — AMLODIPINE BESYLATE 5 MG/1
5 TABLET ORAL
Qty: 90 TABLET | Refills: 3 | Status: SHIPPED | OUTPATIENT
Start: 2024-12-24

## 2024-12-24 RX ADMIN — AMLODIPINE BESYLATE 5 MG: 5 TABLET ORAL at 05:07

## 2024-12-24 RX ADMIN — OXYCODONE HYDROCHLORIDE 5 MG: 5 TABLET ORAL at 05:34

## 2024-12-24 RX ADMIN — OMEPRAZOLE 20 MG: 20 CAPSULE, DELAYED RELEASE ORAL at 08:03

## 2024-12-24 RX ADMIN — OXYCODONE HYDROCHLORIDE 5 MG: 5 TABLET ORAL at 11:28

## 2024-12-24 RX ADMIN — OXYCODONE HYDROCHLORIDE 5 MG: 5 TABLET ORAL at 00:28

## 2024-12-24 RX ADMIN — LISINOPRIL 5 MG: 5 TABLET ORAL at 05:07

## 2024-12-24 ASSESSMENT — PATIENT HEALTH QUESTIONNAIRE - PHQ9
2. FEELING DOWN, DEPRESSED, IRRITABLE, OR HOPELESS: NOT AT ALL
1. LITTLE INTEREST OR PLEASURE IN DOING THINGS: NOT AT ALL
2. FEELING DOWN, DEPRESSED, IRRITABLE, OR HOPELESS: NOT AT ALL
1. LITTLE INTEREST OR PLEASURE IN DOING THINGS: NOT AT ALL
SUM OF ALL RESPONSES TO PHQ9 QUESTIONS 1 AND 2: 0
SUM OF ALL RESPONSES TO PHQ9 QUESTIONS 1 AND 2: 0

## 2024-12-24 ASSESSMENT — ENCOUNTER SYMPTOMS
SHORTNESS OF BREATH: 0
VOMITING: 0
POLYDIPSIA: 0
CHILLS: 0
BRUISES/BLEEDS EASILY: 0
PALPITATIONS: 0
COUGH: 0
NAUSEA: 0
FEVER: 0
ABDOMINAL PAIN: 0
EYES NEGATIVE: 1

## 2024-12-24 ASSESSMENT — PAIN DESCRIPTION - PAIN TYPE
TYPE: ACUTE PAIN

## 2024-12-24 NOTE — DISCHARGE INSTRUCTIONS
[facility] Nursing Discharge Instructions      Fall Prevention in the Home, Adult  Falls can cause injuries and can happen to people of all ages. There are many things you can do to make your home safe and to help prevent falls. Ask for help when making these changes.  What actions can I take to prevent falls?  General Instructions  Use good lighting in all rooms. Replace any light bulbs that burn out.  Turn on the lights in dark areas. Use night-lights.  Keep items that you use often in easy-to-reach places. Lower the shelves around your home if needed.  Set up your furniture so you have a clear path. Avoid moving your furniture around.  Do not have throw rugs or other things on the floor that can make you trip.  Avoid walking on wet floors.  If any of your floors are uneven, fix them.  Add color or contrast paint or tape to clearly nimo and help you see:  Grab bars or handrails.  First and last steps of staircases.  Where the edge of each step is.  If you use a stepladder:  Make sure that it is fully opened. Do not climb a closed stepladder.  Make sure the sides of the stepladder are locked in place.  Ask someone to hold the stepladder while you use it.  Know where your pets are when moving through your home.  What can I do in the bathroom?         Keep the floor dry. Clean up any water on the floor right away.  Remove soap buildup in the tub or shower.  Use nonskid mats or decals on the floor of the tub or shower.  Attach bath mats securely with double-sided, nonslip rug tape.  If you need to sit down in the shower, use a plastic, nonslip stool.  Install grab bars by the toilet and in the tub and shower. Do not use towel bars as grab bars.  What can I do in the bedroom?  Make sure that you have a light by your bed that is easy to reach.  Do not use any sheets or blankets for your bed that hang to the floor.  Have a firm chair with side arms that you can use for support when you get dressed.  What can I do in  the kitchen?  Clean up any spills right away.  If you need to reach something above you, use a step stool with a grab bar.  Keep electrical cords out of the way.  Do not use floor polish or wax that makes floors slippery.  What can I do with my stairs?  Do not leave any items on the stairs.  Make sure that you have a light switch at the top and the bottom of the stairs.  Make sure that there are handrails on both sides of the stairs. Fix handrails that are broken or loose.  Install nonslip stair treads on all your stairs.  Avoid having throw rugs at the top or bottom of the stairs.  Choose a carpet that does not hide the edge of the steps on the stairs.  Check carpeting to make sure that it is firmly attached to the stairs. Fix carpet that is loose or worn.  What can I do on the outside of my home?  Use bright outdoor lighting.  Fix the edges of walkways and driveways and fix any cracks.  Remove anything that might make you trip as you walk through a door, such as a raised step or threshold.  Trim any bushes or trees on paths to your home.  Check to see if handrails are loose or broken and that both sides of all steps have handrails.  Install guardrails along the edges of any raised decks and porches.  Clear paths of anything that can make you trip, such as tools or rocks.  Have leaves, snow, or ice cleared regularly.  Use sand or salt on paths during winter.  Clean up any spills in your garage right away. This includes grease or oil spills.  What other actions can I take?  Wear shoes that:  Have a low heel. Do not wear high heels.  Have rubber bottoms.  Feel good on your feet and fit well.  Are closed at the toe. Do not wear open-toe sandals.  Use tools that help you move around if needed. These include:  Canes.  Walkers.  Scooters.  Crutches.  Review your medicines with your doctor. Some medicines can make you feel dizzy. This can increase your chance of falling.  Ask your doctor what else you can do to help  prevent falls.  Where to find more information  Centers for Disease Control and Prevention, [place]: www.cdc.gov  National [place] on Aging: www.machelle.nih.gov  Contact a doctor if:  You are afraid of falling at home.  You feel weak, drowsy, or dizzy at home.  You fall at home.  Summary  There are many simple things that you can do to make your home safe and to help prevent falls.  Ways to make your home safe include removing things that can make you trip and installing grab bars in the bathroom.  Ask for help when making these changes in your home.  This information is not intended to replace advice given to you by your health care provider. Make sure you discuss any questions you have with your health care provider.  Document Revised: 09/19/2022 Document Reviewed: 07/21/2021  [employer] Patient Education © 2023 [employer]    Femoral Shaft Fracture Treated with ORIF, Care After  The following information offers guidance on how to care for yourself after your procedure. Your health care provider may also give you more specific instructions. If you have problems or questions, contact your health care provider.  What can I expect after the procedure?  After the procedure, it is common to have:  Pain.  Swelling.  Some redness or bruising around the incision.  A small amount of fluid or blood coming from your incision.  Stiffness in your leg.  Follow these instructions at home:  Medicines  Take over-the-counter and prescription medicines only as told by your health care provider. This includes medicines to prevent blood clots from forming (anticoagulants).  Ask your health care provider if the medicine prescribed to you:  Requires you to avoid driving or using machinery.  Can cause constipation. You may need to take these actions to prevent or treat constipation:  Drink enough fluid to keep your urine pale yellow.  Take over-the-counter or prescription medicines.  Eat foods that are high in fiber, such as beans, whole  grains, and fresh fruits and vegetables.  Limit foods that are high in fat and processed sugars, such as fried or sweet foods.  Bleeding precautions  If you are taking blood thinners:  Talk with your health care provider before you take any medicines that contain aspirin or NSAIDs, such as ibuprofen. These medicines increase your risk for dangerous bleeding.  Take your medicine exactly as told, at the same time every day.  Avoid activities that could cause injury or bruising, and follow instructions about how to prevent falls.  Wear a medical alert bracelet or carry a card that lists what medicines you take.  Bathing  Do not take baths, swim, or use a hot tub until your health care provider approves. Ask your health care provider if you may take showers. You may only be allowed to take sponge baths.  Keep your bandage (dressing) dry. Cover it with a watertight covering if you take a sponge bath or shower.  Incision care    Follow instructions from your health care provider about how to take care of your incision. Make sure you:  Wash your hands with soap and water for at least 20 seconds before and after you change your dressing. If soap and water are not available, use hand .  Change your dressing as told by your health care provider.  Leave stitches (sutures), staples, skin glue, or adhesive strips in place. These skin closures may need to stay in place for 2 weeks or longer. If adhesive strip edges start to loosen and curl up, you may trim the loose edges. Do not remove adhesive strips completely unless your health care provider tells you to do that.  Check your incision area every day for signs of infection. Check for:  More redness, swelling, or pain.  More fluid or blood.  Warmth.  Pus or a bad smell.  Managing pain, stiffness, and swelling    If directed, put ice on your leg. To do this:  Put ice in a plastic bag.  Place a towel between your skin or dressing and the bag.  Leave the ice on for 20  minutes, 2-3 times a day.  If your skin turns bright red, remove the ice right away to prevent skin damage. The risk of skin damage is higher if you cannot feel pain, heat, or cold.  Move your toes often to reduce stiffness and swelling.  Raise (elevate) your leg above the level of your heart while you are sitting or lying down.  Activity  Do not use the injured limb to support your body weight until your health care provider says that you can. Use crutches or a walker as told by your health care provider.  Do exercises as told by your health care provider.  Ask your health care provider when it is safe to drive.  Return to your normal activities as told by your health care provider. Ask your health care provider what activities are safe for you.  General instructions  Do not use any products that contain nicotine or tobacco. These products include cigarettes, chewing tobacco, and vaping devices, such as e-cigarettes. These can delay bone healing. They can also increase your risk of an infection. If you need help quitting, ask your health care provider.  Keep all follow-up visits. This includes visits for physical therapy. Physical therapy is an important part of recovery. Complete healing may take 3 to 6 months.  Contact a health care provider if:  You have any of these signs of infection:  More redness, swelling, or pain around your incision.  More fluid or blood coming from your incision.  Warmth coming from your incision.  Pus or a bad smell coming from your incision.  A fever.  You have more bruising around your incision.  Get help right away if:  You develop warmth, redness, tenderness, and swelling in your calf or thigh.  You have chest pain or trouble breathing.  Your incision opens.  You have severe pain that does not get better with medicine.  These symptoms may be an emergency. Get help right away. Call 911.  Do not wait to see if the symptoms will go away.  Do not drive yourself to the  hospital.  Summary  After this procedure, it is common to have pain, swelling, and stiffness.  Take over-the-counter and prescription medicines only as told by your health care provider. This includes medicines to prevent blood clots from forming.  Follow instructions from your health care provider about how to take care of your incision. Check your incision area every day for signs of infection.  Keep all follow-up visits. This includes visits for physical therapy.  This information is not intended to replace advice given to you by your health care provider. Make sure you discuss any questions you have with your health care provider.  Document Revised: 04/04/2023 Document Reviewed: 04/04/2023  [employer] Patient Education © 2023 [employer]    Hypertension, Adult  Hypertension is another name for high blood pressure. High blood pressure forces your heart to work harder to pump blood. This can cause problems over time.  There are two numbers in a blood pressure reading. There is a top number (systolic) over a bottom number (diastolic). It is best to have a blood pressure that is below 120/80.  What are the causes?  The cause of this condition is not known. Some other conditions can lead to high blood pressure.  What increases the risk?  Some lifestyle factors can make you more likely to develop high blood pressure:  Smoking.  Not getting enough exercise or physical activity.  Being overweight.  Having too much fat, sugar, calories, or salt (sodium) in your diet.  Drinking too much alcohol.  Other risk factors include:  Having any of these conditions:  Heart disease.  Diabetes.  High cholesterol.  Kidney disease.  Obstructive sleep apnea.  Having a family history of high blood pressure and high cholesterol.  Age. The risk increases with age.  Stress.  What are the signs or symptoms?  High blood pressure may not cause symptoms. Very high blood pressure (hypertensive crisis) may cause:  Headache.  Fast or uneven  heartbeats (palpitations).  Shortness of breath.  Nosebleed.  Vomiting or feeling like you may vomit (nauseous).  Changes in how you see.  Very bad chest pain.  Feeling dizzy.  Seizures.  How is this treated?  This condition is treated by making healthy lifestyle changes, such as:  Eating healthy foods.  Exercising more.  Drinking less alcohol.  Your doctor may prescribe medicine if lifestyle changes do not help enough and if:  Your top number is above 130.  Your bottom number is above 80.  Your personal target blood pressure may vary.  Follow these instructions at home:  Eating and drinking    If told, follow the DASH eating plan. To follow this plan:  Fill one half of your plate at each meal with fruits and vegetables.  Fill one fourth of your plate at each meal with whole grains. Whole grains include whole-wheat pasta, brown rice, and whole-grain bread.  Eat or drink low-fat dairy products, such as skim milk or low-fat yogurt.  Fill one fourth of your plate at each meal with low-fat (lean) proteins. Low-fat proteins include fish, chicken without skin, eggs, beans, and tofu.  Avoid fatty meat, cured and processed meat, or chicken with skin.  Avoid pre-made or processed food.  Limit the amount of salt in your diet to less than 1,500 mg each day.  Do not drink alcohol if:  Your doctor tells you not to drink.  You are pregnant, may be pregnant, or are planning to become pregnant.  If you drink alcohol:  Limit how much you have to:  0-1 drink a day for women.  0-2 drinks a day for men.  Know how much alcohol is in your drink. In the [place] one drink equals one 12 oz bottle of beer (355 mL), one 5 oz glass of wine (148 mL), or one 1½ oz glass of hard liquor (44 mL).  Lifestyle    Work with your doctor to stay at a healthy weight or to lose weight. Ask your doctor what the best weight is for you.  Get at least 30 minutes of exercise that causes your heart to beat faster (aerobic exercise) most days of the week. This may  include walking, swimming, or biking.  Get at least 30 minutes of exercise that strengthens your muscles (resistance exercise) at least 3 days a week. This may include lifting weights or doing Pilates.  Do not smoke or use any products that contain nicotine or tobacco. If you need help quitting, ask your doctor.  Check your blood pressure at home as told by your doctor.  Keep all follow-up visits.  Medicines  Take over-the-counter and prescription medicines only as told by your doctor. Follow directions carefully.  Do not skip doses of blood pressure medicine. The medicine does not work as well if you skip doses. Skipping doses also puts you at risk for problems.  Ask your doctor about side effects or reactions to medicines that you should watch for.  Contact a doctor if:  You think you are having a reaction to the medicine you are taking.  You have headaches that keep coming back.  You feel dizzy.  You have swelling in your ankles.  You have trouble with your vision.  Get help right away if:  You get a very bad headache.  You start to feel mixed up (confused).  You feel weak or numb.  You feel faint.  You have very bad pain in your:  Chest.  Belly (abdomen).  You vomit more than once.  You have trouble breathing.  These symptoms may be an emergency. Get help right away. Call 911.  Do not wait to see if the symptoms will go away.  Do not drive yourself to the hospital.  Summary  Hypertension is another name for high blood pressure.  High blood pressure forces your heart to work harder to pump blood.  For most people, a normal blood pressure is less than 120/80.  Making healthy choices can help lower blood pressure. If your blood pressure does not get lower with healthy choices, you may need to take medicine.  This information is not intended to replace advice given to you by your health care provider. Make sure you discuss any questions you have with your health care provider.  Document Revised: 10/06/2022 Document  Reviewed: 10/06/2022  [employer] Patient Education © 2023 [employer]    Depression, Adult  Depression refers to feeling sad, low, down in the dumps, blue, gloomy, or empty. In general, there are two kinds of depression:  Normal sadness or normal grief. This kind of depression is one that we all feel from time to time after upsetting life experiences, such as the loss of a job or the ending of a relationship. This kind of depression is considered normal, is short lived, and resolves within a few days to 2 weeks. Depression experienced after the loss of a loved one (bereavement) often lasts longer than 2 weeks but normally gets better with time.  Clinical depression. This kind of depression lasts longer than normal sadness or normal grief or interferes with your ability to function at home, at work, and in school. It also interferes with your personal relationships. It affects almost every aspect of your life. Clinical depression is an illness.  Symptoms of depression can also be caused by conditions other than those mentioned above, such as:  Physical illness. Some physical illnesses, including underactive thyroid gland (hypothyroidism), severe anemia, specific types of cancer, diabetes, uncontrolled seizures, heart and lung problems, strokes, and chronic pain are commonly associated with symptoms of depression.  Side effects of some prescription medicine. In some people, certain types of medicine can cause symptoms of depression.  Substance abuse. Abuse of alcohol and illicit drugs can cause symptoms of depression.  SYMPTOMS  Symptoms of normal sadness and normal grief include the following:  Feeling sad or crying for short periods of time.  Not caring about anything (apathy).  Difficulty sleeping or sleeping too much.  No longer able to enjoy the things you used to enjoy.  Desire to be by oneself all the time (social isolation).  Lack of energy or motivation.  Difficulty concentrating or remembering.  Change in  appetite or weight.  Restlessness or agitation.  Symptoms of clinical depression include the same symptoms of normal sadness or normal grief and also the following symptoms:  Feeling sad or crying all the time.  Feelings of guilt or worthlessness.  Feelings of hopelessness or helplessness.  Thoughts of suicide or the desire to harm yourself (suicidal ideation).  Loss of touch with reality (psychotic symptoms). Seeing or hearing things that are not real (hallucinations) or having false beliefs about your life or the people around you (delusions and paranoia).  DIAGNOSIS   The diagnosis of clinical depression is usually based on how bad the symptoms are and how long they have lasted. Your health care provider will also ask you questions about your medical history and substance use to find out if physical illness, use of prescription medicine, or substance abuse is causing your depression. Your health care provider may also order blood tests.  TREATMENT   Often, normal sadness and normal grief do not require treatment. However, sometimes antidepressant medicine is given for bereavement to ease the depressive symptoms until they resolve.  The treatment for clinical depression depends on how bad the symptoms are but often includes antidepressant medicine, counseling with a mental health professional, or both. Your health care provider will help to determine what treatment is best for you.  Depression caused by physical illness usually goes away with appropriate medical treatment of the illness. If prescription medicine is causing depression, talk with your health care provider about stopping the medicine, decreasing the dose, or changing to another medicine.  Depression caused by the abuse of alcohol or illicit drugs goes away when you stop using these substances. Some adults need professional help in order to stop drinking or using drugs.  SEEK IMMEDIATE MEDICAL CARE IF:  You have thoughts about hurting yourself or  others.  You lose touch with reality (have psychotic symptoms).  You are taking medicine for depression and have a serious side effect.  FOR MORE INFORMATION  National [place] on Mental Illness: www.saul.org   National [place] of Mental Health: www.nimh.nih.gov      This information is not intended to replace advice given to you by your health care provider. Make sure you discuss any questions you have with your health care provider.     Document Released: 12/15/2001 Document Revised: 01/08/2016 Document Reviewed: 03/18/2013  [employer] Interactive Patient Education ©2016 [employer]

## 2024-12-24 NOTE — PROGRESS NOTES
NURSING DAILY NOTE    Name: Franchesca Paige   Date of Admission: 12/11/2024   Admitting Diagnosis: No Principal Problem: There is no principal problem currently on the Problem List. Please update the Problem List and refresh.  Attending Physician: ARMANDO GLASS M.D.  Allergies: Ibuprofen and Sulfa drugs    Safety  Patient Assist  max1  Patient Precautions  Fall Risk, Weight Bearing As Tolerated Right Lower Extremity  Precaution Comments     Bed Transfer Status  Supervised  Toilet Transfer Status   Supervised  Assistive Devices  Walker - front wheel  Oxygen  None - Room Air  Diet/Therapeutic Dining  Current Diet Order   Procedures    Diet Order Diet: Regular     Pill Administration  whole  Agitated Behavioral Scale     ABS Level of Severity       Fall Risk  Has the patient had a fall this admission?   No  Candice Porter Fall Risk Scoring  16, HIGH RISK  Fall Risk Safety Measures  bed alarm and chair alarm    Vitals  Temperature: 36 °C (96.8 °F)  Temp src: Oral  Pulse: 60  Respiration: 18  Blood Pressure : (!) 140/62  Blood Pressure MAP (Calculated): 88 MM HG  BP Location: Right, Upper Arm  Patient BP Position: [place]'s Position     Oxygen  Pulse Oximetry: 95 %  O2 (LPM): 0  O2 Delivery Device: None - Room Air    Bowel and Bladder  Last Bowel Movement  12/22/24  Stool Type  Type 4: Like a sausage or snake, smooth and soft  Bowel Device  Diaper  Continent  Bladder:     Bowel: Continent movement  Bladder Function  Urine Void (mL):  (large)  Number of Times Voided: 1  Urine Color: Unable To Evaluate  Number of Times Incontinent of Urine: 0  Straight Catheter: 525 ml  Genitourinary Assessment   Bladder Assessment (WDL):  WDL Except  Rodriges Catheter: Not Applicable  Urinary Symptoms:  (retention)  Urine Color: Unable To Evaluate  Number of Bladder Accidents: 0  Total Number of Bladder of Accidents in Last 7 Days: 1  Number of Times Incontinent of Urine:  "0  Bladder Device: Bathroom  Time Void: Yes  Bladder Scan: Post Void  $ Bladder Scan Results (mL): 126  Intermittent Catheter: Yes -Go To LDA \"Indwelling Catheter Group\"  Bladder Medications: Yes    Skin  Felix Score   17  Sensory Interventions   Skin Preventative Measures: Pillows in Use for Support / Positioning  Moisture Interventions  Moisturizers/Barriers: Barrier Paste      Pain  Pain Rating Scale  5 - Interrupts some activities  Pain Location  Generalized  Pain Location Orientation  Right  Pain Interventions   Medication (see MAR)    ADLs    Bathing   Patient Refused Bathing (Pt states shes too tired from todays activites)  Linen Change      Personal Hygiene  Moist Joycelyn Wipes, Perineal Care  Chlorhexidine Bath      Oral Care     Teeth/Dentures     Shave     Nutrition Percentage Eaten  Breakfast, Between % Consumed  Environmental Precautions  Treaded Slipper Socks on Patient  Patient Turns/Positioning  Patient turns self independently side to side without assistance, to offload sacral area  Patient Turns Assistance/Tolerance  Assistance of One  Bed Positions  Bed Controls On  Head of Bed Elevated  Self regulated      Psychosocial/Neurologic Assessment  Psychosocial Assessment  Psychosocial (WDL):  Within Defined Limits  Patient Behaviors: Fatigue  Neurologic Assessment  Neuro (WDL): Within Defined Limits  Level of Consciousness: Alert  Orientation Level: Oriented X4  Cognition: Appropriate judgement  Speech: Clear  Pupil Assesment: No  EENT (WDL):  WDL Except    Cardio/Pulmonary Assessment  Edema   RUE Edema: 1+  RLE Edema: 2+  LUE Edema: Trace  LLE Edema: 1+  Respiratory Breath Sounds  RUL Breath Sounds: Clear  RML Breath Sounds: Clear  RLL Breath Sounds: Diminished  DINO Breath Sounds: Clear  LLL Breath Sounds: Diminished  Cardiac Assessment   Cardiac (WDL):  Within Defined Limits        "

## 2024-12-24 NOTE — DOCUMENTATION QUERY
"                                                                         Formerly [facility]                                                                       Query Response Note      PATIENT:               ELIANE VIERA  ACCT #:                  [id number]  MRN:                     [id number]  :                      1945  ADMIT DATE:       2024 11:44 AM  DISCH DATE:        2024 11:46 AM  RESPONDING  PROVIDER #:        [id number]           QUERY TEXT:    Concern for pneumonia is documented in the Medical Record.    Please clarify the clinical/diagnostic relevance for the documented findings.    The patient's clinical indicators include:  80yo with dx of HTN, fall, right femur fx     CXR Impression \"New basilar atelectasis versus infiltrate\"   PN \"Continue antibiotics for concern for pneumonia\"    Risk factors: advanced age, right femur fracture, hematoma, s/p ORIF right distal femur  Treatments: labwork, monitoring, imaging, antibiotics    If you agree with the above dx, please document in the medical record.     Contact me with questions.     Thank you,  ALL Haynes, CDI  [email or web address]  Options provided:   -- Pneumonia is clinically significant and ruled in   -- Pneumonia is clinically significant and ruled out   -- Other explanation, (Please specify the other explanation)   -- Unable to determine      Query created by: Ava Kim on 2024 12:32 PM    RESPONSE TEXT:    Pneumonia is clinically significant and ruled in          Electronically signed by:  JUAN DAVID HEARN DO 2024 2:08 PM              "

## 2024-12-24 NOTE — PROGRESS NOTES
Patient discharged to home per order.  Discharge instructions reviewed with patient and ; they verbalize understanding and signed copies placed in chart.  Patient has all belongings; signed copy of form in chart.  Patient left facility at 1215 via wheelchair accompanied by rehab staff and .  Have enjoyed working with this pleasant patient.

## 2024-12-24 NOTE — PROGRESS NOTES
..                                                         NURSING DAILY NOTE    Name: Franchesca Paige   Date of Admission: 12/11/2024   Admitting Diagnosis: No Principal Problem: There is no principal problem currently on the Problem List. Please update the Problem List and refresh.  Attending Physician: ARMANDO GLASS M.D.  Allergies: Ibuprofen and Sulfa drugs    Safety  Patient Assist  Min  Patient Precautions  Fall Risk, Weight Bearing As Tolerated Right Lower Extremity  Precaution Comments     Bed Transfer Status  Supervised  Toilet Transfer Status   Supervised  Assistive Devices  Walker - front wheel  Oxygen  None - Room Air  Diet/Therapeutic Dining  Current Diet Order   Procedures    Diet Order Diet: Regular     Pill Administration  whole  Agitated Behavioral Scale     ABS Level of Severity       Fall Risk  Has the patient had a fall this admission?   No  Candice Porter Fall Risk Scoring  13, MODERATE RISK  Fall Risk Safety Measures  bed alarm and chair alarm    Vitals  Temperature: 36.3 °C (97.3 °F)  Temp src: Temporal  Pulse: 63  Respiration: 18  Blood Pressure : 134/65  Blood Pressure MAP (Calculated): 88 MM HG  BP Location: Right, Upper Arm  Patient BP Position: Supine     Oxygen  Pulse Oximetry: 94 %  O2 (LPM): 0  O2 Delivery Device: None - Room Air    Bowel and Bladder  Last Bowel Movement  12/22/24  Stool Type  Type 4: Like a sausage or snake, smooth and soft  Bowel Device  Diaper  Continent  Bladder:     Bowel: Continent movement  Bladder Function  Urine Void (mL):  (large)  Number of Times Voided: 1  Urine Color: Yellow  Number of Times Incontinent of Urine: 0  Straight Catheter: 525 ml  Genitourinary Assessment   Bladder Assessment (WDL):  WDL Except  Rodriges Catheter: Not Applicable  Urinary Symptoms:  (retention)  Urine Color: Yellow  Number of Bladder Accidents: 0  Total Number of Bladder of Accidents in Last 7 Days: 1  Number of Times Incontinent of Urine: 0  Bladder Device:  "Bathroom  Time Void: Yes  Bladder Scan: Post Void  $ Bladder Scan Results (mL): 75  Intermittent Catheter: Yes -Go To LDA \"Indwelling Catheter Group\"  Bladder Medications: Yes    Skin  Felix Score   19  Sensory Interventions   Skin Preventative Measures: Pillows in Use for Support / Positioning  Moisture Interventions  Moisturizers/Barriers: Barrier Paste      Pain  Pain Rating Scale  5 - Interrupts some activities  Pain Location  Leg  Pain Location Orientation  Right  Pain Interventions   Medication (see MAR)    ADLs    Bathing   Patient Refused Bathing (Pt states shes too tired from todays activites)  Linen Change      Personal Hygiene  Moist Joycelyn Wipes, Perineal Care  Chlorhexidine Bath      Oral Care     Teeth/Dentures     Shave     Nutrition Percentage Eaten  Breakfast, Between % Consumed  Environmental Precautions  Bed in Low Position  Patient Turns/Positioning  Patient turns self independently side to side without assistance, to offload sacral area  Patient Turns Assistance/Tolerance  Assistance of One  Bed Positions  Bed Controls On, Bed Locked  Head of Bed Elevated  Self regulated      Psychosocial/Neurologic Assessment  Psychosocial Assessment  Psychosocial (WDL):  Within Defined Limits  Patient Behaviors: Fatigue  Neurologic Assessment  Neuro (WDL): Within Defined Limits  Level of Consciousness: Alert  Orientation Level: Oriented X4  Cognition: Appropriate judgement  Speech: Clear  Pupil Assesment: No  EENT (WDL):  WDL Except    Cardio/Pulmonary Assessment  Edema   RUE Edema: 1+  RLE Edema: 2+  LUE Edema: Trace  LLE Edema: 1+  Respiratory Breath Sounds  RUL Breath Sounds: Clear  RML Breath Sounds: Clear  RLL Breath Sounds: Diminished  DINO Breath Sounds: Clear  LLL Breath Sounds: Diminished  Cardiac Assessment   Cardiac (WDL):  Within Defined Limits        "

## 2024-12-26 ENCOUNTER — PATIENT OUTREACH (OUTPATIENT)
Dept: MEDICAL GROUP | Facility: MEDICAL CENTER | Age: 79
End: 2024-12-26
Payer: MEDICARE

## 2024-12-26 NOTE — PROGRESS NOTES
Transitional Care Management  TCM Outreach Date and Time: Filed (12/26/2024  8:49 AM)    Discharge Questions  Actual Discharge Date: 12/24/24  Now that you are home, how are you feeling?: Good (just showered)  Did you receive any new prescriptions?: No  Do you have any questions about your current medications or new medications (Review Med Rec)?: No  Did you have any durable medical equipment ordered?: No  Do you have a follow up appointment scheduled with your PCP?: Yes  Appointment Date: 02/06/25  Appointment Time: 1300  Any issues or paperwork you wish to discuss with your PCP?: Yes (Please specify) (would like to see the imaging from femur break)  Are you (patient) able to get to the appointment?: Yes  If Home Health was ordered, have they contacted you (Patient): Not Applicable  Did you have enough support after your last discharge?: Yes (spouse)  Does this patient qualify for the CCM program?: Yes    Transitional Care  Number of attempts made to contact patient: 1  Current or previous attempts completed within two business days of discharge? : Yes  Provided education regarding treatment plan, medications, self-management, ADLs?: No  Has patient completed an Advanced Directive?: Yes  Is the patient's advanced directive on file?: Yes  Has the Care Manager's phone number provided?: Yes  Is there anything else I can help you with?: No    Discharge Summary  Chief Complaint: Knee Pain        Patient BIB EMS for GLF, stated she landed on right knee. Patient AA&Ox4, taking aspirin 81 mg, denies head strike. Hx of fatty tumor above the right knee.     GLF  Admitting Diagnosis: Fever       Closed displaced fracture of distal epiphysis of right femur with routine healing       Paroxysmal atrial fibrillation (HCC)       Anemia       Essential hypertension       Gastroesophageal reflux disease without esophagitis  Discharge Diagnosis: Fever       Closed displaced fracture of distal epiphysis of right femur with routine  healing       Paroxysmal atrial fibrillation (HCC)       Anemia       Essential hypertension       Gastroesophageal reflux disease without esophagitis

## 2025-01-02 ENCOUNTER — APPOINTMENT (OUTPATIENT)
Dept: MEDICAL GROUP | Facility: MEDICAL CENTER | Age: 80
End: 2025-01-02
Payer: MEDICARE

## 2025-01-06 ENCOUNTER — HOSPITAL ENCOUNTER (OUTPATIENT)
Dept: LAB | Facility: MEDICAL CENTER | Age: 80
End: 2025-01-06
Attending: FAMILY MEDICINE
Payer: MEDICARE

## 2025-01-06 ENCOUNTER — OFFICE VISIT (OUTPATIENT)
Dept: MEDICAL GROUP | Facility: MEDICAL CENTER | Age: 80
End: 2025-01-06
Payer: MEDICARE

## 2025-01-06 ENCOUNTER — APPOINTMENT (OUTPATIENT)
Dept: MEDICAL GROUP | Facility: MEDICAL CENTER | Age: 80
End: 2025-01-06
Payer: MEDICARE

## 2025-01-06 VITALS
OXYGEN SATURATION: 96 % | HEIGHT: 68 IN | DIASTOLIC BLOOD PRESSURE: 74 MMHG | SYSTOLIC BLOOD PRESSURE: 132 MMHG | BODY MASS INDEX: 31.67 KG/M2 | RESPIRATION RATE: 14 BRPM | HEART RATE: 53 BPM | WEIGHT: 209 LBS | TEMPERATURE: 98 F

## 2025-01-06 DIAGNOSIS — D50.0 BLOOD LOSS ANEMIA: ICD-10-CM

## 2025-01-06 DIAGNOSIS — Z87.81 STATUS POST OPEN REDUCTION AND INTERNAL FIXATION (ORIF) OF FRACTURE: ICD-10-CM

## 2025-01-06 DIAGNOSIS — I10 ESSENTIAL HYPERTENSION: ICD-10-CM

## 2025-01-06 DIAGNOSIS — Z78.0 POSTMENOPAUSAL STATUS: ICD-10-CM

## 2025-01-06 DIAGNOSIS — Z98.890 STATUS POST OPEN REDUCTION AND INTERNAL FIXATION (ORIF) OF FRACTURE: ICD-10-CM

## 2025-01-06 DIAGNOSIS — S72.421D CLOSED BICONDYLAR FRACTURE OF RIGHT FEMUR WITH ROUTINE HEALING, SUBSEQUENT ENCOUNTER: ICD-10-CM

## 2025-01-06 DIAGNOSIS — S72.431D CLOSED BICONDYLAR FRACTURE OF RIGHT FEMUR WITH ROUTINE HEALING, SUBSEQUENT ENCOUNTER: ICD-10-CM

## 2025-01-06 DIAGNOSIS — Z12.31 ENCOUNTER FOR SCREENING MAMMOGRAM FOR BREAST CANCER: ICD-10-CM

## 2025-01-06 PROBLEM — S72.423D CLOSED BICONDYLAR FRACTURE OF DISTAL END OF FEMUR WITH ROUTINE HEALING: Status: ACTIVE | Noted: 2025-01-06

## 2025-01-06 PROBLEM — S72.433D CLOSED BICONDYLAR FRACTURE OF DISTAL END OF FEMUR WITH ROUTINE HEALING: Status: ACTIVE | Noted: 2025-01-06

## 2025-01-06 LAB
ALBUMIN SERPL BCP-MCNC: 3.8 G/DL (ref 3.2–4.9)
ALBUMIN/GLOB SERPL: 1.2 G/DL
ALP SERPL-CCNC: 161 U/L (ref 30–99)
ALT SERPL-CCNC: 9 U/L (ref 2–50)
ANION GAP SERPL CALC-SCNC: 11 MMOL/L (ref 7–16)
AST SERPL-CCNC: 14 U/L (ref 12–45)
BILIRUB SERPL-MCNC: 0.3 MG/DL (ref 0.1–1.5)
BUN SERPL-MCNC: 17 MG/DL (ref 8–22)
CALCIUM ALBUM COR SERPL-MCNC: 10.8 MG/DL (ref 8.5–10.5)
CALCIUM SERPL-MCNC: 10.6 MG/DL (ref 8.5–10.5)
CHLORIDE SERPL-SCNC: 103 MMOL/L (ref 96–112)
CO2 SERPL-SCNC: 23 MMOL/L (ref 20–33)
CREAT SERPL-MCNC: 0.7 MG/DL (ref 0.5–1.4)
ERYTHROCYTE [DISTWIDTH] IN BLOOD BY AUTOMATED COUNT: 50.5 FL (ref 35.9–50)
GFR SERPLBLD CREATININE-BSD FMLA CKD-EPI: 89 ML/MIN/1.73 M 2
GLOBULIN SER CALC-MCNC: 3.1 G/DL (ref 1.9–3.5)
GLUCOSE SERPL-MCNC: 101 MG/DL (ref 65–99)
HCT VFR BLD AUTO: 34.3 % (ref 37–47)
HGB BLD-MCNC: 10.6 G/DL (ref 12–16)
IRON SATN MFR SERPL: 17 % (ref 15–55)
IRON SERPL-MCNC: 43 UG/DL (ref 40–170)
MCH RBC QN AUTO: 29.6 PG (ref 27–33)
MCHC RBC AUTO-ENTMCNC: 30.9 G/DL (ref 32.2–35.5)
MCV RBC AUTO: 95.8 FL (ref 81.4–97.8)
PLATELET # BLD AUTO: 353 K/UL (ref 164–446)
PMV BLD AUTO: 9.2 FL (ref 9–12.9)
POTASSIUM SERPL-SCNC: 4 MMOL/L (ref 3.6–5.5)
PROT SERPL-MCNC: 6.9 G/DL (ref 6–8.2)
RBC # BLD AUTO: 3.58 M/UL (ref 4.2–5.4)
SODIUM SERPL-SCNC: 137 MMOL/L (ref 135–145)
TIBC SERPL-MCNC: 248 UG/DL (ref 250–450)
UIBC SERPL-MCNC: 205 UG/DL (ref 110–370)
WBC # BLD AUTO: 9.1 K/UL (ref 4.8–10.8)

## 2025-01-06 PROCEDURE — 3075F SYST BP GE 130 - 139MM HG: CPT | Performed by: FAMILY MEDICINE

## 2025-01-06 PROCEDURE — 36415 COLL VENOUS BLD VENIPUNCTURE: CPT

## 2025-01-06 PROCEDURE — 83550 IRON BINDING TEST: CPT

## 2025-01-06 PROCEDURE — 80053 COMPREHEN METABOLIC PANEL: CPT

## 2025-01-06 PROCEDURE — 99214 OFFICE O/P EST MOD 30 MIN: CPT | Performed by: FAMILY MEDICINE

## 2025-01-06 PROCEDURE — 85027 COMPLETE CBC AUTOMATED: CPT

## 2025-01-06 PROCEDURE — 83540 ASSAY OF IRON: CPT

## 2025-01-06 PROCEDURE — 3078F DIAST BP <80 MM HG: CPT | Performed by: FAMILY MEDICINE

## 2025-01-06 RX ORDER — ASPIRIN 81 MG/1
81 TABLET, CHEWABLE ORAL
COMMUNITY

## 2025-01-06 ASSESSMENT — PATIENT HEALTH QUESTIONNAIRE - PHQ9
5. POOR APPETITE OR OVEREATING: 0 - NOT AT ALL
SUM OF ALL RESPONSES TO PHQ QUESTIONS 1-9: 3
CLINICAL INTERPRETATION OF PHQ2 SCORE: 2

## 2025-01-06 ASSESSMENT — FIBROSIS 4 INDEX: FIB4 SCORE: 1.45

## 2025-01-06 NOTE — PROGRESS NOTES
Subjective:     Franchesca Paige is a 79 y.o. female who presents for Hospital Follow-up.    HPI:   Recently hospitalized for somewhat spectacular right femur fracture, bicondylar and shattered.  It was a closed fracture.  She did have to have open reduction and internal fixation.  She still has significant pain and swelling.  She is using ice.  She is keeping the leg up.  She is taking the low-dose aspirin twice daily as directed.  The fall was at home.  Her shoe caught on the carpet edge.  She hit her face as well and had bruising.  She had very significant anemia and required transfusions.  She will be starting physical therapy very soon.    Current medicines (including reconciliation performed today)  Current Outpatient Medications   Medication Sig Dispense Refill    aspirin (ASA) 81 MG Chew Tab chewable tablet Chew 81 mg 2 (two) times a day.      oxyCODONE immediate-release (ROXICODONE) 5 MG Tab Take 1 Tablet by mouth every 6 hours as needed for Severe Pain for up to 7 days. 28 Tablet 0    amLODIPine (NORVASC) 5 MG Tab Take 1 Tablet by mouth every day. 90 Tablet 3    fosinopril (MONOPRIL) 20 MG Tab Take 1 Tablet by mouth every day. 90 Tablet 2    omeprazole (PRILOSEC) 20 MG delayed-release capsule Take 1 Capsule by mouth every day. 30 Capsule 2    ascorbic acid (ASCORBIC ACID) 500 MG Tab Take 500 mg by mouth every day.      VITAMIN D PO Take 1 Tablet by mouth every day. 30 Capsule 11    diclofenac sodium (VOLTAREN) 1 % Gel Apply 4 g topically 4 times a day. To knee       No current facility-administered medications for this visit.       Allergies:   Ibuprofen and Sulfa drugs    Social History     Tobacco Use    Smoking status: Former     Current packs/day: 0.00     Average packs/day: 1 pack/day for 36.2 years (36.2 ttl pk-yrs)     Types: Cigarettes     Start date: 1964     Quit date: 2000     Years since quittin.7    Smokeless tobacco: Never   Vaping Use    Vaping status: Never Used   Substance  "Use Topics    Alcohol use: No     Alcohol/week: 0.0 oz    Drug use: No       ROS:  Denies chest pain or shortness of breath.  Balance is still quite good.  She has been using a walker at home.  She is using her wheelchair here today.    Objective:     Vitals:    01/06/25 1243   BP: 132/74   BP Location: Left arm   Patient Position: Sitting   BP Cuff Size: Adult   Pulse: (!) 53   Resp: 14   Temp: 36.7 °C (98 °F)   TempSrc: Temporal   SpO2: 96%   Weight: 94.8 kg (209 lb)   Height: 1.727 m (5' 8\")     Body mass index is 31.78 kg/m².    Physical Exam:  Vital signs are reviewed, good blood pressure and oxygen level  Heart shows regular rate and rhythm to auscultation.  No murmur appreciated.  Pulse is low normal.  Lungs are clear to auscultation with good air movement.  Right leg knee and upper thigh region surgical incisions are healing well.  There is moderate edema without erythema or heat.    Assessment and Plan:     1. Closed bicondylar fracture of right femur with routine healing, subsequent encounter/Status post open reduction and internal fixation (ORIF) of fracture  She tripped at the transition from linoleum to carpet in her house and could not catch herself, and the knee actually impacted with the wall as did her face.  Denies loss of consciousness.  She had a shattered lower femur with multiple fragments.  Patient underwent open reduction and internal fixation with Dr. Garzon.  She was very impressed with [facility] care.  She will be starting physical therapy with them soon.  She did well with PT at the rehab hospital.  She is taking her aspirin twice daily.  She is very compliant.  She is still using her pain medication though she has reduced the amount she is taking and hopes to be able to discontinue this within a few weeks.    2. Postmenopausal status  Patient is due for bone density testing, order discussed and placed.  - DS-BONE DENSITY STUDY (DEXA); Future    3. Encounter for " screening mammogram for breast cancer  Mammogram order discussed and placed.  - MA-SCREENING MAMMO BILAT W/TOMOSYNTHESIS W/CAD; Future    4. Blood loss anemia  Patient CBC in March many months prior to her injury was normal with a hemoglobin of 14.  She had mild anemia on presentation to the hospital as would be expected.  However, postoperatively her anemia worsened and she ended up having transfusions both in the hospital and also at rehab.  Patient is feeling better.  However, follow-up lab orders are needed.  Discussed and placed.  - CBC WITHOUT DIFFERENTIAL; Future  - IRON/TOTAL IRON BIND; Future    5. Essential hypertension  Patient does have longstanding hypertension.  Currently in very good control.  Patient continues amlodipine and fosinopril.  Follow-up lab order discussed and placed.  - Comp Metabolic Panel; Future      - Chart and discharge summary were reviewed.   - Hospitalization and results reviewed with patient.   - Medications reviewed including instructions regarding high risk medications, dosing and side effects.  - Recommended Services: No services needed at this time she has just been finishing home health services after rehab and will be starting outpatient physical therapy next week I believe.  - Advance directive/POLST on file?  Yes    Follow-up:Return in about 3 months (around 4/6/2025), or if symptoms worsen or fail to improve.    Face-to-face transitional care management services with MODERATE (today's visit is within 14 days post discharge & LACE+ score of 28-58) medical decision complexity were provided.     LACE+ Historical Score Over Time (0-28: Low, 29-58: Medium, 59+: High): 65      Trauma Surgery

## 2025-03-05 ENCOUNTER — HOSPITAL ENCOUNTER (OUTPATIENT)
Dept: RADIOLOGY | Facility: MEDICAL CENTER | Age: 80
End: 2025-03-05
Attending: FAMILY MEDICINE
Payer: MEDICARE

## 2025-03-05 DIAGNOSIS — Z78.0 POSTMENOPAUSAL STATUS: ICD-10-CM

## 2025-03-05 DIAGNOSIS — Z12.31 ENCOUNTER FOR SCREENING MAMMOGRAM FOR BREAST CANCER: ICD-10-CM

## 2025-03-05 PROCEDURE — 77067 SCR MAMMO BI INCL CAD: CPT

## 2025-03-05 PROCEDURE — 77080 DXA BONE DENSITY AXIAL: CPT

## 2025-03-10 ENCOUNTER — RESULTS FOLLOW-UP (OUTPATIENT)
Dept: MEDICAL GROUP | Facility: MEDICAL CENTER | Age: 80
End: 2025-03-10
Payer: MEDICARE

## 2025-03-21 NOTE — PROGRESS NOTES
Discharge instructions reviewed with pt, all questions answered. Spouse to transport pt home via car.Electronically signed by Ary De Souza RN on 3/21/2025 at 4:35 PM       Subjective:     Franchesca Paige is a 72 y.o. female who presents for Hospital Follow-up.  Chart reviewed. Discharge summary available for review: Yes   Date of discharge 2/21/2018.  48- hour post discharge RN call completed: NO    HPI: Recently hospitalized for memory loss, dizziness, negative stroke work up. TIA cannot be totally excluded. On aspirin but she is not taking due to GI upset, , not on statin. Even TIA is not concerned, her ASCVD score is 11.7. However, pt's LDL is actually improving from previous lab and it is only 105 at this time. Pt reports  had heart attack with stent placement lately so that they have changed the diet already.     The 10-year ASCVD risk score (Jaredfelisha SAMUELS Jr., et al., 2013) is: 10.6%    Values used to calculate the score:      Age: 72 years      Sex: Female      Is Non- : No      Diabetic: No      Tobacco smoker: No      Systolic Blood Pressure: 140 mmHg      Is BP treated: No      HDL Cholesterol: 80 mg/dL      Total Cholesterol: 209 mg/dL    Right mastoid cell disease noted. No fever, no chills, no tenderness at posterior ear area. Pt reports that she has ear discomfort, hearing fluid moving to her right ear for about two months. She reports no hearing loss but dizziness seems to be related to her ear problem. She saw ENT Dr. Prado in the past and would like to see the same doctor.     Since returning home, patient reports no more issue with memory. No new symptoms except intermittent dizziness mentioned above.     The patient denied increased weakness; no difficulty taking care of self at home.  Patient reports taking medications as instructed except aspirin due to GI upset. We have discussed the stroke prevention and she agrees to try aspirin with prilosec or pepcid.    PCP follow up on 3/16    Patient Active Problem List    Diagnosis Date Noted   • Dizziness 02/22/2018   • 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/21/2018   • Chronic pain of right ankle  "02/08/2018   • Obesity (BMI 30.0-34.9) 02/24/2017   • Need for subacute bacterial endocarditis prophylaxis 01/21/2016   • Essential hypertension 06/11/2015   • Status post total left knee replacement 06/11/2015   • Gastroesophageal reflux disease without esophagitis 06/11/2015   • Allergic rhinitis due to pollen 06/11/2015   • History of bladder cancer 06/11/2015   • Dyslipidemia, goal LDL below 130    • Primary osteoarthritis involving multiple joints          Allergies:   Sulfa drugs    Social History:  Social History   Substance Use Topics   • Smoking status: Former Smoker     Quit date: 5/1/2000   • Smokeless tobacco: Never Used   • Alcohol use No        ROS:  Review of Systems   Constitutional: Negative for chills, fever and malaise/fatigue.   HENT: Positive for tinnitus. Negative for ear discharge, ear pain and hearing loss.    Respiratory: Negative for cough, sputum production, shortness of breath and wheezing.    Cardiovascular: Negative for chest pain, palpitations and leg swelling.   Gastrointestinal: Negative for abdominal pain, heartburn, nausea and vomiting.   Genitourinary: Negative for dysuria, frequency and urgency.   Musculoskeletal: Negative for falls and myalgias.   Skin: Negative for rash.   Neurological: Positive for dizziness. Negative for focal weakness, weakness and headaches.   Psychiatric/Behavioral: Negative for depression. The patient is not nervous/anxious.         Objective:     Blood pressure 140/68, pulse 62, temperature 36.3 °C (97.4 °F), resp. rate 16, height 1.702 m (5' 7\"), weight 95.7 kg (211 lb), SpO2 98 %.     Physical Exam:  Physical Exam   Constitutional: She is oriented to person, place, and time and well-developed, well-nourished, and in no distress.   HENT:   Head: Normocephalic and atraumatic.   Right Ear: Hearing, tympanic membrane and external ear normal. No mastoid tenderness.   Left Ear: Hearing, tympanic membrane and external ear normal.   Eyes: Conjunctivae are " normal.   Neck: Neck supple. No JVD present. No thyromegaly present.   Cardiovascular: Normal rate and regular rhythm.    No murmur heard.  Pulmonary/Chest: Effort normal and breath sounds normal. No respiratory distress. She has no wheezes.   Abdominal: Soft. Bowel sounds are normal. She exhibits no distension. There is no tenderness.   Musculoskeletal: Normal range of motion. She exhibits no edema.   Neurological: She is alert and oriented to person, place, and time.   Skin: Skin is warm. No erythema.   Nursing note and vitals reviewed.        Assessment and Plan:     1. Hospital discharge follow-up  Hospitalization and results reviewed with patient. High risk conditions requiring teaching or care coordination were identified and addressed.The patient demonstrate understanding of admission and underlying conditions. The patient understands discharge instructions and when to seek medical attention. Medications reviewed including instructions regarding high risk medications, dosing and side effects.    The patient is able to safely adhere to ADL/IADL, treatment and medication regimen, self-manage of high-risk conditions? Yes   The patient requires physical therapy/home health/DME referral? No   The patient requires referral to care coordination/behavioral health/social work?  No   Patient requires referral for pharmacy consult? No   Advance directive/POLST on file?  No   Required counseled on advance directive?  No     Follow up with PCP on 3/16    2. Disorder of right mastoid  - REFERRAL TO ENT    3. Dizziness  - REFERRAL TO ENT      Medication Reconciliation  Medication list at end of encounter:   Current Outpatient Prescriptions   Medication Sig Dispense Refill   • Cholecalciferol (VITAMIN D3) 5000 units Cap Take 1 Cap by mouth every evening.     • fosinopril (MONOPRIL) 10 MG Tab Take 1 Tab by mouth every day. 30 Tab 11   • S-Adenosylmethionine (NYLA-E) 200 MG TABS Take 1 Tab by mouth every day. OTC 30 Tab 0   •  aspirin EC (ECOTRIN) 81 MG Tablet Delayed Response Take 1 Tab by mouth every day. 30 Tab 2   • tramadol (ULTRAM) 50 MG Tab Take 1 Tab by mouth every 6 hours as needed (arthritis pain) for up to 180 days. 50 Tab 3     No current facility-administered medications for this visit.        Primary care follow-up:  New health conditions identified during hospitalization? Yes   Labs/pathology/imaging requires future PCP follow-up?  No   Changes to medications during hospitalization or today? Yes during hospitalization    Recommended followup: Return in about 4 weeks (around 3/27/2018), or if symptoms worsen or fail to improve, for Follow up with PCP. with Tate De LaR osa M.D.   Future Appointments       Provider Department [place]    2/27/2018 11:20 AM ROD Denis [facility]    3/8/2018 10:15 AM SHARON DE LEON BD 1 IMAGING [facility]    3/16/2018 9:20 AM Tate De La Rosa M.D. [facility]          Patient Instruction  Patient offered educational material on discharge diagnosis and management of symptoms/red flags. Patient instructed to keep follow-up appointments and to bring written questions and and actual medications to each office visit. Patient instructed to call PCP/specialist with any problems/questions/concerns. Patient verbalizes understanding and has no further questions at this time.    Face-to-face transitional care management services with moderate complexity medical decision making.

## 2025-03-27 ENCOUNTER — APPOINTMENT (OUTPATIENT)
Dept: MEDICAL GROUP | Facility: MEDICAL CENTER | Age: 80
End: 2025-03-27
Payer: MEDICARE

## 2025-03-27 VITALS
HEIGHT: 68 IN | WEIGHT: 204 LBS | OXYGEN SATURATION: 95 % | BODY MASS INDEX: 30.92 KG/M2 | HEART RATE: 48 BPM | RESPIRATION RATE: 14 BRPM | TEMPERATURE: 97 F | SYSTOLIC BLOOD PRESSURE: 118 MMHG | DIASTOLIC BLOOD PRESSURE: 62 MMHG

## 2025-03-27 DIAGNOSIS — Z96.642 STATUS POST LEFT HIP REPLACEMENT: ICD-10-CM

## 2025-03-27 DIAGNOSIS — R73.9 HYPERGLYCEMIA: ICD-10-CM

## 2025-03-27 DIAGNOSIS — R00.1 BRADYCARDIA: ICD-10-CM

## 2025-03-27 DIAGNOSIS — E66.811 OBESITY, CLASS I, BMI 30-34.9: ICD-10-CM

## 2025-03-27 DIAGNOSIS — M85.89 OSTEOPENIA OF MULTIPLE SITES: ICD-10-CM

## 2025-03-27 DIAGNOSIS — I10 ESSENTIAL HYPERTENSION: ICD-10-CM

## 2025-03-27 DIAGNOSIS — Z87.81 STATUS POST OPEN REDUCTION AND INTERNAL FIXATION (ORIF) OF FRACTURE: ICD-10-CM

## 2025-03-27 DIAGNOSIS — D64.89 ANEMIA DUE TO MULTIPLE MECHANISMS: ICD-10-CM

## 2025-03-27 DIAGNOSIS — I48.0 HYPERCOAGULABLE STATE DUE TO PAROXYSMAL ATRIAL FIBRILLATION (HCC): ICD-10-CM

## 2025-03-27 DIAGNOSIS — R33.9 URINARY RETENTION WITH INCOMPLETE BLADDER EMPTYING: ICD-10-CM

## 2025-03-27 DIAGNOSIS — I70.0 AORTIC ATHEROSCLEROSIS (HCC): ICD-10-CM

## 2025-03-27 DIAGNOSIS — E83.52 HYPERCALCEMIA: ICD-10-CM

## 2025-03-27 DIAGNOSIS — Z98.890 STATUS POST OPEN REDUCTION AND INTERNAL FIXATION (ORIF) OF FRACTURE: ICD-10-CM

## 2025-03-27 DIAGNOSIS — E01.0 THYROMEGALY: ICD-10-CM

## 2025-03-27 DIAGNOSIS — I47.10 PAROXYSMAL SVT (SUPRAVENTRICULAR TACHYCARDIA) (HCC): ICD-10-CM

## 2025-03-27 DIAGNOSIS — Z96.652 STATUS POST TOTAL LEFT KNEE REPLACEMENT: ICD-10-CM

## 2025-03-27 DIAGNOSIS — D68.69 HYPERCOAGULABLE STATE DUE TO PAROXYSMAL ATRIAL FIBRILLATION (HCC): ICD-10-CM

## 2025-03-27 DIAGNOSIS — K21.9 GASTROESOPHAGEAL REFLUX DISEASE WITHOUT ESOPHAGITIS: ICD-10-CM

## 2025-03-27 DIAGNOSIS — I48.0 PAROXYSMAL ATRIAL FIBRILLATION (HCC): ICD-10-CM

## 2025-03-27 DIAGNOSIS — Z85.51 HISTORY OF BLADDER CANCER: ICD-10-CM

## 2025-03-27 PROBLEM — Z01.818 PREOPERATIVE EXAMINATION: Status: RESOLVED | Noted: 2024-12-07 | Resolved: 2025-03-27

## 2025-03-27 PROBLEM — R50.9 FEVER: Status: RESOLVED | Noted: 2024-12-16 | Resolved: 2025-03-27

## 2025-03-27 PROBLEM — S72.8X1A OTHER FRACTURE OF RIGHT FEMUR, INITIAL ENCOUNTER FOR CLOSED FRACTURE (HCC): Status: RESOLVED | Noted: 2024-12-10 | Resolved: 2025-03-27

## 2025-03-27 PROBLEM — Z71.89 ACP (ADVANCE CARE PLANNING): Status: RESOLVED | Noted: 2023-10-07 | Resolved: 2025-03-27

## 2025-03-27 PROBLEM — I16.0 HYPERTENSIVE URGENCY: Status: RESOLVED | Noted: 2023-10-07 | Resolved: 2025-03-27

## 2025-03-27 PROBLEM — T14.8XXA FRACTURE: Status: RESOLVED | Noted: 2024-12-07 | Resolved: 2025-03-27

## 2025-03-27 PROBLEM — R09.02 HYPOXIA: Status: RESOLVED | Noted: 2024-12-08 | Resolved: 2025-03-27

## 2025-03-27 PROBLEM — D72.829 LEUKOCYTOSIS: Status: RESOLVED | Noted: 2024-12-07 | Resolved: 2025-03-27

## 2025-03-27 PROCEDURE — 99214 OFFICE O/P EST MOD 30 MIN: CPT | Performed by: FAMILY MEDICINE

## 2025-03-27 PROCEDURE — 3074F SYST BP LT 130 MM HG: CPT | Performed by: FAMILY MEDICINE

## 2025-03-27 PROCEDURE — 3078F DIAST BP <80 MM HG: CPT | Performed by: FAMILY MEDICINE

## 2025-03-27 RX ORDER — OMEPRAZOLE 20 MG/1
20 CAPSULE, DELAYED RELEASE ORAL DAILY
Qty: 90 CAPSULE | Refills: 2 | Status: SHIPPED | OUTPATIENT
Start: 2025-03-27

## 2025-03-27 ASSESSMENT — FIBROSIS 4 INDEX: FIB4 SCORE: 1.02

## 2025-03-27 NOTE — PROGRESS NOTES
Chief Complaint   Patient presents with    Follow-Up    Enuresis     Stress and urge urinary incontinence    Anemia    Hypertension       Subjective:     HPI:   Franchesca Paige presents today with the followin. Urinary retention with incomplete bladder emptying/History of bladder cancer  Is susceptible to UTIs.  She has history of bladder cancer.  Denies visible hematuria.  Does not empty her bladder well.  She tries to at least double void.  She is having a little more urinary urgency and frequency.  Follow-up urine culture and urinalysis orders discussed and placed.    2. Gastroesophageal reflux disease without esophagitis  The patient feels the current medication regimen of omeprazole is controlling the gastroesophageal reflux symptoms well. Denies dysphagia, reflux symptoms, acidity, abdominal pain or visible blood or mucus in the stool. Denies vomiting or hematemesis. Denies burping or abdominal bloating. Patient avoids oral nonsteroidal anti-inflammatory drugs. Avoids heavy meals or eating within 2 hours of bedtime.  Follow-up lab order discussed and placed.    3. Anemia due to multiple mechanisms  On her last CBC in January her anemia had improved but not resolved.  She is feeling better overall.  A little more strength.  Still some shortness of breath on exertion but generally doing well.  Follow-up lab order discussed and placed.    4. Essential hypertension/Bradycardia  HTN - Chronic condition stable. Currently taking the amlodipine as directed.  Does seem to create bradycardia.  The bradycardia is stable overall.  Does have a fair amount of pulse variability.  She is taking baby aspirin daily.  She had been taking up to 12/day.  She now takes 6 a day of the 81 mg enteric-coated.  The med list states chewable but she thinks it is enteric-coated, she will double check.  She is monitoring BP at home.  Checks intermittently.  Has been extremely good similar to today.  Denies symptoms low BP:  light-headed, tunnel-vision, unusual fatigue.   Denies symptoms high BP:pounding headache, visual changes, palpitations, flushed face.   Denies medicine side effects: unusual fatigue, slow heartbeat, foot/leg swelling, cough.  Follow-up lab orders discussed and placed.    5. Hypercalcemia  Patient has history of hypercalcemia, follow-up lab order discussed and placed.    6. Paroxysmal atrial fibrillation (HCC)/Paroxysmal SVT (supraventricular tachycardia) (HCC)/Hypercoagulable state due to paroxysmal atrial fibrillation (HCC)  Patient does have paroxysmal atrial fibrillation.  In general her heart rate is now slightly bradycardic.  Denies any rapid heartbeat or heavy or sustained palpitations.  Denies presyncope.  Patient is aware that the aspirin does not protect as well against stroke as an anticoagulant such as warfarin or a DOAC.  However, she tends to fall quite frequently and we have discussed relative risks concerning the anticoagulants in the past.  Currently she wishes to continue with the aspirin regimen.    7. Aortic atherosclerosis (HCC)  Patient has history of aortic atherosclerosis without aneurysm.  Follow-up lab order discussed and placed.    8. Hyperglycemia  Patient has history of mild fasting hyperglycemia.  A1c has generally been normal or only minimally elevated.  Due for follow-up testing in 3 months.  Follow-up orders discussed and placed.    9. Thyromegaly  Moderate, stable and smooth.  Follow-up lab order discussed and placed.    10. Status post open reduction and internal fixation (ORIF) of fracture/Status post left hip replacement/Status post total left knee replacement  Right femur open reduction internal fixation.  Had a very shattered distal femur.  Has done quite well.  She feels she is now more mobile and stronger.  She is coming to the end of physical therapy.  Successful left hip replacement and left total knee replacements in the past.  Generally now using a walker and sometimes  not even that with improved stability.  She did come up in a wheelchair today just because of the distance.    11. Osteopenia of multiple sites  Patient does have osteopenia.  Continues vitamin D supplementation.  Test 3 months ago was normal.  Plan to retest in a year or so.    12. Obesity, Class I, BMI 30-34.9  Continues to do well with her gradual weight loss.  Congratulated.        Patient Active Problem List    Diagnosis Date Noted    Urinary retention with incomplete bladder emptying 03/27/2025    Closed bicondylar fracture of distal end of femur with routine healing 01/06/2025    Status post open reduction and internal fixation (ORIF) of fracture 01/06/2025    Closed displaced fracture of distal epiphysis of right femur with routine healing 12/11/2024    Hyperglycemia 12/08/2024    Advance care planning 12/08/2024    Cardiomyopathy due to hypertension, without heart failure (HCC) 12/07/2024    Class 1 obesity without serious comorbidity with body mass index (BMI) of 31.0 to 31.9 in adult 12/07/2024    Hypercalcemia 12/07/2024    Paroxysmal SVT (supraventricular tachycardia) (HCC) 07/01/2024    Aortic atherosclerosis (HCC) 03/22/2024    Other forms of angina pectoris (HCC) 03/22/2024    Thyromegaly 03/22/2024    Onychomycosis of toenail 11/14/2023    Hypercoagulable state due to paroxysmal atrial fibrillation (HCC) 11/08/2023    Coronary atherosclerosis due to calcified coronary lesion 11/08/2023    Fibrosis of liver 11/08/2023    History of community acquired pneumonia 11/08/2023    Paroxysmal atrial fibrillation (HCC) 10/07/2023    Status post left hip replacement 05/22/2023    Decreased hearing, bilateral 05/22/2023    Osteoporosis of femur without pathological fracture 02/01/2023    Caregiver stress 02/11/2022    Situational depression 02/11/2022    Anemia 05/03/2019    Bradycardia 11/01/2018    Ocular migraine 04/16/2018    Osteopenia of multiple sites     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/21/2018    Obesity,  "Class I, BMI 30-34.9 02/24/2017    Need for subacute bacterial endocarditis prophylaxis 01/21/2016    Essential hypertension 06/11/2015    Status post total left knee replacement 06/11/2015    Gastroesophageal reflux disease without esophagitis 06/11/2015    Allergic rhinitis due to pollen 06/11/2015    History of bladder cancer 06/11/2015    Primary osteoarthritis involving multiple joints        Current medicines (including changes today)  Current Outpatient Medications   Medication Sig Dispense Refill    omeprazole (PRILOSEC) 20 MG delayed-release capsule Take 1 Capsule by mouth every day. 90 Capsule 2    aspirin (ASA) 81 MG Chew Tab chewable tablet Chew 81 mg 2 (two) times a day.      amLODIPine (NORVASC) 5 MG Tab Take 1 Tablet by mouth every day. 90 Tablet 3    fosinopril (MONOPRIL) 20 MG Tab Take 1 Tablet by mouth every day. 90 Tablet 2    VITAMIN D PO Take 1 Tablet by mouth every day. 30 Capsule 11    diclofenac sodium (VOLTAREN) 1 % Gel Apply 4 g topically 4 times a day. To knee       No current facility-administered medications for this visit.       Allergies   Allergen Reactions    Ibuprofen Unspecified     Increased blood pressure & risk of stroke    Sulfa Drugs Vomiting and Nausea       ROS: As per HPI  denies increased shortness of breath.  Denies blood in urine or stool.       Objective:     /62   Pulse (!) 48   Temp 36.1 °C (97 °F) (Temporal)   Resp 14   Ht 1.727 m (5' 8\")   Wt 92.5 kg (204 lb)   SpO2 95%  Body mass index is 31.02 kg/m².    Physical Exam:  Constitutional: Well-developed and well-nourished. Not diaphoretic. No distress. Lucid and fluent.  Skin: Skin is warm and dry. No rash noted.  Head: Atraumatic without lesions.  Eyes: Conjunctivae and extraocular motions are normal. Pupils are equal, round, and reactive to light. No scleral icterus.   Ears:  External ears unremarkable.   Neck: Supple, trachea midline.  Moderate smooth thyromegaly present. No cervical or supraclavicular " lymphadenopathy. No JVD or carotid bruits appreciated  Cardiovascular: Regular rate and rhythm.  Normal S1, S2 without murmur appreciated.  Chest: Effort normal. Clear to auscultation throughout. No adventitious sounds.   Abdomen: Soft, non tender, and without distention. Active bowel sounds in all four quadrants. No rebound, guarding, masses or hepatosplenomegaly.  Extremities: No cyanosis, clubbing, erythema, nor edema.   Neurological: Alert and oriented x 3. No tremor appreciated.  Psychiatric:  Behavior, mood, and affect are appropriate.       Assessment and Plan:     79 y.o. female with the following issues:    1. Urinary retention with incomplete bladder emptying  URINALYSIS    URINE CULTURE(NEW)      2. History of bladder cancer  URINALYSIS    URINE CULTURE(NEW)      3. Gastroesophageal reflux disease without esophagitis  omeprazole (PRILOSEC) 20 MG delayed-release capsule      4. Anemia due to multiple mechanisms  CBC WITHOUT DIFFERENTIAL      5. Essential hypertension  Comp Metabolic Panel    CBC WITHOUT DIFFERENTIAL    Lipid Profile      6. Bradycardia        7. Hypercalcemia        8. Paroxysmal atrial fibrillation (HCC)  Comp Metabolic Panel    CBC WITHOUT DIFFERENTIAL      9. Paroxysmal SVT (supraventricular tachycardia) (HCC)        10. Hypercoagulable state due to paroxysmal atrial fibrillation (HCC)        11. Aortic atherosclerosis (HCC)        12. Hyperglycemia  Comp Metabolic Panel    HEMOGLOBIN A1C      13. Thyromegaly  TSH      14. Status post open reduction and internal fixation (ORIF) of fracture        15. Status post left hip replacement        16. Status post total left knee replacement        17. Osteopenia of multiple sites        18. Obesity, Class I, BMI 30-34.9              Followup: Return in about 6 months (around 9/27/2025), or if symptoms worsen or fail to improve.

## 2025-05-03 NOTE — CARE PLAN
Problem: Mobility  Goal: STG-Within one week, patient will propel wheelchair household distances SBA with BUE support x 25 ft x 2  Outcome: Met  Goal: STG-Within one week, patient will ambulate household distance min A with // bars and wc follow 10 ft x 2  Outcome: Met     Problem: Mobility Transfers  Goal: STG-Within one week, patient will perform bed mobility mod A of 1 with leg / bed controls as needed  Outcome: Met  Goal: STG-Within one week, patient will transfer bed to chair min / mod A of 1 for stand-step with FWW vs slide board  Outcome: Met      All other review of systems negative, except as noted in HPI

## 2025-07-05 ENCOUNTER — APPOINTMENT (OUTPATIENT)
Dept: LAB | Facility: MEDICAL CENTER | Age: 80
End: 2025-07-05
Payer: MEDICARE

## 2025-07-05 DIAGNOSIS — I10 ESSENTIAL HYPERTENSION: ICD-10-CM

## 2025-07-05 DIAGNOSIS — I70.0 AORTIC ATHEROSCLEROSIS (HCC): ICD-10-CM

## 2025-07-05 DIAGNOSIS — Z85.51 HISTORY OF BLADDER CANCER: ICD-10-CM

## 2025-07-05 DIAGNOSIS — R33.9 URINARY RETENTION WITH INCOMPLETE BLADDER EMPTYING: ICD-10-CM

## 2025-07-05 DIAGNOSIS — I48.0 PAROXYSMAL ATRIAL FIBRILLATION (HCC): ICD-10-CM

## 2025-07-05 DIAGNOSIS — E01.0 THYROMEGALY: ICD-10-CM

## 2025-07-05 DIAGNOSIS — R73.9 HYPERGLYCEMIA: ICD-10-CM

## 2025-07-05 DIAGNOSIS — D64.89 ANEMIA DUE TO MULTIPLE MECHANISMS: ICD-10-CM

## 2025-07-05 LAB
ALBUMIN SERPL BCP-MCNC: 4.1 G/DL (ref 3.2–4.9)
ALBUMIN/GLOB SERPL: 1.3 G/DL
ALP SERPL-CCNC: 126 U/L (ref 30–99)
ALT SERPL-CCNC: 20 U/L (ref 2–50)
ANION GAP SERPL CALC-SCNC: 9 MMOL/L (ref 7–16)
APPEARANCE UR: CLEAR
AST SERPL-CCNC: 21 U/L (ref 12–45)
BACTERIA #/AREA URNS HPF: ABNORMAL /HPF
BILIRUB SERPL-MCNC: 0.4 MG/DL (ref 0.1–1.5)
BILIRUB UR QL STRIP.AUTO: NEGATIVE
BUN SERPL-MCNC: 20 MG/DL (ref 8–22)
CALCIUM ALBUM COR SERPL-MCNC: 10.5 MG/DL (ref 8.5–10.5)
CALCIUM SERPL-MCNC: 10.6 MG/DL (ref 8.5–10.5)
CASTS URNS QL MICRO: ABNORMAL /LPF (ref 0–2)
CHLORIDE SERPL-SCNC: 105 MMOL/L (ref 96–112)
CHOLEST SERPL-MCNC: 210 MG/DL (ref 100–199)
CO2 SERPL-SCNC: 22 MMOL/L (ref 20–33)
COLOR UR: YELLOW
CREAT SERPL-MCNC: 0.61 MG/DL (ref 0.5–1.4)
EPITHELIAL CELLS 1715: ABNORMAL /HPF (ref 0–5)
ERYTHROCYTE [DISTWIDTH] IN BLOOD BY AUTOMATED COUNT: 45 FL (ref 35.9–50)
EST. AVERAGE GLUCOSE BLD GHB EST-MCNC: 111 MG/DL
FASTING STATUS PATIENT QL REPORTED: NORMAL
GFR SERPLBLD CREATININE-BSD FMLA CKD-EPI: 92 ML/MIN/1.73 M 2
GLOBULIN SER CALC-MCNC: 3.2 G/DL (ref 1.9–3.5)
GLUCOSE SERPL-MCNC: 102 MG/DL (ref 65–99)
GLUCOSE UR STRIP.AUTO-MCNC: NEGATIVE MG/DL
HBA1C MFR BLD: 5.5 % (ref 4–5.6)
HCT VFR BLD AUTO: 38.3 % (ref 37–47)
HDLC SERPL-MCNC: 80 MG/DL
HGB BLD-MCNC: 12.7 G/DL (ref 12–16)
KETONES UR STRIP.AUTO-MCNC: NEGATIVE MG/DL
LDLC SERPL CALC-MCNC: 114 MG/DL
LEUKOCYTE ESTERASE UR QL STRIP.AUTO: ABNORMAL
MCH RBC QN AUTO: 29.2 PG (ref 27–33)
MCHC RBC AUTO-ENTMCNC: 33.2 G/DL (ref 32.2–35.5)
MCV RBC AUTO: 88 FL (ref 81.4–97.8)
MICRO URNS: ABNORMAL
NITRITE UR QL STRIP.AUTO: NEGATIVE
PH UR STRIP.AUTO: 7 [PH] (ref 5–8)
PLATELET # BLD AUTO: 233 K/UL (ref 164–446)
PMV BLD AUTO: 9.9 FL (ref 9–12.9)
POTASSIUM SERPL-SCNC: 4.2 MMOL/L (ref 3.6–5.5)
PROT SERPL-MCNC: 7.3 G/DL (ref 6–8.2)
PROT UR QL STRIP: NEGATIVE MG/DL
RBC # BLD AUTO: 4.35 M/UL (ref 4.2–5.4)
RBC # URNS HPF: ABNORMAL /HPF (ref 0–2)
RBC UR QL AUTO: NEGATIVE
SODIUM SERPL-SCNC: 136 MMOL/L (ref 135–145)
SP GR UR STRIP.AUTO: 1.01
TRIGL SERPL-MCNC: 78 MG/DL (ref 0–149)
TSH SERPL-ACNC: 3.69 UIU/ML (ref 0.38–5.33)
UROBILINOGEN UR STRIP.AUTO-MCNC: 0.2 EU/DL
WBC # BLD AUTO: 6.9 K/UL (ref 4.8–10.8)
WBC #/AREA URNS HPF: ABNORMAL /HPF

## 2025-07-05 PROCEDURE — 87077 CULTURE AEROBIC IDENTIFY: CPT

## 2025-07-05 PROCEDURE — 80053 COMPREHEN METABOLIC PANEL: CPT

## 2025-07-05 PROCEDURE — 81001 URINALYSIS AUTO W/SCOPE: CPT

## 2025-07-05 PROCEDURE — 83036 HEMOGLOBIN GLYCOSYLATED A1C: CPT

## 2025-07-05 PROCEDURE — 84443 ASSAY THYROID STIM HORMONE: CPT

## 2025-07-05 PROCEDURE — 80061 LIPID PANEL: CPT

## 2025-07-05 PROCEDURE — 87086 URINE CULTURE/COLONY COUNT: CPT

## 2025-07-05 PROCEDURE — 85027 COMPLETE CBC AUTOMATED: CPT

## 2025-07-05 PROCEDURE — 87186 SC STD MICRODIL/AGAR DIL: CPT

## 2025-07-05 PROCEDURE — 36415 COLL VENOUS BLD VENIPUNCTURE: CPT

## 2025-07-07 LAB
BACTERIA UR CULT: ABNORMAL
BACTERIA UR CULT: ABNORMAL
SIGNIFICANT IND 70042: ABNORMAL
SITE SITE: ABNORMAL
SOURCE SOURCE: ABNORMAL

## 2025-08-15 DIAGNOSIS — R05.9 COUGH IN ADULT: ICD-10-CM

## 2025-08-15 DIAGNOSIS — U07.1 POSITIVE SELF-ADMINISTERED ANTIGEN TEST FOR COVID-19: Primary | ICD-10-CM

## 2025-08-16 DIAGNOSIS — U07.1 POSITIVE SELF-ADMINISTERED ANTIGEN TEST FOR COVID-19: ICD-10-CM

## 2025-08-16 DIAGNOSIS — R05.9 COUGH IN ADULT: ICD-10-CM

## 2025-08-18 ENCOUNTER — APPOINTMENT (OUTPATIENT)
Dept: URBAN - METROPOLITAN AREA CLINIC 35 | Facility: CLINIC | Age: 80
Setting detail: DERMATOLOGY
End: 2025-08-18

## 2025-08-18 DIAGNOSIS — D18.0 HEMANGIOMA: ICD-10-CM

## 2025-08-18 DIAGNOSIS — L82.1 OTHER SEBORRHEIC KERATOSIS: ICD-10-CM

## 2025-08-18 DIAGNOSIS — L81.4 OTHER MELANIN HYPERPIGMENTATION: ICD-10-CM

## 2025-08-18 DIAGNOSIS — Z71.89 OTHER SPECIFIED COUNSELING: ICD-10-CM

## 2025-08-18 DIAGNOSIS — D22 MELANOCYTIC NEVI: ICD-10-CM

## 2025-08-18 DIAGNOSIS — L57.0 ACTINIC KERATOSIS: ICD-10-CM

## 2025-08-18 PROBLEM — D22.5 MELANOCYTIC NEVI OF TRUNK: Status: ACTIVE | Noted: 2025-08-18

## 2025-08-18 PROBLEM — D18.01 HEMANGIOMA OF SKIN AND SUBCUTANEOUS TISSUE: Status: ACTIVE | Noted: 2025-08-18

## 2025-08-18 PROBLEM — D22.62 MELANOCYTIC NEVI OF LEFT UPPER LIMB, INCLUDING SHOULDER: Status: ACTIVE | Noted: 2025-08-18

## 2025-08-18 PROCEDURE — ? COUNSELING

## 2025-08-18 PROCEDURE — ? SUNSCREEN RECOMMENDATIONS

## 2025-08-18 PROCEDURE — ? OBSERVATION

## 2025-08-18 PROCEDURE — ? LIQUID NITROGEN

## 2025-08-18 ASSESSMENT — LOCATION SIMPLE DESCRIPTION DERM
LOCATION SIMPLE: LEFT FOREHEAD
LOCATION SIMPLE: LEFT UPPER BACK
LOCATION SIMPLE: CHEST
LOCATION SIMPLE: LEFT PRETIBIAL REGION
LOCATION SIMPLE: LEFT SHOULDER
LOCATION SIMPLE: ABDOMEN

## 2025-08-18 ASSESSMENT — LOCATION ZONE DERM
LOCATION ZONE: LEG
LOCATION ZONE: TRUNK
LOCATION ZONE: FACE
LOCATION ZONE: ARM

## 2025-08-18 ASSESSMENT — LOCATION DETAILED DESCRIPTION DERM
LOCATION DETAILED: LEFT INFERIOR LATERAL FOREHEAD
LOCATION DETAILED: LEFT LATERAL SHOULDER
LOCATION DETAILED: LEFT INFERIOR MEDIAL UPPER BACK
LOCATION DETAILED: RIGHT LATERAL SUPERIOR CHEST
LOCATION DETAILED: LEFT MEDIAL UPPER BACK
LOCATION DETAILED: LEFT LATERAL PROXIMAL PRETIBIAL REGION
LOCATION DETAILED: LEFT MEDIAL FOREHEAD
LOCATION DETAILED: EPIGASTRIC SKIN

## 2025-10-27 ENCOUNTER — APPOINTMENT (OUTPATIENT)
Dept: MEDICAL GROUP | Facility: MEDICAL CENTER | Age: 80
End: 2025-10-27
Payer: MEDICARE

## (undated) DEVICE — CANISTER SUCTION RIGID RED 1500CC (40EA/CA)

## (undated) DEVICE — DRAPE SRG LG BCK TBL DISP - 10/CA

## (undated) DEVICE — TOWEL STOP TIMEOUT SAFETY FLAG (40EA/CA)

## (undated) DEVICE — TOWELS CLOTH SURGICAL - (4/PK 20PK/CA)

## (undated) DEVICE — WATER IRRIGATION STERILE 1000ML (12EA/CA)

## (undated) DEVICE — GLOVE BIOGEL SZ 6.5 SURGICAL PF LTX (50PR/BX 4BX/CA)

## (undated) DEVICE — GLOVE BIOGEL SZ 8 SURGICAL PF LTX - (50PR/BX 4BX/CA)

## (undated) DEVICE — SET LEADWIRE 5 LEAD BEDSIDE DISPOSABLE ECG (1SET OF 5/EA)

## (undated) DEVICE — SODIUM CHL. IRRIGATION 0.9% 3000ML (4EA/CA 65CA/PF)

## (undated) DEVICE — TUBE CONNECTING SUCTION - CLEAR PLASTIC STERILE 72 IN (50EA/CA)

## (undated) DEVICE — BIT DRILL DIA2.6MM SCALED FOR VARIAX 2 WRIST FUSION LOCKING PLATE SYSTEM

## (undated) DEVICE — SENSOR OXIMETER ADULT SPO2 RD SET (20EA/BX)

## (undated) DEVICE — GLOVE BIOGEL INDICATOR SZ 8 SURGICAL PF LTX - (50/BX 4BX/CA)

## (undated) DEVICE — BLADE SAGITTAL SYSTEM 18MM

## (undated) DEVICE — SPONGE GAUZESTER 4 X 4 4PLY - (128PK/CA)

## (undated) DEVICE — DRAPE C-ARM LARGE 41IN X 74 IN - (10/BX 2BX/CA)

## (undated) DEVICE — LENS/HOOD FOR SPACESUIT - (32/PK) PEEL AWAY FACE

## (undated) DEVICE — DRAPE 36X28IN RAD CARM BND BG - (25/CA) O

## (undated) DEVICE — DRESSING STERILE BURN ACTICOAT FLEX 7 (50EA/CA)

## (undated) DEVICE — SUTURE GENERAL

## (undated) DEVICE — DRESSING TRANSPARENT FILM TEGADERM 4 X 4.75" (50EA/BX)"

## (undated) DEVICE — SUCTION INSTRUMENT YANKAUER BULBOUS TIP W/O VENT (50EA/CA)

## (undated) DEVICE — DRESSING POST OP BORDER 4 X 10 (5EA/BX)

## (undated) DEVICE — HANDPIECE 10FT INTPLS SCT PLS IRRIGATION HAND CONTROL SET (6/PK)

## (undated) DEVICE — DRAPE SURGICAL U 77X120 - (10/CA)

## (undated) DEVICE — SODIUM CHL IRRIGATION 0.9% 1000ML (12EA/CA)

## (undated) DEVICE — DRAPE LARGE 3 QUARTER - (20/CA)

## (undated) DEVICE — PACK MAJOR ORTHO - (2EA/CA)

## (undated) DEVICE — BAG SPONGE COUNT 10.25 X 32 - BLUE (250/CA)

## (undated) DEVICE — SET EXTENSION WITH 2 PORTS (48EA/CA) ***PART #2C8610 IS A SUBSTITUTE*****

## (undated) DEVICE — SUTURE 2-0 MONOCRYL PLUS UNDYED CT-1 1 X 36 (36EA/BX)"

## (undated) DEVICE — GOWN WARMING STANDARD FLEX - (30/CA)

## (undated) DEVICE — SUTURE 2-0 VICRYL PLUS CT-1 - 8 X 18 INCH(12/BX)

## (undated) DEVICE — SUTURE 1 VICRYL PLUS CTX - 8 X 18 INCH (12/BX)

## (undated) DEVICE — DERMABOND ADVANCED - (12EA/BX)

## (undated) DEVICE — TIP INTPLS HFLO ML ORFC BTRY - (12/CS)  FOR SURGILAV

## (undated) DEVICE — LACTATED RINGERS INJ 1000 ML - (14EA/CA 60CA/PF)

## (undated) DEVICE — GLOVE BIOGEL INDICATOR SZ 7SURGICAL PF LTX - (50/BX 4BX/CA)

## (undated) DEVICE — HUMID-VENT HEAT AND MOISTURE EXCHANGE- (50/BX)

## (undated) DEVICE — SUTURE 0 SILK TIES (36PK/BX)

## (undated) DEVICE — IMPLANT FLEXIBLE DRILL 35MM (1EA)

## (undated) DEVICE — GOWN SURGEONS X-LARGE - DISP. (30/CA)

## (undated) DEVICE — PACK TOTAL HIP - (1/CA)

## (undated) DEVICE — CANISTER SUCTION 3000ML MECHANICAL FILTER AUTO SHUTOFF MEDI-VAC NONSTERILE LF DISP (40EA/CA)

## (undated) DEVICE — ELECTRODE DUAL RETURN W/ CORD - (50/PK)

## (undated) DEVICE — Device

## (undated) DEVICE — GLOVE BIOGEL SZ 7.5 SURGICAL PF LTX - (50PR/BX 4BX/CA)

## (undated) DEVICE — PEN SKIN MARKER W/RULER - (50EA/BX)

## (undated) DEVICE — BIT DRILL AO 4.3MM X 215MM (1EA)

## (undated) DEVICE — TUBING CLEARLINK DUO-VENT - C-FLO (48EA/CA)

## (undated) DEVICE — SLEEVE, VASO, THIGH, MED